# Patient Record
Sex: MALE | Race: WHITE | Employment: OTHER | URBAN - METROPOLITAN AREA
[De-identification: names, ages, dates, MRNs, and addresses within clinical notes are randomized per-mention and may not be internally consistent; named-entity substitution may affect disease eponyms.]

---

## 2017-01-11 ENCOUNTER — DOCUMENTATION ONLY (OUTPATIENT)
Dept: LAB | Facility: CLINIC | Age: 82
End: 2017-01-11

## 2017-01-11 DIAGNOSIS — R73.03 PREDIABETES: Primary | ICD-10-CM

## 2017-01-11 DIAGNOSIS — R73.09 ELEVATED GLUCOSE: ICD-10-CM

## 2017-01-11 DIAGNOSIS — I48.91 ATRIAL FIBRILLATION, UNSPECIFIED TYPE (H): ICD-10-CM

## 2017-01-11 DIAGNOSIS — I50.22 CHRONIC SYSTOLIC CONGESTIVE HEART FAILURE (H): ICD-10-CM

## 2017-01-11 DIAGNOSIS — I10 HYPERTENSION GOAL BP (BLOOD PRESSURE) < 140/90: ICD-10-CM

## 2017-01-11 NOTE — PROGRESS NOTES
This patient has a lab only appointment on 1/27/2017 but does not have future orders. It would be appreciated if this order could be made a standing order if necessary Please review, associate diagnosis and sign pending lab orders for the upcoming appointment. There are no future scheduled appointments with you at this time.      Thank you,    [facility]

## 2017-01-15 DIAGNOSIS — Z79.01 LONG-TERM (CURRENT) USE OF ANTICOAGULANTS: Primary | ICD-10-CM

## 2017-01-15 DIAGNOSIS — I48.91 ATRIAL FIBRILLATION, UNSPECIFIED TYPE (H): ICD-10-CM

## 2017-01-17 RX ORDER — WARFARIN SODIUM 2.5 MG/1
TABLET ORAL
Qty: 90 TABLET | Refills: 1 | Status: SHIPPED | OUTPATIENT
Start: 2017-01-17 | End: 2017-07-24

## 2017-01-17 NOTE — TELEPHONE ENCOUNTER
Signed Prescriptions:                        Disp   Refills    warfarin (COUMADIN) 2.5 MG tablet          90 tab*1        Sig: take 1 tablet by mouth once daily or as directed  Authorizing Provider: FAVIOLA BANERJEE  Ordering User: MOI MUKHERJEE RN refilled requested medication per [facility] according to the [facility] protocol.  Moi Mukherjee RN

## 2017-01-25 ENCOUNTER — TRANSFERRED RECORDS (OUTPATIENT)
Dept: HEALTH INFORMATION MANAGEMENT | Facility: CLINIC | Age: 82
End: 2017-01-25

## 2017-01-25 ENCOUNTER — TELEPHONE (OUTPATIENT)
Dept: FAMILY MEDICINE | Facility: CLINIC | Age: 82
End: 2017-01-25

## 2017-01-25 DIAGNOSIS — G89.4 CHRONIC PAIN SYNDROME: Primary | ICD-10-CM

## 2017-01-25 RX ORDER — OXYCODONE AND ACETAMINOPHEN 5; 325 MG/1; MG/1
1 TABLET ORAL 2 TIMES DAILY PRN
Qty: 50 TABLET | Refills: 0 | Status: SHIPPED | OUTPATIENT
Start: 2017-01-25 | End: 2017-02-14

## 2017-01-25 RX ORDER — MORPHINE SULFATE 15 MG/1
15 TABLET, FILM COATED, EXTENDED RELEASE ORAL 3 TIMES DAILY
Qty: 90 TABLET | Refills: 0 | Status: SHIPPED | OUTPATIENT
Start: 2017-01-25 | End: 2017-02-14

## 2017-01-25 NOTE — TELEPHONE ENCOUNTER
Left message for patient to call back in regards to approved refills and needing appointment.    Elyse Addison, CMA

## 2017-01-25 NOTE — TELEPHONE ENCOUNTER
The medications are refilled but he is due for a follow up appointment with me, Prescriptions sent to team pink huddle room     Please help see to it that appointment is generated within 30 days is preferred    Paul Knowles MD

## 2017-01-25 NOTE — TELEPHONE ENCOUNTER
Patient called RN hotline requesting a refill for the following medications.  Patient would like a call when these are ready to be picked up.   Controlled Substance Refill Request for Morphine     Problem List Complete:  Yes    Last Written Prescription Date:  7/19/16  Last Fill Quantity: 90,   # refills: 0    Last Office Visit with [facility] primary care provider: 7/19/16    Clinic visit frequency required: Q 3 months     Future Office visit:     Controlled substance agreement on file: Yes:  Date 4/25/16.     Processing:  Patient will  in clinic patient would like a call when this is ready    checked in past 6 months?  Yes today 1/25/17- no concerns last filled 12/29/16    Controlled Substance Refill Request for Percocet   Problem List Complete:  Yes    Last Written Prescription Date:  7/19/16  Last Fill Quantity: 50,   # refills: 0    Last Office Visit with [facility] primary care provider: 7/19/16    Clinic visit frequency required: Q 3 months     Future Office visit:     Controlled substance agreement on file: Yes:  Date 4/25/16.     Processing:  Patient will  in clinic   checked in past 6 months?  Yes today 1/25/17, no concerns      Josselyn Corona RN

## 2017-01-26 NOTE — TELEPHONE ENCOUNTER
MsContin and Percocet prescriptions at  for pick-up.  Patient called and informed.  Patient scheduled to see Dr. Knowles on Tuesday, February 14th at 11:10 a.m. Emma Leach,

## 2017-01-27 ENCOUNTER — ANTICOAGULATION THERAPY VISIT (OUTPATIENT)
Dept: NURSING | Facility: CLINIC | Age: 82
End: 2017-01-27
Payer: MEDICARE

## 2017-01-27 DIAGNOSIS — Z79.01 LONG-TERM (CURRENT) USE OF ANTICOAGULANTS: Primary | ICD-10-CM

## 2017-01-27 DIAGNOSIS — I48.91 ATRIAL FIBRILLATION, UNSPECIFIED TYPE (H): ICD-10-CM

## 2017-01-27 LAB
INR POINT OF CARE: 2.2 (ref 0.86–1.14)
INR PPP: 2.2 (ref 0.86–1.14)

## 2017-01-27 PROCEDURE — 99207 ZZC NO CHARGE NURSE ONLY: CPT | Performed by: INTERNAL MEDICINE

## 2017-01-27 PROCEDURE — 85610 PROTHROMBIN TIME: CPT | Mod: QW | Performed by: INTERNAL MEDICINE

## 2017-01-27 PROCEDURE — 36416 COLLJ CAPILLARY BLOOD SPEC: CPT | Performed by: INTERNAL MEDICINE

## 2017-01-27 NOTE — PROGRESS NOTES
ANTICOAGULATION FOLLOW-UP CLINIC VISIT    Patient Name:  Alexandru Crews  Date:  1/27/2017  Contact Type:  Telephone    SUBJECTIVE:     Patient Findings     Positives No Problem Findings           OBJECTIVE    INR   Date Value Ref Range Status   01/27/2017 2.20* 0.86 - 1.14 Final     Comment:     This test is intended for monitoring Coumadin therapy.  Results are not   accurate   in patients with prolonged INR due to factor deficiency.         ASSESSMENT / PLAN  INR assessment THER    Recheck INR In: 6 WEEKS    INR Location Clinic      Anticoagulation Summary as of 1/27/2017     INR goal 2.0-3.0   Selected INR 2.2 (1/27/2017)   Maintenance plan 2.5 mg (2.5 mg x 1) every day   Full instructions 2.5 mg every day   Weekly total 17.5 mg   No change documented Rabia Ott RN   Plan last modified Allie Awan RN (4/8/2016)   Next INR check 3/10/2017   Priority INR   Target end date     Indications   Long-term (current) use of anticoagulants [Z79.01] [Z79.01]  Atrial fibrillation (H) [I48.91]         Anticoagulation Episode Summary     INR check location     Preferred lab     Send INR reminders to [facility] CLINIC    Comments       Anticoagulation Care Providers     Provider Role Specialty Phone number    Paul Knowles MD [facility] Internal Medicine [phone]            See the Encounter Report to view Anticoagulation Flowsheet and Dosing Calendar (Go to Encounters tab in chart review, and find the Anticoagulation Therapy Visit)      Rabia Ott RN

## 2017-02-14 ENCOUNTER — OFFICE VISIT (OUTPATIENT)
Dept: FAMILY MEDICINE | Facility: CLINIC | Age: 82
End: 2017-02-14
Payer: MEDICARE

## 2017-02-14 VITALS
DIASTOLIC BLOOD PRESSURE: 76 MMHG | BODY MASS INDEX: 23.7 KG/M2 | HEART RATE: 67 BPM | TEMPERATURE: 96.8 F | SYSTOLIC BLOOD PRESSURE: 146 MMHG | WEIGHT: 129.6 LBS

## 2017-02-14 DIAGNOSIS — G89.4 CHRONIC PAIN SYNDROME: ICD-10-CM

## 2017-02-14 DIAGNOSIS — I48.91 ATRIAL FIBRILLATION, UNSPECIFIED TYPE (H): ICD-10-CM

## 2017-02-14 DIAGNOSIS — M79.672 LEFT FOOT PAIN: ICD-10-CM

## 2017-02-14 DIAGNOSIS — Z23 NEED FOR PROPHYLACTIC VACCINATION AND INOCULATION AGAINST INFLUENZA: Primary | ICD-10-CM

## 2017-02-14 DIAGNOSIS — R73.09 ELEVATED GLUCOSE: ICD-10-CM

## 2017-02-14 DIAGNOSIS — I10 HYPERTENSION GOAL BP (BLOOD PRESSURE) < 140/90: ICD-10-CM

## 2017-02-14 DIAGNOSIS — R53.83 FATIGUE, UNSPECIFIED TYPE: ICD-10-CM

## 2017-02-14 DIAGNOSIS — R73.03 PREDIABETES: ICD-10-CM

## 2017-02-14 LAB
ALT SERPL W P-5'-P-CCNC: 27 U/L (ref 0–70)
ANION GAP SERPL CALCULATED.3IONS-SCNC: 8 MMOL/L (ref 3–14)
BASOPHILS # BLD AUTO: 0 10E9/L (ref 0–0.2)
BASOPHILS NFR BLD AUTO: 0.3 %
BUN SERPL-MCNC: 21 MG/DL (ref 7–30)
CALCIUM SERPL-MCNC: 9.7 MG/DL (ref 8.5–10.1)
CHLORIDE SERPL-SCNC: 103 MMOL/L (ref 94–109)
CO2 SERPL-SCNC: 29 MMOL/L (ref 20–32)
CREAT SERPL-MCNC: 0.74 MG/DL (ref 0.66–1.25)
DIFFERENTIAL METHOD BLD: ABNORMAL
EOSINOPHIL # BLD AUTO: 0.2 10E9/L (ref 0–0.7)
EOSINOPHIL NFR BLD AUTO: 2.9 %
ERYTHROCYTE [DISTWIDTH] IN BLOOD BY AUTOMATED COUNT: 13.6 % (ref 10–15)
GFR SERPL CREATININE-BSD FRML MDRD: ABNORMAL ML/MIN/1.7M2
GLUCOSE SERPL-MCNC: 121 MG/DL (ref 70–99)
HBA1C MFR BLD: 5.5 % (ref 4.3–6)
HCT VFR BLD AUTO: 44.5 % (ref 40–53)
HGB BLD-MCNC: 15.1 G/DL (ref 13.3–17.7)
LYMPHOCYTES # BLD AUTO: 1.6 10E9/L (ref 0.8–5.3)
LYMPHOCYTES NFR BLD AUTO: 23.3 %
MCH RBC QN AUTO: 29.7 PG (ref 26.5–33)
MCHC RBC AUTO-ENTMCNC: 33.9 G/DL (ref 31.5–36.5)
MCV RBC AUTO: 88 FL (ref 78–100)
MONOCYTES # BLD AUTO: 0.5 10E9/L (ref 0–1.3)
MONOCYTES NFR BLD AUTO: 7.7 %
NEUTROPHILS # BLD AUTO: 4.5 10E9/L (ref 1.6–8.3)
NEUTROPHILS NFR BLD AUTO: 65.8 %
PLATELET # BLD AUTO: 148 10E9/L (ref 150–450)
POTASSIUM SERPL-SCNC: 4.3 MMOL/L (ref 3.4–5.3)
RBC # BLD AUTO: 5.08 10E12/L (ref 4.4–5.9)
SODIUM SERPL-SCNC: 140 MMOL/L (ref 133–144)
WBC # BLD AUTO: 6.8 10E9/L (ref 4–11)

## 2017-02-14 PROCEDURE — 36415 COLL VENOUS BLD VENIPUNCTURE: CPT | Performed by: INTERNAL MEDICINE

## 2017-02-14 PROCEDURE — 84460 ALANINE AMINO (ALT) (SGPT): CPT | Performed by: INTERNAL MEDICINE

## 2017-02-14 PROCEDURE — 85025 COMPLETE CBC W/AUTO DIFF WBC: CPT | Performed by: INTERNAL MEDICINE

## 2017-02-14 PROCEDURE — 80048 BASIC METABOLIC PNL TOTAL CA: CPT | Performed by: INTERNAL MEDICINE

## 2017-02-14 PROCEDURE — 83036 HEMOGLOBIN GLYCOSYLATED A1C: CPT | Performed by: INTERNAL MEDICINE

## 2017-02-14 PROCEDURE — 99214 OFFICE O/P EST MOD 30 MIN: CPT | Performed by: INTERNAL MEDICINE

## 2017-02-14 RX ORDER — MORPHINE SULFATE 15 MG/1
15 TABLET, FILM COATED, EXTENDED RELEASE ORAL 3 TIMES DAILY
Qty: 90 TABLET | Refills: 0 | Status: SHIPPED | OUTPATIENT
Start: 2017-02-14 | End: 2017-02-16

## 2017-02-14 RX ORDER — OXYCODONE AND ACETAMINOPHEN 5; 325 MG/1; MG/1
1 TABLET ORAL 2 TIMES DAILY PRN
Qty: 50 TABLET | Refills: 0 | Status: SHIPPED | OUTPATIENT
Start: 2017-02-14 | End: 2017-02-16

## 2017-02-14 NOTE — NURSING NOTE
"Chief Complaint   Patient presents with     Refill Request     refill and recheck medications     Fatigue       Initial /76 (BP Location: Right arm, Patient Position: Chair, Cuff Size: Adult Regular)  Pulse 67  Temp 96.8  F (36  C) (Oral)  Wt 129 lb 9.6 oz (58.8 kg)  BMI 23.7 kg/m2 Estimated body mass index is 23.7 kg/(m^2) as calculated from the following:    Height as of 8/18/16: 5' 2\" (1.575 m).    Weight as of this encounter: 129 lb 9.6 oz (58.8 kg).  Medication Reconciliation: complete   Leonila BRINK CMA ([facility])      "

## 2017-02-14 NOTE — MR AVS SNAPSHOT
After Visit Summary   2/14/2017    Alexandru Crews    MRN: [id number]           Patient Information     Date Of Birth          9/22/1932        Visit Information        Provider Department      2/14/2017 11:10 AM Paul Knowles MD [facility]        Today's Diagnoses     Need for prophylactic vaccination and inoculation against influenza    -  1    Prediabetes        Atrial fibrillation, unspecified type (H)        Chronic pain syndrome        Hypertension goal BP (blood pressure) < 140/90        Elevated glucose        Fatigue, unspecified type        Left foot pain          Care Instructions    [facility]    If you have any questions regarding to your visit please contact your care team:     Team Pink:   Clinic Hours Telephone Number   Internal Medicine:  Dr. Nini Landaverde NP       7am-7pm  Monday - Thursday   7am-5pm  Fridays  [phone]  (Appointment scheduling available 24/7)    Questions about your visit?  Team Line  [phone]   Urgent Care - [place] [facility] and [place] [facility] - 11am-9pm Monday-Friday Saturday-Sunday- 9am-5pm   [place] - 5pm-9pm Monday-Friday Saturday-Sunday- 9am-5pm  [phone] - [place]   [phone] - [place]       What options do I have for visits at the clinic other than the traditional office visit?  To expand how we care for you, many of our providers are utilizing electronic visits (e-visits) and telephone visits, when medically appropriate, for interactions with their patients rather than a visit in the clinic.   We also offer nurse visits for many medical concerns. Just like any other service, we will bill your insurance company for this type of visit based on time spent on the phone with your provider. Not all insurance companies cover these visits. Please check with your medical insurance if this type of visit is covered. You will be responsible for any charges that are not paid by  your insurance.      E-visits via [employer]:  generally incur a $35.00 fee.  Telephone visits:  Time spent on the phone: *charged based on time that is spent on the phone in increments of 10 minutes. Estimated cost:   5-10 mins $30.00   11-20 mins. $59.00   21-30 mins. $85.00   Use [employer] (secure email communication and access to your chart) to send your primary care provider a message or make an appointment. Ask someone on your Team how to sign up for [employer].    For a Price Quote for your services, please call our Consumer Price Line at [phone].    As always, Thank you for trusting us with your health care needs!    Discharged by Leonila BRINK CMA ([facility])          Follow-ups after your visit        Additional Services     PODIATRY/FOOT & ANKLE SURGERY REFERRAL       Your provider has referred you to: [facility] [place] [phone]   [email or web address]    Please be aware that coverage of these services is subject to the terms and limitations of your health insurance plan.  Call member services at your health plan with any benefit or coverage questions.      Please bring the following to your appointment:  >>   Any x-rays, CTs or MRIs which have been performed.  Contact the facility where they were done to arrange for  prior to your scheduled appointment.    >>   List of current medications   >>   This referral request   >>   Any documents/labs given to you for this referral                  Your next 10 appointments already scheduled     Mar 10, 2017  8:45 AM CST   LAB with [facility]   [facility]    [address]  [place] [address] [phone]           Patient must bring picture ID.  Patient should be prepared to give a urine specimen  Please do not eat 10-12 hours before your appointment if you are coming in fasting for labs on lipids, cholesterol, or glucose (sugar).  Pregnant women should follow their Care  "Team instructions. Water with medications is okay. Do not drink coffee or other fluids.   If you have concerns about taking  your medications, please ask at office or if scheduling via [employer], send a message by clicking on Secure Messaging, Message Your Care Team.              Future tests that were ordered for you today     Open Future Orders        Priority Expected Expires Ordered    **TSH with free T4 reflex FUTURE anytime Routine 2017    **Vitamin B12 FUTURE 2mo Routine 4/15/2017 2017 2017            Who to contact     If you have questions or need follow up information about today's clinic visit or your schedule please contact [facility] directly at [phone].  Normal or non-critical lab and imaging results will be communicated to you by [employer], letter or phone within 4 business days after the clinic has received the results. If you do not hear from us within 7 days, please contact the clinic through [employer] or phone. If you have a critical or abnormal lab result, we will notify you by phone as soon as possible.  Submit refill requests through [employer] or call your pharmacy and they will forward the refill request to us. Please allow 3 business days for your refill to be completed.          Additional Information About Your Visit        [employer] Information     [employer] lets you send messages to your doctor, view your test results, renew your prescriptions, schedule appointments and more. To sign up, go to [email or web address] . Click on \"Log in\" on the left side of the screen, which will take you to the Welcome page. Then click on \"Sign up Now\" on the right side of the page.     You will be asked to enter the access code listed below, as well as some personal information. Please follow the directions to create your username and password.     Your access code is: [email or web address]  Expires: 2017 12:57 PM     Your access code will  in 90 days. If you " need help or a new code, please call your [place] clinic or [phone].        Care EveryWhere ID     This is your Care EveryWhere ID. This could be used by other organizations to access your [place] medical records  [id number]        Your Vitals Were     Pulse Temperature BMI (Body Mass Index)             67 96.8  F (36  C) (Oral) 23.7 kg/m2          Blood Pressure from Last 3 Encounters:   02/14/17 146/76   12/01/16 132/60   07/19/16 128/68    Weight from Last 3 Encounters:   02/14/17 129 lb 9.6 oz (58.8 kg)   08/18/16 124 lb (56.2 kg)   07/19/16 124 lb (56.2 kg)              We Performed the Following     ALT     BASIC METABOLIC PANEL     CBC with platelets differential     Hemoglobin A1c     PODIATRY/FOOT & ANKLE SURGERY REFERRAL          Where to get your medicines      Some of these will need a paper prescription and others can be bought over the counter.  Ask your nurse if you have questions.     Bring a paper prescription for each of these medications     morphine 15 MG 12 hr tablet    oxyCODONE-acetaminophen 5-325 MG per tablet          Primary Care Provider Office Phone # Fax #    Paul Knowles [phone]       [address] [address]        Thank you!     Thank you for choosing [facility]  for your care. Our goal is always to provide you with excellent care. Hearing back from our patients is one way we can continue to improve our services. Please take a few minutes to complete the written survey that you may receive in the mail after your visit with us. Thank you!             Your Updated Medication List - Protect others around you: Learn how to safely use, store and throw away your medicines at www.disposemymeds.org.          This list is accurate as of: 2/14/17 11:53 AM.  Always use your most recent med list.                   Brand Name Dispense Instructions for use    calcium-vitamin D 600-400 MG-UNIT per tablet    CALTRATE      Take 1 tablet by mouth every other day       DAILY VITAMINS PO      1 tablet daily       FIBER PO      Take 1 tsp. by mouth daily       Fish Oil 1200 MG Caps      Take 1 capsule by mouth 2 times daily.       metoprolol 25 MG 24 hr tablet    TOPROL-XL     Take 25 mg by mouth 2 times daily       morphine 15 MG 12 hr tablet    MS CONTIN    90 tablet    Take 1 tablet (15 mg) by mouth 3 times daily 28 days or more between refills for controlled medications       oxyCODONE-acetaminophen 5-325 MG per tablet    PERCOCET    50 tablet    Take 1 tablet by mouth 2 times daily as needed for moderate to severe pain 28 days or more between refills for controlled medications       senna-docusate 8.6-50 MG per tablet    SENOKOT-S;PERICOLACE     Take 1 tablet by mouth 3 times daily as needed       triamcinolone 0.1 % cream    KENALOG    60 g    Apply  topically 2 times daily as needed. to affected area as directed.       VITAMIN D (CHOLECALCIFEROL) PO      Take  by mouth daily. 3000 units daily.       warfarin 2.5 MG tablet    COUMADIN    90 tablet    take 1 tablet by mouth once daily or as directed

## 2017-02-14 NOTE — LETTER
[address] [place] [address]    February 16, 2017    Alexandru Crews  [address] [place] [address]          Dear Alexandru,    Enclosed is a copy of your results.  Every single one of these tests is fine ! I will follow up with you further still after my conference with the pharmacist on your case. Please let me know if you have any questions for me. This will not take long, I should have some feedback for you within a week.    Results for orders placed or performed in visit on 02/14/17   ALT   Result Value Ref Range    ALT 27 0 - 70 U/L   BASIC METABOLIC PANEL   Result Value Ref Range    Sodium 140 133 - 144 mmol/L    Potassium 4.3 3.4 - 5.3 mmol/L    Chloride 103 94 - 109 mmol/L    Carbon Dioxide 29 20 - 32 mmol/L    Anion Gap 8 3 - 14 mmol/L    Glucose 121 (H) 70 - 99 mg/dL    Urea Nitrogen 21 7 - 30 mg/dL    Creatinine 0.74 0.66 - 1.25 mg/dL    GFR Estimate >90  Non  GFR Calc   >60 mL/min/1.7m2    GFR Estimate If Black >90   GFR Calc   >60 mL/min/1.7m2    Calcium 9.7 8.5 - 10.1 mg/dL   CBC with platelets differential   Result Value Ref Range    WBC 6.8 4.0 - 11.0 10e9/L    RBC Count 5.08 4.4 - 5.9 10e12/L    Hemoglobin 15.1 13.3 - 17.7 g/dL    Hematocrit 44.5 40.0 - 53.0 %    MCV 88 78 - 100 fl    MCH 29.7 26.5 - 33.0 pg    MCHC 33.9 31.5 - 36.5 g/dL    RDW 13.6 10.0 - 15.0 %    Platelet Count 148 (L) 150 - 450 10e9/L    Diff Method Automated Method     % Neutrophils 65.8 %    % Lymphocytes 23.3 %    % Monocytes 7.7 %    % Eosinophils 2.9 %    % Basophils 0.3 %    Absolute Neutrophil 4.5 1.6 - 8.3 10e9/L    Absolute Lymphocytes 1.6 0.8 - 5.3 10e9/L    Absolute Monocytes 0.5 0.0 - 1.3 10e9/L    Absolute Eosinophils 0.2 0.0 - 0.7 10e9/L    Absolute Basophils 0.0 0.0 - 0.2 10e9/L   Hemoglobin A1c   Result Value Ref Range    Hemoglobin A1C 5.5 4.3 - 6.0 %      Sincerely,        Paul Knowles MD/rk

## 2017-02-14 NOTE — Clinical Note
I felt we could just have a discussion regarding this patient's chronic pain medication , when you get a chance, lets chat.

## 2017-02-14 NOTE — PATIENT INSTRUCTIONS
[facility]    If you have any questions regarding to your visit please contact your care team:     Team Pink:   Clinic Hours Telephone Number   Internal Medicine:  Dr. Nini Landaverde NP       7am-7pm  Monday - Thursday   7am-5pm  Fridays  [phone]  (Appointment scheduling available 24/7)    Questions about your visit?  Team Line  [phone]   Urgent Care - [place] - 11am-9pm Monday-Friday Saturday-Sunday- 9am-5pm   [place] - 5pm-9pm Monday-Friday Saturday-Sunday- 9am-5pm  [phone] - [place]   [phone] - [place]       What options do I have for visits at the clinic other than the traditional office visit?  To expand how we care for you, many of our providers are utilizing electronic visits (e-visits) and telephone visits, when medically appropriate, for interactions with their patients rather than a visit in the clinic.   We also offer nurse visits for many medical concerns. Just like any other service, we will bill your insurance company for this type of visit based on time spent on the phone with your provider. Not all insurance companies cover these visits. Please check with your medical insurance if this type of visit is covered. You will be responsible for any charges that are not paid by your insurance.      E-visits via [employer]:  generally incur a $35.00 fee.  Telephone visits:  Time spent on the phone: *charged based on time that is spent on the phone in increments of 10 minutes. Estimated cost:   5-10 mins $30.00   11-20 mins. $59.00   21-30 mins. $85.00   Use [employer] (secure email communication and access to your chart) to send your primary care provider a message or make an appointment. Ask someone on your Team how to sign up for [employer].    For a Price Quote for your services, please call our Consumer Price Line at [phone].    As always, Thank you for trusting us with your health care  needs!    Discharged by Leonila BRINK CMA ([facility])

## 2017-02-14 NOTE — PROGRESS NOTES
"  SUBJECTIVE:                                                    Alexandru Crews is a 84 year old male who presents to clinic today for the following health issues:        Patient presents with:  Refill Request: refill and recheck medications    . Alexandru Crews is a gentleman with many problems including advancing age.    Coming for medication overview. Needs refills. Because he's on opioid pain medication we have insisted that once a year wasn't often enough and as it is, 6 months office visits are acceptable .    Here at the 6 month ruperto we recognize he has a difficult existence at this point. He feels frustrated with his Morphine (Yahaira) , as it is not as much help as it used to be he says. He asks , could he stop this ? All his pain is \" still there\". He's talking about a ill-defined chronic pain situation mostly with his legs but really fits the diagnosis of a chronic pain syndrome . He's been on chronic oral opioid therapy going all the way  Back 10 years or so, this started with Dr. Alex Mcpherson a Family Medicine Physician who used to practice here at [facility] . He is also using the oxyCODONE (ROXICODONE) - generally taking this just 2 times a day and not 3 times a day because he forgets. He's also frustrated with an unrelated matter , which is chronic fatigue.     He's using the oxyCODONE (ROXICODONE) differently then it was originally prescribed . It was planned as a medication to cover breakthrough symptoms and was to be on a more prn basis but what he does is break these oxyCODONE (ROXICODONE) pills in half and he's taking this 4-5 times a day. He is under the impression that the oxyCODONE (ROXICODONE) works more then the MS-Contin (Morphine Sulfate Controlled-Release).    This patient met previously with Kelli Gonsalez RP, the [facility] pharmacist at an effort to make things better but patient feels this was not of value for him. I simply suggested I would review his " medications with Kelli and get back to him with some suggestions.    Current regimen has been in place for a few years or so.  Quality of life right now is not so good. He needs many orthopedic surgeries but is simply not a viable candidate ; he needs a total knee replacement, a foot surgery , back surgery and has serious shoulder problems and a chronic headache  occipital neuralgia condition, seeing the [facility] for the headache pains. He's also got a pacemaker which he feels saved his life, this was placed for atrial fibrillation with slow rates / sinus pauses     Also serious foot problems , there's some chronic motor dysfunction with the left foot and this is since his back surgery, but the foot is drifting to the left consistent with arthritic degeneration    Due for some additional laboratory workup , see assessment and plan section      Problem list and histories reviewed & adjusted, as indicated.  Additional history: as documented    Patient Active Problem List   Diagnosis     Family history of colon cancer     Osteopenia     HYPERLIPIDEMIA LDL GOAL <160     Vitreous condensations, od > os     Pseudophakia, Yag Caps, ou     Chronic pain syndrome     Advanced directives, counseling/discussion     BPH (benign prostatic hyperplasia)     Atrial fibrillation (H)     History of shingles     Chronic fatigue     Lumbar spinal stenosis     DJD (degenerative joint disease), lumbar and thoracic     Posterior capsular opacification     Diverticulosis of large intestine     H/O cardiac pacemaker     Hypertension goal BP (blood pressure) < 140/90     Long-term (current) use of anticoagulants [Z79.01]     Chronic nonintractable headache, unspecified headache type     Prediabetes     Chronic pain     Elevated glucose     Past Surgical History   Procedure Laterality Date     Aditya NIETO appendectomy  12/31/2004     Carpal tunnel release rt/lt  three     2 on left, one on right     Arthroscopy knee rt/lt   1/2006     Rt & Lt, Dr Arriaga     Phacoemulsification with standard intraocular lens implant  6/2008; 8/2008     right eye; left eye     Shoulder surgery       X four [ right x 2 and left x 2 ][[[[[     Release trigger finger       right hand     Back surgery  1978 / 2003     x 3 in 1978, fusions and one surgery in 2003     Rectal surgery       fissurectomy and proctoplasty     Appendectomy  12/2004     Colonoscopy  1995,2000,2005     Inject epidural lumbar  11/9/2010     [facility]     Inject epidural lumbar  1/4/2011     [facility]     Inject epidural lumbar  4/27/2011     [facility]     Inject epidural cervical  5/24/2011     [facility]     Laser yag capsulotomy  11/2016; 12/2016     left eye; right eye     Cataract iol, rt/lt         Social History   Substance Use Topics     Smoking status: Former Smoker     Years: 15.00     Types: Cigarettes     Quit date: 6/30/1975     Smokeless tobacco: Never Used     Alcohol use Yes      Comment: few beers weekly     Family History   Problem Relation Age of Onset     CANCER Mother      ovarian     Cancer - colorectal Brother          Current Outpatient Prescriptions   Medication Sig Dispense Refill     oxyCODONE-acetaminophen (PERCOCET) 5-325 MG per tablet Take 1 tablet by mouth 2 times daily as needed for moderate to severe pain 28 days or more between refills for controlled medications 50 tablet 0     morphine (MS CONTIN) 15 MG 12 hr tablet Take 1 tablet (15 mg) by mouth 3 times daily 28 days or more between refills for controlled medications 90 tablet 0     warfarin (COUMADIN) 2.5 MG tablet take 1 tablet by mouth once daily or as directed 90 tablet 1     metoprolol (TOPROL-XL) 25 MG 24 hr tablet Take 25 mg by mouth 2 times daily       calcium-vitamin D (CALTRATE) 600-400 MG-UNIT per tablet Take 1 tablet by mouth every other day       senna-docusate (SENOKOT-S;PERICOLACE) 8.6-50 MG per tablet Take 1 tablet by mouth 3 times daily as needed         triamcinolone (KENALOG) 0.1 % cream Apply  topically 2 times daily as needed. to affected area as directed. 60 g 2     FIBER PO Take 1 tsp. by mouth daily        DAILY VITAMINS PO 1 tablet daily       VITAMIN D, CHOLECALCIFEROL, PO Take  by mouth daily. 3000 units daily.        omega-3 fatty acids (FISH OIL) 1200 MG capsule Take 1 capsule by mouth 2 times daily.       Allergies   Allergen Reactions     Lactose Nausea and Vomiting     Sulindac      Upset stomach     Tramadol      Itching     Valdecoxib Itching     Vicodin [Hydrocodone-Acetaminophen]      Itching     Vioxx Itching     Rofecoxib Itching and Rash     Sulfa Drugs Rash     Muscle aches     Recent Labs   Lab Test  02/14/17   1200  07/19/16   1033  12/23/15   0956  07/14/15   1054   07/10/14   1102 06/23/14   A1C  5.5   --    --   5.8   --   5.6   --    LDL   --   102*   --   144*   --   132*   --    HDL   --   71   --   60   --   59   --    TRIG   --   87   --   110   --   100   --    ALT  27   --   30  24   --   19   --    CR  0.74  0.66  0.72  0.66   < >  0.65*  0.75   GFRESTIMATED  >90  Non  GFR Calc    >90  Non  GFR Calc    >90  Non  GFR Calc    >90  Non  GFR Calc     < >  >90  >60   GFRESTBLACK  >90   GFR Calc    >90   GFR Calc    >90   GFR Calc    >90   GFR Calc     < >  >90  >60   POTASSIUM  4.3  4.5  4.1  5.1   < >  5.0  4.6   TSH   --    --    --    --    --   1.30  0.60    < > = values in this interval not displayed.      BP Readings from Last 3 Encounters:   02/14/17 146/76   12/01/16 132/60   07/19/16 128/68    Wt Readings from Last 3 Encounters:   02/14/17 129 lb 9.6 oz (58.8 kg)   08/18/16 124 lb (56.2 kg)   07/19/16 124 lb (56.2 kg)                  Labs reviewed in EPIC  Problem list, Medication list, Allergies, and Medical/Social/Surgical histories reviewed in EPIC and updated as  appropriate.    ROS:  Constitutional, neuro, ENT, endocrine, pulmonary, cardiac, gastrointestinal, genitourinary, musculoskeletal, integument and psychiatric systems are negative, except as otherwise noted.    OBJECTIVE:                                                    /76 (BP Location: Right arm, Patient Position: Chair, Cuff Size: Adult Regular)  Pulse 67  Temp 96.8  F (36  C) (Oral)  Wt 129 lb 9.6 oz (58.8 kg)  BMI 23.7 kg/m2  Body mass index is 23.7 kg/(m^2).  GENERAL APPEARANCE: alert and mild distress  EYES: Eyes grossly normal to inspection, PERRL and conjunctivae and sclerae normal  MS: arthritic changes of multiple joints, he showed me his left ankle which has a severe abnormality consistent with advanced osteoarthritis, his foot is literally pushed to the side in a deformity that looks really quite painful  NEURO: Normal strength and tone, mentation intact and speech normal  PSYCH: mentation appears normal, worried and a dour temperament     Diagnostic test results:  Diagnostic Test Results:  Orders Placed This Encounter   Procedures     ALT     BASIC METABOLIC PANEL     CBC with platelets differential     Hemoglobin A1c     **TSH with free T4 reflex FUTURE anytime     **Vitamin B12 FUTURE 2mo     PODIATRY/FOOT & ANKLE SURGERY REFERRAL          ASSESSMENT/PLAN:                                                    1. Need for prophylactic vaccination and inoculation against influenza      2. Prediabetes  Doubt clinical significance but warrants a1c   - ALT  - BASIC METABOLIC PANEL  - CBC with platelets differential  - Hemoglobin A1c    3. Atrial fibrillation, unspecified type (H)  Baseline, sees [facility], notes reviewed with care everywhere   - ALT  - BASIC METABOLIC PANEL  - CBC with platelets differential    4. Chronic pain syndrome  For right now I simply refilled his medication for another month but suggested I would have a conference with Kelli to consider  different ideas  - ALT  - BASIC METABOLIC PANEL  - CBC with platelets differential  - oxyCODONE-acetaminophen (PERCOCET) 5-325 MG per tablet; Take 1 tablet by mouth 2 times daily as needed for moderate to severe pain 28 days or more between refills for controlled medications  Dispense: 50 tablet; Refill: 0  - morphine (MS CONTIN) 15 MG 12 hr tablet; Take 1 tablet (15 mg) by mouth 3 times daily 28 days or more between refills for controlled medications  Dispense: 90 tablet; Refill: 0    5. Hypertension goal BP (blood pressure) < 140/90  Controlled within acceptable limits   BP Readings from Last 3 Encounters:   02/14/17 146/76   12/01/16 132/60   07/19/16 128/68       - ALT  - BASIC METABOLIC PANEL  - CBC with platelets differential    6. Elevated glucose  As above   - Hemoglobin A1c    7. Fatigue, unspecified type  He adds at the end of the appointment that we need to look into his chronic fatigue and there's really no strong sense here of where to go with this beyond a few additional laboratory studies. He's denying depression symptoms   - **TSH with free T4 reflex FUTURE anytime; Future  - **Vitamin B12 FUTURE 2mo; Future    8. Left foot pain  Suggested an opinion with our Podiatrist , there may be some palliative suggestions to lessen his pain  - PODIATRY/FOOT & ANKLE SURGERY REFERRAL      Follow up with Provider - 3-6 months      Paul Knowles MD  [facility]

## 2017-02-16 RX ORDER — MORPHINE SULFATE 15 MG/1
15 TABLET, FILM COATED, EXTENDED RELEASE ORAL EVERY 12 HOURS
Qty: 60 TABLET | Refills: 0 | Status: SHIPPED | OUTPATIENT
Start: 2017-02-16 | End: 2017-02-25

## 2017-02-16 RX ORDER — OXYCODONE AND ACETAMINOPHEN 5; 325 MG/1; MG/1
1 TABLET ORAL 3 TIMES DAILY PRN
Qty: 90 TABLET | Refills: 0 | Status: SHIPPED | OUTPATIENT
Start: 2017-02-16 | End: 2017-02-25

## 2017-02-17 NOTE — PROGRESS NOTES
Chart note addendum     I called patient and we had a detailed discussion per telephone call. I related to patient that I did spend some extra time reviewing his chart and dosing with opioid pain medication and the history, with the pharmacist . After some careful thought what I am going to do is change his prescriptions as follows     MS-Contin (Morphine Sulfate Controlled-Release) is changed to just 2 times a day with a monthly quantity of 60. We will also change his percocet 5/325 tablets to 3 times a day prn with 90 tabs per month.    We can do this good for 3 months, as of the next time for refill.    See prescriptions sent right now    Paul Knowles MD

## 2017-02-21 ENCOUNTER — TELEPHONE (OUTPATIENT)
Dept: FAMILY MEDICINE | Facility: CLINIC | Age: 82
End: 2017-02-21

## 2017-02-21 DIAGNOSIS — G89.4 CHRONIC PAIN SYNDROME: ICD-10-CM

## 2017-02-21 NOTE — TELEPHONE ENCOUNTER
Patient called RN hotline stating that he was seen on 2/14/17 and was told he could have 3 months worth of medication (pain meds) at a time.  He filled his prescription and is wondering if there is a reason it was not written for 3 months?    Patient also questioning high glucose reading.  Spoke with patient he was not fasting, advised patient that is normal if he was not fasting.      Patient would like this message held for pcp.   Josselyn Corona RN

## 2017-02-25 RX ORDER — MORPHINE SULFATE 15 MG/1
15 TABLET, FILM COATED, EXTENDED RELEASE ORAL EVERY 12 HOURS
Qty: 60 TABLET | Refills: 0 | Status: SHIPPED | OUTPATIENT
Start: 2017-02-25 | End: 2017-07-27

## 2017-02-25 RX ORDER — OXYCODONE AND ACETAMINOPHEN 5; 325 MG/1; MG/1
1 TABLET ORAL 3 TIMES DAILY PRN
Qty: 90 TABLET | Refills: 0 | Status: SHIPPED | OUTPATIENT
Start: 2017-02-25 | End: 2017-02-25

## 2017-02-25 RX ORDER — MORPHINE SULFATE 15 MG/1
15 TABLET, FILM COATED, EXTENDED RELEASE ORAL EVERY 12 HOURS
Qty: 60 TABLET | Refills: 0 | Status: SHIPPED | OUTPATIENT
Start: 2017-02-25 | End: 2017-02-25

## 2017-02-25 RX ORDER — OXYCODONE AND ACETAMINOPHEN 5; 325 MG/1; MG/1
1 TABLET ORAL 3 TIMES DAILY PRN
Qty: 90 TABLET | Refills: 0 | Status: SHIPPED | OUTPATIENT
Start: 2017-02-25 | End: 2017-07-27

## 2017-02-26 NOTE — TELEPHONE ENCOUNTER
Additional prescription are written and Prescriptions sent to team pink huddle room     Paul Knowles MD

## 2017-02-27 NOTE — TELEPHONE ENCOUNTER
2 Percocet and 2 Ms Contin prescriptions at  for pick-up.  Patient called and informed. Emma Leach,

## 2017-03-03 ENCOUNTER — DOCUMENTATION ONLY (OUTPATIENT)
Dept: LAB | Facility: CLINIC | Age: 82
End: 2017-03-03

## 2017-03-03 DIAGNOSIS — I48.91 ATRIAL FIBRILLATION, UNSPECIFIED TYPE (H): Primary | ICD-10-CM

## 2017-03-03 NOTE — PROGRESS NOTES
Dr Knowles,    Mr Crews has a Lab Only appointment for an INR.   Please review his chart, future the test to be done with the dx code and sign.    Thank you   Lab Staff

## 2017-03-03 NOTE — PROGRESS NOTES
This makes no sense to me , I am more then glad to place the order and have done so but this needs to be sorted out    Patient has had chronic anticoagulation and the INRs are usually a standing order and followed by our anticoagulation clinic or with [facility] [place] [facility].    There's something not right here and please find out what it is. Does he need inr clinic enrollment information with our anticoagulation clinic ?    See what's what, and see orders.    aPul Knowles MD

## 2017-03-03 NOTE — PROGRESS NOTES
It appears that patient was scheduled for a lab only INR visit due to no INR nurse for a previous appointment. Called to re-set up a INR clinic appointment instead of a lab only appointment. Left message with patient's wife to call [phone] to set up an appointment.    Rose Navarrete, RN - BC

## 2017-03-10 ENCOUNTER — ANTICOAGULATION THERAPY VISIT (OUTPATIENT)
Dept: NURSING | Facility: CLINIC | Age: 82
End: 2017-03-10
Payer: MEDICARE

## 2017-03-10 DIAGNOSIS — I48.91 ATRIAL FIBRILLATION, UNSPECIFIED TYPE (H): ICD-10-CM

## 2017-03-10 DIAGNOSIS — Z79.01 LONG-TERM (CURRENT) USE OF ANTICOAGULANTS: ICD-10-CM

## 2017-03-10 DIAGNOSIS — R53.83 FATIGUE, UNSPECIFIED TYPE: ICD-10-CM

## 2017-03-10 LAB
INR PPP: 1.6 (ref 0.86–1.14)
TSH SERPL DL<=0.005 MIU/L-ACNC: 1.99 MU/L (ref 0.4–4)
VIT B12 SERPL-MCNC: 755 PG/ML (ref 193–986)

## 2017-03-10 PROCEDURE — 85610 PROTHROMBIN TIME: CPT | Performed by: INTERNAL MEDICINE

## 2017-03-10 PROCEDURE — 36416 COLLJ CAPILLARY BLOOD SPEC: CPT | Performed by: INTERNAL MEDICINE

## 2017-03-10 PROCEDURE — 84443 ASSAY THYROID STIM HORMONE: CPT | Performed by: INTERNAL MEDICINE

## 2017-03-10 PROCEDURE — 82607 VITAMIN B-12: CPT | Performed by: INTERNAL MEDICINE

## 2017-03-10 PROCEDURE — 99207 ZZC NO CHARGE NURSE ONLY: CPT | Performed by: INTERNAL MEDICINE

## 2017-03-10 NOTE — PROGRESS NOTES
ANTICOAGULATION FOLLOW-UP CLINIC VISIT    Patient Name:  Alexandru Crews  Date:  3/10/2017  Contact Type:  Telephone    SUBJECTIVE:     Patient Findings     Positives Missed doses (Missed one dose during this past week, cannot remember the day. )           OBJECTIVE    INR   Date Value Ref Range Status   03/10/2017 1.60 (H) 0.86 - 1.14 Final     Comment:     This test is intended for monitoring Coumadin therapy.  Results are not   accurate   in patients with prolonged INR due to factor deficiency.  PATIENT SAID HE DID NOT TAKE HIS MEDICATION AS DIRECTED THIS PAST WEEK. MS         ASSESSMENT / PLAN  INR assessment SUB    Recheck INR In: 1 WEEK    INR Location Outside lab      Anticoagulation Summary as of 3/10/2017     INR goal 2.0-3.0   Today's INR 1.60!   Maintenance plan 2.5 mg (2.5 mg x 1) every day   Full instructions 3/10: 5 mg; Otherwise 2.5 mg every day   Weekly total 17.5 mg   Plan last modified Allie Awan RN (4/8/2016)   Next INR check 3/17/2017   Priority INR   Target end date     Indications   Long-term (current) use of anticoagulants [Z79.01] [Z79.01]  Atrial fibrillation (H) [I48.91]         Anticoagulation Episode Summary     INR check location     Preferred lab     Send INR reminders to [facility]    Comments       Anticoagulation Care Providers     Provider Role Specialty Phone number    Paul Knowles MD [facility] Internal Medicine [phone]            See the Encounter Report to view Anticoagulation Flowsheet and Dosing Calendar (Go to Encounters tab in chart review, and find the Anticoagulation Therapy Visit)    Dosage adjustment made based on physician directed care plan.    Rose Navarrete RN

## 2017-03-10 NOTE — MR AVS SNAPSHOT
Alexandru CARRERA Eleonora   3/10/2017   Anticoagulation Therapy Visit    Description:  84 year old male   Provider:  Paul Knowles MD   Department:  [facility]           INR as of 3/10/2017     Today's INR 1.60!      Anticoagulation Summary as of 3/10/2017     INR goal 2.0-3.0   Today's INR 1.60!   Full instructions 3/10: 5 mg; Otherwise 2.5 mg every day   Next INR check 3/17/2017    Indications   Long-term (current) use of anticoagulants [Z79.01] [Z79.01]  Atrial fibrillation (H) [I48.91]         Your next Anticoagulation Clinic appointment(s)     Mar 17, 2017  9:15 AM CDT   Anticoagulation Visit with JAYESH ANTI COAG   [facility] ([address] [address] [phone]              Contact Numbers     [facility]  Please call [phone] to cancel and/or reschedule your appointment   Please call [phone] with any problems or questions regarding your therapy.        March 2017 Details    Sun Mon Tue Wed Thu Fri Sat        1               2               3               4                 5               6               7               8               9               10      5 mg   See details      11      2.5 mg           12      2.5 mg         13      2.5 mg         14      2.5 mg         15      2.5 mg         16      2.5 mg         17            18                 19               20               21               22               23               24               25                 26               27               28               29               30               31                 Date Details   03/10 This INR check       Date of next INR:  3/17/2017         How to take your warfarin dose     To take:  2.5 mg Take 1 of the 2.5 mg tablets.    To take:  5 mg Take 2 of the 2.5 mg tablets.

## 2017-03-14 ENCOUNTER — ANTICOAGULATION THERAPY VISIT (OUTPATIENT)
Dept: NURSING | Facility: CLINIC | Age: 82
End: 2017-03-14

## 2017-03-14 DIAGNOSIS — Z79.01 LONG-TERM (CURRENT) USE OF ANTICOAGULANTS: ICD-10-CM

## 2017-03-14 LAB — INR PPP: 1.6

## 2017-03-17 ENCOUNTER — ANTICOAGULATION THERAPY VISIT (OUTPATIENT)
Dept: NURSING | Facility: CLINIC | Age: 82
End: 2017-03-17
Payer: MEDICARE

## 2017-03-17 DIAGNOSIS — Z79.01 LONG-TERM (CURRENT) USE OF ANTICOAGULANTS: ICD-10-CM

## 2017-03-17 LAB — INR POINT OF CARE: 2.6 (ref 0.86–1.14)

## 2017-03-17 PROCEDURE — 99207 ZZC NO CHARGE NURSE ONLY: CPT

## 2017-03-17 PROCEDURE — 85610 PROTHROMBIN TIME: CPT | Mod: QW

## 2017-03-17 PROCEDURE — 36416 COLLJ CAPILLARY BLOOD SPEC: CPT

## 2017-03-17 NOTE — PROGRESS NOTES
ANTICOAGULATION FOLLOW-UP CLINIC VISIT    Patient Name:  Alexandru Crews  Date:  3/17/2017  Contact Type:  Face to Face    SUBJECTIVE:     Patient Findings     Positives No Problem Findings           OBJECTIVE    INR Protime   Date Value Ref Range Status   03/17/2017 2.6 (A) 0.86 - 1.14 Final       ASSESSMENT / PLAN  INR assessment THER    Recheck INR In: 1 WEEK    INR Location Clinic      Anticoagulation Summary as of 3/17/2017     INR goal 2.0-3.0   Today's INR 2.6   Maintenance plan 2.5 mg (2.5 mg x 1) every day   Full instructions 3/17: 5 mg; Otherwise 2.5 mg every day   Weekly total 17.5 mg   Plan last modified Allie Awan RN (4/8/2016)   Next INR check 3/31/2017   Priority INR   Target end date     Indications   Long-term (current) use of anticoagulants [Z79.01] [Z79.01]  Atrial fibrillation (H) [I48.91]         Anticoagulation Episode Summary     INR check location     Preferred lab     Send INR reminders to [facility]    Comments       Anticoagulation Care Providers     Provider Role Specialty Phone number    Paul Knowles MD [facility] Internal Medicine [phone]            See the Encounter Report to view Anticoagulation Flowsheet and Dosing Calendar (Go to Encounters tab in chart review, and find the Anticoagulation Therapy Visit)    Dosage adjustment made based on physician directed care plan.    Bee Mesa RN

## 2017-03-17 NOTE — MR AVS SNAPSHOT
Alexandru CARRERA Eleonora   3/17/2017 9:15 AM   Anticoagulation Therapy Visit    Description:  84 year old male   Provider:  KEYANNA ANTI COAG   Department:  [facility]           INR as of 3/17/2017     Today's INR 2.6      Anticoagulation Summary as of 3/17/2017     INR goal 2.0-3.0   Today's INR 2.6   Full instructions 3/17: 5 mg; Otherwise 2.5 mg every day   Next INR check 3/31/2017    Indications   Long-term (current) use of anticoagulants [Z79.01] [Z79.01]  Atrial fibrillation (H) [I48.91]         Your next Anticoagulation Clinic appointment(s)     Mar 31, 2017  9:45 AM CDT   Anticoagulation Visit with KEYANNA ANTI COADEBI   [facility] ([address] [address] [phone]              Contact Numbers     [facility]  Please call [phone] to cancel and/or reschedule your appointment   Please call [phone] with any problems or questions regarding your therapy.        March 2017 Details    Sun Mon Tue Wed Thu Fri Sat        1               2               3               4                 5               6               7               8               9               10               11                 12               13               14               15               16               17      5 mg   See details      18      2.5 mg           19      2.5 mg         20      2.5 mg         21      2.5 mg         22      2.5 mg         23      2.5 mg         24      2.5 mg         25      2.5 mg           26      2.5 mg         27      2.5 mg         28      2.5 mg         29      2.5 mg         30      2.5 mg         31              Date Details   03/17 This INR check       Date of next INR:  3/31/2017         How to take your warfarin dose     To take:  2.5 mg Take 1 of the 2.5 mg tablets.    To take:  5 mg Take 2 of the 2.5 mg tablets.

## 2017-03-31 ENCOUNTER — ANTICOAGULATION THERAPY VISIT (OUTPATIENT)
Dept: NURSING | Facility: CLINIC | Age: 82
End: 2017-03-31
Payer: MEDICARE

## 2017-03-31 DIAGNOSIS — Z79.01 LONG-TERM (CURRENT) USE OF ANTICOAGULANTS: ICD-10-CM

## 2017-03-31 LAB — INR POINT OF CARE: 3.1 (ref 0.86–1.14)

## 2017-03-31 PROCEDURE — 36416 COLLJ CAPILLARY BLOOD SPEC: CPT

## 2017-03-31 PROCEDURE — 99207 ZZC NO CHARGE NURSE ONLY: CPT

## 2017-03-31 PROCEDURE — 85610 PROTHROMBIN TIME: CPT | Mod: QW

## 2017-03-31 NOTE — PROGRESS NOTES
ANTICOAGULATION FOLLOW-UP CLINIC VISIT    Patient Name:  Alexandru Crews  Date:  3/31/2017  Contact Type:  Face to Face    SUBJECTIVE:        OBJECTIVE    INR Protime   Date Value Ref Range Status   03/31/2017 3.1 (A) 0.86 - 1.14 Final       ASSESSMENT / PLAN  INR assessment THER    Recheck INR In: 3 WEEKS    INR Location Clinic      Anticoagulation Summary as of 3/31/2017     INR goal 2.0-3.0   Today's INR 3.1!   Maintenance plan 2.5 mg (2.5 mg x 1) every day   Full instructions 2.5 mg every day   Weekly total 17.5 mg   No change documented Bee Mesa RN   Plan last modified Allie Awan RN (4/8/2016)   Next INR check 4/21/2017   Priority INR   Target end date     Indications   Long-term (current) use of anticoagulants [Z79.01] [Z79.01]  Atrial fibrillation (H) [I48.91]         Anticoagulation Episode Summary     INR check location     Preferred lab     Send INR reminders to [facility]    Comments       Anticoagulation Care Providers     Provider Role Specialty Phone number    Paul Knowles MD Responsible Internal Medicine [phone]            See the Encounter Report to view Anticoagulation Flowsheet and Dosing Calendar (Go to Encounters tab in chart review, and find the Anticoagulation Therapy Visit)    Dosage adjustment made based on physician directed care plan. Reviewed both bleeding and clotting signs and symptoms with patient this visit. Pt. has no further questions or concerns.  Will call with any changes to diet, medications, or missed doses at INR line [phone].  Pt. Will be setting up dental appointment - will call to discuss bridging if needed.    Bee Mesa RN

## 2017-03-31 NOTE — MR AVS SNAPSHOT
Alexandru CARRERA Eleonora   3/31/2017 9:45 AM   Anticoagulation Therapy Visit    Description:  84 year old male   Provider:  KEYANNA ANTI COAG   Department:  [facility]           INR as of 3/31/2017     Today's INR 3.1!      Anticoagulation Summary as of 3/31/2017     INR goal 2.0-3.0   Today's INR 3.1!   Full instructions 2.5 mg every day   Next INR check 4/21/2017    Indications   Long-term (current) use of anticoagulants [Z79.01] [Z79.01]  Atrial fibrillation (H) [I48.91]         Description     Encouraging greens over the next day.      Your next Anticoagulation Clinic appointment(s)     Apr 21, 2017  9:45 AM CDT   Anticoagulation Visit with KEYANNA ANTI JASON   [facility] ([facility]    [address] [address] [phone]              Contact Numbers     [facility]  Please call [phone] to cancel and/or reschedule your appointment   Please call [phone] with any problems or questions regarding your therapy.        March 2017 Details    Sun Mon Tue Wed Thu Fri Sat        1               2               3               4                 5               6               7               8               9               10               11                 12               13               14               15               16               17               18                 19               20               21               22               23               24               25                 26               27               28               29               30               31      2.5 mg   See details        Date Details   03/31 This INR check               How to take your warfarin dose     To take:  2.5 mg Take 1 of the 2.5 mg tablets.           April 2017 Details    Sun Mon Tue Wed Thu Fri Sat           1      2.5 mg           2      2.5 mg         3      2.5 mg         4      2.5 mg         5      2.5 mg         6      2.5 mg         7      2.5 mg         8       2.5 mg           9      2.5 mg         10      2.5 mg         11      2.5 mg         12      2.5 mg         13      2.5 mg         14      2.5 mg         15      2.5 mg           16      2.5 mg         17      2.5 mg         18      2.5 mg         19      2.5 mg         20      2.5 mg         21            22                 23               24               25               26               27               28               29                 30                      Date Details   No additional details    Date of next INR:  4/21/2017         How to take your warfarin dose     To take:  2.5 mg Take 1 of the 2.5 mg tablets.

## 2017-04-03 ENCOUNTER — OFFICE VISIT (OUTPATIENT)
Dept: ORTHOPEDICS | Facility: CLINIC | Age: 82
End: 2017-04-03
Payer: MEDICARE

## 2017-04-03 VITALS — BODY MASS INDEX: 23.55 KG/M2 | RESPIRATION RATE: 18 BRPM | HEIGHT: 62 IN | WEIGHT: 128 LBS | TEMPERATURE: 96.6 F

## 2017-04-03 DIAGNOSIS — G56.21 CUBITAL TUNNEL SYNDROME, RIGHT: ICD-10-CM

## 2017-04-03 DIAGNOSIS — M65.4 DE QUERVAIN'S DISEASE (TENOSYNOVITIS): ICD-10-CM

## 2017-04-03 DIAGNOSIS — M18.12 PRIMARY OSTEOARTHRITIS OF FIRST CARPOMETACARPAL JOINT OF LEFT HAND: ICD-10-CM

## 2017-04-03 PROCEDURE — 99213 OFFICE O/P EST LOW 20 MIN: CPT | Performed by: ORTHOPAEDIC SURGERY

## 2017-04-03 NOTE — MR AVS SNAPSHOT
After Visit Summary   4/3/2017    Alexandru Crews    MRN: [id number]           Patient Information     Date Of Birth          9/22/1932        Visit Information        Provider Department      4/3/2017 10:45 AM Ras Malin MD [facility]        Today's Diagnoses     Cubital tunnel syndrome, right        Primary osteoarthritis of first carpometacarpal joint of left hand        De Quervain's disease (tenosynovitis)          Care Instructions    Diagnosis:  Left thumb carpometacarpal osteoarthritis.  Right DeQuervain's tenosynovitis.  -  thumb tendinitis.  Right cubital tunnel syndrome - pinching the funny bone nerve.    For the left thumb, try to avoid key pinch.  Use handles when you can.  For the right elbow, keep the elbow straight at night when you sleep.  Avoid leaning on the elbow on a desk or table.    Options for the right thumb. Use the thumb less.  Thumb spica splint.  Cannot use arthritis pills due to coumadin.  Steroid injection.  DeQuervain's tenosynovitis release - same day surgery.                            De Quervain's Tenosynovitis    What is de Quervain's tenosynovitis?   De Quervain's tenosynovitis is a painful condition that affects the tendons on the thumb side of your wrist. Tendons, are strong bands of connective tissue that attach muscle to bone. A sheath, or covering, surrounds the tendons that go to your thumb. Tenosynovitis is an irritation of this sheath.   How does it occur?   De Quervain's tenosynovitis is usually cause by overusing your thumb or wrist. This is more likely in activities when your wrist is bent and you use your thumb to  something (such as when you ski or hammer).     Surgery:  Right deQuervain's tenosynovitis release at [facility].    Preop physical with primary physician is needed within 30 days of the surgery.  Nothing to eat or drink for 8 hours before surgery.  For same day surgery you need a ride.  Someone should stay  "with you for 12 hours afterward.  Stop blood thinners 5 days before surgery (aspirin, warfarin, anti-inflammatories).  Stop Plavix at least 10-14 days before surgery.    Call [phone] to schedule your surgery.            Follow-ups after your visit        Your next 10 appointments already scheduled     2017  9:45 AM CDT   Anticoagulation Visit with FZ ANTI COAG   [facility] ([address]  [place] [address] [phone]              Who to contact     If you have questions or need follow up information about today's clinic visit or your schedule please contact [facility] directly at [phone].  Normal or non-critical lab and imaging results will be communicated to you by 4Bloxhart, letter or phone within 4 business days after the clinic has received the results. If you do not hear from us within 7 days, please contact the clinic through 4Bloxhart or phone. If you have a critical or abnormal lab result, we will notify you by phone as soon as possible.  Submit refill requests through GetYou or call your pharmacy and they will forward the refill request to us. Please allow 3 business days for your refill to be completed.          Additional Information About Your Visit        GetYou Information     GetYou lets you send messages to your doctor, view your test results, renew your prescriptions, schedule appointments and more. To sign up, go to [email or web address] . Click on \"Log in\" on the left side of the screen, which will take you to the Welcome page. Then click on \"Sign up Now\" on the right side of the page.     You will be asked to enter the access code listed below, as well as some personal information. Please follow the directions to create your username and password.     Your access code is: [email or web address]  Expires: 2017 11:48 AM     Your access code will  in 90 days. If you need help or a new code, please call your " "[facility] or [phone].        Care EveryWhere ID     This is your Care EveryWhere ID. This could be used by other organizations to access your [facility] medical records  [id number]        Your Vitals Were     Temperature Respirations Height BMI (Body Mass Index)          96.6  F (35.9  C) 18 1.575 m (5' 2\") 23.41 kg/m2         Blood Pressure from Last 3 Encounters:   02/14/17 146/76   12/01/16 132/60   07/19/16 128/68    Weight from Last 3 Encounters:   04/03/17 58.1 kg (128 lb)   02/14/17 58.8 kg (129 lb 9.6 oz)   08/18/16 56.2 kg (124 lb)              Today, you had the following     No orders found for display       Primary Care Provider Office Phone # Fax #    Paul Knowles [phone]       [address] [address]        Thank you!     Thank you for choosing [facility]  for your care. Our goal is always to provide you with excellent care. Hearing back from our patients is one way we can continue to improve our services. Please take a few minutes to complete the written survey that you may receive in the mail after your visit with us. Thank you!             Your Updated Medication List - Protect others around you: Learn how to safely use, store and throw away your medicines at www.disposemymeds.org.          This list is accurate as of: 4/3/17 11:48 AM.  Always use your most recent med list.                   Brand Name Dispense Instructions for use    calcium-vitamin D 600-400 MG-UNIT per tablet    CALTRATE     Take 1 tablet by mouth every other day       DAILY VITAMINS PO      1 tablet daily       FIBER PO      Take 1 tsp. by mouth daily       Fish Oil 1200 MG Caps      Take 1 capsule by mouth 2 times daily.       metoprolol 25 MG 24 hr tablet    TOPROL-XL     Take 25 mg by mouth 2 times daily       morphine 15 MG 12 hr tablet    MS CONTIN    60 tablet    Take 1 tablet (15 mg) by mouth every 12 hours 28 days or more between " refills for controlled medications       oxyCODONE-acetaminophen 5-325 MG per tablet    PERCOCET    90 tablet    Take 1 tablet by mouth 3 times daily as needed for moderate to severe pain 28 days or more between refills for controlled medications       senna-docusate 8.6-50 MG per tablet    SENOKOT-S;PERICOLACE     Take 1 tablet by mouth 3 times daily as needed       triamcinolone 0.1 % cream    KENALOG    60 g    Apply  topically 2 times daily as needed. to affected area as directed.       VITAMIN D (CHOLECALCIFEROL) PO      Take  by mouth daily. 3000 units daily.       warfarin 2.5 MG tablet    COUMADIN    90 tablet    take 1 tablet by mouth once daily or as directed

## 2017-04-03 NOTE — NURSING NOTE
"Chief Complaint   Patient presents with     RECHECK     Right de Queralisha last injection 8/18//17.       Initial Temp 96.6  F (35.9  C)  Resp 18  Ht 1.575 m (5' 2\")  Wt 58.1 kg (128 lb)  BMI 23.41 kg/m2 Estimated body mass index is 23.41 kg/(m^2) as calculated from the following:    Height as of this encounter: 1.575 m (5' 2\").    Weight as of this encounter: 58.1 kg (128 lb).  Medication Reconciliation: complete   Sulmacharlene Bennett MA      "

## 2017-04-03 NOTE — PATIENT INSTRUCTIONS
Diagnosis:  Left thumb carpometacarpal osteoarthritis.  Right DeQuervain's tenosynovitis.  -  thumb tendinitis.  Right cubital tunnel syndrome - pinching the funny bone nerve.    For the left thumb, try to avoid key pinch.  Use handles when you can.  For the right elbow, keep the elbow straight at night when you sleep.  Avoid leaning on the elbow on a desk or table.    Options for the right thumb. Use the thumb less.  Thumb spica splint.  Cannot use arthritis pills due to coumadin.  Steroid injection.  DeQuervain's tenosynovitis release - same day surgery.                            De Quervain's Tenosynovitis    What is de Quervain's tenosynovitis?   De Quervain's tenosynovitis is a painful condition that affects the tendons on the thumb side of your wrist. Tendons, are strong bands of connective tissue that attach muscle to bone. A sheath, or covering, surrounds the tendons that go to your thumb. Tenosynovitis is an irritation of this sheath.   How does it occur?   De Quervain's tenosynovitis is usually cause by overusing your thumb or wrist. This is more likely in activities when your wrist is bent and you use your thumb to  something (such as when you ski or hammer).     Surgery:  Right deQuervain's tenosynovitis release at [facility].    Preop physical with primary physician is needed within 30 days of the surgery.  Nothing to eat or drink for 8 hours before surgery.  For same day surgery you need a ride.  Someone should stay with you for 12 hours afterward.  Stop blood thinners 5 days before surgery (aspirin, warfarin, anti-inflammatories).  Stop Plavix at least 10-14 days before surgery.    Call [phone] to schedule your surgery.

## 2017-04-03 NOTE — LETTER
4/3/2017       RE: Alexandru Crews  [address] [place] [address]           Dear Colleague,    Thank you for referring your patient, Alexandru Crews, to the [facility]. Please see a copy of my visit note below.         HISTORY OF PRESENT ILLNESS:  Mr. Alexandru Crews is an 83-year-old male referred from Dr. Knowles for evaluation of right wrist and thumb pain.  This started on 05/22/2016 when a window fell on his hand.  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  They were fixing a window and someone else was holding the window open and then went to move and the window fell on his hand.  It cut the back of his hand open badly.  He has pictures of this.  It is healed up quite nicely, but he has developed some thumb pain since then along the radial aspect of the thumb.  He has sharp, dull, constant pain rated between 2-6/10, increased with use and moving the thumb, better with rest.  He has a wrist brace but does not include the thumb.  He is on Coumadin so cannot take anti-inflammatories. Steroid injection 8/18/16 did help, but now pain has returned.  He also has pain in left thumb at the base of the thumb.  This is not as severe as the right, but he is worried it is turning into the same thing.  He also has numbness of right small and ring fingers.  He had similar problem on left previously with ulnar nerve transposition.  He does sleep with his elbow bent.    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 risk (brother)     Hydrocele     Rt     Hypertension goal BP (blood pressure) < 140/90 8/10/2015     Osteopenia 2005      Osteoporosis     Dr Regla DHALIWAL (peptic ulcer disease) 11/1998    h/o, +H. Pylori Rx     Varicocele     Lt side-large       Past Surgical History:   Procedure Laterality Date     APPENDECTOMY  12/2004     ARTHROSCOPY KNEE RT/LT  1/2006    Rt & Lt, Dr Arriaga     BACK SURGERY  1978 / 2003    x 3 in 1978, fusions and one surgery in 2003     C APPENDECTOMY  12/31/2004     CARPAL TUNNEL RELEASE RT/LT  three    2 on left, one on right     CATARACT IOL, RT/LT       COLONOSCOPY  1995,2000,2005     INJECT EPIDURAL CERVICAL  5/24/2011    [facility]     INJECT EPIDURAL LUMBAR  11/9/2010    [facility]     INJECT EPIDURAL LUMBAR  1/4/2011    [facility]     INJECT EPIDURAL LUMBAR  4/27/2011    [facility]     LASER YAG CAPSULOTOMY  11/2016; 12/2016    left eye; right eye     PHACOEMULSIFICATION WITH STANDARD INTRAOCULAR LENS IMPLANT  6/2008; 8/2008    right eye; left eye     RECTAL SURGERY      fissurectomy and proctoplasty     RELEASE TRIGGER FINGER      right hand     SHOULDER SURGERY      X four [ right x 2 and left x 2 ][[[[[     TUNA         Family History   Problem Relation Age of Onset     CANCER Mother      ovarian     Cancer - colorectal Brother        Social History     Social History     Marital status:      Spouse name: N/A     Number of children: N/A     Years of education: N/A     Occupational History     Not on file.     Social History Main Topics     Smoking status: Former Smoker     Years: 15.00     Types: Cigarettes     Quit date: 6/30/1975     Smokeless tobacco: Never Used     Alcohol use Yes      Comment: few beers weekly     Drug use: No     Sexual activity: Not Currently     Partners: Female     Other Topics Concern     Not on file     Social History Narrative       Current Outpatient Prescriptions   Medication Sig Dispense Refill     oxyCODONE-acetaminophen (PERCOCET) 5-325 MG per tablet Take 1 tablet by mouth 3 times daily as needed for moderate to severe pain 28 days or  more between refills for controlled medications 90 tablet 0     morphine (MS CONTIN) 15 MG 12 hr tablet Take 1 tablet (15 mg) by mouth every 12 hours 28 days or more between refills for controlled medications 60 tablet 0     warfarin (COUMADIN) 2.5 MG tablet take 1 tablet by mouth once daily or as directed 90 tablet 1     metoprolol (TOPROL-XL) 25 MG 24 hr tablet Take 25 mg by mouth 2 times daily       calcium-vitamin D (CALTRATE) 600-400 MG-UNIT per tablet Take 1 tablet by mouth every other day       senna-docusate (SENOKOT-S;PERICOLACE) 8.6-50 MG per tablet Take 1 tablet by mouth 3 times daily as needed        triamcinolone (KENALOG) 0.1 % cream Apply  topically 2 times daily as needed. to affected area as directed. 60 g 2     FIBER PO Take 1 tsp. by mouth daily        DAILY VITAMINS PO 1 tablet daily       VITAMIN D, CHOLECALCIFEROL, PO Take  by mouth daily. 3000 units daily.        omega-3 fatty acids (FISH OIL) 1200 MG capsule Take 1 capsule by mouth 2 times daily.         Allergies   Allergen Reactions     Lactose Nausea and Vomiting     Sulindac      Upset stomach     Tramadol      Itching     Valdecoxib Itching     Vicodin [Hydrocodone-Acetaminophen]      Itching     Vioxx Itching     Rofecoxib Itching and Rash     Sulfa Drugs Rash     Muscle aches       REVIEW OF SYSTEMS:  CONSTITUTIONAL:  NEGATIVE for fever, chills, change in weight, not feeling tired  SKIN:  NEGATIVE for worrisome rashes, no skin lumps, no skin ulcers and no non-healing wounds  EYES:  NEGATIVE for vision changes or irritation.  ENT/MOUTH:  NEGATIVE.  No hearing loss, no hoarseness, no difficulty swallowing.  RESP:  NEGATIVE. No cough or shortness of breath.  BREAST:  NEGATIVE for masses, tenderness or discharge  CV:  NEGATIVE for chest pain, palpitations or peripheral edema  GI:  NEGATIVE for nausea, abdominal pain, heartburn, or change in bowel habits  :  Negative. No dysuria, no hematuria  MUSCULOSKELETAL:  See HPI above  NEURO:   "NEGATIVE . No headaches, no dizziness,  no numbness  ENDOCRINE:  NEGATIVE for temperature intolerance, skin/hair changes  HEME/ALLERGY/IMMUNE:  NEGATIVE for bleeding problems  PSYCHIATRIC:  NEGATIVE. no anxiety, no depression.      Exam:  Vitals: Temp 96.6  F (35.9  C)  Resp 18  Ht 1.575 m (5' 2\")  Wt 58.1 kg (128 lb)  BMI 23.41 kg/m2  BMI= Body mass index is 23.41 kg/(m^2).  Constitutional:  healthy, alert and no distress  Neuro: Alert and Oriented x 3, Gait normal. Sensation grossly WNL.  HEENT:  Atraumatic, EOMI  Neck:  Neck supple with no tenderness.  Psych: Affect normal   Respiratory: Breathing not labored.  Cardiovascular: normal peripheral pulses.  Lymph: no adenopathy  Skin: No rashes,worrisome lesions or skin problems  Spine: straight, no straight leg raising pain  He has positive Tinel at right elbow over ulnar nerve.    He has full range of motion of the elbow.  No lateral epicondyle tenderness.    Good mobility of both wrists.  He has tenderness over the first dorsal compartment tendons on the right.  He has positive Finkelstein test here.  He has well-healed scars on the back of the right hand.  He has mild tenderness there with a little bit of numbness on the back of the hand.  He does have full range of motion of the fingers.  Has no triggering of fingers or thumb.  He had negative grind test of the CMC joint on right thumb.  He has positive grind of left thumb carpometacarpal.  He has negative Finkelstein of left thumb.  No ligamentous instability.      IMPRESSION:  Right de Quervain tenosynovitis.    2. Left thumb carpometacarpal osteoarthritis.  3. Right cubital tunnel syndrome     Plan:  We discussed options for all of these.  He would like to have deQuervain's tenosynovitis release on right.  Risks, benefits, potential complications and alternatives were discussed.  He will need to stop coumadin 5 days before surgery.    He will keep elbow straight at night to treat cubital tunnel " syndrome.        RAS FLOR MD              Again, thank you for allowing me to participate in the care of your patient.        Sincerely,              Ras Flor MD

## 2017-04-04 NOTE — PROGRESS NOTES
HISTORY OF PRESENT ILLNESS:  Mr. Alexandru Crews is an 83-year-old male referred from Dr. Knowles for evaluation of right wrist and thumb pain.  This started on 05/22/2016 when a window fell on his hand.  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  They were fixing a window and someone else was holding the window open and then went to move and the window fell on his hand.  It cut the back of his hand open badly.  He has pictures of this.  It is healed up quite nicely, but he has developed some thumb pain since then along the radial aspect of the thumb.  He has sharp, dull, constant pain rated between 2-6/10, increased with use and moving the thumb, better with rest.  He has a wrist brace but does not include the thumb.  He is on Coumadin so cannot take anti-inflammatories. Steroid injection 8/18/16 did help, but now pain has returned.  He also has pain in left thumb at the base of the thumb.  This is not as severe as the right, but he is worried it is turning into the same thing.  He also has numbness of right small and ring fingers.  He had similar problem on left previously with ulnar nerve transposition.  He does sleep with his elbow bent.    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 risk (brother)     Hydrocele     Rt     Hypertension goal BP (blood pressure) < 140/90 8/10/2015     Osteopenia 2005     Osteoporosis     Dr Regla DHALIWAL (peptic ulcer disease) 11/1998    h/o, +H. Pylori Rx     Varicocele     Lt side-large       Past Surgical History:   Procedure Laterality Date     APPENDECTOMY  12/2004     ARTHROSCOPY KNEE RT/LT  1/2006    Rt & Lt,   [place]     BACK SURGERY  1978 / 2003    x 3 in 1978, fusions and one surgery in 2003     C APPENDECTOMY  12/31/2004     CARPAL TUNNEL RELEASE RT/LT  three    2 on left, one on right     CATARACT IOL, RT/LT       COLONOSCOPY  1995,2000,2005     INJECT EPIDURAL CERVICAL  5/24/2011    [facility]     INJECT EPIDURAL LUMBAR  11/9/2010    [facility]     INJECT EPIDURAL LUMBAR  1/4/2011    [facility]     INJECT EPIDURAL LUMBAR  4/27/2011    [facility]     LASER YAG CAPSULOTOMY  11/2016; 12/2016    left eye; right eye     PHACOEMULSIFICATION WITH STANDARD INTRAOCULAR LENS IMPLANT  6/2008; 8/2008    right eye; left eye     RECTAL SURGERY      fissurectomy and proctoplasty     RELEASE TRIGGER FINGER      right hand     SHOULDER SURGERY      X four [ right x 2 and left x 2 ][[[[[     TUNA         Family History   Problem Relation Age of Onset     CANCER Mother      ovarian     Cancer - colorectal Brother        Social History     Social History     Marital status:      Spouse name: N/A     Number of children: N/A     Years of education: N/A     Occupational History     Not on file.     Social History Main Topics     Smoking status: Former Smoker     Years: 15.00     Types: Cigarettes     Quit date: 6/30/1975     Smokeless tobacco: Never Used     Alcohol use Yes      Comment: few beers weekly     Drug use: No     Sexual activity: Not Currently     Partners: Female     Other Topics Concern     Not on file     Social History Narrative       Current Outpatient Prescriptions   Medication Sig Dispense Refill     oxyCODONE-acetaminophen (PERCOCET) 5-325 MG per tablet Take 1 tablet by mouth 3 times daily as needed for moderate to severe pain 28 days or more between refills for controlled medications 90 tablet 0     morphine (MS CONTIN) 15 MG 12 hr tablet Take 1 tablet (15 mg) by mouth every 12 hours 28 days or more between refills for controlled medications 60 tablet 0     warfarin (COUMADIN) 2.5 MG  tablet take 1 tablet by mouth once daily or as directed 90 tablet 1     metoprolol (TOPROL-XL) 25 MG 24 hr tablet Take 25 mg by mouth 2 times daily       calcium-vitamin D (CALTRATE) 600-400 MG-UNIT per tablet Take 1 tablet by mouth every other day       senna-docusate (SENOKOT-S;PERICOLACE) 8.6-50 MG per tablet Take 1 tablet by mouth 3 times daily as needed        triamcinolone (KENALOG) 0.1 % cream Apply  topically 2 times daily as needed. to affected area as directed. 60 g 2     FIBER PO Take 1 tsp. by mouth daily        DAILY VITAMINS PO 1 tablet daily       VITAMIN D, CHOLECALCIFEROL, PO Take  by mouth daily. 3000 units daily.        omega-3 fatty acids (FISH OIL) 1200 MG capsule Take 1 capsule by mouth 2 times daily.         Allergies   Allergen Reactions     Lactose Nausea and Vomiting     Sulindac      Upset stomach     Tramadol      Itching     Valdecoxib Itching     Vicodin [Hydrocodone-Acetaminophen]      Itching     Vioxx Itching     Rofecoxib Itching and Rash     Sulfa Drugs Rash     Muscle aches       REVIEW OF SYSTEMS:  CONSTITUTIONAL:  NEGATIVE for fever, chills, change in weight, not feeling tired  SKIN:  NEGATIVE for worrisome rashes, no skin lumps, no skin ulcers and no non-healing wounds  EYES:  NEGATIVE for vision changes or irritation.  ENT/MOUTH:  NEGATIVE.  No hearing loss, no hoarseness, no difficulty swallowing.  RESP:  NEGATIVE. No cough or shortness of breath.  BREAST:  NEGATIVE for masses, tenderness or discharge  CV:  NEGATIVE for chest pain, palpitations or peripheral edema  GI:  NEGATIVE for nausea, abdominal pain, heartburn, or change in bowel habits  :  Negative. No dysuria, no hematuria  MUSCULOSKELETAL:  See HPI above  NEURO:  NEGATIVE . No headaches, no dizziness,  no numbness  ENDOCRINE:  NEGATIVE for temperature intolerance, skin/hair changes  HEME/ALLERGY/IMMUNE:  NEGATIVE for bleeding problems  PSYCHIATRIC:  NEGATIVE. no anxiety, no depression.      Exam:  Vitals: Temp 96.6  " F (35.9  C)  Resp 18  Ht 1.575 m (5' 2\")  Wt 58.1 kg (128 lb)  BMI 23.41 kg/m2  BMI= Body mass index is 23.41 kg/(m^2).  Constitutional:  healthy, alert and no distress  Neuro: Alert and Oriented x 3, Gait normal. Sensation grossly WNL.  HEENT:  Atraumatic, EOMI  Neck:  Neck supple with no tenderness.  Psych: Affect normal   Respiratory: Breathing not labored.  Cardiovascular: normal peripheral pulses.  Lymph: no adenopathy  Skin: No rashes,worrisome lesions or skin problems  Spine: straight, no straight leg raising pain  He has positive Tinel at right elbow over ulnar nerve.    He has full range of motion of the elbow.  No lateral epicondyle tenderness.    Good mobility of both wrists.  He has tenderness over the first dorsal compartment tendons on the right.  He has positive Finkelstein test here.  He has well-healed scars on the back of the right hand.  He has mild tenderness there with a little bit of numbness on the back of the hand.  He does have full range of motion of the fingers.  Has no triggering of fingers or thumb.  He had negative grind test of the CMC joint on right thumb.  He has positive grind of left thumb carpometacarpal.  He has negative Finkelstein of left thumb.  No ligamentous instability.      IMPRESSION:  Right de Quervain tenosynovitis.    2. Left thumb carpometacarpal osteoarthritis.  3. Right cubital tunnel syndrome     Plan:  We discussed options for all of these.  He would like to have deQuervain's tenosynovitis release on right.  Risks, benefits, potential complications and alternatives were discussed.  He will need to stop coumadin 5 days before surgery.    He will keep elbow straight at night to treat cubital tunnel syndrome.        ROSCOE FLOR MD          "

## 2017-04-12 ENCOUNTER — TRANSFERRED RECORDS (OUTPATIENT)
Dept: HEALTH INFORMATION MANAGEMENT | Facility: CLINIC | Age: 82
End: 2017-04-12

## 2017-04-20 NOTE — PATIENT INSTRUCTIONS
"Hold Coumadin for 5 evenings prior to procedure. Restart regular dose of Coumadin post surgery.     EKG (electrocardiogram) was done: we found a new change, a \"Right Bundle Branch Block\". 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      Before Your Surgery      Call your surgeon if there is any change in your health. This includes signs of a cold or flu (such as a sore throat, runny nose, cough, rash or fever).    Do not smoke, drink alcohol or take over the counter medicine (unless your surgeon or primary care doctor tells you to) for the 24 hours before and after surgery.    If you take prescribed drugs: Follow your doctor s orders about which medicines to take and which to stop until after surgery.    Eating and drinking prior to surgery: follow the instructions from your surgeon    Take a shower or bath the night before surgery. Use the soap your surgeon gave you to gently clean your skin. If you do not have soap from your surgeon, use your regular soap. Do not shave or scrub the surgery site.  Wear clean pajamas and have clean sheets on your bed.     [facility]    If you have any questions regarding to your visit please contact your care team:     Team Pink:   Clinic Hours Telephone Number   Internal Medicine:  Dr. Nini Landaverde, NP       7am-7pm  Monday - Thursday   7am-5pm  Fridays  [phone]  (Appointment scheduling available 24/7)    Questions about your visit?  Team Line  [phone]   Urgent Care - [place] - 11am-9pm Monday-Friday Saturday-Sunday- 9am-5pm   [place] - 5pm-9pm Monday-Friday Saturday-Sunday- 9am-5pm  [phone] - [place]   [phone] - [place]       What options do I have for visits at the clinic other than the traditional office visit?  To expand how we care for you, many of our providers are utilizing electronic " visits (e-visits) and telephone visits, when medically appropriate, for interactions with their patients rather than a visit in the clinic.   We also offer nurse visits for many medical concerns. Just like any other service, we will bill your insurance company for this type of visit based on time spent on the phone with your provider. Not all insurance companies cover these visits. Please check with your medical insurance if this type of visit is covered. You will be responsible for any charges that are not paid by your insurance.      E-visits via [employer]:  generally incur a $35.00 fee.  Telephone visits:  Time spent on the phone: *charged based on time that is spent on the phone in increments of 10 minutes. Estimated cost:   5-10 mins $30.00   11-20 mins. $59.00   21-30 mins. $85.00   Use [employer] (secure email communication and access to your chart) to send your primary care provider a message or make an appointment. Ask someone on your Team how to sign up for [facility].    For a Price Quote for your services, please call our Consumer Price Line at [phone].    As always, Thank you for trusting us with your health care needs!    Discharged by Leonila BRINK CMA ([facility])

## 2017-04-20 NOTE — PROGRESS NOTES
[address] [address]  [phone]  Dept: [phone]    PRE-OP EVALUATION:  Today's date: 2017    Alexandru Crews (: 1932) presents for pre-operative evaluation assessment as requested by Dr. Malin.  He requires evaluation and anesthesia risk assessment prior to undergoing surgery/procedure for treatment of De Quervains Contracture .  Proposed procedure: Right De Quervains Contracture Release.    Date of Surgery/ Procedure: 2017  Time of Surgery/ Procedure:   Hospital/Surgical Facility: [facility]   Fax number for surgical facility:   Primary Physician: Paul Knowles  Type of Anesthesia Anticipated: General    Patient has a Health Care Directive or Living Will:  NO    1. NO - Do you have a history of heart attack, stroke, stent, bypass or surgery on an artery in the head, neck, heart or legs?  2. NO - Do you ever have any pain or discomfort in your chest?  3. YES - DO YOU HAVE A HISTORY OF HEART FAILURE   Afib on Warfarin   4. YES - ARE YOUR TROUBLED BY SHORTNESS OF BREATH WHEN WALKING ON THE LEVEL, UP A SLIGHT HILL OR AT NIGHT?    Age related   5. NO - Do you currently have a cold, bronchitis or other respiratory infection?  6. NO - Do you have a cough, shortness of breath or wheezing?  7. NO - Do you sometimes get pains in the calves of your legs when you walk?  8. NO - Do you or anyone in your family have previous history of blood clots?  9. NO - Do you or does anyone in your family have a serious bleeding problem such as prolonged bleeding following surgeries or cuts?  10. NO - Have you ever had problems with anemia or been told to take iron pills?  11. NO - Have you had any abnormal blood loss such as black, tarry or bloody stools, or abnormal vaginal bleeding?  12. YES - HAVE YOU EVER HAD A BLOOD TRANSFUSION?   Previous back surgery many years ago. Hemoglobin is normal presently.     Hemoglobin   Date Value Ref Range Status    02/14/2017 15.1 13.3 - 17.7 g/dL Final       13. YES - HAVE YOU OR ANY OF YOUR RELATIVES EVER HAD PROBLEMS WITH ANESTHESIA?   Previous back surgery. Uncertain reaction.  14. NO - Do you have sleep apnea, excessive snoring or daytime drowsiness?  15. NO - Do you have any prosthetic heart valves?  16. YES - DO YOU HAVE PROSTHETIC JOINTS?   Left knee replacement  17. NO - Is there any chance that you may be pregnant?      HPI:                                                      Brief HPI related to upcoming procedure: diagnosis of DeQuervain's disease (tenosynovitis)   Alexandru Crews is a 84-year-old male who presents to the clinic for a pre-op evaluation for a Right De Quervains Contracture Release procedure. This procedure will be preformed with a local anesthetic.     Cardiac: Coumadin  Follows with Dr. Ferrara. Last follow up 4/12/2017.     Denies fever, cough, sore throat.     See problem list for active medical problems.  Problems all longstanding and stable, except as noted/documented.  See ROS for pertinent symptoms related to these conditions.                                                                                                  .    MEDICAL HISTORY:                                                      Patient Active Problem List    Diagnosis Date Noted     De Quervain's disease (tenosynovitis) 04/03/2017     Priority: Medium     Primary osteoarthritis of first carpometacarpal joint of left hand 04/03/2017     Priority: Medium     Cubital tunnel syndrome, right 04/03/2017     Priority: Medium     Elevated glucose 02/14/2017     Priority: Medium     Prediabetes 05/26/2016     Priority: Medium     Chronic nonintractable headache, unspecified headache type 04/25/2016     Priority: Medium     Long-term (current) use of anticoagulants [Z79.01] 04/08/2016     Priority: Medium     Hypertension goal BP (blood pressure) < 140/90 08/10/2015     Priority: Medium     DJD (degenerative joint disease), lumbar  and thoracic 09/09/2013     Priority: Medium     Chronic pain 11/30/2016     Patient is followed by Paul Knowles MD for ongoing prescription of pain medication.  All refills should be approved by this provider, or covering partner.    Medication(s): MS-Contin (Morphine Sulfate Controlled-Release) 15 milligrams 3 times a day , percocet 5/325 tablets 2 a day  Maximum quantity per month: 90/60  Clinic visit frequency required: Q 3 months     Controlled substance agreement:  Encounter-Level CSA - 4/25/16:               Controlled Substance Agreement - Scan on 5/6/2016  1:14 PM : CONTROLLED SUBSTANCE AGREEMENT (below)            Pain Clinic evaluation in the past: Yes       Date/Location:      DIRE Total Score(s):  No flowsheet data found.    Last [facility] website verification:  done on July, 2016, 01/25/17   [email or web address]    Controlled substance agreement: 04/25/16       H/O cardiac pacemaker 01/02/2015     Diverticulosis of large intestine 07/10/2014     Problem list name updated by automated process. Provider to review       Posterior capsular opacification 02/03/2014     Lumbar spinal stenosis 09/09/2013     See lumbar mri evaluation with [facility] 10/11/2010 and 7/26/2011       History of shingles 07/09/2013     Chronic fatigue 07/09/2013     Atrial fibrillation (H)      BPH (benign prostatic hyperplasia)      Has been followed by Dr. Worthington with [facility]        Advanced directives, counseling/discussion 06/24/2011     Advance Directive Problem List Overview:   Name Relationship Phone    Primary Health Care Agent            Alternative Health Care Agent          Patient states has Advance Directive and will bring in a copy to clinic. 6/24/2011          Chronic pain syndrome      Complicated. Multiple tests. Mainly this is nocturnal leg pain that is unbearable. He has tried the following different medications     Gabapentin [Neurontin] , pramipexole (MIRAPEX) , LYRICA (pregabalin) ,  nortriptyline (PAMELOR) , Clinoril (Sulindac)  , Bextra (Valdecoxib), ultracet, percocet 5/325 tablets [ what works best ], HYDROcodone (VICODIN) , tramadol (Ultram) , rofecoxib (Vioxx) , lactose , darvocet n-100 tablets , ropinirole [ Requip ]  , pramipexole (MIRAPEX)     All the others are either ineffective or had side effects mostly of generalized pruritis        Vitreous condensations, od > os 01/19/2011     Pseudophakia, Yag Caps, ou 01/19/2011     HYPERLIPIDEMIA LDL GOAL <160 10/31/2010     Family history of colon cancer      Incr. risk (brother)       Osteopenia      Dr Arauz, patient sees him there at [facility] and gets Denosumab injection (Prolia) biannual        Past Medical History:   Diagnosis Date     Atrial fibrillation (H)      BPH (benign prostatic hyperplasia)      Cataracts      Chest pain 12/2010    Normal Myocardial perfusion test with [facility]     Chronic pain syndrome      CTS (carpal tunnel syndrome)     Rt, Rx surgery     Diverticulosis      DJD (degenerative joint disease), lumbar and thoracic 9/9/2013     Family history of colon cancer     Incr. risk (brother)     Hydrocele     Rt     Hypertension goal BP (blood pressure) < 140/90 8/10/2015     Osteopenia 2005     Osteoporosis     Dr Arauz     PUD (peptic ulcer disease) 11/1998    h/o, +H. Pylori Rx     Varicocele     Lt side-large     Past Surgical History:   Procedure Laterality Date     APPENDECTOMY  12/2004     ARTHROSCOPY KNEE RT/LT  1/2006    Rt & Lt, Dr Arriaga     BACK SURGERY  1978 / 2003    x 3 in 1978, fusions and one surgery in 2003     C APPENDECTOMY  12/31/2004     CARPAL TUNNEL RELEASE RT/LT  three    2 on left, one on right     CATARACT IOL, RT/LT       COLONOSCOPY  1995,2000,2005     INJECT EPIDURAL CERVICAL  5/24/2011    [facility]     INJECT EPIDURAL LUMBAR  11/9/2010    [facility]     INJECT EPIDURAL LUMBAR  1/4/2011    [facility]     INJECT EPIDURAL LUMBAR   4/27/2011    [facility]     LASER YAG CAPSULOTOMY  11/2016; 12/2016    left eye; right eye     PHACOEMULSIFICATION WITH STANDARD INTRAOCULAR LENS IMPLANT  6/2008; 8/2008    right eye; left eye     RECTAL SURGERY      fissurectomy and proctoplasty     RELEASE TRIGGER FINGER      right hand     SHOULDER SURGERY      X four [ right x 2 and left x 2 ][[[[[     TUNA       Current Outpatient Prescriptions   Medication Sig Dispense Refill     oxyCODONE-acetaminophen (PERCOCET) 5-325 MG per tablet Take 1 tablet by mouth 3 times daily as needed for moderate to severe pain 28 days or more between refills for controlled medications 90 tablet 0     morphine (MS CONTIN) 15 MG 12 hr tablet Take 1 tablet (15 mg) by mouth every 12 hours 28 days or more between refills for controlled medications 60 tablet 0     warfarin (COUMADIN) 2.5 MG tablet take 1 tablet by mouth once daily or as directed 90 tablet 1     metoprolol (TOPROL-XL) 25 MG 24 hr tablet Take 25 mg by mouth 2 times daily       calcium-vitamin D (CALTRATE) 600-400 MG-UNIT per tablet Take 1 tablet by mouth every other day       senna-docusate (SENOKOT-S;PERICOLACE) 8.6-50 MG per tablet Take 1 tablet by mouth 3 times daily as needed        triamcinolone (KENALOG) 0.1 % cream Apply  topically 2 times daily as needed. to affected area as directed. 60 g 2     FIBER PO Take 1 tsp. by mouth daily        DAILY VITAMINS PO 1 tablet daily       VITAMIN D, CHOLECALCIFEROL, PO Take  by mouth daily. 3000 units daily.        omega-3 fatty acids (FISH OIL) 1200 MG capsule Take 1 capsule by mouth 2 times daily.       OTC products: None, except as noted above    Allergies   Allergen Reactions     Lactose Nausea and Vomiting     Sulindac      Upset stomach     Tramadol      Itching     Valdecoxib Itching     Vicodin [Hydrocodone-Acetaminophen]      Itching     Vioxx Itching     Rofecoxib Itching and Rash     Sulfa Drugs Rash     Muscle aches      Latex Allergy: NO    Social History  "  Substance Use Topics     Smoking status: Former Smoker     Years: 15.00     Types: Cigarettes     Quit date: 6/30/1975     Smokeless tobacco: Never Used     Alcohol use Yes      Comment: few beers weekly     History   Drug Use No       REVIEW OF SYSTEMS:                                                    Constitutional, HEENT, cardiovascular, pulmonary, GI, , musculoskeletal, neuro, skin, endocrine and psych systems are negative, except as in HPI or otherwise noted     This document serves as a record of the services and decisions personally performed and made by Paul Knowles MD. It was created on their behalf by Jose Manuel Muñiz, a trained medical scribe. The creation of this document is based the provider's statements to the medical scribe.  Jose Manuel Muñiz April 21, 2017 10:15 AM      EXAM:                                                    /58 (BP Location: Left arm, Patient Position: Chair, Cuff Size: Adult Regular)  Pulse 78  Temp 96.7  F (35.9  C) (Oral)  Ht 1.575 m (5' 2\")  Wt 58.5 kg (129 lb)  SpO2 97%  BMI 23.59 kg/m2    GENERAL APPEARANCE: healthy, alert and no distress, walks with cane, appears his stated age . Frail; elderly gentleman      EYES: EOMI,  PERRL     RESP: lungs clear to auscultation - no rales, rhonchi or wheezes     CV:  Irregularly irregular rhythm consistent with atrial fibrillation and otherwise no murmur, click or rub     ABDOMEN:  soft, nontender, no HSM or masses and bowel sounds normal, sensitive to palpation     MS: Right wrist painful to palpation     SKIN: no suspicious lesions or rashes to visible skin     NEURO: mentation intact and speech normal     PSYCH: mentation appears normal. and affect normal/bright    DIAGNOSTICS:                                                    EKG: appears normal, NSR, normal axis, normal intervals, no acute ST/T changes c/w ischemia, no LVH by voltage criteria, there is an apparently new Right Bundle Branch Block but the previous " electrocardiogram we have is from 7 years ago    Recent Labs   Lab Test 03/31/17 03/17/17 02/14/17   1200   07/19/16   1033   12/23/15   0956   07/14/15   1054   HGB   --    --    --   15.1   --    --    --   14.1   --    --    PLT   --    --    --   148*   --    --    --   193   --    --    INR  3.1*  2.6*   < >   --    < >   --    < >   --    < >   --    NA   --    --    --   140   --   137   --   139   --   138   POTASSIUM   --    --    --   4.3   --   4.5   --   4.1   --   5.1   CR   --    --    --   0.74   --   0.66   --   0.72   --   0.66   A1C   --    --    --   5.5   --    --    --    --    --   5.8    < > = values in this interval not displayed.     IMPRESSION:                                                    Reason for surgery/procedure: De Quervain's syndrome     The proposed surgical procedure is considered INTERMEDIATE risk.    REVISED CARDIAC RISK INDEX  The patient has the following serious cardiovascular risks for perioperative complications such as (MI, PE, VFib and 3  AV Block):  No serious cardiac risks  INTERPRETATION: 0 risks: Class I (very low risk - 0.4% complication rate)    The patient has the following additional risks for perioperative complications:        ICD-10-CM    1. Preop general physical exam Z01.818 EKG 12-lead complete w/read - Clinics       RECOMMENDATIONS:                                                        --Patient is to take all scheduled medications on the day of surgery EXCEPT for modifications listed below.    Hold Coumadin for 5 days prior to procedure, and then restart regular dose post surgery.    APPROVAL GIVEN to proceed with proposed procedure, without further diagnostic evaluation     The information in this document, created by the medical scribe for me, accurately reflects the services I personally performed and the decisions made by me. I have reviewed and approved this document for accuracy.   Paul Knowles MD     Signed Electronically by: Paul Knowles,  MD    Copy of this evaluation report is provided to requesting physician.    [place] Preop Guidelines

## 2017-04-21 ENCOUNTER — ANTICOAGULATION THERAPY VISIT (OUTPATIENT)
Dept: NURSING | Facility: CLINIC | Age: 82
End: 2017-04-21
Payer: MEDICARE

## 2017-04-21 ENCOUNTER — OFFICE VISIT (OUTPATIENT)
Dept: FAMILY MEDICINE | Facility: CLINIC | Age: 82
End: 2017-04-21
Payer: MEDICARE

## 2017-04-21 VITALS
WEIGHT: 129 LBS | BODY MASS INDEX: 23.74 KG/M2 | HEART RATE: 78 BPM | HEIGHT: 62 IN | SYSTOLIC BLOOD PRESSURE: 100 MMHG | DIASTOLIC BLOOD PRESSURE: 58 MMHG | TEMPERATURE: 96.7 F | OXYGEN SATURATION: 97 %

## 2017-04-21 DIAGNOSIS — Z01.818 PREOP GENERAL PHYSICAL EXAM: Primary | ICD-10-CM

## 2017-04-21 DIAGNOSIS — Z79.01 LONG-TERM (CURRENT) USE OF ANTICOAGULANTS: ICD-10-CM

## 2017-04-21 LAB — INR POINT OF CARE: 2.1 (ref 0.86–1.14)

## 2017-04-21 PROCEDURE — 85610 PROTHROMBIN TIME: CPT | Mod: QW

## 2017-04-21 PROCEDURE — 36416 COLLJ CAPILLARY BLOOD SPEC: CPT

## 2017-04-21 PROCEDURE — 99207 ZZC NO CHARGE NURSE ONLY: CPT

## 2017-04-21 PROCEDURE — 99214 OFFICE O/P EST MOD 30 MIN: CPT | Performed by: INTERNAL MEDICINE

## 2017-04-21 PROCEDURE — 93000 ELECTROCARDIOGRAM COMPLETE: CPT | Performed by: INTERNAL MEDICINE

## 2017-04-21 NOTE — MR AVS SNAPSHOT
Alexandru CARRERA Eleonora   4/21/2017 9:45 AM   Anticoagulation Therapy Visit    Description:  84 year old male   Provider:  [facility]   Department:  [facility]           INR as of 4/21/2017     Today's INR 2.1      Anticoagulation Summary as of 4/21/2017     INR goal 2.0-3.0   Today's INR 2.1   Full instructions 2.5 mg every day   Next INR check 5/5/2017    Indications   Long-term (current) use of anticoagulants [Z79.01] [Z79.01]  Atrial fibrillation (H) [I48.91]         Your next Anticoagulation Clinic appointment(s)     May 05, 2017 10:15 AM CDT   Anticoagulation Visit with [facility]   [facility] ([facility]    [address] [address] [phone]              Contact Numbers     [facility]  Please call [phone] to cancel and/or reschedule your appointment   Please call [phone] with any problems or questions regarding your therapy.        April 2017 Details    Sun Mon Tue Wed Thu Fri Sat           1                 2               3               4               5               6               7               8                 9               10               11               12               13               14               15                 16               17               18               19               20               21      2.5 mg   See details      22      2.5 mg           23      2.5 mg         24      2.5 mg         25      2.5 mg         26      2.5 mg         27      2.5 mg         28      2.5 mg         29      2.5 mg           30      2.5 mg                Date Details   04/21 This INR check               How to take your warfarin dose     To take:  2.5 mg Take 1 of the 2.5 mg tablets.           May 2017 Details    Sun Mon Tue Wed u Fri Sat      1      2.5 mg         2      2.5 mg         3      2.5 mg         4      2.5 mg         5            6                 7               8               9               10               11                12               13                 14               15               16               17               18               19               20                 21               22               23               24               25               26               27                 28               29               30               31                   Date Details   No additional details    Date of next INR:  5/5/2017         How to take your warfarin dose     To take:  2.5 mg Take 1 of the 2.5 mg tablets.

## 2017-04-21 NOTE — PROGRESS NOTES
ANTICOAGULATION FOLLOW-UP CLINIC VISIT    Patient Name:  Alexandru Crews  Date:  4/21/2017  Contact Type:  Face to Face    SUBJECTIVE:     Patient Findings     Positives No Problem Findings           OBJECTIVE    INR Protime   Date Value Ref Range Status   04/21/2017 2.1 (A) 0.86 - 1.14 Final       ASSESSMENT / PLAN  INR assessment THER    Recheck INR In: 3 WEEKS    INR Location Clinic      Anticoagulation Summary as of 4/21/2017     INR goal 2.0-3.0   Today's INR 2.1   Maintenance plan 2.5 mg (2.5 mg x 1) every day   Full instructions 2.5 mg every day   Weekly total 17.5 mg   No change documented Bee Mesa RN   Plan last modified Allie Awan RN (4/8/2016)   Next INR check 5/5/2017   Priority INR   Target end date     Indications   Long-term (current) use of anticoagulants [Z79.01] [Z79.01]  Atrial fibrillation (H) [I48.91]         Anticoagulation Episode Summary     INR check location     Preferred lab     Send INR reminders to [facility]    Comments       Anticoagulation Care Providers     Provider Role Specialty Phone number    Paul Knowles MD Responsible Internal Medicine [phone]            See the Encounter Report to view Anticoagulation Flowsheet and Dosing Calendar (Go to Encounters tab in chart review, and find the Anticoagulation Therapy Visit)    Dosage adjustment made based on physician directed care plan. Reviewed both bleeding and clotting signs and symptoms with patient this visit. Pt. has no further questions or concerns.  Will call with any changes to diet, medications, or missed doses at INR line [phone].      Bee Mesa, SHELLEY

## 2017-04-21 NOTE — NURSING NOTE
"Chief Complaint   Patient presents with     Pre-Op Exam       Initial /58 (BP Location: Left arm, Patient Position: Chair, Cuff Size: Adult Regular)  Pulse 78  Temp 96.7  F (35.9  C) (Oral)  Ht 5' 2\" (1.575 m)  Wt 129 lb (58.5 kg)  SpO2 97%  BMI 23.59 kg/m2 Estimated body mass index is 23.59 kg/(m^2) as calculated from the following:    Height as of this encounter: 5' 2\" (1.575 m).    Weight as of this encounter: 129 lb (58.5 kg).  Medication Reconciliation: complete   Leonila BIRNK CMA ([facility])      "

## 2017-04-21 NOTE — MR AVS SNAPSHOT
"              After Visit Summary   4/21/2017    Alexandru Crews    MRN: [id number]           Patient Information     Date Of Birth          9/22/1932        Visit Information        Provider Department      4/21/2017 10:10 AM Paul Knowles MD [facility]        Today's Diagnoses     Preop general physical exam    -  1      Care Instructions    Hold Coumadin for 5 evenings prior to procedure. Restart regular dose of Coumadin post surgery.     EKG (electrocardiogram) was done: we found a new change, a \"Right Bundle Branch Block\". 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      Before Your Surgery      Call your surgeon if there is any change in your health. This includes signs of a cold or flu (such as a sore throat, runny nose, cough, rash or fever).    Do not smoke, drink alcohol or take over the counter medicine (unless your surgeon or primary care doctor tells you to) for the 24 hours before and after surgery.    If you take prescribed drugs: Follow your doctor s orders about which medicines to take and which to stop until after surgery.    Eating and drinking prior to surgery: follow the instructions from your surgeon    Take a shower or bath the night before surgery. Use the soap your surgeon gave you to gently clean your skin. If you do not have soap from your surgeon, use your regular soap. Do not shave or scrub the surgery site.  Wear clean pajamas and have clean sheets on your bed.     [facility]    If you have any questions regarding to your visit please contact your care team:     Team Pink:   Clinic Hours Telephone Number   Internal Medicine:  Dr. Nini Landaverde NP       7am-7pm  Monday - Thursday   7am-5pm  Fridays  [phone]  (Appointment scheduling available 24/7)    Questions about your visit?  Team Line  [phone]   Urgent Care - [place] " and [place] - 11am-9pm Monday-Friday Saturday-Sunday- 9am-5pm   [place] - 5pm-9pm Monday-Friday Saturday-Sunday- 9am-5pm  [phone] - [place] [facility]  [phone] - [place]       What options do I have for visits at the clinic other than the traditional office visit?  To expand how we care for you, many of our providers are utilizing electronic visits (e-visits) and telephone visits, when medically appropriate, for interactions with their patients rather than a visit in the clinic.   We also offer nurse visits for many medical concerns. Just like any other service, we will bill your insurance company for this type of visit based on time spent on the phone with your provider. Not all insurance companies cover these visits. Please check with your medical insurance if this type of visit is covered. You will be responsible for any charges that are not paid by your insurance.      E-visits via Ulule:  generally incur a $35.00 fee.  Telephone visits:  Time spent on the phone: *charged based on time that is spent on the phone in increments of 10 minutes. Estimated cost:   5-10 mins $30.00   11-20 mins. $59.00   21-30 mins. $85.00   Use CAD Crowdt (secure email communication and access to your chart) to send your primary care provider a message or make an appointment. Ask someone on your Team how to sign up for Ulule.    For a Price Quote for your services, please call our Consumer Price Line at [phone].    As always, Thank you for trusting us with your health care needs!    Discharged by Leonila BRINK CMA ([facility])          Follow-ups after your visit        Your next 10 appointments already scheduled     May 05, 2017 10:15 AM CDT   Anticoagulation Visit with JAYESH ANTI COAG   [facility] [place] ([facility] [place])    [address]  [place] [address]   [phone]            May 15, 2017 10:30 AM CDT   Return Visit with Ras Malin MD   [facility] [place] ([place]  "[facility] [place]) [address]  [place] [address] [phone]              Who to contact     If you have questions or need follow up information about today's clinic visit or your schedule please contact [facility] directly at [phone].  Normal or non-critical lab and imaging results will be communicated to you by MyChart, letter or phone within 4 business days after the clinic has received the results. If you do not hear from us within 7 days, please contact the clinic through MyChart or phone. If you have a critical or abnormal lab result, we will notify you by phone as soon as possible.  Submit refill requests through Codota or call your pharmacy and they will forward the refill request to us. Please allow 3 business days for your refill to be completed.          Additional Information About Your Visit        [email or web address] Information     Codota lets you send messages to your doctor, view your test results, renew your prescriptions, schedule appointments and more. To sign up, go to [email or web address] . Click on \"Log in\" on the left side of the screen, which will take you to the Welcome page. Then click on \"Sign up Now\" on the right side of the page.     You will be asked to enter the access code listed below, as well as some personal information. Please follow the directions to create your username and password.     Your access code is: [email or web address]  Expires: 2017 11:48 AM     Your access code will  in 90 days. If you need help or a new code, please call your [facility] or [phone].        Care EveryWhere ID     This is your Care EveryWhere ID. This could be used by other organizations to access your [place] medical records  [id number]        Your Vitals Were     Pulse Temperature Height Pulse Oximetry BMI (Body Mass Index)       78 96.7  F (35.9  C) (Oral) 5' 2\" (1.575 m) 97% 23.59 kg/m2        Blood Pressure from Last 3 Encounters:   17 100/58 "   02/14/17 146/76   12/01/16 132/60    Weight from Last 3 Encounters:   04/21/17 129 lb (58.5 kg)   04/03/17 128 lb (58.1 kg)   02/14/17 129 lb 9.6 oz (58.8 kg)              We Performed the Following     EKG 12-lead complete w/read - Clinics        Primary Care Provider Office Phone # Fax #    Paul Knowles [phone]       [address] [address]        Thank you!     Thank you for choosing [facility]  for your care. Our goal is always to provide you with excellent care. Hearing back from our patients is one way we can continue to improve our services. Please take a few minutes to complete the written survey that you may receive in the mail after your visit with us. Thank you!             Your Updated Medication List - Protect others around you: Learn how to safely use, store and throw away your medicines at www.disposemymeds.org.          This list is accurate as of: 4/21/17 10:38 AM.  Always use your most recent med list.                   Brand Name Dispense Instructions for use    calcium-vitamin D 600-400 MG-UNIT per tablet    CALTRATE     Take 1 tablet by mouth every other day       DAILY VITAMINS PO      1 tablet daily       FIBER PO      Take 1 tsp. by mouth daily       Fish Oil 1200 MG Caps      Take 1 capsule by mouth 2 times daily.       metoprolol 25 MG 24 hr tablet    TOPROL-XL     Take 25 mg by mouth 2 times daily       morphine 15 MG 12 hr tablet    MS CONTIN    60 tablet    Take 1 tablet (15 mg) by mouth every 12 hours 28 days or more between refills for controlled medications       oxyCODONE-acetaminophen 5-325 MG per tablet    PERCOCET    90 tablet    Take 1 tablet by mouth 3 times daily as needed for moderate to severe pain 28 days or more between refills for controlled medications       senna-docusate 8.6-50 MG per tablet    SENOKOT-S;PERICOLACE     Take 1 tablet by mouth 3 times daily as needed       triamcinolone 0.1 %  cream    KENALOG    60 g    Apply  topically 2 times daily as needed. to affected area as directed.       VITAMIN D (CHOLECALCIFEROL) PO      Take  by mouth daily. 3000 units daily.       warfarin 2.5 MG tablet    COUMADIN    90 tablet    take 1 tablet by mouth once daily or as directed

## 2017-04-24 ENCOUNTER — TELEPHONE (OUTPATIENT)
Dept: FAMILY MEDICINE | Facility: CLINIC | Age: 82
End: 2017-04-24

## 2017-04-28 ENCOUNTER — TRANSFERRED RECORDS (OUTPATIENT)
Dept: HEALTH INFORMATION MANAGEMENT | Facility: CLINIC | Age: 82
End: 2017-04-28

## 2017-05-05 ENCOUNTER — ANTICOAGULATION THERAPY VISIT (OUTPATIENT)
Dept: NURSING | Facility: CLINIC | Age: 82
End: 2017-05-05
Payer: MEDICARE

## 2017-05-05 DIAGNOSIS — Z79.01 LONG-TERM (CURRENT) USE OF ANTICOAGULANTS: ICD-10-CM

## 2017-05-05 LAB — INR POINT OF CARE: 1.6 (ref 0.86–1.14)

## 2017-05-05 PROCEDURE — 85610 PROTHROMBIN TIME: CPT | Mod: QW

## 2017-05-05 PROCEDURE — 99207 ZZC NO CHARGE NURSE ONLY: CPT

## 2017-05-05 PROCEDURE — 36416 COLLJ CAPILLARY BLOOD SPEC: CPT

## 2017-05-05 NOTE — MR AVS SNAPSHOT
Alexandru CARRERA Eleonora   5/5/2017 1:15 PM   Anticoagulation Therapy Visit    Description:  84 year old male   Provider:  KEYANNA ANTI COAG   Department:  [facility]           INR as of 5/5/2017     Today's INR 1.6!      Anticoagulation Summary as of 5/5/2017     INR goal 2.0-3.0   Today's INR 1.6!   Full instructions 5/5: 5 mg; Otherwise 2.5 mg every day   Next INR check 5/19/2017    Indications   Long-term (current) use of anticoagulants [Z79.01] [Z79.01]  Atrial fibrillation (H) [I48.91]         Your next Anticoagulation Clinic appointment(s)     May 19, 2017 10:00 AM CDT   Anticoagulation Visit with KEYANNA ANTI JASON   [facility] ([address] [address] [phone]              Contact Numbers     [facility]  Please call [phone] to cancel and/or reschedule your appointment   Please call [phone] with any problems or questions regarding your therapy.        May 2017 Details    Sun Mon Tue Wed Thu Fri Sat      1               2               3               4               5      5 mg   See details      6      2.5 mg           7      2.5 mg         8      2.5 mg         9      2.5 mg         10      2.5 mg         11      2.5 mg         12      2.5 mg         13      2.5 mg           14      2.5 mg         15      2.5 mg         16      2.5 mg         17      2.5 mg         18      2.5 mg         19            20                 21               22               23               24               25               26               27                 28               29               30               31                   Date Details   05/05 This INR check       Date of next INR:  5/19/2017         How to take your warfarin dose     To take:  2.5 mg Take 1 of the 2.5 mg tablets.    To take:  5 mg Take 2 of the 2.5 mg tablets.

## 2017-05-05 NOTE — PROGRESS NOTES
ANTICOAGULATION FOLLOW-UP CLINIC VISIT    Patient Name:  Alexandru Crews  Date:  5/5/2017  Contact Type:  Face to Face    SUBJECTIVE:     Patient Findings     Positives No Problem Findings           OBJECTIVE    INR Protime   Date Value Ref Range Status   05/05/2017 1.6 (A) 0.86 - 1.14 Final       ASSESSMENT / PLAN  INR assessment SUB    Recheck INR In: 2 WEEKS    INR Location Clinic      Anticoagulation Summary as of 5/5/2017     INR goal 2.0-3.0   Today's INR 1.6!   Maintenance plan 2.5 mg (2.5 mg x 1) every day   Full instructions 5/5: 5 mg; Otherwise 2.5 mg every day   Weekly total 17.5 mg   Plan last modified Allie Awan RN (4/8/2016)   Next INR check 5/19/2017   Priority INR   Target end date     Indications   Long-term (current) use of anticoagulants [Z79.01] [Z79.01]  Atrial fibrillation (H) [I48.91]         Anticoagulation Episode Summary     INR check location     Preferred lab     Send INR reminders to [facility] CLINIC    Comments       Anticoagulation Care Providers     Provider Role Specialty Phone number    Paul Knowles MD [facility] Internal Medicine [phone]            See the Encounter Report to view Anticoagulation Flowsheet and Dosing Calendar (Go to Encounters tab in chart review, and find the Anticoagulation Therapy Visit)    Dosage adjustment made based on physician directed care plan. Reviewed both bleeding and clotting signs and symptoms with patient this visit. Pt. has no further questions or concerns.  Will call with any changes to diet, medications, or missed doses at INR line [phone].      Bee Mesa RN

## 2017-05-08 ENCOUNTER — OFFICE VISIT (OUTPATIENT)
Dept: ORTHOPEDICS | Facility: CLINIC | Age: 82
End: 2017-05-08
Payer: MEDICARE

## 2017-05-08 VITALS — WEIGHT: 128 LBS | BODY MASS INDEX: 23.55 KG/M2 | RESPIRATION RATE: 14 BRPM | HEIGHT: 62 IN

## 2017-05-08 DIAGNOSIS — M65.4 DE QUERVAIN'S DISEASE (TENOSYNOVITIS): Primary | ICD-10-CM

## 2017-05-08 PROCEDURE — 99024 POSTOP FOLLOW-UP VISIT: CPT | Performed by: ORTHOPAEDIC SURGERY

## 2017-05-08 NOTE — NURSING NOTE
"Chief Complaint   Patient presents with     RECHECK     Right de Quarviens release on 4/28/17 at [facility].       Initial Resp 14  Ht 1.575 m (5' 2\")  Wt 58.1 kg (128 lb)  BMI 23.41 kg/m2 Estimated body mass index is 23.41 kg/(m^2) as calculated from the following:    Height as of this encounter: 1.575 m (5' 2\").    Weight as of this encounter: 58.1 kg (128 lb).  Medication Reconciliation: complete     GILBERTO BuenoC  Supervising physician: Ras Malin MD  Dept. of Orthopedics  [facility]          "

## 2017-05-08 NOTE — LETTER
5/8/2017       RE: Alexandru Crews  [address] [place] [address]           Dear Colleague,    Thank you for referring your patient, Alexandru Crews, to the [facility]. Please see a copy of my visit note below.    Follow up right deQuervain's tenosynovitis release on 4/28/17.  Splint is removed.  Wound is healing well.   Sutures are removed.    Start scar massage with vitamin-E cream.  Resume activity as tolerated.  Return to clinic as needed.    He would like to be seen in future for his shoulders.  We will get x-ray of shoulders then and consider injections.      Again, thank you for allowing me to participate in the care of your patient.        Sincerely,              Ras Malin MD

## 2017-05-08 NOTE — PATIENT INSTRUCTIONS
Start scar massage with vitamin-E cream.  Resume activity as tolerated.  Return to clinic as needed.

## 2017-05-08 NOTE — PROGRESS NOTES
Follow up right deQuervain's tenosynovitis release on 4/28/17.  Splint is removed.  Wound is healing well.   Sutures are removed.    Start scar massage with vitamin-E cream.  Resume activity as tolerated.  Return to clinic as needed.    He would like to be seen in future for his shoulders.  We will get x-ray of shoulders then and consider injections.

## 2017-05-08 NOTE — MR AVS SNAPSHOT
"              After Visit Summary   5/8/2017    Alexandru Crews    MRN: [id number]           Patient Information     Date Of Birth          9/22/1932        Visit Information        Provider Department      5/8/2017 11:00 AM Ras Malin MD [facility]        Today's Diagnoses     De Quervain's disease (tenosynovitis)    -  1      Care Instructions    Start scar massage with vitamin-E cream.  Resume activity as tolerated.  Return to clinic as needed.        Follow-ups after your visit        Your next 10 appointments already scheduled     May 19, 2017 10:00 AM CDT   Anticoagulation Visit with FZ ANTI COAG   [facility] ([facility])    [address] [address] [phone]              Who to contact     If you have questions or need follow up information about today's clinic visit or your schedule please contact [facility] directly at [phone].  Normal or non-critical lab and imaging results will be communicated to you by Collaborate Cloudhart, letter or phone within 4 business days after the clinic has received the results. If you do not hear from us within 7 days, please contact the clinic through Collaborate Cloudhart or phone. If you have a critical or abnormal lab result, we will notify you by phone as soon as possible.  Submit refill requests through [employer] or call your pharmacy and they will forward the refill request to us. Please allow 3 business days for your refill to be completed.          Additional Information About Your Visit        MyChart Information     [employer] lets you send messages to your doctor, view your test results, renew your prescriptions, schedule appointments and more. To sign up, go to [email or web address] . Click on \"Log in\" on the left side of the screen, which will take you to the Welcome page. Then click on \"Sign up Now\" on the right side of the page.     You will be asked to enter the access code listed below, as " "well as some personal information. Please follow the directions to create your username and password.     Your access code is: [email or web address]  Expires: 2017 11:48 AM     Your access code will  in 90 days. If you need help or a new code, please call your [place] clinic or [phone].        Care EveryWhere ID     This is your Care EveryWhere ID. This could be used by other organizations to access your [place] medical records  [id number]        Your Vitals Were     Respirations Height BMI (Body Mass Index)             14 1.575 m (5' 2\") 23.41 kg/m2          Blood Pressure from Last 3 Encounters:   17 100/58   17 146/76   16 132/60    Weight from Last 3 Encounters:   17 58.1 kg (128 lb)   17 58.5 kg (129 lb)   17 58.1 kg (128 lb)              Today, you had the following     No orders found for display       Primary Care Provider Office Phone # Fax #    Paul Knowles [phone]       [address] [address]        Thank you!     Thank you for choosing [facility]  for your care. Our goal is always to provide you with excellent care. Hearing back from our patients is one way we can continue to improve our services. Please take a few minutes to complete the written survey that you may receive in the mail after your visit with us. Thank you!             Your Updated Medication List - Protect others around you: Learn how to safely use, store and throw away your medicines at www.disposemymeds.org.          This list is accurate as of: 17 12:00 PM.  Always use your most recent med list.                   Brand Name Dispense Instructions for use    calcium-vitamin D 600-400 MG-UNIT per tablet    CALTRATE     Take 1 tablet by mouth every other day       DAILY VITAMINS PO      1 tablet daily       FIBER PO      Take 1 tsp. by mouth daily       Fish Oil 1200 MG Caps      Take 1 capsule by mouth 2 times " daily.       metoprolol 25 MG 24 hr tablet    TOPROL-XL     Take 25 mg by mouth 2 times daily       morphine 15 MG 12 hr tablet    MS CONTIN    60 tablet    Take 1 tablet (15 mg) by mouth every 12 hours 28 days or more between refills for controlled medications       oxyCODONE-acetaminophen 5-325 MG per tablet    PERCOCET    90 tablet    Take 1 tablet by mouth 3 times daily as needed for moderate to severe pain 28 days or more between refills for controlled medications       senna-docusate 8.6-50 MG per tablet    SENOKOT-S;PERICOLACE     Take 1 tablet by mouth 3 times daily as needed       triamcinolone 0.1 % cream    KENALOG    60 g    Apply  topically 2 times daily as needed. to affected area as directed.       VITAMIN D (CHOLECALCIFEROL) PO      Take  by mouth daily. 3000 units daily.       warfarin 2.5 MG tablet    COUMADIN    90 tablet    take 1 tablet by mouth once daily or as directed

## 2017-05-19 ENCOUNTER — ANTICOAGULATION THERAPY VISIT (OUTPATIENT)
Dept: NURSING | Facility: CLINIC | Age: 82
End: 2017-05-19
Payer: MEDICARE

## 2017-05-19 DIAGNOSIS — Z79.01 LONG-TERM (CURRENT) USE OF ANTICOAGULANTS: ICD-10-CM

## 2017-05-19 LAB — INR POINT OF CARE: 2.7 (ref 0.86–1.14)

## 2017-05-19 PROCEDURE — 36416 COLLJ CAPILLARY BLOOD SPEC: CPT

## 2017-05-19 PROCEDURE — 85610 PROTHROMBIN TIME: CPT | Mod: QW

## 2017-05-19 PROCEDURE — 99207 ZZC NO CHARGE NURSE ONLY: CPT

## 2017-05-19 NOTE — PROGRESS NOTES
ANTICOAGULATION FOLLOW-UP CLINIC VISIT    Patient Name:  Alexandru Crews  Date:  5/19/2017  Contact Type:  Face to Face    SUBJECTIVE:     Patient Findings     Positives No Problem Findings    Comments Will be having dental procedure on 6/9/17.           OBJECTIVE    INR Protime   Date Value Ref Range Status   05/19/2017 2.7 (A) 0.86 - 1.14 Final       ASSESSMENT / PLAN  INR assessment THER    Recheck INR In: 3 WEEKS    INR Location Clinic      Anticoagulation Summary as of 5/19/2017     INR goal 2.0-3.0   Today's INR 2.7   Maintenance plan 2.5 mg (2.5 mg x 1) every day   Full instructions 2.5 mg every day   Weekly total 17.5 mg   No change documented Bee Mesa RN   Plan last modified Allie Awan RN (4/8/2016)   Next INR check 6/5/2017   Priority INR   Target end date     Indications   Long-term (current) use of anticoagulants [Z79.01] [Z79.01]  Atrial fibrillation (H) [I48.91]         Anticoagulation Episode Summary     INR check location     Preferred lab     Send INR reminders to [facility] CLINIC    Comments       Anticoagulation Care Providers     Provider Role Specialty Phone number    Paul Knowles MD Responsible Internal Medicine [phone]            See the Encounter Report to view Anticoagulation Flowsheet and Dosing Calendar (Go to Encounters tab in chart review, and find the Anticoagulation Therapy Visit)    Dosage adjustment made based on physician directed care plan. Reviewed both bleeding and clotting signs and symptoms with patient this visit. Pt. has no further questions or concerns.  Will call with any changes to diet, medications, or missed doses at INR line [phone].      Bee Mesa, SHELLEY

## 2017-05-19 NOTE — MR AVS SNAPSHOT
Alexandru CARRERA Eleonora   5/19/2017 10:00 AM   Anticoagulation Therapy Visit    Description:  84 year old male   Provider:  KEYANNA ANTI COAG   Department:  [facility]           INR as of 5/19/2017     Today's INR 2.7      Anticoagulation Summary as of 5/19/2017     INR goal 2.0-3.0   Today's INR 2.7   Full instructions 2.5 mg every day   Next INR check 6/5/2017    Indications   Long-term (current) use of anticoagulants [Z79.01] [Z79.01]  Atrial fibrillation (H) [I48.91]         Your next Anticoagulation Clinic appointment(s)     Jun 05, 2017 10:15 AM CDT   Anticoagulation Visit with KEYANNA ANTI COAG   [facility] ([facility]    [address] [address] [phone]              Contact Numbers     [facility]  Please call [phone] to cancel and/or reschedule your appointment   Please call [phone] with any problems or questions regarding your therapy.        May 2017 Details    Sun Mon Tue Wed Thu Fri Sat      1               2               3               4               5               6                 7               8               9               10               11               12               13                 14               15               16               17               18               19      2.5 mg   See details      20      2.5 mg           21      2.5 mg         22      2.5 mg         23      2.5 mg         24      2.5 mg         25      2.5 mg         26      2.5 mg         27      2.5 mg           28      2.5 mg         29      2.5 mg         30      2.5 mg         31      2.5 mg             Date Details   05/19 This INR check               How to take your warfarin dose     To take:  2.5 mg Take 1 of the 2.5 mg tablets.           June 2017 Details    Sun Mon Tue Wed Thu Fri Sat         1      2.5 mg         2      2.5 mg         3      2.5 mg           4      2.5 mg         5            6               7               8               9                10                 11               12               13               14               15               16               17                 18               19               20               21               22               23               24                 25               26               27               28               29               30                 Date Details   No additional details    Date of next INR:  6/5/2017         How to take your warfarin dose     To take:  2.5 mg Take 1 of the 2.5 mg tablets.

## 2017-06-05 ENCOUNTER — RADIANT APPOINTMENT (OUTPATIENT)
Dept: GENERAL RADIOLOGY | Facility: CLINIC | Age: 82
End: 2017-06-05
Attending: ORTHOPAEDIC SURGERY
Payer: MEDICARE

## 2017-06-05 ENCOUNTER — ANTICOAGULATION THERAPY VISIT (OUTPATIENT)
Dept: NURSING | Facility: CLINIC | Age: 82
End: 2017-06-05
Payer: MEDICARE

## 2017-06-05 ENCOUNTER — OFFICE VISIT (OUTPATIENT)
Dept: ORTHOPEDICS | Facility: CLINIC | Age: 82
End: 2017-06-05
Payer: MEDICARE

## 2017-06-05 DIAGNOSIS — M25.562 PAIN IN BOTH KNEES, UNSPECIFIED CHRONICITY: Primary | ICD-10-CM

## 2017-06-05 DIAGNOSIS — Z79.01 LONG-TERM (CURRENT) USE OF ANTICOAGULANTS: ICD-10-CM

## 2017-06-05 DIAGNOSIS — M25.561 PAIN IN BOTH KNEES, UNSPECIFIED CHRONICITY: Primary | ICD-10-CM

## 2017-06-05 DIAGNOSIS — M25.561 PAIN IN BOTH KNEES, UNSPECIFIED CHRONICITY: ICD-10-CM

## 2017-06-05 DIAGNOSIS — M17.11 PRIMARY OSTEOARTHRITIS OF RIGHT KNEE: ICD-10-CM

## 2017-06-05 DIAGNOSIS — M25.562 PAIN IN BOTH KNEES, UNSPECIFIED CHRONICITY: ICD-10-CM

## 2017-06-05 DIAGNOSIS — Z96.652 HISTORY OF TOTAL KNEE ARTHROPLASTY, LEFT: ICD-10-CM

## 2017-06-05 LAB — INR POINT OF CARE: 2.3 (ref 0.86–1.14)

## 2017-06-05 PROCEDURE — 99213 OFFICE O/P EST LOW 20 MIN: CPT | Mod: 24 | Performed by: ORTHOPAEDIC SURGERY

## 2017-06-05 PROCEDURE — 36416 COLLJ CAPILLARY BLOOD SPEC: CPT

## 2017-06-05 PROCEDURE — 99207 ZZC NO CHARGE NURSE ONLY: CPT

## 2017-06-05 PROCEDURE — 85610 PROTHROMBIN TIME: CPT | Mod: QW

## 2017-06-05 PROCEDURE — 73562 X-RAY EXAM OF KNEE 3: CPT | Mod: RT

## 2017-06-05 PROCEDURE — 20610 DRAIN/INJ JOINT/BURSA W/O US: CPT | Mod: 79 | Performed by: ORTHOPAEDIC SURGERY

## 2017-06-05 RX ORDER — METHYLPREDNISOLONE ACETATE 80 MG/ML
80 INJECTION, SUSPENSION INTRA-ARTICULAR; INTRALESIONAL; INTRAMUSCULAR; SOFT TISSUE ONCE
Qty: 1 ML | Refills: 0 | OUTPATIENT
Start: 2017-06-05 | End: 2017-06-05

## 2017-06-05 NOTE — PROGRESS NOTES
The patient's right knee was prepped with betadine solution after verification of allergies. Area approximately 10 cm x 10 cm prepped in a sterile fashion. After injection, betadine removed with soap and water and band-aids applied.    1ml depo-medrol with 1% lidocaine plain injected into patient's right knee by Dr. Ras Malin  LOT# [id number]  Exp. 08/2019    Jayden Schwartz PA-C  Supervising physician: Ras Malin MD  Dept. of Orthopedics  [facility]

## 2017-06-05 NOTE — LETTER
6/5/2017       RE: Alexandru Crews  [address] [place] [address]           Dear Colleague,    Thank you for referring your patient, Alexandru Crews, to the [facility]. Please see a copy of my visit note below.    The patient's right knee was prepped with betadine solution after verification of allergies. Area approximately 10 cm x 10 cm prepped in a sterile fashion. After injection, betadine removed with soap and water and band-aids applied.    1ml depo-medrol with 1% lidocaine plain injected into patient's right knee by Dr. Ras Malin  LOT# [id number]  Exp. 08/2019    GILBERTO BuenoC  Supervising physician: Ras Malin MD  Dept. of Orthopedics  [facility]          Alexandru Crews is seen for follow up left total knee arthroplasty from 2006 by Dr Gasper Arriaga.  He complains of right knee pain.  Has occasional left knee pain that he thinks is due to favoring right knee.  He thinks he is not a surgical candidate due to bilateral shoulder cuff tear arthropathy, lumbar spinal stenosis, left foot plano-valgus deformity.  Exercise:  walking.  He rates his right knee pain as 7-8/10.  This pain present for 2 years.    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 risk (brother)     Hydrocele     Rt     Hypertension goal BP (blood pressure) < 140/90 8/10/2015     Osteopenia 2005     Osteoporosis     Dr Regla DHALIWAL (peptic ulcer disease) 11/1998    h/o, +H. Pylori Rx     Varicocele     Lt side-large       Past Surgical History:   Procedure Laterality Date     APPENDECTOMY  12/2004     ARTHROSCOPY KNEE  RT/LT  1/2006    Rt & Lt, Dr Arriaga     BACK SURGERY  1978 / 2003    x 3 in 1978, fusions and one surgery in 2003     C APPENDECTOMY  12/31/2004     CARPAL TUNNEL RELEASE RT/LT  three    2 on left, one on right     CATARACT IOL, RT/LT       COLONOSCOPY  1995,2000,2005     INJECT EPIDURAL CERVICAL  5/24/2011    [facility]     INJECT EPIDURAL LUMBAR  11/9/2010    [facility]     INJECT EPIDURAL LUMBAR  1/4/2011    [facility]     INJECT EPIDURAL LUMBAR  4/27/2011    [facility]     LASER YAG CAPSULOTOMY  11/2016; 12/2016    left eye; right eye     PHACOEMULSIFICATION WITH STANDARD INTRAOCULAR LENS IMPLANT  6/2008; 8/2008    right eye; left eye     RECTAL SURGERY      fissurectomy and proctoplasty     RELEASE DEQUERVAINS WRIST Right 04/28/2017    DML     RELEASE TRIGGER FINGER      right hand     SHOULDER SURGERY      X four [ right x 2 and left x 2 ][[[[[     TUNA         Family History   Problem Relation Age of Onset     CANCER Mother      ovarian     Cancer - colorectal Brother        Social History     Social History     Marital status:      Spouse name: N/A     Number of children: N/A     Years of education: N/A     Occupational History     Not on file.     Social History Main Topics     Smoking status: Former Smoker     Years: 15.00     Types: Cigarettes     Quit date: 6/30/1975     Smokeless tobacco: Never Used     Alcohol use Yes      Comment: few beers weekly     Drug use: No     Sexual activity: Not Currently     Partners: Female     Other Topics Concern     Not on file     Social History Narrative       Current Outpatient Prescriptions   Medication Sig Dispense Refill     methylPREDNISolone acetate (DEPO-MEDROL) 80 MG/ML injection 1 mL (80 mg) by INTRA-ARTICULAR route once for 1 dose 1 mL 0     oxyCODONE-acetaminophen (PERCOCET) 5-325 MG per tablet Take 1 tablet by mouth 3 times daily as needed for moderate to severe pain 28 days or more between refills for controlled medications  90 tablet 0     morphine (MS CONTIN) 15 MG 12 hr tablet Take 1 tablet (15 mg) by mouth every 12 hours 28 days or more between refills for controlled medications 60 tablet 0     warfarin (COUMADIN) 2.5 MG tablet take 1 tablet by mouth once daily or as directed 90 tablet 1     metoprolol (TOPROL-XL) 25 MG 24 hr tablet Take 25 mg by mouth 2 times daily       calcium-vitamin D (CALTRATE) 600-400 MG-UNIT per tablet Take 1 tablet by mouth every other day       senna-docusate (SENOKOT-S;PERICOLACE) 8.6-50 MG per tablet Take 1 tablet by mouth 3 times daily as needed        triamcinolone (KENALOG) 0.1 % cream Apply  topically 2 times daily as needed. to affected area as directed. 60 g 2     FIBER PO Take 1 tsp. by mouth daily        DAILY VITAMINS PO 1 tablet daily       VITAMIN D, CHOLECALCIFEROL, PO Take  by mouth daily. 3000 units daily.        omega-3 fatty acids (FISH OIL) 1200 MG capsule Take 1 capsule by mouth 2 times daily.         Allergies   Allergen Reactions     Lactose Nausea and Vomiting     Sulindac      Upset stomach     Tramadol      Itching     Valdecoxib Itching     Vicodin [Hydrocodone-Acetaminophen]      Itching     Vioxx Itching     Rofecoxib Itching and Rash     Sulfa Drugs Rash     Muscle aches       REVIEW OF SYSTEMS:  CONSTITUTIONAL:  NEGATIVE for fever, chills, change in weight, not feeling tired  SKIN:  NEGATIVE for worrisome rashes, no skin lumps, no skin ulcers and no non-healing wounds  EYES:  NEGATIVE for vision changes or irritation.  ENT/MOUTH:  NEGATIVE.  No hearing loss, no hoarseness, no difficulty swallowing.  RESP:  NEGATIVE. No cough or shortness of breath.  BREAST:  NEGATIVE for masses, tenderness or discharge  CV:  NEGATIVE for chest pain, palpitations or peripheral edema  GI:  NEGATIVE for nausea, abdominal pain, heartburn, or change in bowel habits  :  Negative. No dysuria, no hematuria  MUSCULOSKELETAL:  See HPI above  NEURO:  NEGATIVE . No headaches, no dizziness,  no  numbness  ENDOCRINE:  NEGATIVE for temperature intolerance, skin/hair changes  HEME/ALLERGY/IMMUNE:  NEGATIVE for bleeding problems  PSYCHIATRIC:  NEGATIVE. no anxiety, no depression.      Xray:  Xray images were independently visualized with the patient.  This shows good position of left total knee arthroplasty components.  No sign of loosening or wear.   Right knee has severe medial osteoarthritis with bone-on-bone.    Exam:  Vitals: There were no vitals taken for this visit.  BMI= There is no height or weight on file to calculate BMI.  Range of motion right 3-115 degrees, left 2-115 degrees.  Alignment  Normal on left, moderate varus on right.  Stability:   Right       Lateral collateral ligament no laxity       Medial collateral ligament no laxity  Left:       Lateral collateral ligament no laxity       Medial collateral ligament no laxity  Wound:  Well healed.  Edema: Minor - both legs.  Effusion: Minor - right knee   Tenderness: Right Significant - medial joint line        Left:  None  Sensation, motor, and circulation intact.    Assessment:  left total knee arthroplasty doing well.  Right knee severe osteoarthritis.  Plan:  if symptoms are severe enough, he would be candidate for right total knee arthroplasty.  He  desires injection today of right knee(s).  Risks, benefits, potential complications and alternatives were discussed.   With the patient's consent, sterile prep was performed of right knee(s).  Right knee was injected with Depo Medrol 80 mg and lidocaine at anteromedial site.  Return to clinic as needed.    Continue antibiotics for dental work.      Again, thank you for allowing me to participate in the care of your patient.        Sincerely,              Ras Malin MD

## 2017-06-05 NOTE — MR AVS SNAPSHOT
After Visit Summary   6/5/2017    Alexandru Crews    MRN: [id number]           Patient Information     Date Of Birth          9/22/1932        Visit Information        Provider Department      6/5/2017 11:15 AM Ras Malin MD [facility]        Today's Diagnoses     Pain in both knees, unspecified chronicity    -  1      Care Instructions    You have had a steroid injection today.  For the first 2 hours there will likely be some numbing in the joint from the lidocaine.  This is a good sign, indicating that the injection is in the right place.  In 2 hours the lidocaine will wear off, and the joint will hurt like you had a shot.  Each day the cortisone makes it feel better.  It reaches peak effect in 2 weeks.  We expect it to last for 3 months.  You may resume regular activity when you feel ready.  If you are diabetic, your glucoses will be quite high for several days.    Consider  brace for knee              Follow-ups after your visit        Your next 10 appointments already scheduled     Jul 07, 2017 10:00 AM CDT   Anticoagulation Visit with [facility] CO[facility]   [facility] [place] ([facility])    [address] [place] [address]  [place] [id number] [phone]              Who to contact     If you have questions or need follow up information about today's clinic visit or your schedule please contact [facility] directly at [phone].  Normal or non-critical lab and imaging results will be communicated to you by MyChart, letter or phone within 4 business days after the clinic has received the results. If you do not hear from us within 7 days, please contact the clinic through MyChart or phone. If you have a critical or abnormal lab result, we will notify you by phone as soon as possible.  Submit refill requests through [employer] or call your pharmacy and they will forward the refill request to us. Please allow 3 business days for your refill to be  "completed.          Additional Information About Your Visit        [employer] Information     [employer] lets you send messages to your doctor, view your test results, renew your prescriptions, schedule appointments and more. To sign up, go to [email or web address] . Click on \"Log in\" on the left side of the screen, which will take you to the Welcome page. Then click on \"Sign up Now\" on the right side of the page.     You will be asked to enter the access code listed below, as well as some personal information. Please follow the directions to create your username and password.     Your access code is: [email or web address]  Expires: 2017 11:48 AM     Your access code will  in 90 days. If you need help or a new code, please call your [place] clinic or [phone].        Care EveryWhere ID     This is your Care EveryWhere ID. This could be used by other organizations to access your [place] medical records  [id number]         Blood Pressure from Last 3 Encounters:   17 100/58   17 146/76   16 132/60    Weight from Last 3 Encounters:   17 58.1 kg (128 lb)   17 58.5 kg (129 lb)   17 58.1 kg (128 lb)               Primary Care Provider Office Phone # Fax #    Paul Knowles [phone]       [address] [address]        Thank you!     Thank you for choosing [facility]  for your care. Our goal is always to provide you with excellent care. Hearing back from our patients is one way we can continue to improve our services. Please take a few minutes to complete the written survey that you may receive in the mail after your visit with us. Thank you!             Your Updated Medication List - Protect others around you: Learn how to safely use, store and throw away your medicines at www.disposemymeds.org.          This list is accurate as of: 17 12:43 PM.  Always use your most recent med list.                   " Brand Name Dispense Instructions for use    calcium-vitamin D 600-400 MG-UNIT per tablet    CALTRATE     Take 1 tablet by mouth every other day       DAILY VITAMINS PO      1 tablet daily       FIBER PO      Take 1 tsp. by mouth daily       Fish Oil 1200 MG Caps      Take 1 capsule by mouth 2 times daily.       metoprolol 25 MG 24 hr tablet    TOPROL-XL     Take 25 mg by mouth 2 times daily       morphine 15 MG 12 hr tablet    MS CONTIN    60 tablet    Take 1 tablet (15 mg) by mouth every 12 hours 28 days or more between refills for controlled medications       oxyCODONE-acetaminophen 5-325 MG per tablet    PERCOCET    90 tablet    Take 1 tablet by mouth 3 times daily as needed for moderate to severe pain 28 days or more between refills for controlled medications       senna-docusate 8.6-50 MG per tablet    SENOKOT-S;PERICOLACE     Take 1 tablet by mouth 3 times daily as needed       triamcinolone 0.1 % cream    KENALOG    60 g    Apply  topically 2 times daily as needed. to affected area as directed.       VITAMIN D (CHOLECALCIFEROL) PO      Take  by mouth daily. 3000 units daily.       warfarin 2.5 MG tablet    COUMADIN    90 tablet    take 1 tablet by mouth once daily or as directed

## 2017-06-05 NOTE — PATIENT INSTRUCTIONS
You have had a steroid injection today.  For the first 2 hours there will likely be some numbing in the joint from the lidocaine.  This is a good sign, indicating that the injection is in the right place.  In 2 hours the lidocaine will wear off, and the joint will hurt like you had a shot.  Each day the cortisone makes it feel better.  It reaches peak effect in 2 weeks.  We expect it to last for 3 months.  You may resume regular activity when you feel ready.  If you are diabetic, your glucoses will be quite high for several days.    Consider  brace for knee

## 2017-06-05 NOTE — NURSING NOTE
"Chief Complaint   Patient presents with     RECHECK     New issue. Bilateral knee pain for the last 2 yrs. no injury. Pain level 7-8/10 sharp, dull shooting, achy, snapping and episodic. Rest makes the pain better and walking makes the pain worse.       Initial There were no vitals taken for this visit. Estimated body mass index is 23.41 kg/(m^2) as calculated from the following:    Height as of 5/8/17: 1.575 m (5' 2\").    Weight as of 5/8/17: 58.1 kg (128 lb).  Medication Reconciliation: complete   Sulma Bennett MA      "

## 2017-06-05 NOTE — MR AVS SNAPSHOT
Alexandru CARRERA Eleonora   6/5/2017 10:15 AM   Anticoagulation Therapy Visit    Description:  84 year old male   Provider:  [facility] ANTI [facility]AG   Department:  [facility]           INR as of 6/5/2017     Today's INR 2.3      Anticoagulation Summary as of 6/5/2017     INR goal 2.0-3.0   Today's INR 2.3   Full instructions 2.5 mg every day   Next INR check 7/7/2017    Indications   Long-term (current) use of anticoagulants [Z79.01] [Z79.01]  Atrial fibrillation (H) [I48.91]         Your next Anticoagulation Clinic appointment(s)     Jul 07, 2017 10:00 AM CDT   Anticoagulation Visit with KEYANNA ANTI COAG   [facility] ([facility]    [address] [address] [phone]              Contact Numbers     [facility]  Please call [phone] to cancel and/or reschedule your appointment   Please call [phone] with any problems or questions regarding your therapy.        June 2017 Details    Sun Mon Tue Wed Thu Fri Sat         1               2               3                 4               5      2.5 mg   See details      6      2.5 mg         7      2.5 mg         8      2.5 mg         9      2.5 mg         10      2.5 mg           11      2.5 mg         12      2.5 mg         13      2.5 mg         14      2.5 mg         15      2.5 mg         16      2.5 mg         17      2.5 mg           18      2.5 mg         19      2.5 mg         20      2.5 mg         21      2.5 mg         22      2.5 mg         23      2.5 mg         24      2.5 mg           25      2.5 mg         26      2.5 mg         27      2.5 mg         28      2.5 mg         29      2.5 mg         30      2.5 mg           Date Details   06/05 This INR check               How to take your warfarin dose     To take:  2.5 mg Take 1 of the 2.5 mg tablets.           July 2017 Details    Sun Mon Tue Wed Thu Fri Sat           1      2.5 mg           2      2.5 mg         3      2.5 mg         4      2.5 mg         5       2.5 mg         6      2.5 mg         7            8                 9               10               11               12               13               14               15                 16               17               18               19               20               21               22                 23               24               25               26               27               28               29                 30               31                     Date Details   No additional details    Date of next INR:  7/7/2017         How to take your warfarin dose     To take:  2.5 mg Take 1 of the 2.5 mg tablets.

## 2017-06-06 NOTE — PROGRESS NOTES
Alexandru Crews is seen for follow up left total knee arthroplasty from 2006 by Dr Gasper Arriaga.  He complains of right knee pain.  Has occasional left knee pain that he thinks is due to favoring right knee.  He thinks he is not a surgical candidate due to bilateral shoulder cuff tear arthropathy, lumbar spinal stenosis, left foot plano-valgus deformity.  Exercise:  walking.  He rates his right knee pain as 7-8/10.  This pain present for 2 years.    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 risk (brother)     Hydrocele     Rt     Hypertension goal BP (blood pressure) < 140/90 8/10/2015     Osteopenia 2005     Osteoporosis     Dr Regla DHALIWAL (peptic ulcer disease) 11/1998    h/o, +H. Pylori Rx     Varicocele     Lt side-large       Past Surgical History:   Procedure Laterality Date     APPENDECTOMY  12/2004     ARTHROSCOPY KNEE RT/LT  1/2006    Rt & Lt, Dr Arriaga     BACK SURGERY  1978 / 2003    x 3 in 1978, fusions and one surgery in 2003     C APPENDECTOMY  12/31/2004     CARPAL TUNNEL RELEASE RT/LT  three    2 on left, one on right     CATARACT IOL, RT/LT       COLONOSCOPY  1995,2000,2005     INJECT EPIDURAL CERVICAL  5/24/2011    [facility]     INJECT EPIDURAL LUMBAR  11/9/2010    [facility]     INJECT EPIDURAL LUMBAR  1/4/2011    [facility]     INJECT EPIDURAL LUMBAR  4/27/2011    [facility]     LASER YAG CAPSULOTOMY  11/2016; 12/2016    left eye; right eye     PHACOEMULSIFICATION WITH STANDARD INTRAOCULAR LENS IMPLANT  6/2008; 8/2008    right eye; left eye     RECTAL SURGERY      fissurectomy and proctoplasty     RELEASE  DEQUERVAINS WRIST Right 04/28/2017    DML     RELEASE TRIGGER FINGER      right hand     SHOULDER SURGERY      X four [ right x 2 and left x 2 ][[[[[     TUNA         Family History   Problem Relation Age of Onset     CANCER Mother      ovarian     Cancer - colorectal Brother        Social History     Social History     Marital status:      Spouse name: N/A     Number of children: N/A     Years of education: N/A     Occupational History     Not on file.     Social History Main Topics     Smoking status: Former Smoker     Years: 15.00     Types: Cigarettes     Quit date: 6/30/1975     Smokeless tobacco: Never Used     Alcohol use Yes      Comment: few beers weekly     Drug use: No     Sexual activity: Not Currently     Partners: Female     Other Topics Concern     Not on file     Social History Narrative       Current Outpatient Prescriptions   Medication Sig Dispense Refill     methylPREDNISolone acetate (DEPO-MEDROL) 80 MG/ML injection 1 mL (80 mg) by INTRA-ARTICULAR route once for 1 dose 1 mL 0     oxyCODONE-acetaminophen (PERCOCET) 5-325 MG per tablet Take 1 tablet by mouth 3 times daily as needed for moderate to severe pain 28 days or more between refills for controlled medications 90 tablet 0     morphine (MS CONTIN) 15 MG 12 hr tablet Take 1 tablet (15 mg) by mouth every 12 hours 28 days or more between refills for controlled medications 60 tablet 0     warfarin (COUMADIN) 2.5 MG tablet take 1 tablet by mouth once daily or as directed 90 tablet 1     metoprolol (TOPROL-XL) 25 MG 24 hr tablet Take 25 mg by mouth 2 times daily       calcium-vitamin D (CALTRATE) 600-400 MG-UNIT per tablet Take 1 tablet by mouth every other day       senna-docusate (SENOKOT-S;PERICOLACE) 8.6-50 MG per tablet Take 1 tablet by mouth 3 times daily as needed        triamcinolone (KENALOG) 0.1 % cream Apply  topically 2 times daily as needed. to affected area as directed. 60 g 2     FIBER PO Take 1 tsp. by mouth daily         DAILY VITAMINS PO 1 tablet daily       VITAMIN D, CHOLECALCIFEROL, PO Take  by mouth daily. 3000 units daily.        omega-3 fatty acids (FISH OIL) 1200 MG capsule Take 1 capsule by mouth 2 times daily.         Allergies   Allergen Reactions     Lactose Nausea and Vomiting     Sulindac      Upset stomach     Tramadol      Itching     Valdecoxib Itching     Vicodin [Hydrocodone-Acetaminophen]      Itching     Vioxx Itching     Rofecoxib Itching and Rash     Sulfa Drugs Rash     Muscle aches       REVIEW OF SYSTEMS:  CONSTITUTIONAL:  NEGATIVE for fever, chills, change in weight, not feeling tired  SKIN:  NEGATIVE for worrisome rashes, no skin lumps, no skin ulcers and no non-healing wounds  EYES:  NEGATIVE for vision changes or irritation.  ENT/MOUTH:  NEGATIVE.  No hearing loss, no hoarseness, no difficulty swallowing.  RESP:  NEGATIVE. No cough or shortness of breath.  BREAST:  NEGATIVE for masses, tenderness or discharge  CV:  NEGATIVE for chest pain, palpitations or peripheral edema  GI:  NEGATIVE for nausea, abdominal pain, heartburn, or change in bowel habits  :  Negative. No dysuria, no hematuria  MUSCULOSKELETAL:  See HPI above  NEURO:  NEGATIVE . No headaches, no dizziness,  no numbness  ENDOCRINE:  NEGATIVE for temperature intolerance, skin/hair changes  HEME/ALLERGY/IMMUNE:  NEGATIVE for bleeding problems  PSYCHIATRIC:  NEGATIVE. no anxiety, no depression.      Xray:  Xray images were independently visualized with the patient.  This shows good position of left total knee arthroplasty components.  No sign of loosening or wear.   Right knee has severe medial osteoarthritis with bone-on-bone.    Exam:  Vitals: There were no vitals taken for this visit.  BMI= There is no height or weight on file to calculate BMI.  Range of motion right 3-115 degrees, left 2-115 degrees.  Alignment  Normal on left, moderate varus on right.  Stability:   Right       Lateral collateral ligament no laxity       Medial collateral  ligament no laxity  Left:       Lateral collateral ligament no laxity       Medial collateral ligament no laxity  Wound:  Well healed.  Edema: Minor - both legs.  Effusion: Minor - right knee   Tenderness: Right Significant - medial joint line        Left:  None  Sensation, motor, and circulation intact.    Assessment:  left total knee arthroplasty doing well.  Right knee severe osteoarthritis.  Plan:  if symptoms are severe enough, he would be candidate for right total knee arthroplasty.  He  desires injection today of right knee(s).  Risks, benefits, potential complications and alternatives were discussed.   With the patient's consent, sterile prep was performed of right knee(s).  Right knee was injected with Depo Medrol 80 mg and lidocaine at anteromedial site.  Return to clinic as needed.    Continue antibiotics for dental work.

## 2017-07-07 ENCOUNTER — ANTICOAGULATION THERAPY VISIT (OUTPATIENT)
Dept: NURSING | Facility: CLINIC | Age: 82
End: 2017-07-07
Payer: MEDICARE

## 2017-07-07 DIAGNOSIS — Z79.01 LONG-TERM (CURRENT) USE OF ANTICOAGULANTS: ICD-10-CM

## 2017-07-07 LAB — INR POINT OF CARE: 2.4 (ref 0.86–1.14)

## 2017-07-07 PROCEDURE — 36416 COLLJ CAPILLARY BLOOD SPEC: CPT

## 2017-07-07 PROCEDURE — 99207 ZZC NO CHARGE NURSE ONLY: CPT

## 2017-07-07 PROCEDURE — 85610 PROTHROMBIN TIME: CPT | Mod: QW

## 2017-07-07 NOTE — MR AVS SNAPSHOT
Alexandru CARRERA Eleonora   7/7/2017 10:00 AM   Anticoagulation Therapy Visit    Description:  84 year old male   Provider:  KEYANNA ANTI COAG   Department:  [facility]           INR as of 7/7/2017     Today's INR 2.4      Anticoagulation Summary as of 7/7/2017     INR goal 2.0-3.0   Today's INR 2.4   Full instructions 2.5 mg every day   Next INR check 8/18/2017    Indications   Long-term (current) use of anticoagulants [Z79.01] [Z79.01]  Atrial fibrillation (H) [I48.91]         Your next Anticoagulation Clinic appointment(s)     Aug 18, 2017 10:00 AM CDT   Anticoagulation Visit with KEYANNA ANTI COAG   [facility] ([facility]    [address] [address] [phone]              Contact Numbers     [facility]  Please call [phone] to cancel and/or reschedule your appointment   Please call [phone] with any problems or questions regarding your therapy.        July 2017 Details    Sun Mon Tue Wed Thu Fri Sat           1                 2               3               4               5               6               7      2.5 mg   See details      8      2.5 mg           9      2.5 mg         10      2.5 mg         11      2.5 mg         12      2.5 mg         13      2.5 mg         14      2.5 mg         15      2.5 mg           16      2.5 mg         17      2.5 mg         18      2.5 mg         19      2.5 mg         20      2.5 mg         21      2.5 mg         22      2.5 mg           23      2.5 mg         24      2.5 mg         25      2.5 mg         26      2.5 mg         27      2.5 mg         28      2.5 mg         29      2.5 mg           30      2.5 mg         31      2.5 mg               Date Details   07/07 This INR check               How to take your warfarin dose     To take:  2.5 mg Take 1 of the 2.5 mg tablets.           August 2017 Details    Sun Mon Tue Wed Thu Fri Sat       1      2.5 mg         2      2.5 mg         3      2.5 mg         4      2.5  mg         5      2.5 mg           6      2.5 mg         7      2.5 mg         8      2.5 mg         9      2.5 mg         10      2.5 mg         11      2.5 mg         12      2.5 mg           13      2.5 mg         14      2.5 mg         15      2.5 mg         16      2.5 mg         17      2.5 mg         18            19                 20               21               22               23               24               25               26                 27               28               29               30               31                  Date Details   No additional details    Date of next INR:  8/18/2017         How to take your warfarin dose     To take:  2.5 mg Take 1 of the 2.5 mg tablets.

## 2017-07-07 NOTE — PROGRESS NOTES
ANTICOAGULATION FOLLOW-UP CLINIC VISIT    Patient Name:  Alexandru Crews  Date:  7/7/2017  Contact Type:  Face to Face    SUBJECTIVE:     Patient Findings     Positives No Problem Findings           OBJECTIVE    INR Protime   Date Value Ref Range Status   07/07/2017 2.4 (A) 0.86 - 1.14 Final       ASSESSMENT / PLAN  INR assessment THER    Recheck INR In: 5 WEEKS    INR Location Clinic      Anticoagulation Summary as of 7/7/2017     INR goal 2.0-3.0   Today's INR 2.4   Maintenance plan 2.5 mg (2.5 mg x 1) every day   Full instructions 2.5 mg every day   Weekly total 17.5 mg   No change documented Bee Mesa RN   Plan last modified Allie Awan RN (4/8/2016)   Next INR check 8/18/2017   Priority INR   Target end date     Indications   Long-term (current) use of anticoagulants [Z79.01] [Z79.01]  Atrial fibrillation (H) [I48.91]         Anticoagulation Episode Summary     INR check location     Preferred lab     Send INR reminders to [facility]    Comments       Anticoagulation Care Providers     Provider Role Specialty Phone number    Paul Knowles MD Responsible Internal Medicine [phone]            See the Encounter Report to view Anticoagulation Flowsheet and Dosing Calendar (Go to Encounters tab in chart review, and find the Anticoagulation Therapy Visit)    Dosage adjustment made based on physician directed care plan. Reviewed both bleeding and clotting signs and symptoms with patient this visit. Pt. has no further questions or concerns.  Will call with any changes to diet, medications, or missed doses at INR line [phone].      Bee Meas, SHELLEY

## 2017-07-24 DIAGNOSIS — I48.91 ATRIAL FIBRILLATION, UNSPECIFIED TYPE (H): ICD-10-CM

## 2017-07-24 DIAGNOSIS — Z79.01 LONG-TERM (CURRENT) USE OF ANTICOAGULANTS: ICD-10-CM

## 2017-07-25 RX ORDER — WARFARIN SODIUM 2.5 MG/1
TABLET ORAL
Qty: 90 TABLET | Refills: 0 | Status: SHIPPED | OUTPATIENT
Start: 2017-07-25 | End: 2017-10-15

## 2017-07-25 NOTE — TELEPHONE ENCOUNTER
warfarin (COUMADIN) 2.5 MG tablet    Last Written Prescription Date: 1/17/2017  Last Fill Qty: 90, # refills: 1  Last Office Visit with [facility], [facility] or [facility] prescribing provider: 4/21/2017  Next 5 appointments (look out 90 days)     Jul 27, 2017 10:50 AM CDT   PHYSICAL with Paul Knowles MD   [facility] ([facility]    [address] [address] [phone]                   Date and Result of Last PT/INR:   Lab Results   Component Value Date    INR 2.4 07/07/2017    INR 2.3 06/05/2017    INR 1.6 03/14/2017    INR 1.60 03/10/2017    PT 17.2 11/13/2014

## 2017-07-25 NOTE — TELEPHONE ENCOUNTER
Prescription approved per [facility]ill [facility].  Rose Navarrete, RN - BC

## 2017-07-27 ENCOUNTER — OFFICE VISIT (OUTPATIENT)
Dept: FAMILY MEDICINE | Facility: CLINIC | Age: 82
End: 2017-07-27
Payer: MEDICARE

## 2017-07-27 VITALS
OXYGEN SATURATION: 94 % | BODY MASS INDEX: 23.22 KG/M2 | HEIGHT: 62 IN | SYSTOLIC BLOOD PRESSURE: 130 MMHG | HEART RATE: 88 BPM | DIASTOLIC BLOOD PRESSURE: 76 MMHG | WEIGHT: 126.2 LBS | TEMPERATURE: 97.2 F

## 2017-07-27 DIAGNOSIS — Z00.00 ROUTINE GENERAL MEDICAL EXAMINATION AT A HEALTH CARE FACILITY: Primary | ICD-10-CM

## 2017-07-27 DIAGNOSIS — G89.4 CHRONIC PAIN SYNDROME: ICD-10-CM

## 2017-07-27 DIAGNOSIS — I48.20 CHRONIC ATRIAL FIBRILLATION (H): ICD-10-CM

## 2017-07-27 DIAGNOSIS — M65.4 DE QUERVAIN'S DISEASE (TENOSYNOVITIS): ICD-10-CM

## 2017-07-27 DIAGNOSIS — E78.5 HYPERLIPIDEMIA LDL GOAL <160: ICD-10-CM

## 2017-07-27 DIAGNOSIS — R73.09 ELEVATED GLUCOSE: ICD-10-CM

## 2017-07-27 LAB
ANION GAP SERPL CALCULATED.3IONS-SCNC: 6 MMOL/L (ref 3–14)
BUN SERPL-MCNC: 22 MG/DL (ref 7–30)
CALCIUM SERPL-MCNC: 9.8 MG/DL (ref 8.5–10.1)
CHLORIDE SERPL-SCNC: 102 MMOL/L (ref 94–109)
CHOLEST SERPL-MCNC: 194 MG/DL
CO2 SERPL-SCNC: 27 MMOL/L (ref 20–32)
CREAT SERPL-MCNC: 0.8 MG/DL (ref 0.66–1.25)
GFR SERPL CREATININE-BSD FRML MDRD: NORMAL ML/MIN/1.7M2
GLUCOSE SERPL-MCNC: 95 MG/DL (ref 70–99)
HDLC SERPL-MCNC: 66 MG/DL
LDLC SERPL CALC-MCNC: 116 MG/DL
NONHDLC SERPL-MCNC: 128 MG/DL
POTASSIUM SERPL-SCNC: 4.7 MMOL/L (ref 3.4–5.3)
SODIUM SERPL-SCNC: 135 MMOL/L (ref 133–144)
TRIGL SERPL-MCNC: 62 MG/DL

## 2017-07-27 PROCEDURE — 36415 COLL VENOUS BLD VENIPUNCTURE: CPT | Performed by: INTERNAL MEDICINE

## 2017-07-27 PROCEDURE — 80061 LIPID PANEL: CPT | Performed by: INTERNAL MEDICINE

## 2017-07-27 PROCEDURE — 80048 BASIC METABOLIC PNL TOTAL CA: CPT | Performed by: INTERNAL MEDICINE

## 2017-07-27 PROCEDURE — G0439 PPPS, SUBSEQ VISIT: HCPCS | Performed by: INTERNAL MEDICINE

## 2017-07-27 RX ORDER — MORPHINE SULFATE 15 MG/1
15 TABLET, FILM COATED, EXTENDED RELEASE ORAL EVERY 12 HOURS
Qty: 60 TABLET | Refills: 0 | Status: SHIPPED | OUTPATIENT
Start: 2017-07-27 | End: 2017-07-27

## 2017-07-27 RX ORDER — OXYCODONE AND ACETAMINOPHEN 5; 325 MG/1; MG/1
1 TABLET ORAL 3 TIMES DAILY PRN
Qty: 90 TABLET | Refills: 0 | Status: SHIPPED | OUTPATIENT
Start: 2017-07-27 | End: 2017-07-27

## 2017-07-27 RX ORDER — OXYCODONE AND ACETAMINOPHEN 5; 325 MG/1; MG/1
1 TABLET ORAL 3 TIMES DAILY PRN
Qty: 90 TABLET | Refills: 0 | Status: SHIPPED | OUTPATIENT
Start: 2017-07-27 | End: 2017-11-24

## 2017-07-27 RX ORDER — MORPHINE SULFATE 15 MG/1
15 TABLET, FILM COATED, EXTENDED RELEASE ORAL EVERY 12 HOURS
Qty: 60 TABLET | Refills: 0 | Status: SHIPPED | OUTPATIENT
Start: 2017-07-27 | End: 2017-11-24

## 2017-07-27 ASSESSMENT — ANXIETY QUESTIONNAIRES
2. NOT BEING ABLE TO STOP OR CONTROL WORRYING: NOT AT ALL
6. BECOMING EASILY ANNOYED OR IRRITABLE: SEVERAL DAYS
GAD7 TOTAL SCORE: 1
7. FEELING AFRAID AS IF SOMETHING AWFUL MIGHT HAPPEN: NOT AT ALL
5. BEING SO RESTLESS THAT IT IS HARD TO SIT STILL: NOT AT ALL
1. FEELING NERVOUS, ANXIOUS, OR ON EDGE: NOT AT ALL
3. WORRYING TOO MUCH ABOUT DIFFERENT THINGS: NOT AT ALL

## 2017-07-27 ASSESSMENT — PATIENT HEALTH QUESTIONNAIRE - PHQ9: 5. POOR APPETITE OR OVEREATING: NOT AT ALL

## 2017-07-27 NOTE — MR AVS SNAPSHOT
After Visit Summary   7/27/2017    Alexandru Crews    MRN: [id number]           Patient Information     Date Of Birth          9/22/1932        Visit Information        Provider Department      7/27/2017 10:50 AM Paul Knowles MD [facility]        Today's Diagnoses     Routine general medical examination at a health care facility    -  1    Chronic pain syndrome        Hyperlipidemia LDL goal <160        Chronic atrial fibrillation (H)        De Quervain's disease (tenosynovitis)        Elevated glucose          Care Instructions    - I will see if we can get a ID badge for you.    - You can get a Fingertip Pulse Oximeter to check your oxygen levels when you feel shortness of breath.    Preventive Health Recommendations:       Male Ages 65 and over    Yearly exam:             See your health care provider every year in order to  o   Review health changes.   o   Discuss preventive care.    o   Review your medicines if your doctor has prescribed any.    Talk with your health care provider about whether you should have a test to screen for prostate cancer (PSA).    Every 3 years, have a diabetes test (fasting glucose). If you are at risk for diabetes, you should have this test more often.    Every 5 years, have a cholesterol test. Have this test more often if you are at risk for high cholesterol or heart disease.     Every 10 years, have a colonoscopy. Or, have a yearly FIT test (stool test). These exams will check for colon cancer.    Talk to with your health care provider about screening for Abdominal Aortic Aneurysm if you have a family history of AAA or have a history of smoking.  Shots:     Get a flu shot each year.     Get a tetanus shot every 10 years.     Talk to your doctor about your pneumonia vaccines. There are now two you should receive - Pneumovax (PPSV 23) and Prevnar (PCV 13).    Talk to your doctor about a shingles vaccine.     Talk to your doctor about the hepatitis B  vaccine.  Nutrition:     Eat at least 5 servings of fruits and vegetables each day.     Eat whole-grain bread, whole-wheat pasta and brown rice instead of white grains and rice.     Talk to your doctor about Calcium and Vitamin D.   Lifestyle    Exercise for at least 150 minutes a week (30 minutes a day, 5 days a week). This will help you control your weight and prevent disease.     Limit alcohol to one drink per day.     No smoking.     Wear sunscreen to prevent skin cancer.     See your dentist every six months for an exam and cleaning.     See your eye doctor every 1 to 2 years to screen for conditions such as glaucoma, macular degeneration and cataracts.  [facility]    If you have any questions regarding to your visit please contact your care team:     Team Pink:   Clinic Hours Telephone Number   Internal Medicine:  Dr. Nini Landaverde, NP       7am-7pm  Monday - Thursday   7am-5pm  Fridays  [phone]  (Appointment scheduling available 24/7)    Questions about your visit?  Team Line  [phone]   Urgent Care - [place] and [place] [facility] - 11am-9pm Monday-Friday Saturday-Sunday- 9am-5pm   [place] - 5pm-9pm Monday-Friday Saturday-Sunday- 9am-5pm  [phone] - [place]   [phone] - [place]       What options do I have for visits at the clinic other than the traditional office visit?  To expand how we care for you, many of our providers are utilizing electronic visits (e-visits) and telephone visits, when medically appropriate, for interactions with their patients rather than a visit in the clinic.   We also offer nurse visits for many medical concerns. Just like any other service, we will bill your insurance company for this type of visit based on time spent on the phone with your provider. Not all insurance companies cover these visits. Please check with your medical insurance if this type of visit is covered. You will be responsible for  any charges that are not paid by your insurance.      E-visits via Flixsterhart:  generally incur a $35.00 fee.  Telephone visits:  Time spent on the phone: *charged based on time that is spent on the phone in increments of 10 minutes. Estimated cost:   5-10 mins $30.00   11-20 mins. $59.00   21-30 mins. $85.00   Use Flixsterhart (secure email communication and access to your chart) to send your primary care provider a message or make an appointment. Ask someone on your Team how to sign up for Appwapp.    For a Price Quote for your services, please call our Inmagic Line at [phone].    As always, Thank you for trusting us with your health care needs!        JUAN M/MA              Follow-ups after your visit        Your next 10 appointments already scheduled     Aug 18, 2017 10:00 AM CDT   Anticoagulation Visit with FZ ANTI COAG   [facility] ([address]  [place] [address] [phone]              Who to contact     If you have questions or need follow up information about today's clinic visit or your schedule please contact [facility] directly at [phone].  Normal or non-critical lab and imaging results will be communicated to you by Flixsterhart, letter or phone within 4 business days after the clinic has received the results. If you do not hear from us within 7 days, please contact the clinic through Flixsterhart or phone. If you have a critical or abnormal lab result, we will notify you by phone as soon as possible.  Submit refill requests through Appwapp or call your pharmacy and they will forward the refill request to us. Please allow 3 business days for your refill to be completed.          Additional Information About Your Visit        FlixsterharEnergyHub Information     Appwapp lets you send messages to your doctor, view your test results, renew your prescriptions, schedule appointments and more. To sign up, go to [email or web address] . Click  "on \"Log in\" on the left side of the screen, which will take you to the Welcome page. Then click on \"Sign up Now\" on the right side of the page.     You will be asked to enter the access code listed below, as well as some personal information. Please follow the directions to create your username and password.     Your access code is: [email or web address]  Expires: 10/25/2017 11:35 AM     Your access code will  in 90 days. If you need help or a new code, please call your [facility] clinic or [phone].        Care EveryWhere ID     This is your Care EveryWhere ID. This could be used by other organizations to access your [facility] medical records  [id number]        Your Vitals Were     Pulse Temperature Height Pulse Oximetry BMI (Body Mass Index)       88 97.2  F (36.2  C) (Oral) 5' 1.93\" (1.573 m) 94% 23.14 kg/m2        Blood Pressure from Last 3 Encounters:   17 130/76   17 100/58   17 146/76    Weight from Last 3 Encounters:   17 126 lb 3.2 oz (57.2 kg)   17 128 lb (58.1 kg)   17 129 lb (58.5 kg)              We Performed the Following     BASIC METABOLIC PANEL     Lipid panel reflex to direct LDL          Today's Medication Changes          These changes are accurate as of: 17 11:35 AM.  If you have any questions, ask your nurse or doctor.               Start taking these medicines.        Dose/Directions    morphine 15 MG 12 hr tablet   Commonly known as:  MS CONTIN   Used for:  Chronic pain syndrome   Started by:  Paul Knowles MD        Dose:  15 mg   Take 1 tablet (15 mg) by mouth every 12 hours 28 days or more between refills for controlled medications   Quantity:  60 tablet   Refills:  0       oxyCODONE-acetaminophen 5-325 MG per tablet   Commonly known as:  PERCOCET   Used for:  Chronic pain syndrome   Started by:  Paul Knowles MD        Dose:  1 tablet   Take 1 tablet by mouth 3 times daily as needed for moderate to severe pain 28 days or more between refills for " controlled medications   Quantity:  90 tablet   Refills:  0            Where to get your medicines      Some of these will need a paper prescription and others can be bought over the counter.  Ask your nurse if you have questions.     Bring a paper prescription for each of these medications     morphine 15 MG 12 hr tablet    oxyCODONE-acetaminophen 5-325 MG per tablet                Primary Care Provider Office Phone # Fax #    Paul Knowles [phone]       [address] [address]        Equal Access to Services     [facility] : Hadii aad ku hadasho Soomaali, waaxda luqadaha, qaybta kaalmada adeegyada, waxdaly londono hayjoselyn baez . So [facility] [phone].    ATENCIÓN: Si habla español, tiene a jackson disposición servicios gratuitos de asistencia lingüística. Dorinda al [phone].    We comply with applicable federal civil rights laws and [place] laws. We do not discriminate on the basis of race, color, national origin, age, disability sex, sexual orientation or gender identity.            Thank you!     Thank you for choosing [facility]  for your care. Our goal is always to provide you with excellent care. Hearing back from our patients is one way we can continue to improve our services. Please take a few minutes to complete the written survey that you may receive in the mail after your visit with us. Thank you!             Your Updated Medication List - Protect others around you: Learn how to safely use, store and throw away your medicines at www.disposemymeds.org.          This list is accurate as of: 7/27/17 11:35 AM.  Always use your most recent med list.                   Brand Name Dispense Instructions for use Diagnosis    calcium-vitamin D 600-400 MG-UNIT per tablet    CALTRATE     Take 1 tablet by mouth every other day        DAILY VITAMINS PO      1 tablet daily        FIBER PO      Take 1 tsp. by mouth daily        Fish  Oil 1200 MG Caps      Take 1 capsule by mouth 2 times daily.        metoprolol 25 MG 24 hr tablet    TOPROL-XL     Take 25 mg by mouth 2 times daily        morphine 15 MG 12 hr tablet    MS CONTIN    60 tablet    Take 1 tablet (15 mg) by mouth every 12 hours 28 days or more between refills for controlled medications    Chronic pain syndrome       oxyCODONE-acetaminophen 5-325 MG per tablet    PERCOCET    90 tablet    Take 1 tablet by mouth 3 times daily as needed for moderate to severe pain 28 days or more between refills for controlled medications    Chronic pain syndrome       senna-docusate 8.6-50 MG per tablet    SENOKOT-S;PERICOLACE     Take 1 tablet by mouth 3 times daily as needed        triamcinolone 0.1 % cream    KENALOG    60 g    Apply  topically 2 times daily as needed. to affected area as directed.    Dermatitis       VITAMIN D (CHOLECALCIFEROL) PO      Take  by mouth daily. 3000 units daily.        warfarin 2.5 MG tablet    COUMADIN    90 tablet    TAKE ONE TABLET BY MOUTH ONCE DAILY OR AS DIRECTED    Long-term (current) use of anticoagulants, Atrial fibrillation, unspecified type (H)

## 2017-07-27 NOTE — PROGRESS NOTES
SUBJECTIVE:   Alexandru Crews is a 84 year old male who presents for Preventive Visit.    Chronic Pain -- Patient reports he does not like to take his narcotic pain medications because of the side effects. He has been compliant with MS Contin bid, but is not sure if it is actually helping him. Nevertheless his generalized chronic pain syndrome overwhelms him and he continues with narcotic pain medication     Fatigue -- He states he works in his yard and plows snow sometimes. His daughter's can help him if he needs. Patient notes intermittently when he is working in the yard he feels SOB. Denies chest pain.    De Quervain's Tenosynovitis -- He states he had a De Quervain's release with Dr. Malin recently.    Hypertension -- Patient notes he has a pacemaker and sees Dr. Ferrara once a year. For complete details please see care everywhere for [facility] office visit notes   BP Readings from Last 3 Encounters:   07/27/17 130/76   04/21/17 100/58   02/14/17 146/76     Are you in the first 12 months of your Medicare Part B coverage?  No    Healthy Habits:    Do you get at least three servings of calcium containing foods daily (dairy, green leafy vegetables, etc.)? yes    Amount of exercise or daily activities, outside of work: 7 day(s) per week doing yard work    Problems taking medications regularly No    Medication side effects: Yes pain medication making me tired    Have you had an eye exam in the past two years? yes    Do you see a dentist twice per year? no    Do you have sleep apnea, excessive snoring or daytime drowsiness? Due to pain medications daytime drowsiness    COGNITIVE SCREEN  1) Repeat 3 items (Banana, Sunrise, Chair)    2) Clock draw: NORMAL  3) 3 item recall: Recalls 3 objects  Results: 3 items recalled: COGNITIVE IMPAIRMENT LESS LIKELY    Mini-CogTM Copyright S Callum. Licensed by the author for use in [employer]; reprinted with permission  (cory@[facility]). All rights reserved.      Reviewed and updated as needed this visit by clinical staffTobacco  Allergies  Meds  Soc Hx        Reviewed and updated as needed this visit by Provider        Social History   Substance Use Topics     Smoking status: Former Smoker     Years: 15.00     Types: Cigarettes     Quit date: 6/30/1975     Smokeless tobacco: Never Used     Alcohol use Yes      Comment: 1 per week       The patient does not drink >3 drinks per day nor >7 drinks per week.    Today's PHQ-2 Score:   PHQ-2 ( 1999 Pfizer) 7/27/2017 2/14/2017   Q1: Little interest or pleasure in doing things 0 0   Q2: Feeling down, depressed or hopeless 0 0   PHQ-2 Score 0 0     Do you feel safe in your environment - Yes    Do you have a Health Care Directive?: Yes: Advance Directive has been received and scanned.    Current providers sharing in care for this patient include: Patient Care Team:  Paul Knowles MD as PCP - General (Internal Medicine)      Hearing impairment: No    Ability to successfully perform activities of daily living: No, needs assistance with: Anything with reaching wife helps with     Fall risk:  Fallen 2 or more times in the past year?: No  Any fall with injury in the past year?: No      Home safety:  none identified      The following health maintenance items are reviewed in Epic and correct as of today:  Health Maintenance   Topic Date Due     URINE DRUG SCREEN Q1 YR  09/22/1947     BARBARA QUESTIONNAIRE 1 YEAR  09/22/1950     PHQ-9 Q1YR  09/22/1950     ADVANCE DIRECTIVE PLANNING Q5 YRS  06/24/2016     MEDICARE ANNUAL WELLNESS VISIT  04/25/2017     OP ANNUAL INR REFERRAL  04/25/2017     LIPID MONITORING Q1 YEAR  07/19/2017     FALL RISK ASSESSMENT  07/19/2017     BMP Q6 MOS  08/14/2017     INFLUENZA VACCINE (SYSTEM ASSIGNED)  09/01/2017     EYE EXAM Q1 YEAR  12/13/2017     ALT Q1 YR  02/14/2018     CBC Q1 YR  02/14/2018     HF ACTION PLAN Q3 YR  01/25/2019     TETANUS IMMUNIZATION (SYSTEM ASSIGNED)   "07/09/2023     PNEUMOCOCCAL  Completed     Labs reviewed in EPIC    ROS:  Constitutional, HEENT, cardiovascular, pulmonary, GI, , musculoskeletal, neuro, skin, endocrine and psych systems are negative, except as otherwise noted.    This document serves as a record of the services and decisions personally performed and made by Paul Knowles MD. It was created on their behalf by Wyatt Ramsey, a trained medical scribe. The creation of this document is based the provider's statements to the medical scribe.  Wyatt Ramsey July 27, 2017 11:25 AM    OBJECTIVE:   /76  Pulse 88  Temp 97.2  F (36.2  C) (Oral)  Ht 1.573 m (5' 1.93\")  Wt 57.2 kg (126 lb 3.2 oz)  SpO2 94%  BMI 23.14 kg/m2 Estimated body mass index is 23.14 kg/(m^2) as calculated from the following:    Height as of this encounter: 1.573 m (5' 1.93\").    Weight as of this encounter: 57.2 kg (126 lb 3.2 oz).  EXAM:   GENERAL: healthy, alert and no distress, answers all questions appropriately, appropriate grooming and affect, appears stated age   EYES: Eyes grossly normal to inspection, PERRL and conjunctivae and sclerae normal  HENT: ear canals and TM's normal, nose and mouth without ulcers or lesions, impacted cerumen  NECK: no adenopathy, no asymmetry, masses, or scars and thyroid normal to palpation  RESP: lungs clear to auscultation - no rales, rhonchi or wheezes  CV: regular rate and rhythm, normal S1 S2, no S3 or S4, no murmur, click or rub, no peripheral edema and peripheral pulses strong  ABDOMEN: soft, nontender, no hepatosplenomegaly, no masses and bowel sounds normal  MS: generalized osteoarthritis with significant joint deformities, at multiple sites  SKIN: large skin tag on left anterior thigh, about 4 mm  NEURO: mentation intact and speech normal  PSYCH: mentation appears normal, affect normal/bright    ASSESSMENT / PLAN:   (Z00.00) Routine general medical examination at a health care facility  (primary encounter " "diagnosis)  Comment: routine screening issues   Plan: BASIC METABOLIC PANEL            (G89.4) Chronic pain syndrome  Comment: refills provided. A 6 month office visit is required  Plan: morphine (MS CONTIN) 15 MG 12 hr tablet,         oxyCODONE-acetaminophen (PERCOCET) 5-325 MG per        tablet, DISCONTINUED: oxyCODONE-acetaminophen         (PERCOCET) 5-325 MG per tablet, DISCONTINUED:         morphine (MS CONTIN) 15 MG 12 hr tablet,         DISCONTINUED: morphine (MS CONTIN) 15 MG 12 hr         tablet, DISCONTINUED: oxyCODONE-acetaminophen         (PERCOCET) 5-325 MG per tablet            (E78.5) Hyperlipidemia LDL goal <160  Comment: recheck   Plan: Lipid panel reflex to direct LDL            (I48.2) Chronic atrial fibrillation (H)  Comment: pacemaker and continues with coumadin with [facility]   Plan: see care everywhere     (M65.4) De Quervain's disease (tenosynovitis)  Comment: noted as a point of historical importance   Plan: status post release surgery    (R73.09) Elevated glucose  Comment: doubt clinical significance ,   Lab Results   Component Value Date    A1C 5.5 02/14/2017    A1C 5.8 07/14/2015    A1C 5.6 07/10/2014       Plan: further testing not necessary for today     End of Life Planning:  Patient currently has an advanced directive: Yes.  Practitioner is supportive of decision.    COUNSELING:  Reviewed preventive health counseling, as reflected in patient instructions       Regular exercise       Healthy diet/nutrition       Vision screening       Hearing screening       Dental care    Estimated body mass index is 23.14 kg/(m^2) as calculated from the following:    Height as of this encounter: 1.573 m (5' 1.93\").    Weight as of this encounter: 57.2 kg (126 lb 3.2 oz).     reports that he quit smoking about 42 years ago. His smoking use included Cigarettes. He quit after 15.00 years of use. He has never used smokeless tobacco.    Appropriate preventive services were " discussed with this patient, including applicable screening as appropriate for cardiovascular disease, diabetes, osteopenia/osteoporosis, and glaucoma.  As appropriate for age/gender, discussed screening for colorectal cancer, prostate cancer, breast cancer, and cervical cancer. Checklist reviewing preventive services available has been given to the patient.    Reviewed patients plan of care and provided an AVS. The Intermediate Care Plan ( asthma action plan, low back pain action plan, and migraine action plan) for Alexandru meets the Care Plan requirement. This Care Plan has been established and reviewed with the Patient.    Counseling Resources:  ATP IV Guidelines  Pooled Cohorts Equation Calculator  Breast Cancer Risk Calculator  FRAX Risk Assessment  ICSI Preventive Guidelines  Dietary Guidelines for Americans, 2010  USDA's MyPlate  ASA Prophylaxis  Lung CA Screening    The information in this document, created by the medical scribe for me, accurately reflects the services I personally performed and the decisions made by me. I have reviewed and approved this document for accuracy.   MD Paul Vigil MD  [facility]

## 2017-07-27 NOTE — PATIENT INSTRUCTIONS
- I will see if we can get a ID badge for you.    - You can get a Fingertip Pulse Oximeter to check your oxygen levels when you feel shortness of breath.    Preventive Health Recommendations:       Male Ages 65 and over    Yearly exam:             See your health care provider every year in order to  o   Review health changes.   o   Discuss preventive care.    o   Review your medicines if your doctor has prescribed any.    Talk with your health care provider about whether you should have a test to screen for prostate cancer (PSA).    Every 3 years, have a diabetes test (fasting glucose). If you are at risk for diabetes, you should have this test more often.    Every 5 years, have a cholesterol test. Have this test more often if you are at risk for high cholesterol or heart disease.     Every 10 years, have a colonoscopy. Or, have a yearly FIT test (stool test). These exams will check for colon cancer.    Talk to with your health care provider about screening for Abdominal Aortic Aneurysm if you have a family history of AAA or have a history of smoking.  Shots:     Get a flu shot each year.     Get a tetanus shot every 10 years.     Talk to your doctor about your pneumonia vaccines. There are now two you should receive - Pneumovax (PPSV 23) and Prevnar (PCV 13).    Talk to your doctor about a shingles vaccine.     Talk to your doctor about the hepatitis B vaccine.  Nutrition:     Eat at least 5 servings of fruits and vegetables each day.     Eat whole-grain bread, whole-wheat pasta and brown rice instead of white grains and rice.     Talk to your doctor about Calcium and Vitamin D.   Lifestyle    Exercise for at least 150 minutes a week (30 minutes a day, 5 days a week). This will help you control your weight and prevent disease.     Limit alcohol to one drink per day.     No smoking.     Wear sunscreen to prevent skin cancer.     See your dentist every six months for an exam and cleaning.     See your eye doctor  every 1 to 2 years to screen for conditions such as glaucoma, macular degeneration and cataracts.  [facility]    If you have any questions regarding to your visit please contact your care team:     Team Pink:   Clinic Hours Telephone Number   Internal Medicine:  Dr. Nini Landvaerde NP       7am-7pm  Monday - Thursday   7am-5pm  Fridays  [phone]  (Appointment scheduling available 24/7)    Questions about your visit?  Team Line  [phone]   [facility] [place] [facility] [place] 11[facility]-9pm Monday-Friday Saturday-Sunday- 9am-5pm   [place] - 5pm-9pm Monday-Friday Saturday-Sunday- 9am-5pm  [phone] - [place]   [phone] - [place]       What options do I have for visits at the clinic other than the traditional office visit?  To expand how we care for you, many of our providers are utilizing electronic visits (e-visits) and telephone visits, when medically appropriate, for interactions with their patients rather than a visit in the clinic.   We also offer nurse visits for many medical concerns. Just like any other service, we will bill your insurance company for this type of visit based on time spent on the phone with your provider. Not all insurance companies cover these visits. Please check with your medical insurance if this type of visit is covered. You will be responsible for any charges that are not paid by your insurance.      E-visits via [employer]:  generally incur a $35.00 fee.  Telephone visits:  Time spent on the phone: *charged based on time that is spent on the phone in increments of 10 minutes. Estimated cost:   5-10 mins $30.00   11-20 mins. $59.00   21-30 mins. $85.00   Use Not iTt (secure email communication and access to your chart) to send your primary care provider a message or make an appointment. Ask someone on your Team how to sign up for [employer].    For a Price Quote for your services, please call our Consumer Price  Line at [phone].    As always, Thank you for trusting us with your health care needs!        JUAN M/MA

## 2017-07-27 NOTE — NURSING NOTE
"Chief Complaint   Patient presents with     Physical       Initial /76  Pulse 88  Temp 97.2  F (36.2  C) (Oral)  Ht 5' 1.93\" (1.573 m)  Wt 126 lb 3.2 oz (57.2 kg)  SpO2 94%  BMI 23.14 kg/m2 Estimated body mass index is 23.14 kg/(m^2) as calculated from the following:    Height as of this encounter: 5' 1.93\" (1.573 m).    Weight as of this encounter: 126 lb 3.2 oz (57.2 kg).  Medication Reconciliation: complete     Azeb Galdamez MA    "

## 2017-07-27 NOTE — LETTER
[facility]  [address] [place] [address]    July 28, 2017    Alexandru Crews  [address] [place] [address]          Dear Alexandru,  All of these tests are within acceptable limits . Take care Roger,     Results for orders placed or performed in visit on 07/27/17   BASIC METABOLIC PANEL   Result Value Ref Range    Sodium 135 133 - 144 mmol/L    Potassium 4.7 3.4 - 5.3 mmol/L    Chloride 102 94 - 109 mmol/L    Carbon Dioxide 27 20 - 32 mmol/L    Anion Gap 6 3 - 14 mmol/L    Glucose 95 70 - 99 mg/dL    Urea Nitrogen 22 7 - 30 mg/dL    Creatinine 0.80 0.66 - 1.25 mg/dL    GFR Estimate >90  Non  GFR Calc   >60 mL/min/1.7m2    GFR Estimate If Black >90   GFR Calc   >60 mL/min/1.7m2    Calcium 9.8 8.5 - 10.1 mg/dL   Lipid panel reflex to direct LDL   Result Value Ref Range    Cholesterol 194 <200 mg/dL    Triglycerides 62 <150 mg/dL    HDL Cholesterol 66 >39 mg/dL    LDL Cholesterol Calculated 116 (H) <100 mg/dL    Non HDL Cholesterol 128 <130 mg/dL   If you have any questions or concerns, please me or my clinic team at [phone].      Sincerely,        Paul Knowles MD/bt

## 2017-07-28 ASSESSMENT — PATIENT HEALTH QUESTIONNAIRE - PHQ9: SUM OF ALL RESPONSES TO PHQ QUESTIONS 1-9: 3

## 2017-07-28 ASSESSMENT — ANXIETY QUESTIONNAIRES: GAD7 TOTAL SCORE: 1

## 2017-08-18 ENCOUNTER — ANTICOAGULATION THERAPY VISIT (OUTPATIENT)
Dept: NURSING | Facility: CLINIC | Age: 82
End: 2017-08-18
Payer: MEDICARE

## 2017-08-18 DIAGNOSIS — Z79.01 LONG-TERM (CURRENT) USE OF ANTICOAGULANTS: ICD-10-CM

## 2017-08-18 DIAGNOSIS — I48.20 CHRONIC ATRIAL FIBRILLATION (H): ICD-10-CM

## 2017-08-18 LAB — INR POINT OF CARE: 2.3 (ref 0.86–1.14)

## 2017-08-18 PROCEDURE — 85610 PROTHROMBIN TIME: CPT | Mod: QW

## 2017-08-18 PROCEDURE — 36416 COLLJ CAPILLARY BLOOD SPEC: CPT

## 2017-08-18 PROCEDURE — 99207 ZZC NO CHARGE NURSE ONLY: CPT

## 2017-08-18 NOTE — MR AVS SNAPSHOT
Alexandru CARRERA Eleonora   8/18/2017 10:00 AM   Anticoagulation Therapy Visit    Description:  84 year old male   Provider:  KEYANNA ANTI COAG   Department:  [facility]           INR as of 8/18/2017     Today's INR 2.3      Anticoagulation Summary as of 8/18/2017     INR goal 2.0-3.0   Today's INR 2.3   Full instructions 2.5 mg every day   Next INR check 9/29/2017    Indications   Long-term (current) use of anticoagulants [Z79.01] [Z79.01]  Atrial fibrillation (H) [I48.91]         Your next Anticoagulation Clinic appointment(s)     Aug 18, 2017 10:00 AM CDT   Anticoagulation Visit with KEYANNA ANTI COAG   [facility] ([facility]    [address] [id number] [phone]            Sep 29, 2017 10:00 AM CDT   Anticoagulation Visit with KEYANNA ANTI COAG   [facility] ([place] [id number] [address] [phone]              Contact Numbers     [facility]  Please call [phone] to cancel and/or reschedule your appointment   Please call [phone] with any problems or questions regarding your therapy.        August 2017 Details    Sun Mon Tue Wed Thu Fri Sat       1               2               3               4               5                 6               7               8               9               10               11               12                 13               14               15               16               17               18      2.5 mg   See details      19      2.5 mg           20      2.5 mg         21      2.5 mg         22      2.5 mg         23      2.5 mg         24      2.5 mg         25      2.5 mg         26      2.5 mg           27      2.5 mg         28      2.5 mg         29      2.5 mg         30      2.5 mg         31      2.5 mg            Date Details   08/18 This INR check               How to take your warfarin dose     To take:  2.5 mg Take 1 of the 2.5 mg tablets.           September  2017 Details    Sun Mon Tue Wed Thu Fri Sat          1      2.5 mg         2      2.5 mg           3      2.5 mg         4      2.5 mg         5      2.5 mg         6      2.5 mg         7      2.5 mg         8      2.5 mg         9      2.5 mg           10      2.5 mg         11      2.5 mg         12      2.5 mg         13      2.5 mg         14      2.5 mg         15      2.5 mg         16      2.5 mg           17      2.5 mg         18      2.5 mg         19      2.5 mg         20      2.5 mg         21      2.5 mg         22      2.5 mg         23      2.5 mg           24      2.5 mg         25      2.5 mg         26      2.5 mg         27      2.5 mg         28      2.5 mg         29            30                Date Details   No additional details    Date of next INR:  9/29/2017         How to take your warfarin dose     To take:  2.5 mg Take 1 of the 2.5 mg tablets.

## 2017-08-18 NOTE — PROGRESS NOTES
ANTICOAGULATION FOLLOW-UP CLINIC VISIT    Patient Name:  Alexandru Crews  Date:  8/18/2017  Contact Type:  Face to Face    SUBJECTIVE:     Patient Findings     Positives No Problem Findings           OBJECTIVE    INR Protime   Date Value Ref Range Status   08/18/2017 2.3 (A) 0.86 - 1.14 Final       ASSESSMENT / PLAN  No question data found.  Anticoagulation Summary as of 8/18/2017     INR goal 2.0-3.0   Today's INR 2.3   Maintenance plan 2.5 mg (2.5 mg x 1) every day   Full instructions 2.5 mg every day   Weekly total 17.5 mg   No change documented Rose Navarrete, RN   Plan last modified Allie Awan RN (4/8/2016)   Next INR check 9/29/2017   Priority INR   Target end date     Indications   Long-term (current) use of anticoagulants [Z79.01] [Z79.01]  Atrial fibrillation (H) [I48.91]         Anticoagulation Episode Summary     INR check location     Preferred lab     Send INR reminders to [facility] CL[facility]IC    Comments       Anticoagulation Care Providers     Provider Role Specialty Phone number    Paul Knowles MD [facility] Internal Medicine [phone]            See the Encounter Report to view Anticoagulation Flowsheet and Dosing Calendar (Go to Encounters tab in chart review, and find the Anticoagulation Therapy Visit)    Dosage adjustment made based on physician directed care plan.    Rose Navarrete RN

## 2017-10-06 ENCOUNTER — TELEPHONE (OUTPATIENT)
Dept: INTERNAL MEDICINE | Facility: CLINIC | Age: 82
End: 2017-10-06

## 2017-10-06 ENCOUNTER — ANTICOAGULATION THERAPY VISIT (OUTPATIENT)
Dept: NURSING | Facility: CLINIC | Age: 82
End: 2017-10-06
Payer: MEDICARE

## 2017-10-06 DIAGNOSIS — I48.20 CHRONIC ATRIAL FIBRILLATION (H): ICD-10-CM

## 2017-10-06 DIAGNOSIS — I48.20 CHRONIC ATRIAL FIBRILLATION (H): Primary | ICD-10-CM

## 2017-10-06 DIAGNOSIS — Z79.01 LONG-TERM (CURRENT) USE OF ANTICOAGULANTS: ICD-10-CM

## 2017-10-06 LAB — INR POINT OF CARE: 2.5 (ref 0.86–1.14)

## 2017-10-06 PROCEDURE — 85610 PROTHROMBIN TIME: CPT | Mod: QW

## 2017-10-06 PROCEDURE — 99207 ZZC NO CHARGE NURSE ONLY: CPT

## 2017-10-06 PROCEDURE — 36416 COLLJ CAPILLARY BLOOD SPEC: CPT

## 2017-10-06 NOTE — PROGRESS NOTES
ANTICOAGULATION FOLLOW-UP CLINIC VISIT    Patient Name:  Alexandru Crews  Date:  10/6/2017  Contact Type:  Face to Face    SUBJECTIVE:     Patient Findings     Positives No Problem Findings           OBJECTIVE    INR Protime   Date Value Ref Range Status   10/06/2017 2.5 (A) 0.86 - 1.14 Final       ASSESSMENT / PLAN  No question data found.  Anticoagulation Summary as of 10/6/2017     INR goal 2.0-3.0   Today's INR 2.5   Maintenance plan 2.5 mg (2.5 mg x 1) every day   Full instructions 2.5 mg every day   Weekly total 17.5 mg   No change documented Rose Navarrete, RN   Plan last modified Allie Awan RN (4/8/2016)   Next INR check 11/17/2017   Priority INR   Target end date     Indications   Long-term (current) use of anticoagulants [Z79.01] [Z79.01]  Atrial fibrillation (H) [I48.91]         Anticoagulation Episode Summary     INR check location     Preferred lab     Send INR reminders to [facility]    Comments       Anticoagulation Care Providers     Provider Role Specialty Phone number    Paul Knowles MD [facility] Internal Medicine [phone]            See the Encounter Report to view Anticoagulation Flowsheet and Dosing Calendar (Go to Encounters tab in chart review, and find the Anticoagulation Therapy Visit)    Dosage adjustment made based on physician directed care plan.    Rose Navarrete RN

## 2017-10-06 NOTE — MR AVS SNAPSHOT
Alexandru CARRERA Eleonora   10/6/2017 10:00 AM   Anticoagulation Therapy Visit    Description:  85 year old male   Provider:  KEYANNA ANTI COAG   Department:  [facility]           INR as of 10/6/2017     Today's INR 2.5      Anticoagulation Summary as of 10/6/2017     INR goal 2.0-3.0   Today's INR 2.5   Full instructions 2.5 mg every day   Next INR check 11/17/2017    Indications   Long-term (current) use of anticoagulants [Z79.01] [Z79.01]  Atrial fibrillation (H) [I48.91]         Your next Anticoagulation Clinic appointment(s)     Nov 17, 2017 10:00 AM CST   Anticoagulation Visit with KEYANNA ANTI COAG   [facility] ([facility]    [address] [address] [phone]              Contact Numbers     [facility]  Please call [phone] to cancel and/or reschedule your appointment   Please call [phone] with any problems or questions regarding your therapy.        October 2017 Details    Sun Mon Tue Wed Thu Fri Sat     1               2               3               4               5               6      2.5 mg   See details      7      2.5 mg           8      2.5 mg         9      2.5 mg         10      2.5 mg         11      2.5 mg         12      2.5 mg         13      2.5 mg         14      2.5 mg           15      2.5 mg         16      2.5 mg         17      2.5 mg         18      2.5 mg         19      2.5 mg         20      2.5 mg         21      2.5 mg           22      2.5 mg         23      2.5 mg         24      2.5 mg         25      2.5 mg         26      2.5 mg         27      2.5 mg         28      2.5 mg           29      2.5 mg         30      2.5 mg         31      2.5 mg              Date Details   10/06 This INR check               How to take your warfarin dose     To take:  2.5 mg Take 1 of the 2.5 mg tablets.           November 2017 Details    Sun Mon Tue Wed Thu Fri Sat        1      2.5 mg         2      2.5 mg         3      2.5 mg         4       2.5 mg           5      2.5 mg         6      2.5 mg         7      2.5 mg         8      2.5 mg         9      2.5 mg         10      2.5 mg         11      2.5 mg           12      2.5 mg         13      2.5 mg         14      2.5 mg         15      2.5 mg         16      2.5 mg         17            18                 19               20               21               22               23               24               25                 26               27               28               29               30                  Date Details   No additional details    Date of next INR:  11/17/2017         How to take your warfarin dose     To take:  2.5 mg Take 1 of the 2.5 mg tablets.

## 2017-10-15 DIAGNOSIS — I48.91 ATRIAL FIBRILLATION, UNSPECIFIED TYPE (H): ICD-10-CM

## 2017-10-15 DIAGNOSIS — Z79.01 LONG-TERM (CURRENT) USE OF ANTICOAGULANTS: ICD-10-CM

## 2017-10-16 NOTE — TELEPHONE ENCOUNTER
warfarin (COUMADIN) 2.5 MG tablet    Last Written Prescription Date: 7/25/2017  Last Fill Qty: 90, # refills: 0  Last Office Visit with G, [facility] or [facility] prescribing provider: 7/27/2017       Date and Result of Last PT/INR:   Lab Results   Component Value Date    INR 2.5 10/06/2017    INR 2.3 08/18/2017    INR 1.6 03/14/2017    INR 1.60 03/10/2017    PT 17.2 11/13/2014

## 2017-10-17 RX ORDER — WARFARIN SODIUM 2.5 MG/1
TABLET ORAL
Qty: 90 TABLET | Refills: 0 | Status: SHIPPED | OUTPATIENT
Start: 2017-10-17 | End: 2018-01-21

## 2017-10-17 NOTE — TELEPHONE ENCOUNTER
Prescription approved per [facility] Refill Protocol.  Rose Navarrete, RN - BC

## 2017-11-22 ENCOUNTER — TELEPHONE (OUTPATIENT)
Dept: INTERNAL MEDICINE | Facility: CLINIC | Age: 82
End: 2017-11-22

## 2017-11-22 DIAGNOSIS — G89.4 CHRONIC PAIN SYNDROME: ICD-10-CM

## 2017-11-22 RX ORDER — OXYCODONE AND ACETAMINOPHEN 5; 325 MG/1; MG/1
1 TABLET ORAL 3 TIMES DAILY PRN
Qty: 90 TABLET | Refills: 0 | Status: CANCELLED | OUTPATIENT
Start: 2017-11-22

## 2017-11-22 RX ORDER — MORPHINE SULFATE 15 MG/1
15 TABLET, FILM COATED, EXTENDED RELEASE ORAL EVERY 12 HOURS
Qty: 60 TABLET | Refills: 0 | Status: CANCELLED | OUTPATIENT
Start: 2017-11-22

## 2017-11-22 NOTE — TELEPHONE ENCOUNTER
Pt will be in on Friday for INR. Pt asking to  written Rx's above.  Pt informed PCP is off today. Since Thursday is a holiday, that we would get message to  as soon as possible.   Rabia Ott RN

## 2017-11-24 ENCOUNTER — ANTICOAGULATION THERAPY VISIT (OUTPATIENT)
Dept: NURSING | Facility: CLINIC | Age: 82
End: 2017-11-24
Payer: MEDICARE

## 2017-11-24 DIAGNOSIS — I48.20 CHRONIC ATRIAL FIBRILLATION (H): ICD-10-CM

## 2017-11-24 DIAGNOSIS — Z79.01 LONG-TERM (CURRENT) USE OF ANTICOAGULANTS: ICD-10-CM

## 2017-11-24 LAB — INR POINT OF CARE: 2.6 (ref 0.86–1.14)

## 2017-11-24 PROCEDURE — 85610 PROTHROMBIN TIME: CPT | Mod: QW

## 2017-11-24 PROCEDURE — 99207 ZZC NO CHARGE NURSE ONLY: CPT

## 2017-11-24 PROCEDURE — 36416 COLLJ CAPILLARY BLOOD SPEC: CPT

## 2017-11-24 NOTE — PROGRESS NOTES
ANTICOAGULATION FOLLOW-UP CLINIC VISIT    Patient Name:  Alexandru Crews  Date:  11/24/2017  Contact Type:  Face to Face    SUBJECTIVE:     Patient Findings     Positives No Problem Findings           OBJECTIVE    INR Protime   Date Value Ref Range Status   11/24/2017 2.6 (A) 0.86 - 1.14 Final       ASSESSMENT / PLAN  INR assessment THER    Recheck INR In: 6 WEEKS    INR Location Clinic      Anticoagulation Summary as of 11/24/2017     INR goal 2.0-3.0   Today's INR 2.6   Maintenance plan 2.5 mg (2.5 mg x 1) every day   Full instructions 2.5 mg every day   Weekly total 17.5 mg   No change documented Evelyne Bethea RN   Plan last modified Allie Awan RN (4/8/2016)   Next INR check 1/5/2018   Priority INR   Target end date Indefinite    Indications   Long-term (current) use of anticoagulants [Z79.01] [Z79.01]  Atrial fibrillation (H) [I48.91]         Anticoagulation Episode Summary     INR check location     Preferred lab     Send INR reminders to [facility]    Comments       Anticoagulation Care Providers     Provider Role Specialty Phone number    Paul Knowles MD Responsible Internal Medicine [phone]            See the Encounter Report to view Anticoagulation Flowsheet and Dosing Calendar (Go to Encounters tab in chart review, and find the Anticoagulation Therapy Visit)    Dosage adjustment made based on physician directed care plan.    Evelyne Bethea RN

## 2017-11-24 NOTE — MR AVS SNAPSHOT
Alexandru CARRERA Eleonora   11/24/2017 8:00 AM   Anticoagulation Therapy Visit    Description:  85 year old male   Provider:  KEYANNA ANTI COAG   Department:  [facility]           INR as of 11/24/2017     Today's INR 2.6      Anticoagulation Summary as of 11/24/2017     INR goal 2.0-3.0   Today's INR 2.6   Full instructions 2.5 mg every day   Next INR check 1/5/2018    Indications   Long-term (current) use of anticoagulants [Z79.01] [Z79.01]  Atrial fibrillation (H) [I48.91]         Your next Anticoagulation Clinic appointment(s)     Jan 05, 2018  9:30 AM CST   Anticoagulation Visit with KEYANNA ANTI COAG   [facility]    [address] [address] [phone]              Contact Numbers     [facility]  Please call [phone] to cancel and/or reschedule your appointment   Please call [phone] with any problems or questions regarding your therapy.        November 2017 Details    Sun Mon Tue Wed Thu Fri Sat        1               2               3               4                 5               6               7               8               9               10               11                 12               13               14               15               16               17               18                 19               20               21               22               23               24      2.5 mg   See details      25      2.5 mg           26      2.5 mg         27      2.5 mg         28      2.5 mg         29      2.5 mg         30      2.5 mg            Date Details   11/24 This INR check               How to take your warfarin dose     To take:  2.5 mg Take 1 of the 2.5 mg tablets.           December 2017 Details    Sun Mon Tue Wed u Fri Sat          1      2.5 mg         2      2.5 mg           3      2.5 mg         4      2.5 mg         5      2.5 mg         6      2.5 mg         7      2.5 mg         8      2.5 mg         9      2.5 mg            10      2.5 mg         11      2.5 mg         12      2.5 mg         13      2.5 mg         14      2.5 mg         15      2.5 mg         16      2.5 mg           17      2.5 mg         18      2.5 mg         19      2.5 mg         20      2.5 mg         21      2.5 mg         22      2.5 mg         23      2.5 mg           24      2.5 mg         25      2.5 mg         26      2.5 mg         27      2.5 mg         28      2.5 mg         29      2.5 mg         30      2.5 mg           31      2.5 mg                Date Details   No additional details            How to take your warfarin dose     To take:  2.5 mg Take 1 of the 2.5 mg tablets.           January 2018 Details    Sun Mon Tue Wed Thu Fri Sat      1      2.5 mg         2      2.5 mg         3      2.5 mg         4      2.5 mg         5            6                 7               8               9               10               11               12               13                 14               15               16               17               18               19               20                 21               22               23               24               25               26               27                 28               29               30               31                   Date Details   No additional details    Date of next INR:  1/5/2018         How to take your warfarin dose     To take:  2.5 mg Take 1 of the 2.5 mg tablets.

## 2017-11-24 NOTE — TELEPHONE ENCOUNTER
Received 3 month supply  Rx for Oxycodone and MSContin. Writer placed scripts at .      Inocencia Diaz CMA

## 2017-11-28 NOTE — TELEPHONE ENCOUNTER
Spoke with patient he was unaware that prescriptions were ready for .  advised patient of message below, patient will come in to  prescription.   Josselyn Corona RN

## 2017-12-14 ENCOUNTER — OFFICE VISIT (OUTPATIENT)
Dept: OPHTHALMOLOGY | Facility: CLINIC | Age: 82
End: 2017-12-14
Payer: MEDICARE

## 2017-12-14 DIAGNOSIS — H52.4 PRESBYOPIA: ICD-10-CM

## 2017-12-14 DIAGNOSIS — H43.399 VITREOUS OPACITIES: ICD-10-CM

## 2017-12-14 DIAGNOSIS — Z96.1 PSEUDOPHAKIA: Primary | ICD-10-CM

## 2017-12-14 PROCEDURE — 92014 COMPRE OPH EXAM EST PT 1/>: CPT | Performed by: OPHTHALMOLOGY

## 2017-12-14 PROCEDURE — 92015 DETERMINE REFRACTIVE STATE: CPT | Mod: GY | Performed by: OPHTHALMOLOGY

## 2017-12-14 ASSESSMENT — CONF VISUAL FIELD
OS_NORMAL: 1
OD_NORMAL: 1

## 2017-12-14 ASSESSMENT — REFRACTION_WEARINGRX
OS_CYLINDER: +0.75
OS_AXIS: 155
OD_CYLINDER: +0.25
OD_SPHERE: -0.25
OD_ADD: +3.00
OD_AXIS: 136
OS_ADD: +3.00
SPECS_TYPE: TRIFOCAL
OS_SPHERE: -0.50

## 2017-12-14 ASSESSMENT — VISUAL ACUITY
METHOD: SNELLEN - LINEAR
OD_CC: 20/20
OD_CC+: -2
OS_CC+: -2
CORRECTION_TYPE: GLASSES
OS_CC: 20/20

## 2017-12-14 ASSESSMENT — SLIT LAMP EXAM - LIDS
COMMENTS: 1+ DERMATOCHALASIS - UPPER LID
COMMENTS: 1+ DERMATOCHALASIS - UPPER LID

## 2017-12-14 ASSESSMENT — EXTERNAL EXAM - RIGHT EYE: OD_EXAM: 2+ BROW PTOSIS

## 2017-12-14 ASSESSMENT — TONOMETRY
IOP_METHOD: APPLANATION
OS_IOP_MMHG: 14
OD_IOP_MMHG: 14

## 2017-12-14 ASSESSMENT — REFRACTION_MANIFEST
OD_ADD: +3.00
OS_AXIS: 166
OD_CYLINDER: +0.50
OS_CYLINDER: +1.00
OS_SPHERE: -0.75
OD_SPHERE: -0.50
OD_AXIS: 175
OS_ADD: +3.00

## 2017-12-14 ASSESSMENT — CUP TO DISC RATIO
OS_RATIO: 0.2
OD_RATIO: 0.2

## 2017-12-14 ASSESSMENT — EXTERNAL EXAM - LEFT EYE: OS_EXAM: 2+ BROW PTOSIS

## 2017-12-14 NOTE — PATIENT INSTRUCTIONS
Glasses Rx given - optional   Call in August 2018 for an appointment in December 2018 for Complete Exam    Dr. Raines [phone]

## 2017-12-14 NOTE — PROGRESS NOTES
" Current Eye Medications:  None     Subjective: here for complete today. Needs glasses adjusted, keep going down the nose.  Seems to have a little \"swimming\" with them once in awhile, make him dizzy then it comes back to normal.    Still left occipital neuralgia.  Sees neuro; injections stopped working.  Talk of cutting nerve.      Objective:  See Ophthalmology Exam.       Assessment:  Stable eye exam.      ICD-10-CM    1. Pseudophakia, Yag Caps, ou Z96.1 EYE EXAM (SIMPLE-NONBILLABLE)   2. 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 Presbyopia H52.4 REFRACTIVE STATUS        Plan:  Glasses Rx given - optional   Call in August 2018 for an appointment in December 2018 for Complete Exam    Dr. Raines [phone]       "

## 2017-12-14 NOTE — MR AVS SNAPSHOT
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 Raines [phone]          Follow-ups after your visit        Your next 10 appointments already scheduled     Jan 05, 2018  9:30 AM CST   Anticoagulation Visit with FZ ANTI COAG   [facility] [address] [address] [phone]              Who to contact     If you have questions or need follow up information about today's clinic visit or your schedule please contact [facility] directly at [phone].  Normal or non-critical lab and imaging results will be communicated to you by MyChart, letter or phone within 4 business days after the clinic has received the results. If you do not hear from us within 7 days, please contact the clinic through [employer] or phone. If you have a critical or abnormal lab result, we will notify you by phone as soon as possible.  Submit refill requests through [facility] [facility] or call your pharmacy and they will forward the refill request to us. Please allow 3 business days for your refill to be completed.          Additional Information About Your Visit        [employer] Information     [facility] lets you send messages to your doctor, view your test results, renew your prescriptions, schedule appointments and more. To sign up, go to [email or web address] . Click on \"Log in\" on the left side of the screen, which will take you to the Welcome page. Then click on \"Sign up Now\" on the right " side of the page.     You will be asked to enter the access code listed below, as well as some personal information. Please follow the directions to create your username and password.     Your access code is: [email or web address]  Expires: 2018  5:27 PM     Your access code will  in 90 days. If you need help or a new code, please call your [place] clinic or [phone].        Care EveryWhere ID     This is your Care EveryWhere ID. This could be used by other organizations to access your [place] medical records  [id number]         Blood Pressure from Last 3 Encounters:   17 130/76   17 100/58   17 146/76    Weight from Last 3 Encounters:   17 57.2 kg (126 lb 3.2 oz)   17 58.1 kg (128 lb)   17 58.5 kg (129 lb)              We Performed the Following     EYE EXAM (SIMPLE-NONBILLABLE)     REFRACTIVE STATUS        Primary Care Provider Office Phone # Fax #    Paul Knowles [phone] [address] [address]        Equal Access to Services     [facility]: Hadii aad ku hadasho Soomaali, waaxda luqadaha, qaybta kaalmada adeegyada, vlad baez . So [facility] [phone].    ATENCIÓN: Si habla español, tiene a jackson disposición servicios gratuitos de asistencia lingüística. [facility] [phone].    We comply with applicable federal civil rights laws and [place] laws. We do not discriminate on the basis of race, color, national origin, age, disability, sex, sexual orientation, or gender identity.            Thank you!     Thank you for choosing [facility]  for your care. Our goal is always to provide you with excellent care. Hearing back from our patients is one way we can continue to improve our services. Please take a few minutes to complete the written survey that you may receive in the mail after your visit with us. Thank you!             Your Updated Medication List - Protect others around you:  Learn how to safely use, store and throw away your medicines at www.disposemymeds.org.          This list is accurate as of: 12/14/17  1:55 PM.  Always use your most recent med list.                   Brand Name Dispense Instructions for use Diagnosis    calcium-vitamin D 600-400 MG-UNIT per tablet    CALTRATE     Take 1 tablet by mouth every other day        DAILY VITAMINS PO      1 tablet daily        FIBER PO      Take 1 tsp. by mouth daily        fish Oil 1200 MG capsule      Take 1 capsule by mouth 2 times daily.        metoprolol 25 MG 24 hr tablet    TOPROL-XL     Take 25 mg by mouth 2 times daily        morphine 15 MG 12 hr tablet    MS CONTIN    60 tablet    Take 1 tablet (15 mg) by mouth every 12 hours 28 days or more between refills for controlled medications    Chronic pain syndrome       oxyCODONE-acetaminophen 5-325 MG per tablet    PERCOCET    90 tablet    Take 1 tablet by mouth 3 times daily as needed for moderate to severe pain 28 days or more between refills for controlled medications    Chronic pain syndrome       senna-docusate 8.6-50 MG per tablet    SENOKOT-S;PERICOLACE     Take 1 tablet by mouth 3 times daily as needed        triamcinolone 0.1 % cream    KENALOG    60 g    Apply  topically 2 times daily as needed. to affected area as directed.    Dermatitis       VITAMIN D (CHOLECALCIFEROL) PO      Take  by mouth daily. 3000 units daily.        warfarin 2.5 MG tablet    COUMADIN    90 tablet    TAKE 1 TABLET BY MOUTH ONCE DAILY OR AS DIRECTED.    Long-term (current) use of anticoagulants, Atrial fibrillation, unspecified type (H)

## 2018-01-05 ENCOUNTER — ANTICOAGULATION THERAPY VISIT (OUTPATIENT)
Dept: NURSING | Facility: CLINIC | Age: 83
End: 2018-01-05
Payer: MEDICARE

## 2018-01-05 DIAGNOSIS — I48.20 CHRONIC ATRIAL FIBRILLATION (H): ICD-10-CM

## 2018-01-05 DIAGNOSIS — Z79.01 LONG-TERM (CURRENT) USE OF ANTICOAGULANTS: ICD-10-CM

## 2018-01-05 LAB — INR POINT OF CARE: 2.7 (ref 0.86–1.14)

## 2018-01-05 PROCEDURE — 36416 COLLJ CAPILLARY BLOOD SPEC: CPT

## 2018-01-05 PROCEDURE — 99207 ZZC NO CHARGE NURSE ONLY: CPT

## 2018-01-05 PROCEDURE — 85610 PROTHROMBIN TIME: CPT | Mod: QW

## 2018-01-05 NOTE — PROGRESS NOTES
ANTICOAGULATION FOLLOW-UP CLINIC VISIT    Patient Name:  Alexandru Crews  Date:  1/5/2018  Contact Type:  Face to Face    SUBJECTIVE:     Patient Findings     Positives No Problem Findings           OBJECTIVE    INR Protime   Date Value Ref Range Status   01/05/2018 2.7 (A) 0.86 - 1.14 Final       ASSESSMENT / PLAN  INR assessment THER    Recheck INR In: 6 WEEKS    INR Location Clinic      Anticoagulation Summary as of 1/5/2018     INR goal 2.0-3.0   Today's INR 2.7   Maintenance plan 2.5 mg (2.5 mg x 1) every day   Full instructions 2.5 mg every day   Weekly total 17.5 mg   No change documented Rose Navarrete RN   Plan last modified Allie Awan RN (4/8/2016)   Next INR check 2/16/2018   Priority INR   Target end date Indefinite    Indications   Long-term (current) use of anticoagulants [Z79.01] [Z79.01]  Atrial fibrillation (H) [I48.91]         Anticoagulation Episode Summary     INR check location     Preferred lab     Send INR reminders to [facility]    Comments       Anticoagulation Care Providers     Provider Role Specialty Phone number    Paul Knowles MD [facility] Internal Medicine [phone]            See the Encounter Report to view Anticoagulation Flowsheet and Dosing Calendar (Go to Encounters tab in chart review, and find the Anticoagulation Therapy Visit)    Dosage adjustment made based on physician directed care plan.    Rose Navarrete, SHELLEY

## 2018-01-05 NOTE — MR AVS SNAPSHOT
Alexandru CARRERA Eleonora   1/5/2018 9:30 AM   Anticoagulation Therapy Visit    Description:  85 year old male   Provider:  KEYANNA ANTI COAG   Department:  [facility]           INR as of 1/5/2018     Today's INR 2.7      Anticoagulation Summary as of 1/5/2018     INR goal 2.0-3.0   Today's INR 2.7   Full instructions 2.5 mg every day   Next INR check 2/16/2018    Indications   Long-term (current) use of anticoagulants [Z79.01] [Z79.01]  Atrial fibrillation (H) [I48.91]         Your next Anticoagulation Clinic appointment(s)     Feb 16, 2018  9:45 AM CST   Anticoagulation Visit with KEYANNA ANTI COAG   [facility]    [address] [address] [phone]              Contact Numbers     [facility]  Please call [phone] to cancel and/or reschedule your appointment   Please call [phone] with any problems or questions regarding your therapy.        January 2018 Details    Sun Mon Tue Wed Thu Fri Sat      1               2               3               4               5      2.5 mg   See details      6      2.5 mg           7      2.5 mg         8      2.5 mg         9      2.5 mg         10      2.5 mg         11      2.5 mg         12      2.5 mg         13      2.5 mg           14      2.5 mg         15      2.5 mg         16      2.5 mg         17      2.5 mg         18      2.5 mg         19      2.5 mg         20      2.5 mg           21      2.5 mg         22      2.5 mg         23      2.5 mg         24      2.5 mg         25      2.5 mg         26      2.5 mg         27      2.5 mg           28      2.5 mg         29      2.5 mg         30      2.5 mg         31      2.5 mg             Date Details   01/05 This INR check               How to take your warfarin dose     To take:  2.5 mg Take 1 of the 2.5 mg tablets.           February 2018 Details    Sun Mon Tue Wed Thu Fri Sat         1      2.5 mg         2      2.5 mg         3      2.5 mg           4       2.5 mg         5      2.5 mg         6      2.5 mg         7      2.5 mg         8      2.5 mg         9      2.5 mg         10      2.5 mg           11      2.5 mg         12      2.5 mg         13      2.5 mg         14      2.5 mg         15      2.5 mg         16            17                 18               19               20               21               22               23               24                 25               26               27               28                   Date Details   No additional details    Date of next INR:  2/16/2018         How to take your warfarin dose     To take:  2.5 mg Take 1 of the 2.5 mg tablets.

## 2018-02-01 DIAGNOSIS — L30.9 DERMATITIS: ICD-10-CM

## 2018-02-01 RX ORDER — TRIAMCINOLONE ACETONIDE 1 MG/G
CREAM TOPICAL 2 TIMES DAILY PRN
Qty: 60 G | Refills: 2 | Status: SHIPPED | OUTPATIENT
Start: 2018-02-01

## 2018-02-01 NOTE — TELEPHONE ENCOUNTER
Pt called RN hotline. He is requesting a script for Triamcinolone 1% cream as needed for ear itch. Was initially prescribed this medication years ago from a dermatologist. Also had this refilled in the hospital. Dr. Knowles has never prescribed this. He uses it every night. Ok to leave a detailed message. Please advise.    Evelyne Bethea RN  [facility]

## 2018-02-14 DIAGNOSIS — G89.4 CHRONIC PAIN SYNDROME: ICD-10-CM

## 2018-02-14 NOTE — TELEPHONE ENCOUNTER
Patient left VM on hotline asking for refill on below 2 medications. Patient would like to know if OV is needed before refill.   OK to leave detailed message on VM if OV is needed.     oxyCODONE-acetaminophen (PERCOCET) 5-325 MG per tablet 90 tablet 0 11/24/2017  No      Sig: Take 1 tablet by mouth 3 times daily as needed for moderate to severe pain 28 days or more between refills for controlled medications      morphine (MS CONTIN) 15 MG 12 hr tablet 60 tablet 0 11/24/2017  No     Sig: Take 1 tablet (15 mg) by mouth every 12 hours 28 days or more between refills for controlled medications     Class: Local Print       Prescriptions pending. Routing to PCP to advise if OV is needed before refill.    Sandy Dubois RN

## 2018-02-16 ENCOUNTER — ANTICOAGULATION THERAPY VISIT (OUTPATIENT)
Dept: NURSING | Facility: CLINIC | Age: 83
End: 2018-02-16
Payer: MEDICARE

## 2018-02-16 DIAGNOSIS — I48.20 CHRONIC ATRIAL FIBRILLATION (H): ICD-10-CM

## 2018-02-16 DIAGNOSIS — Z79.01 LONG-TERM (CURRENT) USE OF ANTICOAGULANTS: ICD-10-CM

## 2018-02-16 LAB — INR POINT OF CARE: 2.9 (ref 0.86–1.14)

## 2018-02-16 PROCEDURE — 85610 PROTHROMBIN TIME: CPT | Mod: QW

## 2018-02-16 PROCEDURE — 99207 ZZC NO CHARGE NURSE ONLY: CPT

## 2018-02-16 PROCEDURE — 36416 COLLJ CAPILLARY BLOOD SPEC: CPT

## 2018-02-16 RX ORDER — OXYCODONE AND ACETAMINOPHEN 5; 325 MG/1; MG/1
1 TABLET ORAL 3 TIMES DAILY PRN
Qty: 90 TABLET | Refills: 0
Start: 2018-02-16

## 2018-02-16 RX ORDER — MORPHINE SULFATE 15 MG/1
15 TABLET, FILM COATED, EXTENDED RELEASE ORAL EVERY 12 HOURS
Qty: 60 TABLET | Refills: 0
Start: 2018-02-16

## 2018-02-16 NOTE — TELEPHONE ENCOUNTER
Yes. Needs face to face encounter . If he is out of medication I can refill one month     Paul Knowles MD

## 2018-02-16 NOTE — PROGRESS NOTES
ANTICOAGULATION FOLLOW-UP CLINIC VISIT    Patient Name:  Alexandru Crews  Date:  2/16/2018  Contact Type:  Face to Face    SUBJECTIVE:     Patient Findings     Positives No Problem Findings           OBJECTIVE    INR Protime   Date Value Ref Range Status   02/16/2018 2.9 (A) 0.86 - 1.14 Final       ASSESSMENT / PLAN  INR assessment THER    Recheck INR In: 6 WEEKS    INR Location Clinic      Anticoagulation Summary as of 2/16/2018     INR goal 2.0-3.0   Today's INR 2.9   Maintenance plan 2.5 mg (2.5 mg x 1) every day   Full instructions 2.5 mg every day   Weekly total 17.5 mg   No change documented Rose Navarrete RN   Plan last modified Allie Awan RN (4/8/2016)   Next INR check 3/30/2018   Priority INR   Target end date Indefinite    Indications   Long-term (current) use of anticoagulants [Z79.01] [Z79.01]  Atrial fibrillation (H) [I48.91]         Anticoagulation Episode Summary     INR check location     Preferred lab     Send INR reminders to [facility]    Comments       Anticoagulation Care Providers     Provider Role Specialty Phone number    Paul Knowles MD [facility] Internal Medicine [phone]            See the Encounter Report to view Anticoagulation Flowsheet and Dosing Calendar (Go to Encounters tab in chart review, and find the Anticoagulation Therapy Visit)    Dosage adjustment made based on physician directed care plan.    Rose Navarrete, SHELLEY

## 2018-02-16 NOTE — TELEPHONE ENCOUNTER
Patient has appointment with provider 2/20/2018 and said he should have enough medication until his appointment.  Evelyne MURRAY CMA ([facility])

## 2018-02-16 NOTE — MR AVS SNAPSHOT
Alexandru CARRERA Eleonora   2/16/2018 9:45 AM   Anticoagulation Therapy Visit    Description:  85 year old male   Provider:  KEYANNA ANTI COAG   Department:  [facility]           INR as of 2/16/2018     Today's INR 2.9      Anticoagulation Summary as of 2/16/2018     INR goal 2.0-3.0   Today's INR 2.9   Full instructions 2.5 mg every day   Next INR check 3/30/2018    Indications   Long-term (current) use of anticoagulants [Z79.01] [Z79.01]  Atrial fibrillation (H) [I48.91]         Your next Anticoagulation Clinic appointment(s)     Mar 30, 2018  9:45 AM CDT   Anticoagulation Visit with KEYANNA ANTI COAG   [facility]    [address] [address] [phone]              Contact Numbers     [facility]  Please call [phone] to cancel and/or reschedule your appointment   Please call [phone] with any problems or questions regarding your therapy.        February 2018 Details    Sun Mon Tue Wed Thu Fri Sat         1               2               3                 4               5               6               7               8               9               10                 11               12               13               14               15               16      2.5 mg   See details      17      2.5 mg           18      2.5 mg         19      2.5 mg         20      2.5 mg         21      2.5 mg         22      2.5 mg         23      2.5 mg         24      2.5 mg           25      2.5 mg         26      2.5 mg         27      2.5 mg         28      2.5 mg             Date Details   02/16 This INR check               How to take your warfarin dose     To take:  2.5 mg Take 1 of the 2.5 mg tablets.           March 2018 Details    Sun Mon Tue Wed Thu Fri Sat         1      2.5 mg         2      2.5 mg         3      2.5 mg           4      2.5 mg         5      2.5 mg         6      2.5 mg         7      2.5 mg         8      2.5 mg         9      2.5 mg          10      2.5 mg           11      2.5 mg         12      2.5 mg         13      2.5 mg         14      2.5 mg         15      2.5 mg         16      2.5 mg         17      2.5 mg           18      2.5 mg         19      2.5 mg         20      2.5 mg         21      2.5 mg         22      2.5 mg         23      2.5 mg         24      2.5 mg           25      2.5 mg         26      2.5 mg         27      2.5 mg         28      2.5 mg         29      2.5 mg         30            31                Date Details   No additional details    Date of next INR:  3/30/2018         How to take your warfarin dose     To take:  2.5 mg Take 1 of the 2.5 mg tablets.

## 2018-02-20 ENCOUNTER — OFFICE VISIT (OUTPATIENT)
Dept: FAMILY MEDICINE | Facility: CLINIC | Age: 83
End: 2018-02-20
Payer: MEDICARE

## 2018-02-20 ENCOUNTER — TELEPHONE (OUTPATIENT)
Dept: INTERNAL MEDICINE | Facility: CLINIC | Age: 83
End: 2018-02-20

## 2018-02-20 VITALS
RESPIRATION RATE: 16 BRPM | WEIGHT: 128 LBS | HEIGHT: 62 IN | OXYGEN SATURATION: 96 % | BODY MASS INDEX: 23.55 KG/M2 | HEART RATE: 87 BPM | DIASTOLIC BLOOD PRESSURE: 66 MMHG | SYSTOLIC BLOOD PRESSURE: 132 MMHG

## 2018-02-20 DIAGNOSIS — G89.4 CHRONIC PAIN SYNDROME: Primary | ICD-10-CM

## 2018-02-20 DIAGNOSIS — R73.03 PREDIABETES: ICD-10-CM

## 2018-02-20 DIAGNOSIS — I48.20 CHRONIC ATRIAL FIBRILLATION (H): ICD-10-CM

## 2018-02-20 DIAGNOSIS — Z79.01 LONG-TERM (CURRENT) USE OF ANTICOAGULANTS: ICD-10-CM

## 2018-02-20 DIAGNOSIS — Z95.0 PACEMAKER: ICD-10-CM

## 2018-02-20 DIAGNOSIS — I10 HYPERTENSION GOAL BP (BLOOD PRESSURE) < 140/90: ICD-10-CM

## 2018-02-20 LAB
ALBUMIN SERPL-MCNC: 3.8 G/DL (ref 3.4–5)
ALP SERPL-CCNC: 77 U/L (ref 40–150)
ALT SERPL W P-5'-P-CCNC: 61 U/L (ref 0–70)
ANION GAP SERPL CALCULATED.3IONS-SCNC: 4 MMOL/L (ref 3–14)
AST SERPL W P-5'-P-CCNC: 37 U/L (ref 0–45)
BASOPHILS # BLD AUTO: 0 10E9/L (ref 0–0.2)
BASOPHILS NFR BLD AUTO: 0.3 %
BILIRUB SERPL-MCNC: 0.6 MG/DL (ref 0.2–1.3)
BUN SERPL-MCNC: 26 MG/DL (ref 7–30)
CALCIUM SERPL-MCNC: 9.5 MG/DL (ref 8.5–10.1)
CHLORIDE SERPL-SCNC: 104 MMOL/L (ref 94–109)
CO2 SERPL-SCNC: 30 MMOL/L (ref 20–32)
CREAT SERPL-MCNC: 0.82 MG/DL (ref 0.66–1.25)
DIFFERENTIAL METHOD BLD: ABNORMAL
EOSINOPHIL # BLD AUTO: 0.3 10E9/L (ref 0–0.7)
EOSINOPHIL NFR BLD AUTO: 4 %
ERYTHROCYTE [DISTWIDTH] IN BLOOD BY AUTOMATED COUNT: 14.9 % (ref 10–15)
GFR SERPL CREATININE-BSD FRML MDRD: 89 ML/MIN/1.7M2
GLUCOSE SERPL-MCNC: 95 MG/DL (ref 70–99)
HBA1C MFR BLD: 5.3 % (ref 4.3–6)
HCT VFR BLD AUTO: 41.1 % (ref 40–53)
HGB BLD-MCNC: 13.3 G/DL (ref 13.3–17.7)
LYMPHOCYTES # BLD AUTO: 1.3 10E9/L (ref 0.8–5.3)
LYMPHOCYTES NFR BLD AUTO: 20.3 %
MCH RBC QN AUTO: 28.7 PG (ref 26.5–33)
MCHC RBC AUTO-ENTMCNC: 32.4 G/DL (ref 31.5–36.5)
MCV RBC AUTO: 89 FL (ref 78–100)
MONOCYTES # BLD AUTO: 0.5 10E9/L (ref 0–1.3)
MONOCYTES NFR BLD AUTO: 7.4 %
NEUTROPHILS # BLD AUTO: 4.2 10E9/L (ref 1.6–8.3)
NEUTROPHILS NFR BLD AUTO: 68 %
PLATELET # BLD AUTO: 122 10E9/L (ref 150–450)
POTASSIUM SERPL-SCNC: 4.9 MMOL/L (ref 3.4–5.3)
PROT SERPL-MCNC: 6.9 G/DL (ref 6.8–8.8)
RBC # BLD AUTO: 4.63 10E12/L (ref 4.4–5.9)
SODIUM SERPL-SCNC: 138 MMOL/L (ref 133–144)
WBC # BLD AUTO: 6.2 10E9/L (ref 4–11)

## 2018-02-20 PROCEDURE — 99213 OFFICE O/P EST LOW 20 MIN: CPT | Performed by: INTERNAL MEDICINE

## 2018-02-20 PROCEDURE — 85025 COMPLETE CBC W/AUTO DIFF WBC: CPT | Performed by: INTERNAL MEDICINE

## 2018-02-20 PROCEDURE — 80053 COMPREHEN METABOLIC PANEL: CPT | Performed by: INTERNAL MEDICINE

## 2018-02-20 PROCEDURE — 36415 COLL VENOUS BLD VENIPUNCTURE: CPT | Performed by: INTERNAL MEDICINE

## 2018-02-20 PROCEDURE — 83036 HEMOGLOBIN GLYCOSYLATED A1C: CPT | Performed by: INTERNAL MEDICINE

## 2018-02-20 RX ORDER — SOTALOL HYDROCHLORIDE 120 MG/1
120 TABLET ORAL 2 TIMES DAILY
COMMUNITY
Start: 2018-02-06 | End: 2019-05-23

## 2018-02-20 RX ORDER — MORPHINE SULFATE 15 MG/1
15 TABLET, FILM COATED, EXTENDED RELEASE ORAL EVERY 12 HOURS
Qty: 60 TABLET | Refills: 0 | Status: SHIPPED | OUTPATIENT
Start: 2018-02-20 | End: 2018-02-20

## 2018-02-20 RX ORDER — CALCIUM CARBONATE 500(1250)
TABLET,CHEWABLE ORAL
COMMUNITY
Start: 2017-04-12 | End: 2020-01-01

## 2018-02-20 RX ORDER — OXYCODONE AND ACETAMINOPHEN 5; 325 MG/1; MG/1
1 TABLET ORAL 3 TIMES DAILY PRN
Qty: 90 TABLET | Refills: 0 | Status: SHIPPED | OUTPATIENT
Start: 2018-02-20 | End: 2018-05-22

## 2018-02-20 RX ORDER — OXYCODONE AND ACETAMINOPHEN 5; 325 MG/1; MG/1
1 TABLET ORAL 3 TIMES DAILY PRN
Qty: 90 TABLET | Refills: 0 | Status: SHIPPED | OUTPATIENT
Start: 2018-02-20 | End: 2018-02-20

## 2018-02-20 RX ORDER — MORPHINE SULFATE 15 MG/1
15 TABLET, FILM COATED, EXTENDED RELEASE ORAL EVERY 12 HOURS
Qty: 60 TABLET | Refills: 0 | Status: SHIPPED | OUTPATIENT
Start: 2018-02-20 | End: 2018-05-22

## 2018-02-20 NOTE — PROGRESS NOTES
SUBJECTIVE:   Alexandru Crews is a 85 year old male who presents to clinic today for the following health issues:      Medication Re-check  He needs to have his medications refilled. He is using percocet for chronic pain in his legs and back from arthritis and degenerative joint disease.     He is being seen at [facility] by Dr. Ferrara for his heart problems. He has a pacemaker implanted. He reports that his pacemaker fires often, and he has some kind of arrhythmia disorder, but isn't totally sure. He is taking coumadin as well.     Reviewed health maintenance topics. Can draw labs today that he is due for: ALT, CBC, BMP. He doesn't need any homecare referral today, but wishes when he built his house he had made it handicap accessible. He has an appointment with the pharmacist nurse to go over his meds, as he has questions for when he should be taking them.       Social History   Substance Use Topics     Smoking status: Former Smoker     Years: 15.00     Types: Cigarettes     Quit date: 6/30/1975     Smokeless tobacco: Never Used     Alcohol use Yes      Comment: 1 per week      Problem list and histories reviewed & adjusted, as indicated.  Additional history: as documented    Patient Active Problem List   Diagnosis     Family history of colon cancer     Osteopenia     HYPERLIPIDEMIA LDL GOAL <160     Vitreous condensations, od > os     Pseudophakia, Yag Caps, ou     Chronic pain syndrome     Advanced directives, counseling/discussion     BPH (benign prostatic hyperplasia)     Atrial fibrillation (H)     History of shingles     Chronic fatigue     Lumbar spinal stenosis     DJD (degenerative joint disease), lumbar and thoracic     Diverticulosis of large intestine     H/O cardiac pacemaker     Hypertension goal BP (blood pressure) < 140/90     Long-term (current) use of anticoagulants [Z79.01]     Chronic nonintractable headache, unspecified headache type     Prediabetes     Chronic  pain     Elevated glucose     De Quervain's disease (tenosynovitis)     Primary osteoarthritis of first carpometacarpal joint of left hand     Cubital tunnel syndrome, right     Primary osteoarthritis of right knee     History of total knee arthroplasty, left     Chronic atrial fibrillation (H)     Past Surgical History:   Procedure Laterality Date     APPENDECTOMY  12/2004     ARTHROSCOPY KNEE RT/LT  1/2006    Rt & Lt, Dr Arriaga     BACK SURGERY  1978 / 2003    x 3 in 1978, fusions and one surgery in 2003     C APPENDECTOMY  12/31/2004     CARPAL TUNNEL RELEASE RT/LT  three    2 on left, one on right     CATARACT IOL, RT/LT       COLONOSCOPY  1995,2000,2005     INJECT EPIDURAL CERVICAL  5/24/2011    [facility]     INJECT EPIDURAL LUMBAR  11/9/2010    [facility]     INJECT EPIDURAL LUMBAR  1/4/2011    [facility]     INJECT EPIDURAL LUMBAR  4/27/2011    [facility]     LASER YAG CAPSULOTOMY  11/2016; 12/2016    left eye; right eye     PHACOEMULSIFICATION WITH STANDARD INTRAOCULAR LENS IMPLANT  6/2008; 8/2008    right eye; left eye     RECTAL SURGERY      fissurectomy and proctoplasty     RELEASE DEQUERVAINS WRIST Right 04/28/2017    DML     RELEASE TRIGGER FINGER      right hand     SHOULDER SURGERY      X four [ right x 2 and left x 2 ][[[[[     TUNA         Social History   Substance Use Topics     Smoking status: Former Smoker     Years: 15.00     Types: Cigarettes     Quit date: 6/30/1975     Smokeless tobacco: Never Used     Alcohol use Yes      Comment: 1 per week     Family History   Problem Relation Age of Onset     CANCER Mother      ovarian     Cancer - colorectal Brother          Current Outpatient Prescriptions   Medication Sig Dispense Refill     sotalol (BETAPACE) 120 MG tablet Take 120 mg by mouth       calcium carbonate 1250 (500 CA) MG CHEW Takes one every other day-unsure of dose       triamcinolone (KENALOG) 0.1 % cream Apply topically 2 times daily as needed to affected area  "as directed. 60 g 2     warfarin (COUMADIN) 2.5 MG tablet TAKE 1 TABLET BY MOUTH ONCE DAILY OR AS DIRECTED. 90 tablet 0     oxyCODONE-acetaminophen (PERCOCET) 5-325 MG per tablet Take 1 tablet by mouth 3 times daily as needed for moderate to severe pain 28 days or more between refills for controlled medications 90 tablet 0     morphine (MS CONTIN) 15 MG 12 hr tablet Take 1 tablet (15 mg) by mouth every 12 hours 28 days or more between refills for controlled medications 60 tablet 0     metoprolol (TOPROL-XL) 25 MG 24 hr tablet Take 25 mg by mouth 2 times daily       senna-docusate (SENOKOT-S;PERICOLACE) 8.6-50 MG per tablet Take 1 tablet by mouth 3 times daily as needed        FIBER PO Take 1 tsp. by mouth daily        DAILY VITAMINS PO 1 tablet daily       VITAMIN D, CHOLECALCIFEROL, PO Take  by mouth daily. 3000 units daily.        omega-3 fatty acids (FISH OIL) 1200 MG capsule Take 1 capsule by mouth 2 times daily.         Reviewed and updated as needed this visit by clinical staff  Tobacco  Allergies  Meds  Med Hx  Surg Hx  Fam Hx  Soc Hx      Reviewed and updated as needed this visit by Provider         ROS:  Constitutional, HEENT, cardiovascular, pulmonary, GI, , musculoskeletal, neuro, skin, endocrine and psych systems are negative, except as otherwise noted.    This document serves as a record of the services and decisions personally performed and made by Paul Knowles MD. It was created on his behalf by Yamilex Ahuja, a trained medical scribe. The creation of this document is based on the provider's statements to the medical scribe.  Yamilex Ahuja February 20, 2018 11:22 AM      OBJECTIVE:     /66  Pulse 87  Resp 16  Ht 1.573 m (5' 1.92\")  Wt 58.1 kg (128 lb)  SpO2 96%  BMI 23.47 kg/m2  Body mass index is 23.47 kg/(m^2).  GENERAL: healthy, alert and no distress, well groomed  RESP: lungs clear to auscultation - no rales, rhonchi or wheezes  CV: regular rate and rhythm, normal S1 S2, no S3 or " S4, no murmur, click or rub, no peripheral edema and peripheral pulses strong  MS: arthritis in ankles and knees  SKIN: no suspicious lesions or rashes to visible skin  NEURO: Normal strength and tone, mentation intact and speech normal  PSYCH: mentation appears normal, affect normal/bright    Diagnostic Test Results:  No results found for this or any previous visit (from the past 24 hour(s)).    ASSESSMENT/PLAN:         (E78.5) Hyperlipidemia LDL goal <160  (primary encounter diagnosis)  Comment: laboratory studies postponed until June   Plan: complete physical exam in June     (I10) Hypertension goal BP (blood pressure) < 140/90  Comment: as above   Plan:     (Z79.01) Long-term (current) use of anticoagulants [Z79.01]  Comment: as above   Plan:     (R73.03) Prediabetes  Comment: as above   Plan:     (I48.2) Chronic atrial fibrillation (H)  Comment: as above  Plan:     (Z95.0) Pacemaker  Comment: as above  Plan:     (G89.4) Chronic pain syndrome  Comment: refills provided   Plan: oxyCODONE-acetaminophen (PERCOCET) 5-325 MG per        tablet, morphine (MS CONTIN) 15 MG 12 hr         tablet, DISCONTINUED: oxyCODONE-acetaminophen         (PERCOCET) 5-325 MG per tablet, DISCONTINUED:         morphine (MS CONTIN) 15 MG 12 hr tablet,         DISCONTINUED: oxyCODONE-acetaminophen         (PERCOCET) 5-325 MG per tablet, DISCONTINUED:         morphine (MS CONTIN) 15 MG 12 hr tablet            The information in this document, created by the medical scribe for me, accurately reflects the services I personally performed and the decisions made by me. I have reviewed and approved this document for accuracy.   MD Paul Vigil MD  [facility]

## 2018-02-20 NOTE — PATIENT INSTRUCTIONS
Follow up for Wellness physical in 3 months      [facility]    If you have any questions regarding to your visit please contact your care team:     Team Pink:   Clinic Hours Telephone Number   Internal Medicine:  Dr. Nini Landaverde, NP       7am-7pm  Monday - Thursday   7am-5pm  Fridays  [phone]  (Appointment scheduling available 24/7)    Questions about your visit?  Team Line  [phone]   Urgent Care - [place] and [facility] - 11am-9pm Monday-Friday Saturday-Sunday- 9am-5pm   [place] - 5pm-9pm Monday-Friday Saturday-Sunday- 9am-5pm  [phone] - [place]   [phone] - [place]       What options do I have for visits at the clinic other than the traditional office visit?  To expand how we care for you, many of our providers are utilizing electronic visits (e-visits) and telephone visits, when medically appropriate, for interactions with their patients rather than a visit in the clinic.   We also offer nurse visits for many medical concerns. Just like any other service, we will bill your insurance company for this type of visit based on time spent on the phone with your provider. Not all insurance companies cover these visits. Please check with your medical insurance if this type of visit is covered. You will be responsible for any charges that are not paid by your insurance.      E-visits via [employer]:  generally incur a $35.00 fee.  Telephone visits:  Time spent on the phone: *charged based on time that is spent on the phone in increments of 10 minutes. Estimated cost:   5-10 mins $30.00   11-20 mins. $59.00   21-30 mins. $85.00   Use [employer] (secure email communication and access to your chart) to send your primary care provider a message or make an appointment. Ask someone on your Team how to sign up for [employer].    For a Price Quote for your services, please call our Consumer Price Line at [phone].    As always, Thank you for  trusting us with your health care needs!    Elyse Addison, CMA

## 2018-02-20 NOTE — TELEPHONE ENCOUNTER
BP Readings from Last 3 Encounters:   02/20/18 132/66   07/27/17 130/76   04/21/17 100/58     Patient left message on Rn hotline asking why his BP was recorded as 140/90. Patient would like a call back, okay to leave a detailed message.   Called patient and explained that the 140/90 is the BP goal, so we want his BP under this. Patient verbalized understanding, no further questions or concerns.    Josselyn Corona RN

## 2018-02-20 NOTE — LETTER
[address] [place] [address]    February 21, 2018    Alexandru Crews  [address] [place] [address]          Dear Alexandru,    Enclosed is a copy of your results. All of these tests are within acceptable limits. Things look good !     Results for orders placed or performed in visit on 02/20/18   CBC with platelets differential   Result Value Ref Range    WBC 6.2 4.0 - 11.0 10e9/L    RBC Count 4.63 4.4 - 5.9 10e12/L    Hemoglobin 13.3 13.3 - 17.7 g/dL    Hematocrit 41.1 40.0 - 53.0 %    MCV 89 78 - 100 fl    MCH 28.7 26.5 - 33.0 pg    MCHC 32.4 31.5 - 36.5 g/dL    RDW 14.9 10.0 - 15.0 %    Platelet Count 122 (L) 150 - 450 10e9/L    Diff Method Automated Method     % Neutrophils 68.0 %    % Lymphocytes 20.3 %    % Monocytes 7.4 %    % Eosinophils 4.0 %    % Basophils 0.3 %    Absolute Neutrophil 4.2 1.6 - 8.3 10e9/L    Absolute Lymphocytes 1.3 0.8 - 5.3 10e9/L    Absolute Monocytes 0.5 0.0 - 1.3 10e9/L    Absolute Eosinophils 0.3 0.0 - 0.7 10e9/L    Absolute Basophils 0.0 0.0 - 0.2 10e9/L   Comprehensive metabolic panel   Result Value Ref Range    Sodium 138 133 - 144 mmol/L    Potassium 4.9 3.4 - 5.3 mmol/L    Chloride 104 94 - 109 mmol/L    Carbon Dioxide 30 20 - 32 mmol/L    Anion Gap 4 3 - 14 mmol/L    Glucose 95 70 - 99 mg/dL    Urea Nitrogen 26 7 - 30 mg/dL    Creatinine 0.82 0.66 - 1.25 mg/dL    GFR Estimate 89 >60 mL/min/1.7m2    GFR Estimate If Black >90 >60 mL/min/1.7m2    Calcium 9.5 8.5 - 10.1 mg/dL    Bilirubin Total 0.6 0.2 - 1.3 mg/dL    Albumin 3.8 3.4 - 5.0 g/dL    Protein Total 6.9 6.8 - 8.8 g/dL    Alkaline Phosphatase 77 40 - 150 U/L    ALT 61 0 - 70 U/L    AST 37 0 - 45 U/L   Hemoglobin A1c   Result Value Ref Range    Hemoglobin A1C 5.3 4.3 - 6.0 %     If you have any questions or concerns, please call myself or my nurse at [phone].    Sincerely,        Paul Knowles MD/contreras

## 2018-02-26 ENCOUNTER — TELEPHONE (OUTPATIENT)
Dept: FAMILY MEDICINE | Facility: CLINIC | Age: 83
End: 2018-02-26

## 2018-02-26 NOTE — TELEPHONE ENCOUNTER
Patient left message on RN hotline stating he has pain below the belly button near his hip and is wondering if he can have an appointment with PCP for this? Okay to leave a detailed message.   Called patient he is gone right now and his wife asked that clinic call back in a few hours.  Josselyn Corona RN

## 2018-02-28 NOTE — TELEPHONE ENCOUNTER
Spoke to patient.  The pain is now gone so he does not need to be seen.  Advised patient that if pain returns and he wants to be seen for an appointment to call the main number to schedule.  Emma Leach,

## 2018-03-13 ENCOUNTER — OFFICE VISIT (OUTPATIENT)
Dept: PHARMACY | Facility: CLINIC | Age: 83
End: 2018-03-13

## 2018-03-13 ENCOUNTER — OFFICE VISIT (OUTPATIENT)
Dept: PODIATRY | Facility: CLINIC | Age: 83
End: 2018-03-13
Payer: MEDICARE

## 2018-03-13 VITALS — HEART RATE: 74 BPM | WEIGHT: 128 LBS | OXYGEN SATURATION: 97 % | BODY MASS INDEX: 23.47 KG/M2

## 2018-03-13 VITALS — DIASTOLIC BLOOD PRESSURE: 70 MMHG | SYSTOLIC BLOOD PRESSURE: 112 MMHG

## 2018-03-13 DIAGNOSIS — R21 RASH: ICD-10-CM

## 2018-03-13 DIAGNOSIS — M19.072 PRIMARY LOCALIZED OSTEOARTHROSIS OF LEFT ANKLE AND FOOT: Primary | ICD-10-CM

## 2018-03-13 DIAGNOSIS — I10 HYPERTENSION GOAL BP (BLOOD PRESSURE) < 140/90: ICD-10-CM

## 2018-03-13 DIAGNOSIS — G89.4 CHRONIC PAIN SYNDROME: ICD-10-CM

## 2018-03-13 DIAGNOSIS — I50.30 (HFPEF) HEART FAILURE WITH PRESERVED EJECTION FRACTION (H): ICD-10-CM

## 2018-03-13 DIAGNOSIS — I48.91 ATRIAL FIBRILLATION, UNSPECIFIED TYPE (H): ICD-10-CM

## 2018-03-13 DIAGNOSIS — E63.9 NUTRITIONAL DISORDER: ICD-10-CM

## 2018-03-13 DIAGNOSIS — K59.00 CONSTIPATION, UNSPECIFIED CONSTIPATION TYPE: ICD-10-CM

## 2018-03-13 DIAGNOSIS — M85.80 OSTEOPENIA, UNSPECIFIED LOCATION: Primary | ICD-10-CM

## 2018-03-13 PROCEDURE — 99203 OFFICE O/P NEW LOW 30 MIN: CPT | Performed by: PODIATRIST

## 2018-03-13 PROCEDURE — 99607 MTMS BY PHARM ADDL 15 MIN: CPT | Performed by: PHARMACIST

## 2018-03-13 PROCEDURE — 99605 MTMS BY PHARM NP 15 MIN: CPT | Performed by: PHARMACIST

## 2018-03-13 NOTE — PROGRESS NOTES
"SUBJECTIVE/OBJECTIVE:                Alexandru Crews is a 85 year old male coming in for a follow-up visit for Medication Therapy Management.  He was referred to me from Dr. Knowles.     Chief Complaint: Follow up from our visit on 12/1/16.  This is his first MT visit in >1 year.  He is concerned about the timing of sotalol, he was under the impression he'd need to take this 30 mins prior to breakfast and dinner.      Tobacco: History of tobacco dependence - quit 1975  Alcohol: 1-3 beverages / week    Medication Adherence/Access:  no issues reported    Osteopenia:  Current therapy includes: Vitamin D 2000 IU daily and calcium/vitamin D every other day, dietary intake (gets at least 800mg of calcium/day) and his MVI (contains 210mg of calcium). Pt is not experiencing side effects.  Hasn't received treatment since January 2015.   We had planned to do another DEXA scan summer of 2018.  Last vitamin D level: 9/17/15 = 46 ug/L  DEXA History: DEXA done July 2016 indicated osteopenia, FRAX was not calculated due to recent treatment  High risk: No     Chronic Pain: Current medication regimen includes MS Contin 15mg BID and Percocet 5/325mg TID PRN (he generally takes 1/2 tablet 4 times daily PRN).  He again notes that he \"should\" have several different surgeries to treat the pain, but he doesn't feel he'd do well with these (and for some has been told he's not a candidate).  He denies side effects of therapy.  He feels he's doing ok with this regimen.    Hypertension/A. Fib/HFpEF: Current medications include metoprolol ER 25mg BID, sotalol 120mg BID and warfarin as directed.   Patient reports no current medication side effects including s/s bruising/bleeding.  He's been trying to take sotalol 30 minutes prior to breakfast and dinner, but is often missing doses.     Constipation: He takes fiber daily as well as senna/docuate 2-3x/day.  Feels bowels are overall stable with this regimen.    Rash:  He uses triamcinolone cream " as needed, which he finds effective.  He denies side effects of therapy.    Supplements:  He's taking fish oil 1200mg BID, at previous visits he wasn't interested in discontinuing this but today questions why he's taking it.  He denies side effects of therapy.    Current labs include:  Today's Vitals: /70     BP Readings from Last 3 Encounters:   02/20/18 132/66   07/27/17 130/76   04/21/17 100/58     Lab Results   Component Value Date    A1C 5.3 02/20/2018   .  Lab Results   Component Value Date    CHOL 194 07/27/2017     Lab Results   Component Value Date    TRIG 62 07/27/2017     Lab Results   Component Value Date    HDL 66 07/27/2017     Lab Results   Component Value Date     07/27/2017       Liver Function Studies -   Recent Labs   Lab Test  02/20/18   1146   PROTTOTAL  6.9   ALBUMIN  3.8   BILITOTAL  0.6   ALKPHOS  77   AST  37   ALT  61       Lab Results   Component Value Date    UCRR 48 07/14/2015    MICROL 8 07/14/2015    UMALCR 17.39 (H) 07/14/2015       Last Basic Metabolic Panel:  Lab Results   Component Value Date     02/20/2018      Lab Results   Component Value Date    POTASSIUM 4.9 02/20/2018     Lab Results   Component Value Date    CHLORIDE 104 02/20/2018     Lab Results   Component Value Date    BUN 26 02/20/2018     Lab Results   Component Value Date    CR 0.82 02/20/2018     GFR Estimate   Date Value Ref Range Status   02/20/2018 89 >60 mL/min/1.7m2 Final     Comment:     Non  GFR Calc   07/27/2017 >90  Non  GFR Calc   >60 mL/min/1.7m2 Final   02/14/2017 >90  Non  GFR Calc   >60 mL/min/1.7m2 Final       TSH   Date Value Ref Range Status   03/10/2017 1.99 0.40 - 4.00 mU/L Final   ]    Most Recent Immunizations   Administered Date(s) Administered     Influenza (High Dose) 3 valent vaccine 10/01/2017     Influenza (IIV3) PF 09/13/2013     Pneumo Conj 13-V (2010&after) 07/14/2015     Pneumococcal 23 valent 10/01/2005     TD (ADULT,  7+) 11/20/2007     TDAP Vaccine (Adacel) 07/09/2013       ASSESSMENT:              Current medications were reviewed today as discussed above.      Medication Adherence: good, sotalol is the main issue at this point.    Osteopenia: Will be due for DEXA scan this summer.      Chronic Pain: Stable.    Hypertension/A. Fib/HFpEF: Sotalol does not need to be taken 30 mins prior to food, can be taken with his other medications.    Constipation: Stable.    Rash: Stable.    Supplements:  Could d/c fish oil.     PLAN:                  1.  Advised Alexandru he can take sotalol in the AM and HS, along with his other medications.  2.  Recommended d/c fish oil.    I spent 45 minutes with this patient today. A copy of the visit note was provided to the patient's primary care provider.     Will follow up this summer for repeat DEXA scan.    The patient was given a summary of these recommendations as an after visit summary.    Kelli Gonsalez, PharmD, [facility]  Medication Therapy Management Provider  Pager: [phone]

## 2018-03-13 NOTE — MR AVS SNAPSHOT
After Visit Summary   3/13/2018    Alexandru Crews    MRN: [id number]           Patient Information     Date Of Birth          9/22/1932        Visit Information        Provider Department      3/13/2018 10:30 AM Kelli Gonsalez, [facility] MTM        Care Instructions    Recommendations from today's MTM visit:                                                    MTM (medication therapy management) is a service provided by a clinical pharmacist designed to help you get the most of out of your medicines.   Today we reviewed what your medicines are for, how to know if they are working, that your medicines are safe and how to make your medicine regimen as easy as possible.     1.  You can take sotalol in the AM and at bedtime, along with your other medications.    2.  You can stop taking the fish oil.  There's a small chance this might help with pain; so if you notice your pain worsens you can go back on it.    Next MTM visit:  1 year, sooner if needed    To schedule another MTM appointment, please call the clinic directly or you may call the MTM scheduling line at [phone] or toll-free at [phone].     My Clinical Pharmacist's contact information:                                                      It was a pleasure seeing you today!  Please feel free to contact me with any questions or concerns you have.      Kelli Gonsalez, PharmD, [facility]  Medication Therapy Management Provider  Pager: [phone]     You may receive a survey about the MTM services you received.  I would appreciate your feedback to help me serve you better in the future. Please fill it out and return it when you can. Your comments will be anonymous.                   Follow-ups after your visit        Your next 10 appointments already scheduled     Mar 13, 2018  1:30 PM CDT   [facility] Dago Fortune DPM   [facility]    [address] [address]  "  [phone]            Mar 30, 2018  9:45 AM CDT   Anticoagulation Visit with FZ ANTI COAG   [facility] ([address]  [place] [address] [phone]            May 22, 2018 10:10 AM CDT   PHYSICAL with Paul Knowles MD   [facility] ([place] [id number] [facility]  [place] [address] [phone]              Who to contact     If you have questions or need follow up information about today's clinic visit or your schedule please contact [facility] MTM directly at [phone].  Normal or non-critical lab and imaging results will be communicated to you by MyChart, letter or phone within 4 business days after the clinic has received the results. If you do not hear from us within 7 days, please contact the clinic through MyChart or phone. If you have a critical or abnormal lab result, we will notify you by phone as soon as possible.  Submit refill requests through eucl3D or call your pharmacy and they will forward the refill request to us. Please allow 3 business days for your refill to be completed.          Additional Information About Your Visit        InbiomotionharAuramist Information     eucl3D lets you send messages to your doctor, view your test results, renew your prescriptions, schedule appointments and more. To sign up, go to [email or web address] . Click on \"Log in\" on the left side of the screen, which will take you to the Welcome page. Then click on \"Sign up Now\" on the right side of the page.     You will be asked to enter the access code listed below, as well as some personal information. Please follow the directions to create your username and password.     Your access code is: [email or web address]  Expires: 2018 11:06 AM     Your access code will  in 90 days. If you need help or a new code, please call your [place] clinic or [phone].        Care EveryWhere ID     This is your Care EveryWhere " ID. This could be used by other organizations to access your [facility] medical records  [id number]         Blood Pressure from Last 3 Encounters:   03/13/18 112/70   02/20/18 132/66   07/27/17 130/76    Weight from Last 3 Encounters:   02/20/18 128 lb (58.1 kg)   07/27/17 126 lb 3.2 oz (57.2 kg)   05/08/17 128 lb (58.1 kg)              Today, you had the following     No orders found for display       Primary Care Provider Office Phone # Fax #    Paul Knowles [phone]       [address] [address]        Equal Access to Services     KIRTI NIEVES : Herbert Sher, wahailey parada, qaybta kaalmada daniel, vlad gale. So [facility] [phone].    ATENCIÓN: Si habla español, tiene a jackson disposición servicios gratuitos de asistencia lingüística. Llame al [phone].    We comply with applicable federal civil rights laws and [place] laws. We do not discriminate on the basis of race, color, national origin, age, disability, sex, sexual orientation, or gender identity.            Thank you!     Thank you for choosing [facility] [place] [facility]  for your care. Our goal is always to provide you with excellent care. Hearing back from our patients is one way we can continue to improve our services. Please take a few minutes to complete the written survey that you may receive in the mail after your visit with us. Thank you!             Your Updated Medication List - Protect others around you: Learn how to safely use, store and throw away your medicines at www.disposemymeds.org.          This list is accurate as of 3/13/18 11:06 AM.  Always use your most recent med list.                   Brand Name Dispense Instructions for use Diagnosis    calcium carbonate 1250 (500 CA) MG Chew      Takes one every other day-unsure of dose        DAILY VITAMINS PO      1 tablet daily        FIBER PO      Take 1 tsp. by mouth daily        metoprolol  succinate 25 MG 24 hr tablet    TOPROL-XL     Take 25 mg by mouth 2 times daily        morphine 15 MG 12 hr tablet    MS CONTIN    60 tablet    Take 1 tablet (15 mg) by mouth every 12 hours 28 days or more between refills for controlled medications    Chronic pain syndrome       oxyCODONE-acetaminophen 5-325 MG per tablet    PERCOCET    90 tablet    Take 1 tablet by mouth 3 times daily as needed for moderate to severe pain 28 days or more between refills for controlled medications    Chronic pain syndrome       senna-docusate 8.6-50 MG per tablet    SENOKOT-S;PERICOLACE     Take 1 tablet by mouth 3 times daily as needed        sotalol 120 MG tablet    BETAPACE     Take 120 mg by mouth 2 times daily        triamcinolone 0.1 % cream    KENALOG    60 g    Apply topically 2 times daily as needed to affected area as directed.    Dermatitis       VITAMIN D (CHOLECALCIFEROL) PO      Take 1,000 Units by mouth 2 times daily        warfarin 2.5 MG tablet    COUMADIN    90 tablet    TAKE 1 TABLET BY MOUTH ONCE DAILY OR AS DIRECTED.    Long-term (current) use of anticoagulants, Atrial fibrillation, unspecified type (H)

## 2018-03-13 NOTE — LETTER
3/13/2018         RE: Alexandru Tapiasera  [address] [place] [address]        Dear Colleague,    Thank you for referring your patient, Alexandru Crews, to the [facility]. Please see a copy of my visit note below.    S:  Complains of foot pain.  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  Has had this for 9 years.  Describes it as a burning pain.  Aggrevated by activity and relieved by rest.  Slowly getting worse.    Positive history of post static dyskinesia.  Retired.  Has tried orthotics with have helped and Federico brace which did not help.  States he had x-rays and MRI at another facility and he has brought in results and films.   He is retired.  On blood thinner.      ROS:  A 10-point review of systems was performed and is positive for that noted in the HPI and as seen above.  All other areas are negative.          Allergies   Allergen Reactions     Lactose Nausea and Vomiting     Sulindac      Upset stomach     Tramadol      Itching     Valdecoxib Itching     Vicodin [Hydrocodone-Acetaminophen]      Itching     Vioxx Itching     Rofecoxib Itching and Rash     Sulfa Drugs Rash     Muscle aches       Current Outpatient Prescriptions   Medication Sig Dispense Refill     sotalol (BETAPACE) 120 MG tablet Take 120 mg by mouth 2 times daily        calcium carbonate 1250 (500 CA) MG CHEW Takes one every other day-unsure of dose       oxyCODONE-acetaminophen (PERCOCET) 5-325 MG per tablet Take 1 tablet by mouth 3 times daily as needed for moderate to severe pain 28 days or more between refills for controlled medications 90 tablet 0     morphine (MS CONTIN) 15 MG 12 hr tablet Take 1 tablet (15 mg) by mouth every 12 hours 28 days or more between refills for controlled medications 60 tablet 0     triamcinolone (KENALOG) 0.1 % cream Apply topically 2 times daily as needed to affected area as directed. 60 g 2     warfarin (COUMADIN) 2.5 MG tablet TAKE 1 TABLET BY MOUTH ONCE DAILY OR AS DIRECTED. 90  tablet 0     metoprolol (TOPROL-XL) 25 MG 24 hr tablet Take 25 mg by mouth 2 times daily       senna-docusate (SENOKOT-S;PERICOLACE) 8.6-50 MG per tablet Take 1 tablet by mouth 3 times daily as needed        FIBER PO Take 1 tsp. by mouth daily        DAILY VITAMINS PO 1 tablet daily       VITAMIN D, CHOLECALCIFEROL, PO Take 1,000 Units by mouth 2 times daily          Patient Active Problem List   Diagnosis     Family history of colon cancer     Osteopenia     HYPERLIPIDEMIA LDL GOAL <160     Vitreous condensations, od > os     Pseudophakia, Yag Caps, ou     Chronic pain syndrome     Advanced directives, counseling/discussion     BPH (benign prostatic hyperplasia)     Paroxysmal atrial fibrillation (H)     History of shingles     Chronic fatigue     Lumbar spinal stenosis     DJD (degenerative joint disease), lumbar and thoracic     Diverticulosis of large intestine     H/O cardiac pacemaker     Hypertension goal BP (blood pressure) < 140/90     Long-term (current) use of anticoagulants [Z79.01]     Chronic nonintractable headache, unspecified headache type     Prediabetes     Chronic pain     Elevated glucose     De Quervain's disease (tenosynovitis)     Primary osteoarthritis of first carpometacarpal joint of left hand     Cubital tunnel syndrome, right     Primary osteoarthritis of right knee     History of total knee arthroplasty, left     Chronic atrial fibrillation (H)     Pacemaker       Past Medical History:   Diagnosis Date     Atrial fibrillation (H)      BPH (benign prostatic hyperplasia)      Cataracts      Chest pain 12/2010    Normal Myocardial perfusion test with [facility]     Chronic pain syndrome      CTS (carpal tunnel syndrome)     Rt, Rx surgery     Diverticulosis      DJD (degenerative joint disease), lumbar and thoracic 9/9/2013     Family history of colon cancer     Incr. risk (brother)     Hydrocele     Rt     Hypertension goal BP (blood pressure) < 140/90 8/10/2015     Osteopenia 2005      Osteoporosis     Dr Regla DHALIWAL (peptic ulcer disease) 11/1998    h/o, +H. Pylori Rx     Varicocele     Lt side-large       Past Surgical History:   Procedure Laterality Date     APPENDECTOMY  12/2004     ARTHROSCOPY KNEE RT/LT  1/2006    Rt & Lt, Dr Arriaga     BACK SURGERY  1978 / 2003    x 3 in 1978, fusions and one surgery in 2003     C APPENDECTOMY  12/31/2004     CARPAL TUNNEL RELEASE RT/LT  three    2 on left, one on right     CATARACT IOL, RT/LT       COLONOSCOPY  1995,2000,2005     INJECT EPIDURAL CERVICAL  5/24/2011    [facility]     INJECT EPIDURAL LUMBAR  11/9/2010    [facility]     INJECT EPIDURAL LUMBAR  1/4/2011    [facility]     INJECT EPIDURAL LUMBAR  4/27/2011    [facility]     LASER YAG CAPSULOTOMY  11/2016; 12/2016    left eye; right eye     PHACOEMULSIFICATION WITH STANDARD INTRAOCULAR LENS IMPLANT  6/2008; 8/2008    right eye; left eye     RECTAL SURGERY      fissurectomy and proctoplasty     RELEASE DEQUERVAINS WRIST Right 04/28/2017    DML     RELEASE TRIGGER FINGER      right hand     SHOULDER SURGERY      X four [ right x 2 and left x 2 ][[[[[     TUNA         Family History   Problem Relation Age of Onset     CANCER Mother      ovarian     Cancer - colorectal Brother        Social History   Substance Use Topics     Smoking status: Former Smoker     Years: 15.00     Types: Cigarettes     Quit date: 6/30/1975     Smokeless tobacco: Never Used     Alcohol use Yes      Comment: 1 per week         Exam:    Vitals: Pulse 74  Wt 128 lb (58.1 kg)  SpO2 97%  BMI 23.47 kg/m2  BMI: Body mass index is 23.47 kg/(m^2).  Height: Data Unavailable    Constitutional/ general:  Pt is in no apparent distress, appears well-nourished.  Cooperative with history and physical exam.  Seen with wife.       Psych:  The patient answered questions appropriately.  Normal affect.  Seems to have reasonable expectations, in terms of treatment.     Eyes:  Visual scanning/ tracking without  deficit.     Ears:  Response to auditory stimuli is normal.  positive hearing aid devices.  Auricles in proper alignment.     Lymphatic:  Popliteal lymph nodes not enlarged.     Lungs:  Non labored breathing, non labored speech. No cough.  No audible wheezing. Even, quiet breathing.       Vascular:  positive pedal pulses bilaterally for both the DP and PT arteries.  CFT < 3 sec.  positive ankle edema.  negative pedal hair growth.    Neuro:  Alert and oriented x 3. Coordinated gait.  Light touch sensation is intact to the L4, L5, S1 distributions. No obvious deficits.  No evidence of neurological-based weakness, spasticity, or contracture in the lower extremities.      Derm: Normal texture and turgor.  No erythema, ecchymosis, or cyanosis.      Musculoskeletal:    Lower extremity muscle strength is normal.  Patient is ambulatory without an assistive device or brace.   Pronated arch with weightbearing left>R.  No forefoot or rear foot deformities noted.  Decrease ROM all fore foot and rearfoot joints.  No equinus.    No pain with stressing any muscle compartments.  No erythema edema or ecchymosis or masses noted.  Pain over left sinus tarsi.  Some pain with ROM of STJ and ankle joint      Radiographic Exam:  MRI- arthritis of ankle and subtalor joint left foot.      A:  Left rearfoot arthritis.      P:  X-rays and mri personally revieved.  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    Rx for [place] AFO.  Discussed importance of wearing this in a good shoe at all times, even in the house to prevent rearfoot pain.  Discussed other treatment options if not resolving.  RETURN TO CLINIC PRN.    Dago Fortune DPM, FACFAS      Again, thank you for allowing me to participate in the care of your patient.        Sincerely,        Dago Fortune DPM

## 2018-03-13 NOTE — PATIENT INSTRUCTIONS
Recommendations from today's MTM visit:                                                    MTM (medication therapy management) is a service provided by a clinical pharmacist designed to help you get the most of out of your medicines.   Today we reviewed what your medicines are for, how to know if they are working, that your medicines are safe and how to make your medicine regimen as easy as possible.     1.  You can take sotalol in the AM and at bedtime, along with your other medications.    2.  You can stop taking the fish oil.  There's a small chance this might help with pain; so if you notice your pain worsens you can go back on it.    Next MTM visit:  1 year, sooner if needed    To schedule another MTM appointment, please call the clinic directly or you may call the MTM scheduling line at [phone] or toll-free at [phone].     My Clinical Pharmacist's contact information:                                                      It was a pleasure seeing you today!  Please feel free to contact me with any questions or concerns you have.      Kelli Gonsalez, [place], [facility]  Medication Therapy Management Provider  Pager: [phone]     You may receive a survey about the MTM services you received.  I would appreciate your feedback to help me serve you better in the future. Please fill it out and return it when you can. Your comments will be anonymous.

## 2018-03-13 NOTE — MR AVS SNAPSHOT
After Visit Summary   3/13/2018    Alexandru Crews    MRN: [id number]           Patient Information     Date Of Birth          9/22/1932        Visit Information        Provider Department      3/13/2018 1:30 PM Dago Fortune, DPM [facility]        Care Instructions    We wish you continued good healing. If you have any questions or concerns, please do not hesitate to contact us at [phone]    Please remember to call and schedule a follow up appointment if one was recommended at your earliest convenience.   PODIATRY CLINIC HOURS  TELEPHONE NUMBER    Dr. Dago Fortune D.P.M FAC FAS    Clinics:  [facility]    [facility]   Tuesday 1PM-6PM  [place]  Wednesday 7AM-2PM  [place]  Thursday 10AM-6PM  [place]  Friday 7AM-3PM  [place]  Specialty schedulers:   [phone] to make an appointment with any Specialty Provider.        Urgent Care locations:    [place] Monday-Friday 5 pm - 9 pm. Saturday-Sunday 9 am -5pm    Monday-Friday 11 am - 9 pm Saturday 9 am - 5 pm     Monday-Sunday 12 noon-8PM [phone]     If you need a medication refill, please contact us you may need lab work and/or a follow up visit prior to your refill (i.e. Antifungal medications).    AlphaSmarthart (secure e-mail communication and access to your chart) to send a message or to make an appointment.    If MRI needed please call [facility] at [phone]        Weight management plan: Patient was referred to their PCP to discuss a diet and exercise plan.            Follow-ups after your visit        Your next 10 appointments already scheduled     Mar 30, 2018  9:45 AM CDT   Anticoagulation Visit with JAYESH ANTI COAG   [facility] [place] ([facility])    [address]  [place] [address] [phone]            May 22, 2018  "10:10 AM CDT   PHYSICAL with Paul Knowles MD   [facility] [place] ([facility])    [address]  [place] [address] [phone]              Who to contact     If you have questions or need follow up information about today's clinic visit or your schedule please contact [facility] directly at [phone].  Normal or non-critical lab and imaging results will be communicated to you by FUJIAN HAIYUANhart, letter or phone within 4 business days after the clinic has received the results. If you do not hear from us within 7 days, please contact the clinic through FUJIAN HAIYUANhart or phone. If you have a critical or abnormal lab result, we will notify you by phone as soon as possible.  Submit refill requests through Axcient or call your pharmacy and they will forward the refill request to us. Please allow 3 business days for your refill to be completed.          Additional Information About Your Visit        Axcient Information     Axcient lets you send messages to your doctor, view your test results, renew your prescriptions, schedule appointments and more. To sign up, go to [email or web address] . Click on \"Log in\" on the left side of the screen, which will take you to the Welcome page. Then click on \"Sign up Now\" on the right side of the page.     You will be asked to enter the access code listed below, as well as some personal information. Please follow the directions to create your username and password.     Your access code is: [email or web address]  Expires: 2018 11:06 AM     Your access code will  in 90 days. If you need help or a new code, please call your [place] clinic or [phone].        Care EveryWhere ID     This is your Care EveryWhere ID. This could be used by other organizations to access your [place] medical records  [id number]        Your Vitals Were     Pulse Pulse Oximetry BMI (Body Mass Index)             74 97% 23.47 kg/m2          Blood Pressure from Last 3 " Encounters:   03/13/18 112/70   02/20/18 132/66   07/27/17 130/76    Weight from Last 3 Encounters:   03/13/18 128 lb (58.1 kg)   02/20/18 128 lb (58.1 kg)   07/27/17 126 lb 3.2 oz (57.2 kg)              Today, you had the following     No orders found for display       Primary Care Provider Office Phone # Fax #    Paul Knowles [phone] [address] [address]        Equal Access to Services     [facility]: Hadii aad ku hadasho Soomaali, waaxda luqadaha, qaybta kaalmada adeabdullahi, vlad baez . So [facility] [phone].    ATENCIÓN: Si habla español, tiene a jackson disposición servicios gratuitos de asistencia lingüística. [facility] [phone].    We comply with applicable federal civil rights laws and [place] laws. We do not discriminate on the basis of race, color, national origin, age, disability, sex, sexual orientation, or gender identity.            Thank you!     Thank you for choosing [facility]  for your care. Our goal is always to provide you with excellent care. Hearing back from our patients is one way we can continue to improve our services. Please take a few minutes to complete the written survey that you may receive in the mail after your visit with us. Thank you!             Your Updated Medication List - Protect others around you: Learn how to safely use, store and throw away your medicines at www.disposemymeds.org.          This list is accurate as of 3/13/18  1:39 PM.  Always use your most recent med list.                   Brand Name Dispense Instructions for use Diagnosis    calcium carbonate 1250 (500 CA) MG Chew      Takes one every other day-unsure of dose        DAILY VITAMINS PO      1 tablet daily        FIBER PO      Take 1 tsp. by mouth daily        metoprolol succinate 25 MG 24 hr tablet    TOPROL-XL     Take 25 mg by mouth 2 times daily        morphine 15 MG 12 hr tablet    MS CONTIN    60 tablet     Take 1 tablet (15 mg) by mouth every 12 hours 28 days or more between refills for controlled medications    Chronic pain syndrome       oxyCODONE-acetaminophen 5-325 MG per tablet    PERCOCET    90 tablet    Take 1 tablet by mouth 3 times daily as needed for moderate to severe pain 28 days or more between refills for controlled medications    Chronic pain syndrome       senna-docusate 8.6-50 MG per tablet    SENOKOT-S;PERICOLACE     Take 1 tablet by mouth 3 times daily as needed        sotalol 120 MG tablet    BETAPACE     Take 120 mg by mouth 2 times daily        triamcinolone 0.1 % cream    KENALOG    60 g    Apply topically 2 times daily as needed to affected area as directed.    Dermatitis       VITAMIN D (CHOLECALCIFEROL) PO      Take 1,000 Units by mouth 2 times daily        warfarin 2.5 MG tablet    COUMADIN    90 tablet    TAKE 1 TABLET BY MOUTH ONCE DAILY OR AS DIRECTED.    Long-term (current) use of anticoagulants, Atrial fibrillation, unspecified type (H)

## 2018-03-13 NOTE — PATIENT INSTRUCTIONS
We wish you continued good healing. If you have any questions or concerns, please do not hesitate to contact us at [phone]    Please remember to call and schedule a follow up appointment if one was recommended at your earliest convenience.   PODIATRY CLINIC HOURS  TELEPHONE NUMBER    Dr. Dago Fortune D.P.M [facility]    Clinics:  [facility]    [facility]   Tuesday 1PM-6PM  [facility] [place]  Wednesday 7AM-2PM  [facility]  Thursday 10AM-6PM  [place]  Friday 7AM-3PM  [place]  Specialty schedulers:   [phone] to make an appointment with any Specialty Provider.        Urgent Care locations:    [facility] Monday-Friday 5 pm - 9 pm. Saturday-Sunday 9 am -5pm    Monday-Friday 11 am - 9 pm Saturday 9 am - 5 pm     Monday-Sunday 12 noon-8PM [phone]     If you need a medication refill, please contact us you may need lab work and/or a follow up visit prior to your refill (i.e. Antifungal medications).    [employer] (secure e-mail communication and access to your chart) to send a message or to make an appointment.    If MRI needed please call Teodoro Armijo at [phone]        Weight management plan: Patient was referred to their PCP to discuss a diet and exercise plan.

## 2018-03-16 NOTE — PROGRESS NOTES
S:  Complains of foot pain.  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  Has had this for 9 years.  Describes it as a burning pain.  Aggrevated by activity and relieved by rest.  Slowly getting worse.    Positive history of post static dyskinesia.  Retired.  Has tried orthotics with have helped and Federico brace which did not help.  States he had x-rays and MRI at another facility and he has brought in results and films.   He is retired.  On blood thinner.      ROS:  A 10-point review of systems was performed and is positive for that noted in the HPI and as seen above.  All other areas are negative.          Allergies   Allergen Reactions     Lactose Nausea and Vomiting     Sulindac      Upset stomach     Tramadol      Itching     Valdecoxib Itching     Vicodin [Hydrocodone-Acetaminophen]      Itching     Vioxx Itching     Rofecoxib Itching and Rash     Sulfa Drugs Rash     Muscle aches       Current Outpatient Prescriptions   Medication Sig Dispense Refill     sotalol (BETAPACE) 120 MG tablet Take 120 mg by mouth 2 times daily        calcium carbonate 1250 (500 CA) MG CHEW Takes one every other day-unsure of dose       oxyCODONE-acetaminophen (PERCOCET) 5-325 MG per tablet Take 1 tablet by mouth 3 times daily as needed for moderate to severe pain 28 days or more between refills for controlled medications 90 tablet 0     morphine (MS CONTIN) 15 MG 12 hr tablet Take 1 tablet (15 mg) by mouth every 12 hours 28 days or more between refills for controlled medications 60 tablet 0     triamcinolone (KENALOG) 0.1 % cream Apply topically 2 times daily as needed to affected area as directed. 60 g 2     warfarin (COUMADIN) 2.5 MG tablet TAKE 1 TABLET BY MOUTH ONCE DAILY OR AS DIRECTED. 90 tablet 0     metoprolol (TOPROL-XL) 25 MG 24 hr tablet Take 25 mg by mouth 2 times daily       senna-docusate (SENOKOT-S;PERICOLACE) 8.6-50 MG per tablet Take 1 tablet by mouth 3 times daily as needed        FIBER PO Take 1 tsp. by mouth daily         DAILY VITAMINS PO 1 tablet daily       VITAMIN D, CHOLECALCIFEROL, PO Take 1,000 Units by mouth 2 times daily          Patient Active Problem List   Diagnosis     Family history of colon cancer     Osteopenia     HYPERLIPIDEMIA LDL GOAL <160     Vitreous condensations, od > os     Pseudophakia, Yag Caps, ou     Chronic pain syndrome     Advanced directives, counseling/discussion     BPH (benign prostatic hyperplasia)     Paroxysmal atrial fibrillation (H)     History of shingles     Chronic fatigue     Lumbar spinal stenosis     DJD (degenerative joint disease), lumbar and thoracic     Diverticulosis of large intestine     H/O cardiac pacemaker     Hypertension goal BP (blood pressure) < 140/90     Long-term (current) use of anticoagulants [Z79.01]     Chronic nonintractable headache, unspecified headache type     Prediabetes     Chronic pain     Elevated glucose     De Quervain's disease (tenosynovitis)     Primary osteoarthritis of first carpometacarpal joint of left hand     Cubital tunnel syndrome, right     Primary osteoarthritis of right knee     History of total knee arthroplasty, left     Chronic atrial fibrillation (H)     Pacemaker       Past Medical History:   Diagnosis Date     Atrial fibrillation (H)      BPH (benign prostatic hyperplasia)      Cataracts      Chest pain 12/2010    Normal Myocardial perfusion test with [facility]     Chronic pain syndrome      CTS (carpal tunnel syndrome)     Rt, Rx surgery     Diverticulosis      DJD (degenerative joint disease), lumbar and thoracic 9/9/2013     Family history of colon cancer     Incr. risk (brother)     Hydrocele     Rt     Hypertension goal BP (blood pressure) < 140/90 8/10/2015     Osteopenia 2005     Osteoporosis     Dr Regla DHALIWAL (peptic ulcer disease) 11/1998    h/o, +H. Pylori Rx     Varicocele     Lt side-large       Past Surgical History:   Procedure Laterality Date     APPENDECTOMY  12/2004     ARTHROSCOPY KNEE RT/LT  1/2006    Rt &  Lt, Dr Arriaga     BACK SURGERY  1978 / 2003    x 3 in 1978, fusions and one surgery in 2003     C APPENDECTOMY  12/31/2004     CARPAL TUNNEL RELEASE RT/LT  three    2 on left, one on right     CATARACT IOL, RT/LT       COLONOSCOPY  1995,2000,2005     INJECT EPIDURAL CERVICAL  5/24/2011    [facility]     INJECT EPIDURAL LUMBAR  11/9/2010    [facility]     INJECT EPIDURAL LUMBAR  1/4/2011    [facility]     INJECT EPIDURAL LUMBAR  4/27/2011    [facility]     LASER YAG CAPSULOTOMY  11/2016; 12/2016    left eye; right eye     PHACOEMULSIFICATION WITH STANDARD INTRAOCULAR LENS IMPLANT  6/2008; 8/2008    right eye; left eye     RECTAL SURGERY      fissurectomy and proctoplasty     RELEASE DEQUERVAINS WRIST Right 04/28/2017    DML     RELEASE TRIGGER FINGER      right hand     SHOULDER SURGERY      X four [ right x 2 and left x 2 ][[[[[     TUNA         Family History   Problem Relation Age of Onset     CANCER Mother      ovarian     Cancer - colorectal Brother        Social History   Substance Use Topics     Smoking status: Former Smoker     Years: 15.00     Types: Cigarettes     Quit date: 6/30/1975     Smokeless tobacco: Never Used     Alcohol use Yes      Comment: 1 per week         Exam:    Vitals: Pulse 74  Wt 128 lb (58.1 kg)  SpO2 97%  BMI 23.47 kg/m2  BMI: Body mass index is 23.47 kg/(m^2).  Height: Data Unavailable    Constitutional/ general:  Pt is in no apparent distress, appears well-nourished.  Cooperative with history and physical exam.  Seen with wife.       Psych:  The patient answered questions appropriately.  Normal affect.  Seems to have reasonable expectations, in terms of treatment.     Eyes:  Visual scanning/ tracking without deficit.     Ears:  Response to auditory stimuli is normal.  positive hearing aid devices.  Auricles in proper alignment.     Lymphatic:  Popliteal lymph nodes not enlarged.     Lungs:  Non labored breathing, non labored speech. No cough.  No audible  wheezing. Even, quiet breathing.       Vascular:  positive pedal pulses bilaterally for both the DP and PT arteries.  CFT < 3 sec.  positive ankle edema.  negative pedal hair growth.    Neuro:  Alert and oriented x 3. Coordinated gait.  Light touch sensation is intact to the L4, L5, S1 distributions. No obvious deficits.  No evidence of neurological-based weakness, spasticity, or contracture in the lower extremities.      Derm: Normal texture and turgor.  No erythema, ecchymosis, or cyanosis.      Musculoskeletal:    Lower extremity muscle strength is normal.  Patient is ambulatory without an assistive device or brace.   Pronated arch with weightbearing left>R.  No forefoot or rear foot deformities noted.  Decrease ROM all fore foot and rearfoot joints.  No equinus.    No pain with stressing any muscle compartments.  No erythema edema or ecchymosis or masses noted.  Pain over left sinus tarsi.  Some pain with ROM of STJ and ankle joint      Radiographic Exam:  MRI- arthritis of ankle and subtalor joint left foot.      A:  Left rearfoot arthritis.      P:  X-rays and mri personally revieved.  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    Rx for [place] AFO.  Discussed importance of wearing this in a good shoe at all times, even in the house to prevent rearfoot pain.  Discussed other treatment options if not resolving.  RETURN TO CLINIC PRN.    Dago Fortune DPM, [place]

## 2018-03-30 ENCOUNTER — ANTICOAGULATION THERAPY VISIT (OUTPATIENT)
Dept: NURSING | Facility: CLINIC | Age: 83
End: 2018-03-30
Payer: MEDICARE

## 2018-03-30 DIAGNOSIS — Z79.01 LONG-TERM (CURRENT) USE OF ANTICOAGULANTS: ICD-10-CM

## 2018-03-30 DIAGNOSIS — I48.0 PAROXYSMAL ATRIAL FIBRILLATION (H): ICD-10-CM

## 2018-03-30 LAB — INR POINT OF CARE: 2.7 (ref 0.86–1.14)

## 2018-03-30 PROCEDURE — 99207 ZZC NO CHARGE NURSE ONLY: CPT

## 2018-03-30 PROCEDURE — 36416 COLLJ CAPILLARY BLOOD SPEC: CPT

## 2018-03-30 PROCEDURE — 85610 PROTHROMBIN TIME: CPT | Mod: QW

## 2018-03-30 NOTE — PROGRESS NOTES
ANTICOAGULATION FOLLOW-UP CLINIC VISIT    Patient Name:  Alexandru Crews  Date:  3/30/2018  Contact Type:  Face to Face    SUBJECTIVE:     Patient Findings     Positives No Problem Findings           OBJECTIVE    INR Protime   Date Value Ref Range Status   03/30/2018 2.7 (A) 0.86 - 1.14 Final       ASSESSMENT / PLAN  INR assessment THER    Recheck INR In: 6 WEEKS    INR Location Clinic      Anticoagulation Summary as of 3/30/2018     INR goal 2.0-3.0   Today's INR 2.7   Maintenance plan 2.5 mg (2.5 mg x 1) every day   Full instructions 2.5 mg every day   Weekly total 17.5 mg   No change documented Rose Navarrete RN   Plan last modified Allie Awan RN (4/8/2016)   Next INR check 5/11/2018   Priority INR   Target end date Indefinite    Indications   Long-term (current) use of anticoagulants [Z79.01] [Z79.01]  Paroxysmal atrial fibrillation (H) [I48.0]         Anticoagulation Episode Summary     INR check location     Preferred lab     Send INR reminders to [facility]    Comments       Anticoagulation Care Providers     Provider Role Specialty Phone number    Paul Knowles MD Responsible Internal Medicine [phone]            See the Encounter Report to view Anticoagulation Flowsheet and Dosing Calendar (Go to Encounters tab in chart review, and find the Anticoagulation Therapy Visit)    Dosage adjustment made based on physician directed care plan.    Rose Navarrete, SHELLEY

## 2018-03-30 NOTE — MR AVS SNAPSHOT
Alexandru CARRERA Eleonora   3/30/2018 9:45 AM   Anticoagulation Therapy Visit    Description:  85 year old male   Provider:  KEYANNA ANTI COAG   Department:  [facility]           INR as of 3/30/2018     Today's INR 2.7      Anticoagulation Summary as of 3/30/2018     INR goal 2.0-3.0   Today's INR 2.7   Full instructions 2.5 mg every day   Next INR check 5/11/2018    Indications   Long-term (current) use of anticoagulants [Z79.01] [Z79.01]  Paroxysmal atrial fibrillation (H) [I48.0]         Your next Anticoagulation Clinic appointment(s)     May 11, 2018 10:30 AM CDT   Anticoagulation Visit with KEYANNA ANTI COAG   [facility] ([facility]    [address] [address] [phone]              Contact Numbers     [facility]  Please call [phone] to cancel and/or reschedule your appointment   Please call [phone] with any problems or questions regarding your therapy.        March 2018 Details    Sun Mon Tue Wed Thu Fri Sat         1               2               3                 4               5               6               7               8               9               10                 11               12               13               14               15               16               17                 18               19               20               21               22               23               24                 25               26               27               28               29               30      2.5 mg   See details      31      2.5 mg          Date Details   03/30 This INR check               How to take your warfarin dose     To take:  2.5 mg Take 1 of the 2.5 mg tablets.           April 2018 Details    Sun Mon Tue Wed Thu Fri Sat     1      2.5 mg         2      2.5 mg         3      2.5 mg         4      2.5 mg         5      2.5 mg         6      2.5 mg         7      2.5 mg           8      2.5 mg         9      2.5 mg         10      2.5 mg          11      2.5 mg         12      2.5 mg         13      2.5 mg         14      2.5 mg           15      2.5 mg         16      2.5 mg         17      2.5 mg         18      2.5 mg         19      2.5 mg         20      2.5 mg         21      2.5 mg           22      2.5 mg         23      2.5 mg         24      2.5 mg         25      2.5 mg         26      2.5 mg         27      2.5 mg         28      2.5 mg           29      2.5 mg         30      2.5 mg               Date Details   No additional details            How to take your warfarin dose     To take:  2.5 mg Take 1 of the 2.5 mg tablets.           May 2018 Details    Sun Mon Tue Wed Thu Fri Sat       1      2.5 mg         2      2.5 mg         3      2.5 mg         4      2.5 mg         5      2.5 mg           6      2.5 mg         7      2.5 mg         8      2.5 mg         9      2.5 mg         10      2.5 mg         11            12                 13               14               15               16               17               18               19                 20               21               22               23               24               25               26                 27               28               29               30               31                  Date Details   No additional details    Date of next INR:  5/11/2018         How to take your warfarin dose     To take:  2.5 mg Take 1 of the 2.5 mg tablets.

## 2018-05-02 ENCOUNTER — TRANSFERRED RECORDS (OUTPATIENT)
Dept: HEALTH INFORMATION MANAGEMENT | Facility: CLINIC | Age: 83
End: 2018-05-02

## 2018-05-17 ENCOUNTER — ANTICOAGULATION THERAPY VISIT (OUTPATIENT)
Dept: NURSING | Facility: CLINIC | Age: 83
End: 2018-05-17
Payer: MEDICARE

## 2018-05-17 DIAGNOSIS — Z79.01 LONG-TERM (CURRENT) USE OF ANTICOAGULANTS: ICD-10-CM

## 2018-05-17 DIAGNOSIS — I48.0 PAROXYSMAL ATRIAL FIBRILLATION (H): ICD-10-CM

## 2018-05-17 LAB — INR POINT OF CARE: 2.7 (ref 0.86–1.14)

## 2018-05-17 PROCEDURE — 99207 ZZC NO CHARGE NURSE ONLY: CPT

## 2018-05-17 PROCEDURE — 36416 COLLJ CAPILLARY BLOOD SPEC: CPT

## 2018-05-17 PROCEDURE — 85610 PROTHROMBIN TIME: CPT | Mod: QW

## 2018-05-17 NOTE — PROGRESS NOTES
ANTICOAGULATION FOLLOW-UP CLINIC VISIT    Patient Name:  Alexandru Crews  Date:  5/17/2018  Contact Type:  Face to Face    SUBJECTIVE:     Patient Findings     Positives No Problem Findings           OBJECTIVE    INR Protime   Date Value Ref Range Status   05/17/2018 2.7 (A) 0.86 - 1.14 Final       ASSESSMENT / PLAN  INR assessment THER    Recheck INR In: 6 WEEKS    INR Location Clinic      Anticoagulation Summary as of 5/17/2018     INR goal 2.0-3.0   Today's INR 2.7   Maintenance plan 2.5 mg (2.5 mg x 1) every day   Full instructions 2.5 mg every day   Weekly total 17.5 mg   No change documented Rose Navarrete RN   Plan last modified Allie Awan RN (4/8/2016)   Next INR check 6/29/2018   Priority INR   Target end date Indefinite    Indications   Long-term (current) use of anticoagulants [Z79.01] [Z79.01]  Paroxysmal atrial fibrillation (H) [I48.0]         Anticoagulation Episode Summary     INR check location     Preferred lab     Send INR reminders to [facility]    Comments       Anticoagulation Care Providers     Provider Role Specialty Phone number    Paul Knowles MD Responsible Internal Medicine [phone]            See the Encounter Report to view Anticoagulation Flowsheet and Dosing Calendar (Go to Encounters tab in chart review, and find the Anticoagulation Therapy Visit)    Dosage adjustment made based on physician directed care plan.    Rose Navarrete, SHELLEY

## 2018-05-17 NOTE — MR AVS SNAPSHOT
Alexandru Crews   5/17/2018 10:45 AM   Anticoagulation Therapy Visit    Description:  85 year old male   Provider:  KEYANNA ANTI COAG   Department:  [facility]           INR as of 5/17/2018     Today's INR 2.7      Anticoagulation Summary as of 5/17/2018     INR goal 2.0-3.0   Today's INR 2.7   Full instructions 2.5 mg every day   Next INR check 6/29/2018    Indications   Long-term (current) use of anticoagulants [Z79.01] [Z79.01]  Paroxysmal atrial fibrillation (H) [I48.0]         Your next Anticoagulation Clinic appointment(s)     May 17, 2018 10:45 AM CDT   Anticoagulation Visit with  ANTI COAG   [facility] ([place] [id number] [facility] [address] [phone]            Jun 29, 2018 10:15 AM CDT   Anticoagulation Visit with  ANTI COAG   [facility] ([place] [id number] [facility] [address] [phone]              Contact Numbers     [facility]  Please call [phone] to cancel and/or reschedule your appointment   Please call [phone] with any problems or questions regarding your therapy.        May 2018 Details    Sun Mon Tue Wed Thu Fri Sat       1               2               3               4               5                 6               7               8               9               10               11               12                 13               14               15               16               17      2.5 mg   See details      18      2.5 mg         19      2.5 mg           20      2.5 mg         21      2.5 mg         22      2.5 mg         23      2.5 mg         24      2.5 mg         25      2.5 mg         26      2.5 mg           27      2.5 mg         28      2.5 mg         29      2.5 mg         30      2.5 mg         31      2.5 mg            Date Details   05/17 This INR check               How to take your warfarin dose     To take:  2.5 mg Take 1 of the 2.5 mg tablets.            June 2018 Details    Sun Mon Tue Wed Thu Fri Sat          1      2.5 mg         2      2.5 mg           3      2.5 mg         4      2.5 mg         5      2.5 mg         6      2.5 mg         7      2.5 mg         8      2.5 mg         9      2.5 mg           10      2.5 mg         11      2.5 mg         12      2.5 mg         13      2.5 mg         14      2.5 mg         15      2.5 mg         16      2.5 mg           17      2.5 mg         18      2.5 mg         19      2.5 mg         20      2.5 mg         21      2.5 mg         22      2.5 mg         23      2.5 mg           24      2.5 mg         25      2.5 mg         26      2.5 mg         27      2.5 mg         28      2.5 mg         29            30                Date Details   No additional details    Date of next INR:  6/29/2018         How to take your warfarin dose     To take:  2.5 mg Take 1 of the 2.5 mg tablets.

## 2018-05-22 ENCOUNTER — OFFICE VISIT (OUTPATIENT)
Dept: FAMILY MEDICINE | Facility: CLINIC | Age: 83
End: 2018-05-22
Payer: MEDICARE

## 2018-05-22 VITALS
TEMPERATURE: 97.1 F | DIASTOLIC BLOOD PRESSURE: 62 MMHG | SYSTOLIC BLOOD PRESSURE: 126 MMHG | RESPIRATION RATE: 16 BRPM | BODY MASS INDEX: 22.01 KG/M2 | HEART RATE: 86 BPM | WEIGHT: 120 LBS | OXYGEN SATURATION: 99 %

## 2018-05-22 DIAGNOSIS — G89.4 CHRONIC PAIN SYNDROME: ICD-10-CM

## 2018-05-22 DIAGNOSIS — M85.80 OSTEOPENIA, UNSPECIFIED LOCATION: ICD-10-CM

## 2018-05-22 DIAGNOSIS — Z00.00 ROUTINE GENERAL MEDICAL EXAMINATION AT A HEALTH CARE FACILITY: Primary | ICD-10-CM

## 2018-05-22 DIAGNOSIS — R68.89 SENSATION OF FEELING COLD: ICD-10-CM

## 2018-05-22 DIAGNOSIS — R53.83 FATIGUE, UNSPECIFIED TYPE: ICD-10-CM

## 2018-05-22 DIAGNOSIS — I48.91 ATRIAL FIBRILLATION, UNSPECIFIED TYPE (H): ICD-10-CM

## 2018-05-22 DIAGNOSIS — Z79.01 LONG-TERM (CURRENT) USE OF ANTICOAGULANTS: ICD-10-CM

## 2018-05-22 LAB
BASOPHILS # BLD AUTO: 0 10E9/L (ref 0–0.2)
BASOPHILS NFR BLD AUTO: 0.4 %
DIFFERENTIAL METHOD BLD: ABNORMAL
EOSINOPHIL # BLD AUTO: 0.4 10E9/L (ref 0–0.7)
EOSINOPHIL NFR BLD AUTO: 6.8 %
ERYTHROCYTE [DISTWIDTH] IN BLOOD BY AUTOMATED COUNT: 15.4 % (ref 10–15)
HCT VFR BLD AUTO: 37.7 % (ref 40–53)
HGB BLD-MCNC: 12.3 G/DL (ref 13.3–17.7)
LYMPHOCYTES # BLD AUTO: 1.8 10E9/L (ref 0.8–5.3)
LYMPHOCYTES NFR BLD AUTO: 33.3 %
MCH RBC QN AUTO: 29.2 PG (ref 26.5–33)
MCHC RBC AUTO-ENTMCNC: 32.6 G/DL (ref 31.5–36.5)
MCV RBC AUTO: 90 FL (ref 78–100)
MONOCYTES # BLD AUTO: 0.5 10E9/L (ref 0–1.3)
MONOCYTES NFR BLD AUTO: 9 %
NEUTROPHILS # BLD AUTO: 2.7 10E9/L (ref 1.6–8.3)
NEUTROPHILS NFR BLD AUTO: 50.5 %
PLATELET # BLD AUTO: 104 10E9/L (ref 150–450)
RBC # BLD AUTO: 4.21 10E12/L (ref 4.4–5.9)
TSH SERPL DL<=0.005 MIU/L-ACNC: 1.37 MU/L (ref 0.4–4)
WBC # BLD AUTO: 5.3 10E9/L (ref 4–11)

## 2018-05-22 PROCEDURE — 85025 COMPLETE CBC W/AUTO DIFF WBC: CPT | Performed by: INTERNAL MEDICINE

## 2018-05-22 PROCEDURE — 99213 OFFICE O/P EST LOW 20 MIN: CPT | Mod: 25 | Performed by: INTERNAL MEDICINE

## 2018-05-22 PROCEDURE — G0439 PPPS, SUBSEQ VISIT: HCPCS | Performed by: INTERNAL MEDICINE

## 2018-05-22 PROCEDURE — 36415 COLL VENOUS BLD VENIPUNCTURE: CPT | Performed by: INTERNAL MEDICINE

## 2018-05-22 PROCEDURE — 84443 ASSAY THYROID STIM HORMONE: CPT | Performed by: INTERNAL MEDICINE

## 2018-05-22 RX ORDER — WARFARIN SODIUM 2.5 MG/1
TABLET ORAL
Qty: 90 TABLET | Refills: 1 | Status: SHIPPED | OUTPATIENT
Start: 2018-05-22 | End: 2019-01-20

## 2018-05-22 RX ORDER — OXYCODONE AND ACETAMINOPHEN 5; 325 MG/1; MG/1
1 TABLET ORAL 3 TIMES DAILY PRN
Qty: 90 TABLET | Refills: 0 | Status: SHIPPED | OUTPATIENT
Start: 2018-05-22 | End: 2018-05-22

## 2018-05-22 RX ORDER — MORPHINE SULFATE 15 MG/1
15 TABLET, FILM COATED, EXTENDED RELEASE ORAL EVERY 12 HOURS
Qty: 60 TABLET | Refills: 0 | Status: SHIPPED | OUTPATIENT
Start: 2018-05-22 | End: 2018-08-24

## 2018-05-22 RX ORDER — MORPHINE SULFATE 15 MG/1
15 TABLET, FILM COATED, EXTENDED RELEASE ORAL EVERY 12 HOURS
Qty: 60 TABLET | Refills: 0 | Status: SHIPPED | OUTPATIENT
Start: 2018-05-22 | End: 2018-05-22

## 2018-05-22 RX ORDER — OXYCODONE AND ACETAMINOPHEN 5; 325 MG/1; MG/1
1 TABLET ORAL 3 TIMES DAILY PRN
Qty: 90 TABLET | Refills: 0 | Status: SHIPPED | OUTPATIENT
Start: 2018-05-22 | End: 2018-08-24

## 2018-05-22 NOTE — MR AVS SNAPSHOT
After Visit Summary   5/22/2018    Alexandru Crews    MRN: [id number]           Patient Information     Date Of Birth          9/22/1932        Visit Information        Provider Department      5/22/2018 10:10 AM Paul Knowles MD [facility]        Today's Diagnoses     Routine general medical examination at a health care facility    -  1    Sensation of feeling cold        Fatigue, unspecified type        Chronic pain syndrome        Osteopenia, unspecified location        Long-term (current) use of anticoagulants [Z79.01]        Atrial fibrillation, unspecified type (H)          Care Instructions    Get 1200-1500mg of calcium daily.    Send us a copy of your advanced directive.      Preventive Health Recommendations:   Male Ages 65 and over    Yearly exam:             See your health care provider every year in order to  o   Review health changes.   o   Discuss preventive care.    o   Review your medicines if your doctor has prescribed any.    Talk with your health care provider about whether you should have a test to screen for prostate cancer (PSA).    Every 3 years, have a diabetes test (fasting glucose). If you are at risk for diabetes, you should have this test more often.    Every 5 years, have a cholesterol test. Have this test more often if you are at risk for high cholesterol or heart disease.     Every 10 years, have a colonoscopy. Or, have a yearly FIT test (stool test). These exams will check for colon cancer.    Talk to with your health care provider about screening for Abdominal Aortic Aneurysm if you have a family history of AAA or have a history of smoking.    Shots:     Get a flu shot each year.     Get a tetanus shot every 10 years.     Talk to your doctor about your pneumonia vaccines. There are now two you should receive - Pneumovax (PPSV 23) and Prevnar (PCV 13).     Talk to your doctor about a shingles vaccine.     Talk to your doctor about the hepatitis B  vaccine.  Nutrition:     Eat at least 5 servings of fruits and vegetables each day.     Eat whole-grain bread, whole-wheat pasta and brown rice instead of white grains and rice.     Talk to your provider about Calcium and Vitamin D.   Lifestyle    Exercise for at least 150 minutes a week (30 minutes a day, 5 days a week). This will help you control your weight and prevent disease.     Limit alcohol to one drink per day.     No smoking.     Wear sunscreen to prevent skin cancer.     See your dentist every six months for an exam and cleaning.     See your eye doctor every 1 to 2 years to screen for conditions such as glaucoma, macular degeneration, cataracts, etc         [facility]    If you have any questions regarding to your visit please contact your care team:     Team Pink:   Clinic Hours Telephone Number   Internal Medicine:  Dr. Nini Landaverde, NP       7am-7pm  Monday - Thursday   7am-5pm  Fridays  [phone]  (Appointment scheduling available 24/7)    Questions about your recent visit?  Team Line  [phone]   Urgent Care - [place] and [facility] - 11am-9pm Monday-Friday Saturday-Sunday- 9am-5pm   [place] - 5pm-9pm Monday-Friday Saturday-Sunday- 9am-5pm  [phone] - [place]  [phone] - [place]       What options do I have for a visit other than an office visit? We offer electronic visits (e-visits) and telephone visits, when medically appropriate.  Please check with your medical insurance to see if these types of visits are covered, as you will be responsible for any charges that are not paid by your insurance.      You can use BuildFax (secure electronic communication) to access to your chart, send your primary care provider a message, or make an appointment. Ask a team member how to get started.     For a price quote for your services, please call our Consumer Price Line at [phone] or our Imaging Cost estimation line  "at [phone] (for imaging tests).  Leonila BRINK CMA ([facility])            Follow-ups after your visit        Your next 10 appointments already scheduled     2018 10:15 AM CDT   Anticoagulation Visit with FZ ANTI COAG   [facility] [place] ([facility] [place])    [address] [place] [address]  [place] [address] [phone]              Who to contact     If you have questions or need follow up information about today's clinic visit or your schedule please contact [facility] directly at [phone].  Normal or non-critical lab and imaging results will be communicated to you by Streamixhart, letter or phone within 4 business days after the clinic has received the results. If you do not hear from us within 7 days, please contact the clinic through Streamixhart or phone. If you have a critical or abnormal lab result, we will notify you by phone as soon as possible.  Submit refill requests through Pharmly or call your pharmacy and they will forward the refill request to us. Please allow 3 business days for your refill to be completed.          Additional Information About Your Visit        MyChart Information     Pharmly lets you send messages to your doctor, view your test results, renew your prescriptions, schedule appointments and more. To sign up, go to [email or web address] . Click on \"Log in\" on the left side of the screen, which will take you to the Welcome page. Then click on \"Sign up Now\" on the right side of the page.     You will be asked to enter the access code listed below, as well as some personal information. Please follow the directions to create your username and password.     Your access code is: [email or web address]  Expires: 2018 11:06 AM     Your access code will  in 90 days. If you need help or a new code, please call your [facility] or [phone].        Care EveryWhere ID     This is your Care EveryWhere ID. This could be used by other organizations to access your " [facility] medical records  [id number]        Your Vitals Were     Pulse Temperature Respirations Pulse Oximetry BMI (Body Mass Index)       86 97.1  F (36.2  C) (Oral) 16 99% 22.01 kg/m2        Blood Pressure from Last 3 Encounters:   05/22/18 126/62   03/13/18 112/70   02/20/18 132/66    Weight from Last 3 Encounters:   05/22/18 120 lb (54.4 kg)   03/13/18 128 lb (58.1 kg)   02/20/18 128 lb (58.1 kg)              We Performed the Following     CBC with platelets differential     TSH with free T4 reflex          Today's Medication Changes          These changes are accurate as of 5/22/18 10:46 AM.  If you have any questions, ask your nurse or doctor.               Start taking these medicines.        Dose/Directions    morphine 15 MG 12 hr tablet   Commonly known as:  MS CONTIN   Used for:  Chronic pain syndrome   Started by:  Paul Knowles MD        Dose:  15 mg   Take 1 tablet (15 mg) by mouth every 12 hours 28 days or more between refills for controlled medications   Quantity:  60 tablet   Refills:  0       oxyCODONE-acetaminophen 5-325 MG per tablet   Commonly known as:  PERCOCET   Used for:  Chronic pain syndrome   Started by:  Paul Knowles MD        Dose:  1 tablet   Take 1 tablet by mouth 3 times daily as needed for moderate to severe pain 28 days or more between refills for controlled medications   Quantity:  90 tablet   Refills:  0         These medicines have changed or have updated prescriptions.        Dose/Directions    warfarin 2.5 MG tablet   Commonly known as:  COUMADIN   This may have changed:  See the new instructions.   Used for:  Long-term (current) use of anticoagulants, Atrial fibrillation, unspecified type (H)   Changed by:  Paul Knowles MD        TAKE ONE TABLET BY MOUTH ONE TIME DAILY OR AS DIRECTED   Quantity:  90 tablet   Refills:  1            Where to get your medicines      These medications were sent to [id number] - [place], [place] - [address]  [address], [place] [place] [address]     Phone:  [phone]     warfarin 2.5 MG tablet         Some of these will need a paper prescription and others can be bought over the counter.  Ask your nurse if you have questions.     Bring a paper prescription for each of these medications     morphine 15 MG 12 hr tablet    oxyCODONE-acetaminophen 5-325 MG per tablet               Information about OPIOIDS     PRESCRIPTION OPIOIDS: WHAT YOU NEED TO KNOW   You have a prescription for an opioid (narcotic) pain medicine. Opioids can cause addiction. If you have a history of chemical dependency of any type, you are at a higher risk of becoming addicted to opioids. Only take this medicine after all other options have been tried. Take it for as short a time and as few doses as possible.     Do not:    Drive. If you drive while taking these medicines, you could be arrested for driving under the influence (DUI).    Operate heavy machinery    Do any other dangerous activities while taking these medicines.     Drink any alcohol while taking these medicines.      Take with any other medicines that contain acetaminophen. Read all labels carefully. Look for the word  acetaminophen  or  Tylenol.  Ask your pharmacist if you have questions or are unsure.    Store your pills in a secure place, locked if possible. We will not replace any lost or stolen medicine. If you don t finish your medicine, please throw away (dispose) as directed by your pharmacist. The [place] Pollution Control Agency has more information about safe disposal: https://www.pca.[facility].[place].us/living-green/managing-unwanted-medications    All opioids tend to cause constipation. Drink plenty of water and eat foods that have a lot of fiber, such as fruits, vegetables, prune juice, apple juice and high-fiber cereal. Take a laxative (Miralax, milk of magnesia, Colace, Senna) if you don t move your bowels at least every other day.          Primary Care Provider Office Phone # Fax #     Paul Knowles [phone]       [address] [facility] [address]  [place] [address]        Equal Access to Services     KIRTI NIEVES : Hadii aad ku hadyesicahenrik Sher, candy isabellajacobyha, treva kamichael sanchez, vlad vernonin hayaan imanidivya prakash nestor gale. So [facility] [phone].    ATENCIÓN: Si habla español, tiene a jackson disposición servicios gratuitos de asistencia lingüística. Llame al [phone].    We comply with applicable federal civil rights laws and [place] laws. We do not discriminate on the basis of race, color, national origin, age, disability, sex, sexual orientation, or gender identity.            Thank you!     Thank you for choosing [facility]  for your care. Our goal is always to provide you with excellent care. Hearing back from our patients is one way we can continue to improve our services. Please take a few minutes to complete the written survey that you may receive in the mail after your visit with us. Thank you!             Your Updated Medication List - Protect others around you: Learn how to safely use, store and throw away your medicines at www.disposemymeds.org.          This list is accurate as of 5/22/18 10:46 AM.  Always use your most recent med list.                   Brand Name Dispense Instructions for use Diagnosis    calcium carbonate 1250 (500 Ca) MG Chew      Takes one every other day-unsure of dose        DAILY VITAMINS PO      1 tablet daily        FIBER PO      Take 1 tsp. by mouth daily        metoprolol succinate 25 MG 24 hr tablet    TOPROL-XL     Take 25 mg by mouth 2 times daily        morphine 15 MG 12 hr tablet    MS CONTIN    60 tablet    Take 1 tablet (15 mg) by mouth every 12 hours 28 days or more between refills for controlled medications    Chronic pain syndrome       oxyCODONE-acetaminophen 5-325 MG per tablet    PERCOCET    90 tablet    Take 1 tablet by mouth 3 times daily as needed for moderate to severe pain 28 days or more between refills for  controlled medications    Chronic pain syndrome       senna-docusate 8.6-50 MG per tablet    SENOKOT-S;PERICOLACE     Take 1 tablet by mouth 3 times daily as needed        sotalol 120 MG tablet    BETAPACE     Take 120 mg by mouth 2 times daily        triamcinolone 0.1 % cream    KENALOG    60 g    Apply topically 2 times daily as needed to affected area as directed.    Dermatitis       VITAMIN D (CHOLECALCIFEROL) PO      Take 1,000 Units by mouth 2 times daily        warfarin 2.5 MG tablet    COUMADIN    90 tablet    TAKE ONE TABLET BY MOUTH ONE TIME DAILY OR AS DIRECTED    Long-term (current) use of anticoagulants, Atrial fibrillation, unspecified type (H)

## 2018-05-22 NOTE — PATIENT INSTRUCTIONS
Get 1200-1500mg of calcium daily.    Send us a copy of your advanced directive.      Preventive Health Recommendations:   Male Ages 65 and over    Yearly exam:             See your health care provider every year in order to  o   Review health changes.   o   Discuss preventive care.    o   Review your medicines if your doctor has prescribed any.    Talk with your health care provider about whether you should have a test to screen for prostate cancer (PSA).    Every 3 years, have a diabetes test (fasting glucose). If you are at risk for diabetes, you should have this test more often.    Every 5 years, have a cholesterol test. Have this test more often if you are at risk for high cholesterol or heart disease.     Every 10 years, have a colonoscopy. Or, have a yearly FIT test (stool test). These exams will check for colon cancer.    Talk to with your health care provider about screening for Abdominal Aortic Aneurysm if you have a family history of AAA or have a history of smoking.    Shots:     Get a flu shot each year.     Get a tetanus shot every 10 years.     Talk to your doctor about your pneumonia vaccines. There are now two you should receive - Pneumovax (PPSV 23) and Prevnar (PCV 13).     Talk to your doctor about a shingles vaccine.     Talk to your doctor about the hepatitis B vaccine.  Nutrition:     Eat at least 5 servings of fruits and vegetables each day.     Eat whole-grain bread, whole-wheat pasta and brown rice instead of white grains and rice.     Talk to your provider about Calcium and Vitamin D.   Lifestyle    Exercise for at least 150 minutes a week (30 minutes a day, 5 days a week). This will help you control your weight and prevent disease.     Limit alcohol to one drink per day.     No smoking.     Wear sunscreen to prevent skin cancer.     See your dentist every six months for an exam and cleaning.     See your eye doctor every 1 to 2 years to screen for conditions such as glaucoma, macular  degeneration, cataracts, etc         [facility]    If you have any questions regarding to your visit please contact your care team:     Team Pink:   Clinic Hours Telephone Number   Internal Medicine:  Dr. Nini Landaverde, NP       7am-7pm  Monday - Thursday   7am-5pm  Fridays  [phone]  (Appointment scheduling available 24/7)    Questions about your recent visit?  Team Line  [phone]   Urgent Care - [place] and [facility] - 11am-9pm Monday-Friday Saturday-Sunday- 9am-5pm   [place] - 5pm-9pm Monday-Friday Saturday-Sunday- 9am-5pm  [phone] - [place]  [phone] - [place]       What options do I have for a visit other than an office visit? We offer electronic visits (e-visits) and telephone visits, when medically appropriate.  Please check with your medical insurance to see if these types of visits are covered, as you will be responsible for any charges that are not paid by your insurance.      You can use Nubee (secure electronic communication) to access to your chart, send your primary care provider a message, or make an appointment. Ask a team member how to get started.     For a price quote for your services, please call our Consumer Price Line at [phone] or our Imaging Cost estimation line at [phone] (for imaging tests).  Leonila BRINK CMA ([facility])

## 2018-05-22 NOTE — PROGRESS NOTES
SUBJECTIVE:   Alexandru Crwes is a 85 year old male who presents for Preventive Visit.      Are you in the first 12 months of your Medicare Part B coverage?  No    Healthy Habits:    Do you get at least three servings of calcium containing foods daily (dairy, green leafy vegetables, etc.)? no    Amount of exercise or daily activities, outside of work: 6-7 day(s) per week    Problems taking medications regularly No    Medication side effects: No    Have you had an eye exam in the past two years? yes    Do you see a dentist twice per year? no    Do you have sleep apnea, excessive snoring or daytime drowsiness?no      Ability to successfully perform activities of daily living: Yes, no assistance needed    Home safety:  none identified     Hearing impairment: No    Fall risk:  Fallen 2 or more times in the past year?: No  Any fall with injury in the past year?: No    COGNITIVE SCREEN  1) Repeat 3 items (Banana, Sunrise, Chair)    2) Clock draw: NORMAL  3) 3 item recall: Recalls 3 objects  Results: 3 items recalled: COGNITIVE IMPAIRMENT LESS LIKELY    Mini-CogTM Copyright S Callum. Licensed by the author for use in [facility] [employer]; reprinted with permission ([email or web address]@[email or web address]). All rights reserved.      Osteopenia - He used to take calcium supplements for osteopenia and wonders if he is getting enough daily calcium. Recommended 7440-5332 milligrams of daily calcium     Chronic pain - He has chronic body pains including his legs, feet, and back. He used to see Dr. Arriaga with [facility] and Dr. Malin. This is a chronic oral opioid therapy , needs his refills today     Foot pain - On his left foot, he says he has 4 toes with muscle atrophy and needs reconstruction. Denies neuropathy.     Appetite - He does not eat as much and sometimes does not care if he eats. However, he enjoys eating sweets.    Wt Readings from Last 5 Encounters:   05/22/18 54.4 kg (120 lb)   03/13/18 58.1 kg (128 lb)   02/20/18 58.1 kg (128 lb)    07/27/17 57.2 kg (126 lb 3.2 oz)   05/08/17 58.1 kg (128 lb)     Additional notes:  Still feels persistently cold and wonders if his thyroid is the cause  Reports having fatigue and headaches. See assessment and plan section  / orders section of this encounter . Laboratory workup ordered       Reviewed and updated as needed this visit by clinical staff  Tobacco  Meds  Med Hx  Surg Hx  Fam Hx  Soc Hx      Reviewed and updated as needed this visit by Provider        Social History   Substance Use Topics     Smoking status: Former Smoker     Years: 15.00     Types: Cigarettes     Quit date: 6/30/1975     Smokeless tobacco: Never Used     Alcohol use Yes      Comment: 1 per week     If you drink alcohol do you typically have >3 drinks per day or >7 drinks per week? No                        Today's PHQ-2 Score:   PHQ-2 ( 1999 [employer]) 7/27/2017 2/14/2017   Q1: Little interest or pleasure in doing things 0 0   Q2: Feeling down, depressed or hopeless 0 0   PHQ-2 Score 0 0     Do you feel safe in your environment - Yes  Do you have a Health Care Directive?: Yes: Patient states has Advance Directive and will bring in a copy to clinic.    Current providers sharing in care for this patient include:   Patient Care Team:  Paul Knowles MD as PCP - General (Internal Medicine)    The following health maintenance items are reviewed in Epic and correct as of today:  Health Maintenance   Topic Date Due     URINE DRUG SCREEN Q1 YR  09/22/1947     ADVANCE DIRECTIVE PLANNING Q5 YRS  06/24/2016     MEDICARE ANNUAL WELLNESS VISIT  04/25/2017     OP ANNUAL INR REFERRAL  04/25/2017     FALL RISK ASSESSMENT  07/27/2018     LIPID MONITORING Q1 YEAR  07/27/2018     BARBARA QUESTIONNAIRE 1 YEAR  07/27/2018     PHQ-9 Q1YR  07/27/2018     BMP Q6 MOS  08/20/2018     EYE EXAM Q1 YEAR  12/14/2018     HF ACTION PLAN Q3 YR  01/25/2019     ALT Q1 YR  02/20/2019     CBC Q1 YR  02/20/2019     TETANUS IMMUNIZATION (SYSTEM ASSIGNED)  07/09/2023      PNEUMOCOCCAL  Completed     INFLUENZA VACCINE  Completed     Labs reviewed in EPIC  Patient Active Problem List   Diagnosis     Family history of colon cancer     Osteopenia     HYPERLIPIDEMIA LDL GOAL <160     Vitreous condensations, od > os     Pseudophakia, Yag Caps, ou     Chronic pain syndrome     Advanced directives, counseling/discussion     BPH (benign prostatic hyperplasia)     Paroxysmal atrial fibrillation (H)     History of shingles     Chronic fatigue     Lumbar spinal stenosis     DJD (degenerative joint disease), lumbar and thoracic     Diverticulosis of large intestine     H/O cardiac pacemaker     Hypertension goal BP (blood pressure) < 140/90     Long-term (current) use of anticoagulants [Z79.01]     Chronic nonintractable headache, unspecified headache type     Prediabetes     Chronic pain     Elevated glucose     De Quervain's disease (tenosynovitis)     Primary osteoarthritis of first carpometacarpal joint of left hand     Cubital tunnel syndrome, right     Primary osteoarthritis of right knee     History of total knee arthroplasty, left     Chronic atrial fibrillation (H)     Pacemaker     Past Surgical History:   Procedure Laterality Date     APPENDECTOMY  12/2004     ARTHROSCOPY KNEE RT/LT  1/2006    Rt & Lt, Dr Arriaga     BACK SURGERY  1978 / 2003    x 3 in 1978, fusions and one surgery in 2003     C APPENDECTOMY  12/31/2004     CARPAL TUNNEL RELEASE RT/LT  three    2 on left, one on right     CATARACT IOL, RT/LT       COLONOSCOPY  1995,2000,2005     INJECT EPIDURAL CERVICAL  5/24/2011    [facility]     INJECT EPIDURAL LUMBAR  11/9/2010    [facility]     INJECT EPIDURAL LUMBAR  1/4/2011    [facility]     INJECT EPIDURAL LUMBAR  4/27/2011    [facility]     LASER YAG CAPSULOTOMY  11/2016; 12/2016    left eye; right eye     PHACOEMULSIFICATION WITH STANDARD INTRAOCULAR LENS IMPLANT  6/2008; 8/2008    right eye; left eye     RECTAL SURGERY      fissurectomy and  proctoplasty     RELEASE DEQUERVAINS WRIST Right 04/28/2017    DML     RELEASE TRIGGER FINGER      right hand     SHOULDER SURGERY      X four [ right x 2 and left x 2 ][[[[[     TUNA         Social History   Substance Use Topics     Smoking status: Former Smoker     Years: 15.00     Types: Cigarettes     Quit date: 6/30/1975     Smokeless tobacco: Never Used     Alcohol use Yes      Comment: 1 per week     Family History   Problem Relation Age of Onset     CANCER Mother      ovarian     Cancer - colorectal Brother          Current Outpatient Prescriptions   Medication Sig Dispense Refill     calcium carbonate 1250 (500 CA) MG CHEW Takes one every other day-unsure of dose       DAILY VITAMINS PO 1 tablet daily       FIBER PO Take 1 tsp. by mouth daily        metoprolol (TOPROL-XL) 25 MG 24 hr tablet Take 25 mg by mouth 2 times daily       morphine (MS CONTIN) 15 MG 12 hr tablet Take 1 tablet (15 mg) by mouth every 12 hours 28 days or more between refills for controlled medications 60 tablet 0     oxyCODONE-acetaminophen (PERCOCET) 5-325 MG per tablet Take 1 tablet by mouth 3 times daily as needed for moderate to severe pain 28 days or more between refills for controlled medications 90 tablet 0     senna-docusate (SENOKOT-S;PERICOLACE) 8.6-50 MG per tablet Take 1 tablet by mouth 3 times daily as needed        sotalol (BETAPACE) 120 MG tablet Take 120 mg by mouth 2 times daily        triamcinolone (KENALOG) 0.1 % cream Apply topically 2 times daily as needed to affected area as directed. 60 g 2     VITAMIN D, CHOLECALCIFEROL, PO Take 1,000 Units by mouth 2 times daily        warfarin (COUMADIN) 2.5 MG tablet TAKE ONE TABLET BY MOUTH ONE TIME DAILY OR AS DIRECTED 90 tablet 1       Pneumonia Vaccine: COMPLETED    ROS:  Constitutional, HEENT, cardiovascular, pulmonary, GI, , musculoskeletal, neuro, skin, endocrine and psych systems are negative, except as otherwise noted.    This document serves as a record of the  "services and decisions personally performed and made by Paul Knowles MD. It was created on his/her behalf by Mary Barclay, trained medical scribe. The creation of this document is based the provider's statements to the medical scribes.    Roland Barclay 10:28 AM, May 22, 2018  OBJECTIVE:   /62  Pulse 86  Temp 97.1  F (36.2  C) (Oral)  Resp 16  Wt 54.4 kg (120 lb)  SpO2 99%  BMI 22.01 kg/m2 Estimated body mass index is 22.01 kg/(m^2) as calculated from the following:    Height as of 2/20/18: 1.573 m (5' 1.92\").    Weight as of this encounter: 54.4 kg (120 lb).  EXAM:   GENERAL: healthy, alert and no distress, appears his stated age   EYES: Eyes grossly normal to inspection, PERRL and conjunctivae and sclerae normal  HENT: ear canals and TM's normal, nose and mouth without ulcers or lesions  NECK: no adenopathy, no asymmetry, masses, or scars and thyroid normal to palpation  RESP: lungs clear to auscultation - no rales, rhonchi or wheezes  CV: regular rate and rhythm, normal S1 S2, no S3 or S4, no murmur, click or rub, no peripheral edema and peripheral pulses strong  ABDOMEN: soft, nontender, no hepatosplenomegaly, no masses and bowel sounds normal  MS: no gross musculoskeletal defects noted, no edema  SKIN: no suspicious lesions or rashes  NEURO: Normal strength and tone, mentation intact and speech normal  PSYCH: mentation appears normal, affect normal/bright    ASSESSMENT / PLAN:   (Z00.00) Routine general medical examination at a health care facility  (primary encounter diagnosis)  Comment: routine screening issues   Plan: TSH with free T4 reflex, CBC with platelets         differential        Doubt new organic pathological process     (R68.89) Sensation of feeling cold  Comment: as above   Plan: TSH with free T4 reflex, CBC with platelets         differential            (R53.83) Fatigue, unspecified type  Comment: as above   Plan: TSH with free T4 reflex, CBC with platelets         differential       " "     (G89.4) Chronic pain syndrome  Comment: refills provided   Plan: morphine (MS CONTIN) 15 MG 12 hr tablet,         oxyCODONE-acetaminophen (PERCOCET) 5-325 MG per        tablet, DISCONTINUED: morphine (MS CONTIN) 15         MG 12 hr tablet, DISCONTINUED:         oxyCODONE-acetaminophen (PERCOCET) 5-325 MG per        tablet, DISCONTINUED: morphine (MS CONTIN) 15         MG 12 hr tablet, DISCONTINUED:         oxyCODONE-acetaminophen (PERCOCET) 5-325 MG per        tablet            (M85.80) Osteopenia, unspecified location  Comment: continue current plan of care    Plan: see most recent previous DEXA scan     (Z79.01) Long-term (current) use of anticoagulants [Z79.01]  Comment: ongoing treatment   Plan: warfarin (COUMADIN) 2.5 MG tablet        Sees [facility]     (I48.91) Atrial fibrillation, unspecified type (H)  Comment: ongoing treatment plan   Plan: warfarin (COUMADIN) 2.5 MG tablet              End of Life Planning:  Patient currently has an advanced directive: Yes.  Practitioner is supportive of decision.    COUNSELING:  Reviewed preventive health counseling, as reflected in patient instructions  Special attention given to:       Regular exercise       Healthy diet/nutrition      Estimated body mass index is 22.01 kg/(m^2) as calculated from the following:    Height as of 2/20/18: 1.573 m (5' 1.92\").    Weight as of this encounter: 54.4 kg (120 lb).  Weight management plan noted, stable and monitoring   reports that he quit smoking about 42 years ago. His smoking use included Cigarettes. He quit after 15.00 years of use. He has never used smokeless tobacco.    Appropriate preventive services were discussed with this patient, including applicable screening as appropriate for cardiovascular disease, diabetes, osteopenia/osteoporosis, and glaucoma.  As appropriate for age/gender, discussed screening for colorectal cancer, prostate cancer, breast cancer, and cervical cancer. " Checklist reviewing preventive services available has been given to the patient.    Reviewed patients plan of care and provided an AVS. The Complex Care Plan (for patients with higher acuity and needing more deliberate coordination of services) for Alexandru meets the Care Plan requirement. This Care Plan has been established and reviewed with the Patient.    Counseling Resources:  ATP IV Guidelines  Pooled Cohorts Equation Calculator  Breast Cancer Risk Calculator  FRAX Risk Assessment  ICSI Preventive Guidelines  Dietary Guidelines for Americans, 2010  [employer]'s MyPlate  ASA Prophylaxis  Lung CA Screening    Patient Instructions  Get 1200-1500mg of calcium daily.    Send us a copy of your advanced directive.      Preventive Health Recommendations:   Male Ages 65 and over    Yearly exam:             See your health care provider every year in order to  o   Review health changes.   o   Discuss preventive care.    o   Review your medicines if your doctor has prescribed any.    Talk with your health care provider about whether you should have a test to screen for prostate cancer (PSA).    Every 3 years, have a diabetes test (fasting glucose). If you are at risk for diabetes, you should have this test more often.    Every 5 years, have a cholesterol test. Have this test more often if you are at risk for high cholesterol or heart disease.     Every 10 years, have a colonoscopy. Or, have a yearly FIT test (stool test). These exams will check for colon cancer.    Talk to with your health care provider about screening for Abdominal Aortic Aneurysm if you have a family history of AAA or have a history of smoking.    Shots:     Get a flu shot each year.     Get a tetanus shot every 10 years.     Talk to your doctor about your pneumonia vaccines. There are now two you should receive - Pneumovax (PPSV 23) and Prevnar (PCV 13).     Talk to your doctor about a shingles vaccine.     Talk to your doctor about the hepatitis B  vaccine.  Nutrition:     Eat at least 5 servings of fruits and vegetables each day.     Eat whole-grain bread, whole-wheat pasta and brown rice instead of white grains and rice.     Talk to your provider about Calcium and Vitamin D.   Lifestyle    Exercise for at least 150 minutes a week (30 minutes a day, 5 days a week). This will help you control your weight and prevent disease.     Limit alcohol to one drink per day.     No smoking.     Wear sunscreen to prevent skin cancer.     See your dentist every six months for an exam and cleaning.     See your eye doctor every 1 to 2 years to screen for conditions such as glaucoma, macular degeneration, cataracts, etc       The information in this document, created by the medical scribe, Mary Barclay, for me, accurately reflects the services I personally performed and the decisions made by me. I have reviewed and approved this document for accuracy prior to leaving the patient care area.    Paul Knowles MD  [facility]

## 2018-05-23 ENCOUNTER — TELEPHONE (OUTPATIENT)
Dept: INTERNAL MEDICINE | Facility: CLINIC | Age: 83
End: 2018-05-23

## 2018-05-23 DIAGNOSIS — D64.9 LOW HEMOGLOBIN: Primary | ICD-10-CM

## 2018-05-23 DIAGNOSIS — E78.5 HYPERLIPIDEMIA LDL GOAL <160: ICD-10-CM

## 2018-05-23 DIAGNOSIS — D69.6 TEMPORARY LOW PLATELET COUNT (H): ICD-10-CM

## 2018-05-23 NOTE — TELEPHONE ENCOUNTER
TSH with free T4 reflex   Status:  Final result   Visible to patient:  No (Not Released)   Dx:  Routine general medical examination a... Order: [id number]       Notes Recorded by Sandy Dubois RN on 5/23/2018 at 12:08 PM  Message left on  to return call to RN hotline at [phone].   Sandy Dubois RN    TE started  ------    Notes Recorded by Paul Knowles MD on 5/22/2018 at 4:04 PM  The TSH ( thyroid test ) is within normal limits     The complete blood count shows some mild and nonspecific variations from normal range.    1. Platelet count a touch lower at 104  2. Hemoglobin a touch lower at 12.3    I don't know if this is significant or not. I think at this point we should just    A] recheck laboratory studies in one month , TSH ( thyroid test ), and complete blood count with differential along with ferritin, total iron binding capacity and serum iron   B] further follow up will depending on the test results at that time   C] Please let me know if patient has questions for me     Paul Knowles MD

## 2018-05-24 NOTE — TELEPHONE ENCOUNTER
1.   Lab Results   Component Value Date    A1C 5.3 02/20/2018    A1C 5.5 02/14/2017    A1C 5.8 07/14/2015    A1C 5.6 07/10/2014     Patient has ZERO evidence of diabetes mellitus . This is a total non-issue and nondiagnosis. He needs reasonably an hemoglobin a1c  [ diabetes test ] once a year because he had minute tiny and likely insignificant bump with his blood glucose . His next hemoglobin a1c  [ diabetes test ] would be February 2019    2.   Lab Results   Component Value Date    CHOL 194 07/27/2017     Lab Results   Component Value Date    HDL 66 07/27/2017     Lab Results   Component Value Date     07/27/2017     Lab Results   Component Value Date    TRIG 62 07/27/2017     Lab Results   Component Value Date    CHOLHDLRATIO 3.8 07/14/2015     His next fasting lipid panel can be in July. If he wants it sooner this is ok but unnecessary in my opinion. He can do it without fasting . We have to order it as non-fasting which is an option. If he can fast without any difficulty for this test then that's probably to be preferred so we can set it up either way.    Paul Knowles MD

## 2018-05-24 NOTE — TELEPHONE ENCOUNTER
Patient notified of Provider's message as written.  Patient verbalized understanding.    Patient asked if his cholesterol or DM labs were checked.  Advised him that he was not due for these tests and so they were not checked.  He asked it FLP can be added to the labs listed below to be checked in 1 month, order balta'd up    Please call him back with updates so that he knows whether or not he should come fasting    Arelis Ferrara RN

## 2018-05-24 NOTE — TELEPHONE ENCOUNTER
Patient notified of Provider's message as written.  Patient verbalized understanding.  He is unsure if insurance would pay for another FLP yet so he will hold off on this until it is due  He understands that the A1c is not due until Feb 2019    Arelis Ferrara RN

## 2018-06-26 DIAGNOSIS — D64.9 LOW HEMOGLOBIN: ICD-10-CM

## 2018-06-26 DIAGNOSIS — D69.6 TEMPORARY LOW PLATELET COUNT (H): ICD-10-CM

## 2018-06-26 LAB
BASOPHILS # BLD AUTO: 0 10E9/L (ref 0–0.2)
BASOPHILS NFR BLD AUTO: 0.3 %
DIFFERENTIAL METHOD BLD: ABNORMAL
EOSINOPHIL # BLD AUTO: 0.4 10E9/L (ref 0–0.7)
EOSINOPHIL NFR BLD AUTO: 6.4 %
ERYTHROCYTE [DISTWIDTH] IN BLOOD BY AUTOMATED COUNT: 15 % (ref 10–15)
FERRITIN SERPL-MCNC: 264 NG/ML (ref 26–388)
HCT VFR BLD AUTO: 38.6 % (ref 40–53)
HGB BLD-MCNC: 12.8 G/DL (ref 13.3–17.7)
IRON SATN MFR SERPL: 21 % (ref 15–46)
IRON SERPL-MCNC: 58 UG/DL (ref 35–180)
LYMPHOCYTES # BLD AUTO: 1.9 10E9/L (ref 0.8–5.3)
LYMPHOCYTES NFR BLD AUTO: 33.7 %
MCH RBC QN AUTO: 30.3 PG (ref 26.5–33)
MCHC RBC AUTO-ENTMCNC: 33.2 G/DL (ref 31.5–36.5)
MCV RBC AUTO: 92 FL (ref 78–100)
MONOCYTES # BLD AUTO: 0.5 10E9/L (ref 0–1.3)
MONOCYTES NFR BLD AUTO: 8 %
NEUTROPHILS # BLD AUTO: 3 10E9/L (ref 1.6–8.3)
NEUTROPHILS NFR BLD AUTO: 51.6 %
PLATELET # BLD AUTO: 128 10E9/L (ref 150–450)
RBC # BLD AUTO: 4.22 10E12/L (ref 4.4–5.9)
TIBC SERPL-MCNC: 274 UG/DL (ref 240–430)
TSH SERPL DL<=0.005 MIU/L-ACNC: 1.56 MU/L (ref 0.4–4)
WBC # BLD AUTO: 5.8 10E9/L (ref 4–11)

## 2018-06-26 PROCEDURE — 82728 ASSAY OF FERRITIN: CPT | Performed by: INTERNAL MEDICINE

## 2018-06-26 PROCEDURE — 36415 COLL VENOUS BLD VENIPUNCTURE: CPT | Performed by: INTERNAL MEDICINE

## 2018-06-26 PROCEDURE — 83550 IRON BINDING TEST: CPT | Performed by: INTERNAL MEDICINE

## 2018-06-26 PROCEDURE — 84443 ASSAY THYROID STIM HORMONE: CPT | Performed by: INTERNAL MEDICINE

## 2018-06-26 PROCEDURE — 83540 ASSAY OF IRON: CPT | Performed by: INTERNAL MEDICINE

## 2018-06-26 PROCEDURE — 85025 COMPLETE CBC W/AUTO DIFF WBC: CPT | Performed by: INTERNAL MEDICINE

## 2018-06-29 ENCOUNTER — ANTICOAGULATION THERAPY VISIT (OUTPATIENT)
Dept: NURSING | Facility: CLINIC | Age: 83
End: 2018-06-29
Payer: MEDICARE

## 2018-06-29 DIAGNOSIS — I48.0 PAROXYSMAL ATRIAL FIBRILLATION (H): ICD-10-CM

## 2018-06-29 DIAGNOSIS — Z79.01 LONG-TERM (CURRENT) USE OF ANTICOAGULANTS: ICD-10-CM

## 2018-06-29 LAB — INR POINT OF CARE: 2.8 (ref 0.86–1.14)

## 2018-06-29 PROCEDURE — 36416 COLLJ CAPILLARY BLOOD SPEC: CPT

## 2018-06-29 PROCEDURE — 99207 ZZC NO CHARGE NURSE ONLY: CPT

## 2018-06-29 PROCEDURE — 85610 PROTHROMBIN TIME: CPT | Mod: QW

## 2018-06-29 NOTE — MR AVS SNAPSHOT
Alexandru CARRERA Eleonora   6/29/2018 10:15 AM   Anticoagulation Therapy Visit    Description:  85 year old male   Provider:  [facility]AG   Department:  [facility]           INR as of 6/29/2018     Today's INR 2.8      Anticoagulation Summary as of 6/29/2018     INR goal 2.0-3.0   Today's INR 2.8   Full warfarin instructions 2.5 mg every day   Next INR check 8/10/2018    Indications   Long-term (current) use of anticoagulants [Z79.01] [Z79.01]  Paroxysmal atrial fibrillation (H) [I48.0]         Your next Anticoagulation Clinic appointment(s)     Aug 10, 2018 10:15 AM CDT   Anticoagulation Visit with KEYANNA ANTI JASON   [facility] ([facility]    [address] [facility] [address] [phone]              Contact Numbers     [facility]  Please call [phone] to cancel and/or reschedule your appointment   Please call [phone] with any problems or questions regarding your therapy.        June 2018 Details    Sun Mon Tue Wed Thu Fri Sat          1               2                 3               4               5               6               7               8               9                 10               11               12               13               14               15               16                 17               18               19               20               21               22               23                 24               25               26               27               28               29      2.5 mg   See details      30      2.5 mg          Date Details   06/29 This INR check               How to take your warfarin dose     To take:  2.5 mg Take 1 of the 2.5 mg tablets.           July 2018 Details    Sun Mon Tue Wed Thu Fri Sat     1      2.5 mg         2      2.5 mg         3      2.5 mg         4      2.5 mg         5      2.5 mg         6      2.5 mg         7      2.5 mg           8      2.5 mg         9      2.5 mg         10      2.5 mg          11      2.5 mg         12      2.5 mg         13      2.5 mg         14      2.5 mg           15      2.5 mg         16      2.5 mg         17      2.5 mg         18      2.5 mg         19      2.5 mg         20      2.5 mg         21      2.5 mg           22      2.5 mg         23      2.5 mg         24      2.5 mg         25      2.5 mg         26      2.5 mg         27      2.5 mg         28      2.5 mg           29      2.5 mg         30      2.5 mg         31      2.5 mg              Date Details   No additional details            How to take your warfarin dose     To take:  2.5 mg Take 1 of the 2.5 mg tablets.           August 2018 Details    Sun Mon Tue Wed Thu Fri Sat        1      2.5 mg         2      2.5 mg         3      2.5 mg         4      2.5 mg           5      2.5 mg         6      2.5 mg         7      2.5 mg         8      2.5 mg         9      2.5 mg         10            11                 12               13               14               15               16               17               18                 19               20               21               22               23               24               25                 26               27               28               29               30               31                 Date Details   No additional details    Date of next INR:  8/10/2018         How to take your warfarin dose     To take:  2.5 mg Take 1 of the 2.5 mg tablets.

## 2018-06-29 NOTE — PROGRESS NOTES
ANTICOAGULATION FOLLOW-UP CLINIC VISIT    Patient Name:  Alexandru Crews  Date:  6/29/2018  Contact Type:  Face to Face    SUBJECTIVE:     Patient Findings     Positives No Problem Findings           OBJECTIVE    INR Protime   Date Value Ref Range Status   06/29/2018 2.8 (A) 0.86 - 1.14 Final       ASSESSMENT / PLAN  INR assessment THER    Recheck INR In: 6 WEEKS    INR Location Clinic      Anticoagulation Summary as of 6/29/2018     INR goal 2.0-3.0   Today's INR 2.8   Warfarin maintenance plan 2.5 mg (2.5 mg x 1) every day   Full warfarin instructions 2.5 mg every day   Weekly warfarin total 17.5 mg   No change documented Rose Navarrete RN   Plan last modified Allie Awan RN (4/8/2016)   Next INR check 8/10/2018   Priority INR   Target end date Indefinite    Indications   Long-term (current) use of anticoagulants [Z79.01] [Z79.01]  Paroxysmal atrial fibrillation (H) [I48.0]         Anticoagulation Episode Summary     INR check location     Preferred lab     Send INR reminders to [facility]    Comments       Anticoagulation Care Providers     Provider Role Specialty Phone number    Paul Knowles MD Responsible Internal Medicine [phone]            See the Encounter Report to view Anticoagulation Flowsheet and Dosing Calendar (Go to Encounters tab in chart review, and find the Anticoagulation Therapy Visit)    Dosage adjustment made based on physician directed care plan.    Rose Navarrete RN

## 2018-08-10 ENCOUNTER — ANTICOAGULATION THERAPY VISIT (OUTPATIENT)
Dept: NURSING | Facility: CLINIC | Age: 83
End: 2018-08-10
Payer: MEDICARE

## 2018-08-10 DIAGNOSIS — I48.0 PAROXYSMAL ATRIAL FIBRILLATION (H): ICD-10-CM

## 2018-08-10 DIAGNOSIS — Z79.01 LONG-TERM (CURRENT) USE OF ANTICOAGULANTS: ICD-10-CM

## 2018-08-10 LAB — INR POINT OF CARE: 2.3 (ref 0.86–1.14)

## 2018-08-10 PROCEDURE — 36416 COLLJ CAPILLARY BLOOD SPEC: CPT

## 2018-08-10 PROCEDURE — 99207 ZZC NO CHARGE NURSE ONLY: CPT

## 2018-08-10 PROCEDURE — 85610 PROTHROMBIN TIME: CPT | Mod: QW

## 2018-08-10 NOTE — MR AVS SNAPSHOT
Alexandru G Eleonora   8/10/2018 10:15 AM   Anticoagulation Therapy Visit    Description:  85 year old male   Provider:  KEYANNA ANTI COAG   Department:  [facility]           INR as of 8/10/2018     Today's INR 2.3      Anticoagulation Summary as of 8/10/2018     INR goal 2.0-3.0   Today's INR 2.3   Full warfarin instructions 2.5 mg every day   Next INR check 8/21/2018    Indications   Long-term (current) use of anticoagulants [Z79.01] [Z79.01]  Paroxysmal atrial fibrillation (H) [I48.0]         Your next Anticoagulation Clinic appointment(s)     Aug 10, 2018 10:15 AM CDT   Anticoagulation Visit with  ANTI COAG   [facility] ([address] [id number] [phone]            Sep 21, 2018 10:15 AM CDT   Anticoagulation Visit with KEYANNA ANTI COAG   [facility] ([place] [id number] [facility] [id number] [phone]              Contact Numbers     [facility]  Please call [phone] to cancel and/or reschedule your appointment   Please call [phone] with any problems or questions regarding your therapy.        August 2018 Details    Sun Mon Tue Wed Thu Fri Sat        1               2               3               4                 5               6               7               8               9               10      2.5 mg   See details      11      2.5 mg           12      2.5 mg         13      2.5 mg         14      2.5 mg         15      2.5 mg         16      2.5 mg         17      2.5 mg         18      2.5 mg           19      2.5 mg         20      2.5 mg         21            22               23               24               25                 26               27               28               29               30               31                 Date Details   08/10 This INR check       Date of next INR:  8/21/2018         How to take your warfarin dose     To take:  2.5 mg Take 1 of the 2.5 mg  tablets.

## 2018-08-24 ENCOUNTER — TELEPHONE (OUTPATIENT)
Dept: INTERNAL MEDICINE | Facility: CLINIC | Age: 83
End: 2018-08-24

## 2018-08-24 DIAGNOSIS — G89.4 CHRONIC PAIN SYNDROME: ICD-10-CM

## 2018-08-24 RX ORDER — MORPHINE SULFATE 15 MG/1
15 TABLET, FILM COATED, EXTENDED RELEASE ORAL EVERY 12 HOURS
Qty: 60 TABLET | Refills: 0 | Status: SHIPPED | OUTPATIENT
Start: 2018-08-24 | End: 2018-08-24

## 2018-08-24 RX ORDER — MORPHINE SULFATE 15 MG/1
15 TABLET, FILM COATED, EXTENDED RELEASE ORAL EVERY 12 HOURS
Qty: 60 TABLET | Refills: 0 | Status: SHIPPED | OUTPATIENT
Start: 2018-08-24 | End: 2018-11-09

## 2018-08-24 RX ORDER — OXYCODONE AND ACETAMINOPHEN 5; 325 MG/1; MG/1
1 TABLET ORAL 3 TIMES DAILY PRN
Qty: 90 TABLET | Refills: 0 | Status: SHIPPED | OUTPATIENT
Start: 2018-08-24 | End: 2018-11-09

## 2018-08-24 RX ORDER — OXYCODONE AND ACETAMINOPHEN 5; 325 MG/1; MG/1
1 TABLET ORAL 3 TIMES DAILY PRN
Qty: 90 TABLET | Refills: 0 | Status: SHIPPED | OUTPATIENT
Start: 2018-08-24 | End: 2018-08-24

## 2018-08-24 NOTE — TELEPHONE ENCOUNTER
Left message for Alexandru to call the clinic back.     Paper prescriptions have been written.    Need to know if patient wants to  at the [facility]  (please get full name of person picking up and relationship to the patient if not the patient to ) or if he would like this take to the [facility] [phone] to get filled.    Awaiting return call. Radha Crandall,

## 2018-08-24 NOTE — TELEPHONE ENCOUNTER
Please call patient back when prescriptions are ready      Controlled Substance Refill Request for Morphine, Percocet   Problem List Complete:  Yes  Overview  Edited:  Arelis Ferrara RN  Today  Patient is followed by Paul Knowles MD for ongoing prescription of pain medication. All refills should be approved by this provider, or covering partner.     Medication(s): MS-Contin (Morphine Sulfate Controlled-Release) 15 milligrams 3 times a day , percocet 5/325 tablets 2 a day  Maximum quantity per month: 90/60  Clinic visit frequency required: Q 3 months      Controlled substance agreement:      Encounter-Level CSA - 4/25/16:                   Controlled Substance Agreement - Scan on 5/6/2016 1:14 PM : CONTROLLED SUBSTANCE AGREEMENT (below)          Pain Clinic evaluation in the past: Yes  Date/Location:      DIRE Total Score(s):  No flowsheet data found.     Last [facility] website verification: done on July, 2016, 01/25/17  [email or web address]     Controlled substance agreement: 8/24/18              checked in past 3 months?  Yes today     RX [place] reviewed:  reviewed- no concerns, Morphine last dispensed on 8/1/18, percocet last dispensed on 6/3/18    [place]:  [email or web address]      Arelis Ferrara RN

## 2018-08-27 NOTE — TELEPHONE ENCOUNTER
3 Percocet and 3 MS Contin prescriptions at  for . Emma Leach,         Alexandru picked up envelope 8-27-18

## 2018-08-27 NOTE — TELEPHONE ENCOUNTER
Patient is returning call patient will be coming in this morning to  his medication at the . Thank you

## 2018-09-13 DIAGNOSIS — M85.80 OSTEOPENIA, UNSPECIFIED LOCATION: Primary | ICD-10-CM

## 2018-09-13 DIAGNOSIS — M81.0 AGE-RELATED OSTEOPOROSIS WITHOUT CURRENT PATHOLOGICAL FRACTURE: ICD-10-CM

## 2018-09-13 NOTE — PROGRESS NOTES
Radiology cannot accept the order for Dexa signed by [facility] pharmacist.   Routing to Dr. Knowles to please sign Dexa scan order balta'd up  Arelis Ferrara RN

## 2018-09-17 ENCOUNTER — RADIANT APPOINTMENT (OUTPATIENT)
Dept: BONE DENSITY | Facility: CLINIC | Age: 83
End: 2018-09-17
Attending: INTERNAL MEDICINE
Payer: MEDICARE

## 2018-09-17 ENCOUNTER — TELEPHONE (OUTPATIENT)
Dept: INTERNAL MEDICINE | Facility: CLINIC | Age: 83
End: 2018-09-17

## 2018-09-17 DIAGNOSIS — M85.80 OSTEOPENIA, UNSPECIFIED LOCATION: ICD-10-CM

## 2018-09-17 DIAGNOSIS — M81.0 AGE-RELATED OSTEOPOROSIS WITHOUT CURRENT PATHOLOGICAL FRACTURE: ICD-10-CM

## 2018-09-17 DIAGNOSIS — M85.80 OSTEOPENIA: Primary | ICD-10-CM

## 2018-09-17 PROCEDURE — 77081 DXA BONE DENSITY APPENDICULR: CPT | Performed by: INTERNAL MEDICINE

## 2018-09-17 PROCEDURE — 77080 DXA BONE DENSITY AXIAL: CPT | Mod: 59 | Performed by: INTERNAL MEDICINE

## 2018-09-17 RX ORDER — ALENDRONATE SODIUM 70 MG/1
TABLET ORAL
Qty: 12 TABLET | Refills: 3 | Status: CANCELLED | OUTPATIENT
Start: 2018-09-17

## 2018-09-17 NOTE — LETTER
[address] [place] [address]    September 21, 2018    Alexandru Crews  [address] [place] [address] [address] [place] [address]          Dear Alexandru,  Results of DEXA scan suggest treatment with more then just calcium and vitamin D . I recommend Fosamax (alendronate) .  Alendronate is used to prevent and treat certain types of bone loss (osteoporosis) in adults. Osteoporosis causes bones to become thinner and break more easily. Your chance of developing osteoporosis increases as you age, after menopause, or if you are taking corticosteroid medications (such as prednisone) for a long time. We have assessed your bone health with the DEXA scan and it confirm the presence of thinning of the bones beyond what is considered a safe minor amount. Thus the indication for a bone building drug like fosomax .  This medication works by slowing bone loss. This effect helps maintain strong bones and reduce the risk of broken bones (fractures). Alendronate belongs to a class of drugs called bisphosphonates.  How to use Fosamax Oral    Read the Medication Guide provided by your pharmacist before you start taking alendronate and each time you get a refill. Follow the instructions very closely to make sure your body absorbs as much drug as possible and to reduce the risk of injury to your esophagus. If you have any questions, ask your doctor or pharmacist.    Take this medication by mouth once a week, after getting up for the day and before taking your first food, beverage, or other medication. Take it with a full glass (6-8 ounces or 180-240 milliliters) of plain water. Swallow the tablet whole. Do not chew or suck on it. Then stay fully upright (sitting, standing, or walking) for at least 30 minutes and do not lie down until after your first food of the day. Alendronate works only if taken on an empty stomach. Wait at least 30 minutes (preferably 1 to  2 hours) after taking the medication before you eat or drink anything other than plain water.    Do not take this medication at bedtime or before rising for the day. It may not be absorbed and you may have side effects.    Calcium or iron supplements, vitamins, antacids, coffee, tea, soda, mineral water, calcium-enriched juices, and food can decrease the absorption of alendronate. Do not take these for at least 30 minutes (preferably 1 to 2 hours) after taking alendronate.    Take this medication regularly to get the most benefit from it. Remember to use it at the same time each morning. Talk to me about the risks and benefits of long-term use of this medication if you have any additional questions or concerns.  Results for orders placed or performed in visit on 18   DX Wrist Heel Radius    Narrative    BONE DENSITOMETRY  [facility]  [address] [place] [id number]  2018       PATIENT: Alexandru Crews  CHART: [id number]   :  1932  AGE:  85 year old  SEX:  male   REFERRING PROVIDER:  Paul Knowles MD      PROCEDURE:  Bone density scanning was performed using DXA technology of   the lumbar spine and hip.  Scanning was performed on a Lunar Prodigy   scanner.  Reporting is completed in the form of a T-score.  The T-score   represents the standard deviation from peak bone mass based on a young   healthy adult.      REFERENCE T-SCORES:       Normal                -1.0 and greater                                 Osteopenia         Between -1.0 and -2.5                                             Osteoporosis     -2.5 and less                                       RISK FACTORS:  Male > 70 years old, Height loss of 5 inches, Parent   history of osteoporosis, follow up osteopenia     CURRENT TREATMENT:  Calcium, Vitamin D, previous Boniva, Prolia, stopped   ?      FINDINGS:               Left Femoral Neck T-score:  -1.8               Right Femoral Neck T-score:  " -2.1               Forearm (radius 33%) T-score:  -2.4  The spine is not acceptable for evaluation due to previous surgical   changes.                                Total Hip Mean BMD: 0.898              Previous:   0.898                         Forearm (radius 33%) BMD: 0.613    Previous: 0.666      Comparison is made to another DXA performed on the same Lunar Prodigy    machine on 7/6/2016.       IMPRESSION  Osteopenia.     There has been significant decrease in bone density of the forearm. There   has been no significant change in bone density of the hip(s).     Recommendations include ensuring adequate Calcium and Vitamin D.     The current NOF Guidelines recommend treatment for patients with prior hip   or vertebral fracture, T-score -2.5 or below, or 10 year risk of any major   osteoporotic fracture >20% or 10 year risk of hip fracture >3%, as   calculated using the FRAX calculator (www.shef.ac.uk/FRAX or you can   google \"FRAX\").       This patient's risks based on available information, with the use of FRAX,   are 7.8 % for major osteoporotic fracture and 3.3 % for hip fracture.   Based on these guidelines, treatment (in addition to calcium and vitamin   D) is recommended for this patient, after ruling out other causes of   osteoporosis.  This is meant as an aid to clinical decision making; one must still use   clinical judgement.        Follow up can be considered in 2 years.   ___________________  Trinidad Agustin M.D.  Electronically signed           If you have any questions or concerns, please me or my clinic team at [phone].      Sincerely,        Paul Knowles MD/bt            "

## 2018-09-17 NOTE — TELEPHONE ENCOUNTER
Left message on voicemail for patient to return call to  hotline at # [phone].  Prescription balta'd up as clarified by Dr. Knowles- Alendronate (fosamax) 70mg once a week      Notes Recorded by Paul Knowles MD on 9/17/2018 at 2:25 PM  Results of DEXA scan suggest treatment with more then just calcium and vitamin D . I recommend Fosamax (alendronate) .    Alendronate is used to prevent and treat certain types of bone loss (osteoporosis) in adults. Osteoporosis causes bones to become thinner and break more easily. Your chance of developing osteoporosis increases as you age, after menopause, or if you are taking corticosteroid medications (such as prednisone) for a long time. We have assessed your bone health with the DEXA scan and it confirm the presence of thinning of the bones beyond what is considered a safe minor amount. Thus the indication for a bone building drug like fosomax .    This medication works by slowing bone loss. This effect helps maintain strong bones and reduce the risk of broken bones (fractures). Alendronate belongs to a class of drugs called bisphosphonates.    How to use Fosamax Oral    Read the Medication Guide provided by your pharmacist before you start taking alendronate and each time you get a refill. Follow the instructions very closely to make sure your body absorbs as much drug as possible and to reduce the risk of injury to your esophagus. If you have any questions, ask your doctor or pharmacist.    Take this medication by mouth once a week, after getting up for the day and before taking your first food, beverage, or other medication. Take it with a full glass (6-8 ounces or 180-240 milliliters) of plain water. Swallow the tablet whole. Do not chew or suck on it. Then stay fully upright (sitting, standing, or walking) for at least 30 minutes and do not lie down until after your first food of the day. Alendronate works only if taken on an empty stomach. Wait at least 30 minutes  (preferably 1 to 2 hours) after taking the medication before you eat or drink anything other than plain water.    Do not take this medication at bedtime or before rising for the day. It may not be absorbed and you may have side effects.    Calcium or iron supplements, vitamins, antacids, coffee, tea, soda, mineral water, calcium-enriched juices, and food can decrease the absorption of alendronate. Do not take these for at least 30 minutes (preferably 1 to 2 hours) after taking alendronate.    Take this medication regularly to get the most benefit from it. Remember to use it at the same time each morning. Talk to me about the risks and benefits of long-term use of this medication if you have any additional questions or concerns.    Paul Ferrara, RN

## 2018-09-19 NOTE — TELEPHONE ENCOUNTER
Detailed message left on patient's VM with results and recommendations and instructions for Fosamax  Requested that he call back to discuss further and to verify pharmacy      Arelis Ferrara RN

## 2018-09-19 NOTE — TELEPHONE ENCOUNTER
Patient left message on RN hotline number requesting a call back with results. States it is ok to leave a detailed VM.    Rose Navarrete RN - BC

## 2018-09-20 NOTE — TELEPHONE ENCOUNTER
Denosumab injection (Prolia) is an entirely appropriate choice. Lets see if patient wants to get this set up. Can you help me with the protocol ?    Paul Knowles MD

## 2018-09-20 NOTE — TELEPHONE ENCOUNTER
-Patient would like note from provider below mailed to home address.     PCP- patient stated that he has a sensitive stomach and is nervous about taking Fosamax. He previously took Prolia but this was stopped after a previously normal Dexa scan result.  Patient wondering if he should take Prolia instead?  He is willing to try Fosamax but is wondering about the Prolia.     Josselyn Corona RN

## 2018-09-21 ENCOUNTER — TELEPHONE (OUTPATIENT)
Dept: PHARMACY | Facility: CLINIC | Age: 83
End: 2018-09-21

## 2018-09-21 ENCOUNTER — ANTICOAGULATION THERAPY VISIT (OUTPATIENT)
Dept: NURSING | Facility: CLINIC | Age: 83
End: 2018-09-21
Payer: MEDICARE

## 2018-09-21 DIAGNOSIS — M85.80 OSTEOPENIA, UNSPECIFIED LOCATION: Primary | ICD-10-CM

## 2018-09-21 DIAGNOSIS — I48.0 PAROXYSMAL ATRIAL FIBRILLATION (H): ICD-10-CM

## 2018-09-21 DIAGNOSIS — R73.09 ELEVATED GLUCOSE: ICD-10-CM

## 2018-09-21 DIAGNOSIS — Z79.01 LONG-TERM (CURRENT) USE OF ANTICOAGULANTS: ICD-10-CM

## 2018-09-21 DIAGNOSIS — E78.5 HYPERLIPIDEMIA LDL GOAL <160: ICD-10-CM

## 2018-09-21 LAB — INR POINT OF CARE: 2.8 (ref 0.86–1.14)

## 2018-09-21 PROCEDURE — 36416 COLLJ CAPILLARY BLOOD SPEC: CPT

## 2018-09-21 PROCEDURE — 85610 PROTHROMBIN TIME: CPT | Mod: QW

## 2018-09-21 PROCEDURE — 99207 ZZC NO CHARGE NURSE ONLY: CPT

## 2018-09-21 NOTE — PROGRESS NOTES
ANTICOAGULATION FOLLOW-UP CLINIC VISIT    Patient Name:  Alexandru Crews  Date:  9/21/2018  Contact Type:  Face to Face    SUBJECTIVE:     Patient Findings     Positives Other complaints (General pain)           OBJECTIVE    INR Protime   Date Value Ref Range Status   09/21/2018 2.8 (A) 0.86 - 1.14 Final       ASSESSMENT / PLAN  INR assessment THER    Recheck INR In: 6 WEEKS    INR Location Clinic      Anticoagulation Summary as of 9/21/2018     INR goal 2.0-3.0   Today's INR 2.8   Warfarin maintenance plan 2.5 mg (2.5 mg x 1) every day   Full warfarin instructions 2.5 mg every day   Weekly warfarin total 17.5 mg   No change documented Evelyne Bethea RN   Plan last modified Allie Awan RN (4/8/2016)   Next INR check 11/2/2018   Priority INR   Target end date Indefinite    Indications   Long-term (current) use of anticoagulants [Z79.01] [Z79.01]  Paroxysmal atrial fibrillation (H) [I48.0]         Anticoagulation Episode Summary     INR check location     Preferred lab     Send INR reminders to [facility]    Comments       Anticoagulation Care Providers     Provider Role Specialty Phone number    Paul Knowles MD Responsible Internal Medicine [phone]            See the Encounter Report to view Anticoagulation Flowsheet and Dosing Calendar (Go to Encounters tab in chart review, and find the Anticoagulation Therapy Visit)    Dosage adjustment made based on physician directed care plan.    Evelyne Bethea RN

## 2018-09-21 NOTE — MR AVS SNAPSHOT
Alexandru G Eleonora   9/21/2018 10:15 AM   Anticoagulation Therapy Visit    Description:  85 year old male   Provider:  KEYANNA ANTI COAG   Department:  [facility]           INR as of 9/21/2018     Today's INR 2.8      Anticoagulation Summary as of 9/21/2018     INR goal 2.0-3.0   Today's INR 2.8   Full warfarin instructions 2.5 mg every day   Next INR check 11/2/2018    Indications   Long-term (current) use of anticoagulants [Z79.01] [Z79.01]  Paroxysmal atrial fibrillation (H) [I48.0]         Your next Anticoagulation Clinic appointment(s)     Sep 21, 2018 10:15 AM CDT   Anticoagulation Visit with  ANTI COAG   [facility] ([address] [id number] [phone]            Nov 02, 2018 10:15 AM CDT   Anticoagulation Visit with KEYANNA ANTI COAG   [facility] ([place] [id number] [facility] [address] [phone]              Contact Numbers     [facility]  Please call [phone] to cancel and/or reschedule your appointment   Please call [phone] with any problems or questions regarding your therapy.        September 2018 Details    Sun Mon Tue Wed Thu Fri Sat           1                 2               3               4               5               6               7               8                 9               10               11               12               13               14               15                 16               17               18               19               20               21      2.5 mg   See details      22      2.5 mg           23      2.5 mg         24      2.5 mg         25      2.5 mg         26      2.5 mg         27      2.5 mg         28      2.5 mg         29      2.5 mg           30      2.5 mg                Date Details   09/21 This INR check               How to take your warfarin dose     To take:  2.5 mg Take 1 of the 2.5 mg tablets.           October 2018  Details    Sun Mon Tue Wed Thu Fri Sat      1      2.5 mg         2      2.5 mg         3      2.5 mg         4      2.5 mg         5      2.5 mg         6      2.5 mg           7      2.5 mg         8      2.5 mg         9      2.5 mg         10      2.5 mg         11      2.5 mg         12      2.5 mg         13      2.5 mg           14      2.5 mg         15      2.5 mg         16      2.5 mg         17      2.5 mg         18      2.5 mg         19      2.5 mg         20      2.5 mg           21      2.5 mg         22      2.5 mg         23      2.5 mg         24      2.5 mg         25      2.5 mg         26      2.5 mg         27      2.5 mg           28      2.5 mg         29      2.5 mg         30      2.5 mg         31      2.5 mg             Date Details   No additional details            How to take your warfarin dose     To take:  2.5 mg Take 1 of the 2.5 mg tablets.           November 2018 Details    Sun Mon Tue Wed Thu Fri Sat         1      2.5 mg         2            3                 4               5               6               7               8               9               10                 11               12               13               14               15               16               17                 18               19               20               21               22               23               24                 25               26               27               28               29               30                 Date Details   No additional details    Date of next INR:  11/2/2018         How to take your warfarin dose     To take:  2.5 mg Take 1 of the 2.5 mg tablets.

## 2018-09-21 NOTE — TELEPHONE ENCOUNTER
Patient notified that DR. Knowles is fine with Prolia and we will ask [facility] pharmacist to do insurance verification  Patient mentioned that he also has lactose intolerance which is another reason he cannot take fosamax as it contains lactose  He will wait to hear about Prolia coverage/cost    Arelis Ferrara RN

## 2018-09-21 NOTE — TELEPHONE ENCOUNTER
Please see separate telephone encounter.    Kelli Gonsalez PharmD, [facility]  Medication Therapy Management Provider  Pager: [phone]

## 2018-09-21 NOTE — TELEPHONE ENCOUNTER
Submitted Prolia insurance verification request via [employer].  Please leave encounter open; will await response.    Kelli Gonaslez PharmD, [facility]  Medication Therapy Management Provider  Pager: [phone]

## 2018-09-24 NOTE — TELEPHONE ENCOUNTER
Routing to Dr. Knowles to please place any lab orders before receiving the Prolia injection. Emma Leach,

## 2018-09-24 NOTE — TELEPHONE ENCOUNTER
Received Prolia insurance verification coverage back from [employer].  Prolia should be covered 100% - no co-insurance should be expected.  Routing to team coordinators to call to schedule RN injection.    Larry EsquivelD, [facility]  Medication Therapy Management Provider  Pager: [phone]

## 2018-09-25 NOTE — TELEPHONE ENCOUNTER
Patient called.  Lab appointment scheduled for Thursday, September 27th at 9:45 a.m.  Patient will be fasting as Dr. Knowles also ordered a lipid panel.  Emma Leach,

## 2018-09-27 DIAGNOSIS — E78.5 HYPERLIPIDEMIA LDL GOAL <160: ICD-10-CM

## 2018-09-27 DIAGNOSIS — R73.09 ELEVATED GLUCOSE: ICD-10-CM

## 2018-09-27 DIAGNOSIS — M85.80 OSTEOPENIA, UNSPECIFIED LOCATION: ICD-10-CM

## 2018-09-27 LAB
ANION GAP SERPL CALCULATED.3IONS-SCNC: 6 MMOL/L (ref 3–14)
BUN SERPL-MCNC: 26 MG/DL (ref 7–30)
CALCIUM SERPL-MCNC: 9.9 MG/DL (ref 8.5–10.1)
CHLORIDE SERPL-SCNC: 103 MMOL/L (ref 94–109)
CHOLEST SERPL-MCNC: 155 MG/DL
CO2 SERPL-SCNC: 31 MMOL/L (ref 20–32)
CREAT SERPL-MCNC: 0.79 MG/DL (ref 0.66–1.25)
DEPRECATED CALCIDIOL+CALCIFEROL SERPL-MC: 45 UG/L (ref 20–75)
GFR SERPL CREATININE-BSD FRML MDRD: >90 ML/MIN/1.7M2
GLUCOSE SERPL-MCNC: 98 MG/DL (ref 70–99)
HBA1C MFR BLD: 5.5 % (ref 0–5.6)
HDLC SERPL-MCNC: 52 MG/DL
LDLC SERPL CALC-MCNC: 92 MG/DL
MAGNESIUM SERPL-MCNC: 2.4 MG/DL (ref 1.6–2.3)
NONHDLC SERPL-MCNC: 103 MG/DL
PHOSPHATE SERPL-MCNC: 3.3 MG/DL (ref 2.5–4.5)
POTASSIUM SERPL-SCNC: 4.8 MMOL/L (ref 3.4–5.3)
SODIUM SERPL-SCNC: 140 MMOL/L (ref 133–144)
TRIGL SERPL-MCNC: 53 MG/DL

## 2018-09-27 PROCEDURE — 83735 ASSAY OF MAGNESIUM: CPT | Performed by: INTERNAL MEDICINE

## 2018-09-27 PROCEDURE — 80061 LIPID PANEL: CPT | Performed by: INTERNAL MEDICINE

## 2018-09-27 PROCEDURE — 82306 VITAMIN D 25 HYDROXY: CPT | Performed by: INTERNAL MEDICINE

## 2018-09-27 PROCEDURE — 83036 HEMOGLOBIN GLYCOSYLATED A1C: CPT | Performed by: INTERNAL MEDICINE

## 2018-09-27 PROCEDURE — 80048 BASIC METABOLIC PNL TOTAL CA: CPT | Performed by: INTERNAL MEDICINE

## 2018-09-27 PROCEDURE — 36415 COLL VENOUS BLD VENIPUNCTURE: CPT | Performed by: INTERNAL MEDICINE

## 2018-09-27 PROCEDURE — 84100 ASSAY OF PHOSPHORUS: CPT | Performed by: INTERNAL MEDICINE

## 2018-09-28 NOTE — TELEPHONE ENCOUNTER
LM for patient to return call to schedule Prolia injection.  Need to make sure patient has not had any dental or jaw work, extractions within the last month before scheduling. Once scheduled, will need to have LAURA Eller order medication.  Emma Leach,

## 2018-09-28 NOTE — TELEPHONE ENCOUNTER
Notes Recorded by Paul Knowles MD on 9/27/2018 at 8:07 PM  These laboratory studies are ok. Ok for Denosumab injection (Prolia)   Paul Knowles MD    Please call patient to schedule injection.   Thanks  Josselyn Corona RN

## 2018-09-28 NOTE — TELEPHONE ENCOUNTER
Next 5 appointments (look out 90 days)     Oct 10, 2018 10:30 AM CDT   Nurse Only with FZ RN   [facility] ([facility])    [address] [facility]  [place] [address]   [phone]               Radha Crandall,     Sent the email for Daphnie to order     No dental work done. Dental cleaning only.

## 2018-10-10 ENCOUNTER — ALLIED HEALTH/NURSE VISIT (OUTPATIENT)
Dept: NURSING | Facility: CLINIC | Age: 83
End: 2018-10-10
Payer: MEDICARE

## 2018-10-10 DIAGNOSIS — M81.0 AGE-RELATED OSTEOPOROSIS WITHOUT CURRENT PATHOLOGICAL FRACTURE: ICD-10-CM

## 2018-10-10 PROCEDURE — 96372 THER/PROPH/DIAG INJ SC/IM: CPT

## 2018-10-10 NOTE — PROGRESS NOTES
Indication: Prolia  (denosumab) is a prescription medicine used to treat osteoporosis in patients who:     Are at high risk for fracture, meaning patients who have had a fracture related to osteoporosis, or who have multiple risk factors for fracture     Cannot use another osteoporosis medicine or other osteoporosis medicines did not work well   The timeline for early/late injections would be 4 weeks early and any time after the 6 month ruperto. If a patient receives their injection late, then the subsequent injection would be 6 months from the date that they actually received the injection    1.  When was the last injection?  Several years ago  2.  Did they check with their insurance for this calendar year?  Yes  3.  Is there an order in the chart?  Yes  4.  Has the patient had dental work involving the bone in the past month or will have work in the next 6 months?  No  5.  Did you have the patient wait 15 minutes after the injection?  Yes  6.  Remember to use .injection under the medication notes    The following steps were completed to comply with the REMS program for Prolia:    Reviewed information in the Medication Guide and Patient Counseling Chart, including the serious risks of Prolia  and the symptoms of each risk.    Advised patient to seek prompt medical attention if they have signs or symptoms of any of the serious risks.  Provided each patient a copy of the Medication Guide and Patient Brochure.    Arelis Ferrara RN

## 2018-10-10 NOTE — MR AVS SNAPSHOT
After Visit Summary   10/10/2018    Alexandru Crews    MRN: [id number]           Patient Information     Date Of Birth          9/22/1932        Visit Information        Provider Department      10/10/2018 10:30 AM [facility] [place] [facility] Yang        Today's Diagnoses     Age-related osteoporosis without current pathological fracture          Care Instructions    You Received your Prolia injection today  Your next Injection is due in 6 months (around 4/10/2019)  If you plan on having any dental work done within the next 6 months, please let your dentist know that you are on this medication.  We will call in advance to have your next injection scheduled.   Make sure you do not have any dental work completed involving the jaw bone within 1 month prior to your scheduled injection            Follow-ups after your visit        Follow-up notes from your care team     Return in about 6 months (around 4/10/2019).      Your next 10 appointments already scheduled     Nov 02, 2018 10:15 AM CDT   Anticoagulation Visit with FZ ANTI COAG   [facility] [place] ([facility])    [address]  [place] [address]   [phone]            Dec 19, 2018 10:00 AM CST   New Visit with Obie Raines MD   [facility] [place] ([facility])    [address]  [place] [address]   [phone]              Who to contact     If you have questions or need follow up information about today's clinic visit or your schedule please contact [facility] [place] directly at [phone].  Normal or non-critical lab and imaging results will be communicated to you by MyChart, letter or phone within 4 business days after the clinic has received the results. If you do not hear from us within 7 days, please contact the clinic through MyChart or phone. If you have a critical or abnormal lab result, we will notify you by phone as soon as possible.  Submit refill requests  through [employer] or call your pharmacy and they will forward the refill request to us. Please allow 3 business days for your refill to be completed.          Additional Information About Your Visit        Care EveryWhere ID     This is your Care EveryWhere ID. This could be used by other organizations to access your [facility] medical records  [id number]         Blood Pressure from Last 3 Encounters:   05/22/18 126/62   03/13/18 112/70   02/20/18 132/66    Weight from Last 3 Encounters:   05/22/18 120 lb (54.4 kg)   03/13/18 128 lb (58.1 kg)   02/20/18 128 lb (58.1 kg)              We Performed the Following     C DENOSUMAB INJECTION, 1 MG        Primary Care Provider Office Phone # Fax #    Paul Knowles [phone] [address] [address]        Equal Access to Services     [facility]: Hadii aad ku hadasho Soomaali, waaxda luqadaha, qaybta kaalmada adeegyada, vlad londono hayjoselyn baez . So [facility] [phone].    ATENCIÓN: Si habla español, tiene a jackson disposición servicios gratuitos de asistencia lingüística. Dorinda al [phone].    We comply with applicable federal civil rights laws and [place] laws. We do not discriminate on the basis of race, color, national origin, age, disability, sex, sexual orientation, or gender identity.            Thank you!     Thank you for choosing [facility]  for your care. Our goal is always to provide you with excellent care. Hearing back from our patients is one way we can continue to improve our services. Please take a few minutes to complete the written survey that you may receive in the mail after your visit with us. Thank you!             Your Updated Medication List - Protect others around you: Learn how to safely use, store and throw away your medicines at www.disposemymeds.org.          This list is accurate as of 10/10/18 11:00 AM.  Always use your most recent med list.                   Brand Name  Dispense Instructions for use Diagnosis    calcium carbonate 1250 (500 Ca) MG Chew      Takes one every other day-unsure of dose        DAILY VITAMINS PO      1 tablet daily        denosumab 60 MG/ML Soln injection    PROLIA    1 mL    Inject 1 mL (60 mg) Subcutaneous once for 1 dose    Age-related osteoporosis without current pathological fracture       FIBER PO      Take 1 tsp. by mouth daily        metoprolol succinate 25 MG 24 hr tablet    TOPROL-XL     Take 25 mg by mouth 2 times daily        morphine 15 MG 12 hr tablet    MS CONTIN    60 tablet    Take 1 tablet (15 mg) by mouth every 12 hours 28 days or more between refills for controlled medications    Chronic pain syndrome       oxyCODONE-acetaminophen 5-325 MG per tablet    PERCOCET    90 tablet    Take 1 tablet by mouth 3 times daily as needed for moderate to severe pain 28 days or more between refills for controlled medications    Chronic pain syndrome       senna-docusate 8.6-50 MG per tablet    SENOKOT-S;PERICOLACE     Take 1 tablet by mouth 3 times daily as needed        sotalol 120 MG tablet    BETAPACE     Take 120 mg by mouth 2 times daily        triamcinolone 0.1 % cream    KENALOG    60 g    Apply topically 2 times daily as needed to affected area as directed.    Dermatitis       VITAMIN D (CHOLECALCIFEROL) PO      Take 1,000 Units by mouth 2 times daily        warfarin 2.5 MG tablet    COUMADIN    90 tablet    TAKE ONE TABLET BY MOUTH ONE TIME DAILY OR AS DIRECTED    Long-term (current) use of anticoagulants, Atrial fibrillation, unspecified type (H)

## 2018-10-10 NOTE — PATIENT INSTRUCTIONS
You Received your Prolia injection today  Your next Injection is due in 6 months (around 4/10/2019)  If you plan on having any dental work done within the next 6 months, please let your dentist know that you are on this medication.  We will call in advance to have your next injection scheduled.   Make sure you do not have any dental work completed involving the jaw bone within 1 month prior to your scheduled injection

## 2018-10-10 NOTE — TELEPHONE ENCOUNTER
Postponing this encounter-    Patient due for Prolia injection around 4/10/2019    1. Please ask provider to put in a new order for Prolia injection if one is not current, and any labs orders if needed (it is up to the provider's discretion on how often labs are checked once Prolia injections have been started)    2. Please contact patient to schedule the injection with RN    3. Ask MA to re-order med 1 week in advance    Make sure patient has not had any dental work including the jaw bone (i.e teeth extraction) 1 month prior to injection  If insurance has changed or it is a new calendar year, will need [facility] pharmacist, Kelli Gonsalez, to do insurance verification again prior to scheduling and ordering med    Arelis Ferrara RN

## 2018-10-11 ENCOUNTER — TELEPHONE (OUTPATIENT)
Dept: INTERNAL MEDICINE | Facility: CLINIC | Age: 83
End: 2018-10-11

## 2018-10-11 NOTE — TELEPHONE ENCOUNTER
"Lets review the summary and recommendations of the DEXA scan     IMPRESSION  Osteopenia.      There has been significant decrease in bone density of the forearm. There has been no significant change in bone density of the hip(s).      Recommendations include ensuring adequate Calcium and Vitamin D.      The current NOF Guidelines recommend treatment for patients with prior hip or vertebral fracture, T-score -2.5 or below, or 10 year risk of any major osteoporotic fracture >20% or 10 year risk of hip fracture >3%, as calculated using the FRAX calculator (www.shef.ac.uk/FRAX or you can google \"FRAX\").        This patient's risks based on available information, with the use of FRAX, are 7.8 % for major osteoporotic fracture and 3.3 % for hip fracture.   Based on these guidelines, treatment (in addition to calcium and vitamin D) is recommended for this patient, after ruling out other causes of osteoporosis.  This is meant as an aid to clinical decision making; one must still use clinical judgement.    So what does this all mean ?    He's correct that in an objective analysis he has osteopenia not osteoporosis , however , using the FRAX calculator (www.shef.ac.uk/FRAX or you can google \"FRAX\") he has an osteoporotic range of risk for fractures and the official overread of the film per Trinidad Agustin MD is that treatment beyond calcium and vitamin D is recommended     Paul Knowles MD    "

## 2018-10-11 NOTE — TELEPHONE ENCOUNTER
Pt called RN hotline. States he read on his bone density test that he has osteopenia, but on the diagnosis for prolia, it said age related osteoporosis without current pathological fracture.   Pt wants to clarify. Does he have osteoporosis and osteopenia? States he did not think his numbers on the bone density added up to osteoporosis. Please advise.    Evelyne Bethea RN  [facility]

## 2018-10-11 NOTE — TELEPHONE ENCOUNTER
Left message on voicemail for patient to return call to RN hotline at # [phone].    Please explain to patient that based on the images, it looks like osteopenia  However, based on these results AND his personal risk factors, his risk for bone fractures is in the osteoporosis risk range and he should continue with prolia injections to lower his risk of fracture    Arelis Ferrara RN

## 2018-10-12 NOTE — TELEPHONE ENCOUNTER
Spoke with pt. Gave him message below and he agrees with this plan.     Evelyne Bethea RN  [facility]

## 2018-10-22 ENCOUNTER — TRANSFERRED RECORDS (OUTPATIENT)
Dept: HEALTH INFORMATION MANAGEMENT | Facility: CLINIC | Age: 83
End: 2018-10-22

## 2018-10-26 ENCOUNTER — TELEPHONE (OUTPATIENT)
Dept: INTERNAL MEDICINE | Facility: CLINIC | Age: 83
End: 2018-10-26

## 2018-10-26 NOTE — TELEPHONE ENCOUNTER
The construction of orthotics is a craft, this is usually done by a specialist called a  . A  is a professional who has specialized training to modify footwear and employ supportive devices to address conditions which affect the feet and lower limbs such as ankle-foot orthoses braces, etc.    These professionals work out of places such as [facility] [facility] or [employer] [facility] [facility] , etc. There's many different places in the [facility]. I am uncertain where he should go.    These specialists won't fit patient with orthotics without a prescription for durable medical equipment or supplies and a firm diagnosis . We need more information to create a prescription for durable medical equipment or supplies and it's possible patient will need to see Dr. Dago Fortune, podiatrist if we can't root out all the necessary information , such as what was the diagnostic codes / diagnoses that was used to get him his last orthotics ! Sorry to be so technical but we need to move forwards with as much correctness as possible !    Paul Knowles MD

## 2018-10-26 NOTE — TELEPHONE ENCOUNTER
"Pt called RN hotline. States he would like to have orthotics made for his left foot. States he had the orthotics made years ago in \"the other building\" Who would Dr. Knowles recommend to get orthotics made? Ok to leave a detailed message at [phone]. Please advise.    Evelyne Bethea RN  [facility]    "

## 2018-10-29 NOTE — TELEPHONE ENCOUNTER
LMTC(left mess to call)  Ok to give pt an Rx for Orthotics. Need to clarify which location he received his last set. In [place]?     Edith Jack, CMA

## 2018-10-29 NOTE — TELEPHONE ENCOUNTER
Patient was seen by Dr. Fortune, Podiatrist on 3/13/18  Can Dr. Fortune please help or advise on this request?    Arelis Ferrara RN

## 2018-10-30 NOTE — TELEPHONE ENCOUNTER
Patient returned call. Would like a call back If a message needs to be left patient would like a direct number to call back to. Thank you

## 2018-11-02 ENCOUNTER — ANTICOAGULATION THERAPY VISIT (OUTPATIENT)
Dept: NURSING | Facility: CLINIC | Age: 83
End: 2018-11-02
Payer: MEDICARE

## 2018-11-02 ENCOUNTER — TELEPHONE (OUTPATIENT)
Dept: INTERNAL MEDICINE | Facility: CLINIC | Age: 83
End: 2018-11-02

## 2018-11-02 DIAGNOSIS — I48.20 CHRONIC ATRIAL FIBRILLATION (H): Primary | ICD-10-CM

## 2018-11-02 DIAGNOSIS — I48.0 PAROXYSMAL ATRIAL FIBRILLATION (H): ICD-10-CM

## 2018-11-02 LAB — INR POINT OF CARE: 1.7 (ref 0.86–1.14)

## 2018-11-02 PROCEDURE — 36416 COLLJ CAPILLARY BLOOD SPEC: CPT

## 2018-11-02 PROCEDURE — 85610 PROTHROMBIN TIME: CPT | Mod: QW

## 2018-11-02 PROCEDURE — 99207 ZZC NO CHARGE NURSE ONLY: CPT

## 2018-11-02 NOTE — TELEPHONE ENCOUNTER
INR clinic referral has . Please review and sign pended referral.    Has the patient previously taken warfarin? yes  If yes, for what indication? Chronic atrial fibrillation    Does the patient have any of the following indications for a higher range of 2.5-3.5:    Mitral position mechanical valve? no    Ruiz-Shiley, Ball and Cage or Monoleaflet valve (regardless of position) no    Other (if yes, please explain) no    Evelyne Bethea RN  [facility]

## 2018-11-02 NOTE — PROGRESS NOTES
ANTICOAGULATION FOLLOW-UP CLINIC VISIT    Patient Name:  Alexandru Crews  Date:  11/2/2018  Contact Type:  Face to Face    SUBJECTIVE:     Patient Findings     Positives Change in medications (Just started prolia injections), Missed doses (in the last couple of weeks)           OBJECTIVE    INR Protime   Date Value Ref Range Status   11/02/2018 1.7 (A) 0.86 - 1.14 Final       ASSESSMENT / PLAN  INR assessment SUB    Recheck INR In: 2 WEEKS    INR Location Clinic      Anticoagulation Summary as of 11/2/2018     INR goal 2.0-3.0   Today's INR 1.7!   Warfarin maintenance plan 2.5 mg (2.5 mg x 1) every day   Full warfarin instructions 11/2: 5 mg; Otherwise 2.5 mg every day   Weekly warfarin total 17.5 mg   Plan last modified Allie Awan RN (4/8/2016)   Next INR check 11/16/2018   Priority INR   Target end date Indefinite    Indications   Long-term (current) use of anticoagulants [Z79.01] [Z79.01]  Paroxysmal atrial fibrillation (H) [I48.0]         Anticoagulation Episode Summary     INR check location     Preferred lab     Send INR reminders to [facility]    Comments       Anticoagulation Care Providers     Provider Role Specialty Phone number    Paul Knowles MD Responsible Internal Medicine [phone]            See the Encounter Report to view Anticoagulation Flowsheet and Dosing Calendar (Go to Encounters tab in chart review, and find the Anticoagulation Therapy Visit)    Dosage adjustment made based on physician directed care plan.    Evelyne Bethea RN

## 2018-11-02 NOTE — MR AVS SNAPSHOT
Alexandru CARRERA Eleonora   11/2/2018 10:15 AM   Anticoagulation Therapy Visit    Description:  86 year old male   Provider:  KEYANNA ANTI COAG   Department:  [facility]           INR as of 11/2/2018     Today's INR 1.7!      Anticoagulation Summary as of 11/2/2018     INR goal 2.0-3.0   Today's INR 1.7!   Full warfarin instructions 11/2: 5 mg; Otherwise 2.5 mg every day   Next INR check 11/16/2018    Indications   Long-term (current) use of anticoagulants [Z79.01] [Z79.01]  Paroxysmal atrial fibrillation (H) [I48.0]         Instructions    Some signs and symptoms of clots include: pain or tenderness in arm or leg, swelling in arm or leg, changes in skin color, or area is warm to touch, shortness or breath, trouble breathing.  Numbness or weakness especially on 1 side of the body, sudden trouble speaking or swallowing, sudden trouble seeing, sudden confusion, dizzy spells or headache.  If you have these please call 911 or seek medical care immediately.           Your next Anticoagulation Clinic appointment(s)     Nov 16, 2018 10:15 AM CST   Anticoagulation Visit with KEYANNA ANTI COADEBI   [facility]    [address] [address] [phone]              Contact Numbers     [facility]  Please call [phone] to cancel and/or reschedule your appointment   Please call [phone] with any problems or questions regarding your therapy.        November 2018 Details    Sun Mon Tue Wed Thu Fri Sat         1               2      5 mg   See details      3      2.5 mg           4      2.5 mg         5      2.5 mg         6      2.5 mg         7      2.5 mg         8      2.5 mg         9      2.5 mg         10      2.5 mg           11      2.5 mg         12      2.5 mg         13      2.5 mg         14      2.5 mg         15      2.5 mg         16            17                 18               19               20               21               22               23                24                 25               26               27               28               29               30                 Date Details   11/02 This INR check       Date of next INR:  11/16/2018         How to take your warfarin dose     To take:  2.5 mg Take 1 of the 2.5 mg tablets.    To take:  5 mg Take 2 of the 2.5 mg tablets.

## 2018-11-05 NOTE — TELEPHONE ENCOUNTER
Alexandru has had the Rx for Orthotics given back in March 2018 by Dr. Fortune. He states his original question was for Dr. Knowles as to who else beside Dr. Fortune/Gerry  that can make him a set of Orthotics. He was thinking he could still come to the [place] office and get them made there. When he was told that was not an option any more that is when he wanted to know where else he could go. I did give him the name of of [facility], but he states now he will stay with in [place] and see Arron Ragland at the [place] location.     Alexandru claims his reasoning  to wanting to change locations for Orthotics is that he saw the MD's signature and thought it was horrible. ~      Edith Jack, CMA

## 2018-11-09 ENCOUNTER — OFFICE VISIT (OUTPATIENT)
Dept: FAMILY MEDICINE | Facility: CLINIC | Age: 83
End: 2018-11-09
Payer: MEDICARE

## 2018-11-09 ENCOUNTER — TELEPHONE (OUTPATIENT)
Dept: FAMILY MEDICINE | Facility: CLINIC | Age: 83
End: 2018-11-09

## 2018-11-09 VITALS
RESPIRATION RATE: 16 BRPM | HEART RATE: 75 BPM | OXYGEN SATURATION: 96 % | BODY MASS INDEX: 22.28 KG/M2 | WEIGHT: 121.5 LBS | DIASTOLIC BLOOD PRESSURE: 60 MMHG | SYSTOLIC BLOOD PRESSURE: 120 MMHG | TEMPERATURE: 96.7 F

## 2018-11-09 DIAGNOSIS — H90.3 ASYMMETRICAL SENSORINEURAL HEARING LOSS: Primary | ICD-10-CM

## 2018-11-09 DIAGNOSIS — G89.4 CHRONIC PAIN SYNDROME: ICD-10-CM

## 2018-11-09 PROCEDURE — 99214 OFFICE O/P EST MOD 30 MIN: CPT | Performed by: INTERNAL MEDICINE

## 2018-11-09 RX ORDER — OXYCODONE AND ACETAMINOPHEN 5; 325 MG/1; MG/1
1 TABLET ORAL 3 TIMES DAILY PRN
Qty: 90 TABLET | Refills: 0 | Status: SHIPPED | OUTPATIENT
Start: 2018-11-09 | End: 2018-12-26

## 2018-11-09 RX ORDER — MORPHINE SULFATE 15 MG/1
15 TABLET, FILM COATED, EXTENDED RELEASE ORAL EVERY 12 HOURS
Qty: 60 TABLET | Refills: 0 | Status: SHIPPED | OUTPATIENT
Start: 2018-11-09 | End: 2018-12-26

## 2018-11-09 ASSESSMENT — PATIENT HEALTH QUESTIONNAIRE - PHQ9
SUM OF ALL RESPONSES TO PHQ QUESTIONS 1-9: 21
5. POOR APPETITE OR OVEREATING: SEVERAL DAYS

## 2018-11-09 ASSESSMENT — ANXIETY QUESTIONNAIRES
IF YOU CHECKED OFF ANY PROBLEMS ON THIS QUESTIONNAIRE, HOW DIFFICULT HAVE THESE PROBLEMS MADE IT FOR YOU TO DO YOUR WORK, TAKE CARE OF THINGS AT HOME, OR GET ALONG WITH OTHER PEOPLE: SOMEWHAT DIFFICULT
6. BECOMING EASILY ANNOYED OR IRRITABLE: SEVERAL DAYS
3. WORRYING TOO MUCH ABOUT DIFFERENT THINGS: SEVERAL DAYS
GAD7 TOTAL SCORE: 5
5. BEING SO RESTLESS THAT IT IS HARD TO SIT STILL: NOT AT ALL
7. FEELING AFRAID AS IF SOMETHING AWFUL MIGHT HAPPEN: NOT AT ALL
1. FEELING NERVOUS, ANXIOUS, OR ON EDGE: SEVERAL DAYS
2. NOT BEING ABLE TO STOP OR CONTROL WORRYING: SEVERAL DAYS

## 2018-11-09 NOTE — PROGRESS NOTES
SUBJECTIVE:   Alexandru Crews is a 86 year old male who presents to clinic today for the following health issues:    Asymmetrical Sensorineural Hearing Loss Left  He noted last night for the first time that his left ear was giving him trouble and was unable to hear his daughter on the phone. He changed the phone and still was only able to make out a few words here and there. When he woke up in the middle of the night he noted his hearing was restricted to just the right side and when he covered his right ear he was unable to hear little if at all. Alexandru denies pressure in his ears. He's never needed hearing aids in the past and this is an acute issue for him. Yesterday he was working outside in the cold and came in several times to warm up. He plans to follow with an ENT physician.    Problem list and histories reviewed & adjusted, as indicated.  Additional history: as documented    Patient Active Problem List   Diagnosis     Family history of colon cancer     Osteopenia     HYPERLIPIDEMIA LDL GOAL <160     Vitreous condensations, od > os     Pseudophakia, Yag Caps, ou     Chronic pain syndrome     Advanced directives, counseling/discussion     BPH (benign prostatic hyperplasia)     Paroxysmal atrial fibrillation (H)     History of shingles     Chronic fatigue     Lumbar spinal stenosis     DJD (degenerative joint disease), lumbar and thoracic     Diverticulosis of large intestine     H/O cardiac pacemaker     Hypertension goal BP (blood pressure) < 140/90     Long-term (current) use of anticoagulants [Z79.01]     Chronic nonintractable headache, unspecified headache type     Prediabetes     Chronic pain     Elevated glucose     De Quervain's disease (tenosynovitis)     Primary osteoarthritis of first carpometacarpal joint of left hand     Cubital tunnel syndrome, right     Primary osteoarthritis of right knee     History of total knee arthroplasty, left     Chronic atrial fibrillation (H)     Pacemaker     Past  Surgical History:   Procedure Laterality Date     APPENDECTOMY  12/2004     ARTHROSCOPY KNEE RT/LT  1/2006    Rt & Lt, Dr Arriaga     BACK SURGERY  1978 / 2003    x 3 in 1978, fusions and one surgery in 2003     C APPENDECTOMY  12/31/2004     CARPAL TUNNEL RELEASE RT/LT  three    2 on left, one on right     CATARACT IOL, RT/LT       COLONOSCOPY  1995,2000,2005     INJECT EPIDURAL CERVICAL  5/24/2011    [facility]     INJECT EPIDURAL LUMBAR  11/9/2010    [facility]     INJECT EPIDURAL LUMBAR  1/4/2011    [facility]     INJECT EPIDURAL LUMBAR  4/27/2011    [facility]     LASER YAG CAPSULOTOMY  11/2016; 12/2016    left eye; right eye     PHACOEMULSIFICATION WITH STANDARD INTRAOCULAR LENS IMPLANT  6/2008; 8/2008    right eye; left eye     RECTAL SURGERY      fissurectomy and proctoplasty     RELEASE DEQUERVAINS WRIST Right 04/28/2017    DML     RELEASE TRIGGER FINGER      right hand     SHOULDER SURGERY      X four [ right x 2 and left x 2 ][[[[[     TUNA         Social History   Substance Use Topics     Smoking status: Former Smoker     Years: 15.00     Types: Cigarettes     Quit date: 6/30/1975     Smokeless tobacco: Never Used     Alcohol use Yes      Comment: 1 per week     Family History   Problem Relation Age of Onset     Cancer Mother      ovarian     Cancer - colorectal Brother          BP Readings from Last 3 Encounters:   11/09/18 120/60   05/22/18 126/62   03/13/18 112/70    Wt Readings from Last 3 Encounters:   11/09/18 55.1 kg (121 lb 8 oz)   05/22/18 54.4 kg (120 lb)   03/13/18 58.1 kg (128 lb)        Reviewed and updated as needed this visit by clinical staff       Reviewed and updated as needed this visit by Provider       ROS:  Constitutional, HEENT, cardiovascular, pulmonary, GI, , musculoskeletal, neuro, skin, endocrine and psych systems are negative, except as otherwise noted.    This document serves as a record of the services and decisions personally performed and made by  Paul Knowles MD. It was created on his behalf by Leandro Reyna, a trained medical scribe. The creation of this document is based on the provider's statements to the medical scribe.  Leandro Reyna 11:36 AM November 9, 2018    OBJECTIVE:     /60  Pulse 75  Temp 96.7  F (35.9  C) (Oral)  Resp 16  Wt 55.1 kg (121 lb 8 oz)  SpO2 96%  BMI 22.28 kg/m2  Body mass index is 22.28 kg/(m^2).  GENERAL: healthy, alert and no distress  EYES: Eyes grossly normal to inspection, PERRL and conjunctivae and sclerae normal  HENT: ear canals and TM's normal, he had no cerumen impaction and ear exam was completely normal  SKIN: no suspicious lesions or rashes to visible skin   NEURO: Normal strength and tone, mentation intact and speech normal  PSYCH: mentation appears normal, affect normal/bright    Diagnostic Test Results:  No results found for this or any previous visit (from the past 24 hour(s)).    ASSESSMENT/PLAN:     (H90.5) Asymmetrical sensorineural hearing loss  (primary encounter diagnosis)  Comment: this is a puzzling and sudden onset of asymmetrical hearing loss. I had hoped we would find wax and / or evidence of Eustachian Tube Dysfunction but there is absolutely neither  Plan: needs to see Ear, Nose, and Throat specialist MD     (G89.4) Chronic pain syndrome  Comment: refills provided   Plan: morphine (MS CONTIN) 15 MG 12 hr tablet,         oxyCODONE-acetaminophen (PERCOCET) 5-325 MG per        tablet              See Patient Instructions    The information in this document, created by the medical scribe for me, accurately reflects the services I personally performed and the decisions made by me. I have reviewed and approved this document for accuracy prior to leaving the patient care area.  November 9, 2018 11:30 AM    Paul Knowles MD  [facility]

## 2018-11-09 NOTE — TELEPHONE ENCOUNTER
"Alexandru Crews is a 86 year old male who calls with noticed last night he couldn't hear in L ear, feels like R ear is getting worse too. Denies ear pain, fever, stiff neck, swelling, drainage, dizziness, light-headedness, or other negative symptoms.     NURSING ASSESSMENT:  Description:  Loss of hearing in L ear, R ear feels like it's getting worse  Onset/duration:  11/8/2018  Precip. factors:  N/A  Associated symptoms:  Eyes feel \"sticky\", nasal drainage (states he always has that)  Improves/worsens symptoms:  N/A  Pain scale (0-10)   0/10  Last exam/Treatment:  5/22/18  Allergies:   Allergies   Allergen Reactions     Lactose Nausea and Vomiting     Sulindac      Upset stomach     Tramadol      Itching     Valdecoxib Itching     Vicodin [Hydrocodone-Acetaminophen]      Itching     Vioxx Itching     Rofecoxib Itching and Rash     Sulfa Drugs Rash     Muscle aches     MEDICATIONS:   Taking medication(s) as prescribed? Yes  Taking over the counter medication(s?) Yes  Any medication side effects? No significant side effects    Any barriers to taking medication(s) as prescribed?  No  Medication(s) improving/managing symptoms?  N/A  Medication reconciliation completed: No    NURSING PLAN: Huddle with provider, plan includes see patient today    RECOMMENDED DISPOSITION:  See in 4 hours, another person to drive - yes. Scheduled pt for appt at 11:10 am today  Will comply with recommendation: Yes  If further questions/concerns or if symptoms do not improve, worsen or new symptoms develop, call your PCP or [facility] as soon as possible.    Guideline used:  Telephone Triage Protocols for Nurses, Fifth Edition, Eufemia Escalera RN  "

## 2018-11-09 NOTE — PATIENT INSTRUCTIONS
[facility]    If you have any questions regarding to your visit please contact your care team:     Team Pink:   Clinic Hours Telephone Number   Internal Medicine:  Dr. Nini Landaverde NP 7am-7pm  Monday - Thursday   7am-5pm  Fridays  [phone]  (Appointment scheduling available 24/7)   Urgent Care - [place] and [facility] - 11am-9pm Monday-Friday Saturday-Sunday- 9am-5pm   [place] - 5pm-9pm Monday-Friday Saturday-Sunday- 9am-5pm  [phone] - [place]  [phone] - [place]       What options do I have for a visit other than an office visit? We offer electronic visits (e-visits) and telephone visits, when medically appropriate.  Please check with your medical insurance to see if these types of visits are covered, as you will be responsible for any charges that are not paid by your insurance.      You can use [employer] (secure electronic communication) to access to your chart, send your primary care provider a message, or make an appointment. Ask a team member how to get started.     For a price quote for your services, please call our Consumer Price Line at [phone] or our Imaging Cost estimation line at [phone] (for imaging tests).

## 2018-11-09 NOTE — TELEPHONE ENCOUNTER
Patient called and left  on RN Hotline.   Patient states he has lost hearing in L ear and has a problem with R ear and wants further direction.     Beatriz Escalera RN

## 2018-11-09 NOTE — MR AVS SNAPSHOT
After Visit Summary   11/9/2018    Alexandru Crews    MRN: [id number]           Patient Information     Date Of Birth          9/22/1932        Visit Information        Provider Department      11/9/2018 11:10 AM [facility]les MD [facility]        Today's Diagnoses     Asymmetrical sensorineural hearing loss    -  1    Chronic pain syndrome          Care Instructions    [facility]    If you have any questions regarding to your visit please contact your care team:     Team Pink:   Clinic Hours Telephone Number   Internal Medicine:  Dr. Nini Landaverde NP 7am-7pm  Monday - Thursday   7am-5pm  Fridays  [phone]  (Appointment scheduling available 24/7)   Urgent Care - [place] - 11am-9pm Monday-Friday Saturday-Sunday- 9am-5pm   [place] - 5pm-9pm Monday-Friday Saturday-Sunday- 9am-5pm  [phone] - Eloisa Schwarz  [phone] - [place]       What options do I have for a visit other than an office visit? We offer electronic visits (e-visits) and telephone visits, when medically appropriate.  Please check with your medical insurance to see if these types of visits are covered, as you will be responsible for any charges that are not paid by your insurance.      You can use [email or web address] (secure electronic communication) to access to your chart, send your primary care provider a message, or make an appointment. Ask a team member how to get started.     For a price quote for your services, please call our Consumer Price Line at [phone] or our Imaging Cost estimation line at [phone] (for imaging tests).            Follow-ups after your visit        Your next 10 appointments already scheduled     Nov 16, 2018 10:15 AM CST   Anticoagulation Visit with JAYESH ANTI COAG   [facility] [place] ([facility] [place])    [address]  [place] [address]   [phone]            Dec 19, 2018 10:00  AM CST   New Visit with Obie Raines MD   [facility] [place] ([facility])    [address]  [place] [address] [phone]              Who to contact     If you have questions or need follow up information about today's clinic visit or your schedule please contact [facility] directly at [phone].  Normal or non-critical lab and imaging results will be communicated to you by MyChart, letter or phone within 4 business days after the clinic has received the results. If you do not hear from us within 7 days, please contact the clinic through MyChart or phone. If you have a critical or abnormal lab result, we will notify you by phone as soon as possible.  Submit refill requests through [employer] or call your pharmacy and they will forward the refill request to us. Please allow 3 business days for your refill to be completed.          Additional Information About Your Visit        Care EveryWhere ID     This is your Care EveryWhere ID. This could be used by other organizations to access your [place] medical records  [id number]        Your Vitals Were     Pulse Temperature Respirations Pulse Oximetry BMI (Body Mass Index)       75 96.7  F (35.9  C) (Oral) 16 96% 22.28 kg/m2        Blood Pressure from Last 3 Encounters:   11/09/18 120/60   05/22/18 126/62   03/13/18 112/70    Weight from Last 3 Encounters:   11/09/18 121 lb 8 oz (55.1 kg)   05/22/18 120 lb (54.4 kg)   03/13/18 128 lb (58.1 kg)              Today, you had the following     No orders found for display         Where to get your medicines      Some of these will need a paper prescription and others can be bought over the counter.  Ask your nurse if you have questions.     Bring a paper prescription for each of these medications     morphine 15 MG 12 hr tablet    oxyCODONE-acetaminophen 5-325 MG per tablet         Information about OPIOIDS     PRESCRIPTION OPIOIDS: WHAT YOU NEED TO KNOW   We gave you an  opioid (narcotic) pain medicine. It is important to manage your pain, but opioids are not always the best choice. You should first try all the other options your care team gave you. Take this medicine for as short a time (and as few doses) as possible.    Some activities can increase your pain, such as bandage changes or therapy sessions. It may help to take your pain medicine 30 to 60 minutes before these activities. Reduce your stress by getting enough sleep, working on hobbies you enjoy and practicing relaxation or meditation. Talk to your care team about ways to manage your pain beyond prescription opioids.    These medicines have risks:    DO NOT drive when on new or higher doses of pain medicine. These medicines can affect your alertness and reaction times, and you could be arrested for driving under the influence (DUI). If you need to use opioids long-term, talk to your care team about driving.    DO NOT operate heavy machinery    DO NOT do any other dangerous activities while taking these medicines.    DO NOT drink any alcohol while taking these medicines.     If the opioid prescribed includes acetaminophen, DO NOT take with any other medicines that contain acetaminophen. Read all labels carefully. Look for the word  acetaminophen  or  Tylenol.  Ask your pharmacist if you have questions or are unsure.    You can get addicted to pain medicines, especially if you have a history of addiction (chemical, alcohol or substance dependence). Talk to your care team about ways to reduce this risk.    All opioids tend to cause constipation. Drink plenty of water and eat foods that have a lot of fiber, such as fruits, vegetables, prune juice, apple juice and high-fiber cereal. Take a laxative (Miralax, milk of magnesia, Colace, Senna) if you don t move your bowels at least every other day. Other side effects include upset stomach, sleepiness, dizziness, throwing up, tolerance (needing more of the medicine to have the  same effect), physical dependence and slowed breathing.    Store your pills in a secure place, locked if possible. We will not replace any lost or stolen medicine. If you don t finish your medicine, please throw away (dispose) as directed by your pharmacist. The [place] Pollution Control Agency has more information about safe disposal: https://www.pca.[email or web address].[place].us/living-green/managing-unwanted-medications         Primary Care Provider Office Phone # Fax #    Paul Knowles [phone] [address] [address]        Equal Access to Services     [facility]: Hadii aad ku hadasho Soomaali, waaxda luqadaha, qaybta kaalmada adeegyada, vlad baez . So [facility] [phone].    ATENCIÓN: Si habla español, tiene a jackson disposición servicios gratuitos de asistencia lingüística. Llame al [phone].    We comply with applicable federal civil rights laws and [place] laws. We do not discriminate on the basis of race, color, national origin, age, disability, sex, sexual orientation, or gender identity.            Thank you!     Thank you for choosing [facility]  for your care. Our goal is always to provide you with excellent care. Hearing back from our patients is one way we can continue to improve our services. Please take a few minutes to complete the written survey that you may receive in the mail after your visit with us. Thank you!             Your Updated Medication List - Protect others around you: Learn how to safely use, store and throw away your medicines at www.disposemymeds.org.          This list is accurate as of 11/9/18 11:42 AM.  Always use your most recent med list.                   Brand Name Dispense Instructions for use Diagnosis    calcium carbonate 1250 (500 Ca) MG Chew      Takes one every other day-unsure of dose        DAILY VITAMINS PO      1 tablet daily        denosumab 60 MG/ML Soln injection    PROLIA    1 mL     Inject 1 mL (60 mg) Subcutaneous once for 1 dose    Age-related osteoporosis without current pathological fracture       FIBER PO      Take 1 tsp. by mouth daily        metoprolol succinate 25 MG 24 hr tablet    TOPROL-XL     Take 25 mg by mouth 2 times daily        morphine 15 MG 12 hr tablet    MS CONTIN    60 tablet    Take 1 tablet (15 mg) by mouth every 12 hours 28 days or more between refills for controlled medications    Chronic pain syndrome       oxyCODONE-acetaminophen 5-325 MG per tablet    PERCOCET    90 tablet    Take 1 tablet by mouth 3 times daily as needed for moderate to severe pain 28 days or more between refills for controlled medications    Chronic pain syndrome       senna-docusate 8.6-50 MG per tablet    SENOKOT-S;PERICOLACE     Take 1 tablet by mouth 3 times daily as needed        sotalol 120 MG tablet    BETAPACE     Take 120 mg by mouth 2 times daily        triamcinolone 0.1 % cream    KENALOG    60 g    Apply topically 2 times daily as needed to affected area as directed.    Dermatitis       VITAMIN D (CHOLECALCIFEROL) PO      Take 1,000 Units by mouth 2 times daily        warfarin 2.5 MG tablet    COUMADIN    90 tablet    TAKE ONE TABLET BY MOUTH ONE TIME DAILY OR AS DIRECTED    Long-term (current) use of anticoagulants, Atrial fibrillation, unspecified type (H)

## 2018-11-10 ASSESSMENT — ANXIETY QUESTIONNAIRES: GAD7 TOTAL SCORE: 5

## 2018-11-16 ENCOUNTER — ANTICOAGULATION THERAPY VISIT (OUTPATIENT)
Dept: NURSING | Facility: CLINIC | Age: 83
End: 2018-11-16
Payer: MEDICARE

## 2018-11-16 DIAGNOSIS — I48.0 PAROXYSMAL ATRIAL FIBRILLATION (H): ICD-10-CM

## 2018-11-16 LAB — INR POINT OF CARE: 2.3 (ref 0.86–1.14)

## 2018-11-16 PROCEDURE — 99207 ZZC NO CHARGE NURSE ONLY: CPT

## 2018-11-16 PROCEDURE — 85610 PROTHROMBIN TIME: CPT | Mod: QW

## 2018-11-16 PROCEDURE — 36416 COLLJ CAPILLARY BLOOD SPEC: CPT

## 2018-11-16 NOTE — MR AVS SNAPSHOT
Alexandru G Eleonora   11/16/2018 10:15 AM   Anticoagulation Therapy Visit    Description:  86 year old male   Provider:  [facility] ANTI COAG   Department:  [facility]           INR as of 11/16/2018     Today's INR 2.3      Anticoagulation Summary as of 11/16/2018     INR goal 2.0-3.0   Today's INR 2.3   Full warfarin instructions 2.5 mg every day   Next INR check 12/28/2018    Indications   Long-term (current) use of anticoagulants [Z79.01] [Z79.01]  Paroxysmal atrial fibrillation (H) [I48.0]         Your next Anticoagulation Clinic appointment(s)     Nov 16, 2018 10:15 AM CST   Anticoagulation Visit with  ANTI COAG   [facility] ([facility]    [address] [address] [phone]            Dec 28, 2018 10:30 AM CST   Anticoagulation Visit with  ANTI COAG   [facility] ([facility]    [address] [address] [phone]              Contact Numbers     [facility]  Please call [phone] to cancel and/or reschedule your appointment   Please call [phone] with any problems or questions regarding your therapy.        November 2018 Details    Sun Mon Tue Wed Thu Fri Sat         1               2               3                 4               5               6               7               8               9               10                 11               12               13               14               15               16      2.5 mg   See details      17      2.5 mg           18      2.5 mg         19      2.5 mg         20      2.5 mg         21      2.5 mg         22      2.5 mg         23      2.5 mg         24      2.5 mg           25      2.5 mg         26      2.5 mg         27      2.5 mg         28      2.5 mg         29      2.5 mg         30      2.5 mg           Date Details   11/16 This INR check               How to take your warfarin dose     To take:  2.5 mg Take 1 of the 2.5 mg tablets.            December 2018 Details    Sun Mon Tue Wed Thu Fri Sat           1      2.5 mg           2      2.5 mg         3      2.5 mg         4      2.5 mg         5      2.5 mg         6      2.5 mg         7      2.5 mg         8      2.5 mg           9      2.5 mg         10      2.5 mg         11      2.5 mg         12      2.5 mg         13      2.5 mg         14      2.5 mg         15      2.5 mg           16      2.5 mg         17      2.5 mg         18      2.5 mg         19      2.5 mg         20      2.5 mg         21      2.5 mg         22      2.5 mg           23      2.5 mg         24      2.5 mg         25      2.5 mg         26      2.5 mg         27      2.5 mg         28            29                 30               31                     Date Details   No additional details    Date of next INR:  12/28/2018         How to take your warfarin dose     To take:  2.5 mg Take 1 of the 2.5 mg tablets.

## 2018-11-16 NOTE — PROGRESS NOTES
ANTICOAGULATION FOLLOW-UP CLINIC VISIT    Patient Name:  Alexandru Crews  Date:  11/16/2018  Contact Type:  Face to Face    SUBJECTIVE:     Patient Findings     Positives No Problem Findings    Comments Bleeding Signs/Symptoms: NO  Thromboembolic Signs/Symptoms: NO     Medication Changes:  NO  Dietary Changes:  NO  Bacterial/Viral Infection: NO     Missed Coumadin Doses: NO  Other Concerns:  NO             OBJECTIVE    INR Protime   Date Value Ref Range Status   11/16/2018 2.3 (A) 0.86 - 1.14 Final       ASSESSMENT / PLAN  INR assessment THER    Recheck INR In: 6 WEEKS    INR Location Clinic      Anticoagulation Summary as of 11/16/2018     INR goal 2.0-3.0   Today's INR 2.3   Warfarin maintenance plan 2.5 mg (2.5 mg x 1) every day   Full warfarin instructions 2.5 mg every day   Weekly warfarin total 17.5 mg   No change documented Rose Navarrete RN   Plan last modified Allie Awan RN (4/8/2016)   Next INR check 12/28/2018   Priority INR   Target end date Indefinite    Indications   Long-term (current) use of anticoagulants [Z79.01] [Z79.01]  Paroxysmal atrial fibrillation (H) [I48.0]         Anticoagulation Episode Summary     INR check location     Preferred lab     Send INR reminders to [facility]    Comments       Anticoagulation Care Providers     Provider Role Specialty Phone number    Paul Knowles MD [facility] [phone]            See the Encounter Report to view Anticoagulation Flowsheet and Dosing Calendar (Go to Encounters tab in chart review, and find the Anticoagulation Therapy Visit)    Dosage adjustment made based on physician directed care plan.    Rose Navarrete RN

## 2018-11-21 ENCOUNTER — TELEPHONE (OUTPATIENT)
Dept: INTERNAL MEDICINE | Facility: CLINIC | Age: 83
End: 2018-11-21

## 2018-11-27 ENCOUNTER — OFFICE VISIT (OUTPATIENT)
Dept: PHARMACY | Facility: CLINIC | Age: 83
End: 2018-11-27
Payer: COMMERCIAL

## 2018-11-27 DIAGNOSIS — I50.30 (HFPEF) HEART FAILURE WITH PRESERVED EJECTION FRACTION (H): ICD-10-CM

## 2018-11-27 DIAGNOSIS — R21 RASH: ICD-10-CM

## 2018-11-27 DIAGNOSIS — K59.00 CONSTIPATION, UNSPECIFIED CONSTIPATION TYPE: ICD-10-CM

## 2018-11-27 DIAGNOSIS — I48.91 ATRIAL FIBRILLATION, UNSPECIFIED TYPE (H): ICD-10-CM

## 2018-11-27 DIAGNOSIS — I10 HYPERTENSION GOAL BP (BLOOD PRESSURE) < 140/90: ICD-10-CM

## 2018-11-27 DIAGNOSIS — G89.4 CHRONIC PAIN SYNDROME: ICD-10-CM

## 2018-11-27 DIAGNOSIS — M85.80 OSTEOPENIA, UNSPECIFIED LOCATION: Primary | ICD-10-CM

## 2018-11-27 PROCEDURE — 99607 MTMS BY PHARM ADDL 15 MIN: CPT | Performed by: PHARMACIST

## 2018-11-27 PROCEDURE — 99606 MTMS BY PHARM EST 15 MIN: CPT | Performed by: PHARMACIST

## 2018-11-27 NOTE — MR AVS SNAPSHOT
After Visit Summary   11/27/2018    Alexandru Crews    MRN: [id number]           Patient Information     Date Of Birth          9/22/1932        Visit Information        Provider Department      11/27/2018 11:00 AM Kelli Gonsalez, [facility] MTM        Care Instructions    Recommendations from today's MTM visit:                                                    MTM (medication therapy management) is a service provided by a clinical pharmacist designed to help you get the most of out of your medicines.   Today we reviewed what your medicines are for, how to know if they are working, that your medicines are safe and how to make your medicine regimen as easy as possible.     1.  You can put your fiber in any hot/cold drink or mix with applesauce, pudding, yogurt, etc.    Next MTM visit:  1 year, sooner if needed.  You'll be due for a Prolia injection in March.    To schedule another MTM appointment, please call the clinic directly or you may call the MTM scheduling line at [phone] or toll-free at [phone].     My Clinical Pharmacist's contact information:                                                      It was a pleasure talking with you today!  Please feel free to contact me with any questions or concerns you have.      Kelli Gonsalez, PharmD, [facility]  Medication Therapy Management Provider  Pager: [phone]     You may receive a survey about the MTM services you received.  I would appreciate your feedback to help me serve you better in the future. Please fill it out and return it when you can. Your comments will be anonymous.                   Follow-ups after your visit        Your next 10 appointments already scheduled     Dec 04, 2018 10:30 AM CST   [facility] Julio Conner   [facility]    [address] [place] [address]   [phone]            Dec 04, 2018 11:00 AM CST   New Visit with  Andrew Doll MD   [facility] ([facility]    [address] [address]   [phone]            Dec 19, 2018 10:00 AM CST   New Visit with Obie Raines MD   [facility] ([facility])    [address] [address]   [phone]            Dec 28, 2018 10:30 AM CST   Anticoagulation Visit with JAYESH ANTI COAG   [facility] ([facility])    [address] [address] [phone]              Who to contact     If you have questions or need follow up information about today's clinic visit or your schedule please contact [facility] MTM directly at [phone].  Normal or non-critical lab and imaging results will be communicated to you by MyChart, letter or phone within 4 business days after the clinic has received the results. If you do not hear from us within 7 days, please contact the clinic through MyChart or phone. If you have a critical or abnormal lab result, we will notify you by phone as soon as possible.  Submit refill requests through [employer] or call your pharmacy and they will forward the refill request to us. Please allow 3 business days for your refill to be completed.          Additional Information About Your Visit        Care EveryWhere ID     This is your Care EveryWhere ID. This could be used by other organizations to access your [place] medical records  [id number]         Blood Pressure from Last 3 Encounters:   11/09/18 120/60   05/22/18 126/62   03/13/18 112/70    Weight from Last 3 Encounters:   11/09/18 121 lb 8 oz (55.1 kg)   05/22/18 120 lb (54.4 kg)   03/13/18 128 lb (58.1 kg)              Today, you had the following     No orders found for display       Primary Care Provider Office Phone # Fax #    Paul Knowles [phone]       [address]  [facility] [address]        Equal Access to Services     KIRTI  GAAR : Hadii aad benji alba Sher, waaxda luqadaha, qaybta kamatiasda imaniandrewclarisse, waxdaly spring hayjoselyn ibaneztonycarleen gale. So [facility] [phone].    ATENCIÓN: Si habla español, tiene a jackson disposición servicios gratuitos de asistencia lingüística. Llame al [phone].    We comply with applicable federal civil rights laws and [place] laws. We do not discriminate on the basis of race, color, national origin, age, disability, sex, sexual orientation, or gender identity.            Thank you!     Thank you for choosing [facility]  for your care. Our goal is always to provide you with excellent care. Hearing back from our patients is one way we can continue to improve our services. Please take a few minutes to complete the written survey that you may receive in the mail after your visit with us. Thank you!             Your Updated Medication List - Protect others around you: Learn how to safely use, store and throw away your medicines at www.disposemymeds.org.          This list is accurate as of 11/27/18 11:45 AM.  Always use your most recent med list.                   Brand Name Dispense Instructions for use Diagnosis    calcium carbonate 1250 (500 Ca) MG Chew      Takes one every other day-unsure of dose        DAILY VITAMINS PO      1 tablet daily        denosumab 60 MG/ML Soln injection    PROLIA     Inject 60 mg Subcutaneous every 6 months        FIBER PO      Take 1 tsp. by mouth daily        metoprolol succinate 25 MG 24 hr tablet    TOPROL-XL     Take 25 mg by mouth 2 times daily        morphine 15 MG CR tablet    MS CONTIN    60 tablet    Take 1 tablet (15 mg) by mouth every 12 hours 28 days or more between refills for controlled medications    Chronic pain syndrome       oxyCODONE-acetaminophen 5-325 MG tablet    PERCOCET    90 tablet    Take 1 tablet by mouth 3 times daily as needed for moderate to severe pain 28 days or more between refills for controlled medications    Chronic pain  syndrome       senna-docusate 8.6-50 MG tablet    SENOKOT-S/PERICOLACE     Take 1 tablet by mouth 2 times daily        sotalol 120 MG tablet    BETAPACE     Take 120 mg by mouth 2 times daily        triamcinolone 0.1 % cream    KENALOG    60 g    Apply topically 2 times daily as needed to affected area as directed.    Dermatitis       VITAMIN D (CHOLECALCIFEROL) PO      Take 1,000 Units by mouth 2 times daily        warfarin 2.5 MG tablet    COUMADIN    90 tablet    TAKE ONE TABLET BY MOUTH ONE TIME DAILY OR AS DIRECTED    Long-term (current) use of anticoagulants, Atrial fibrillation, unspecified type (H)

## 2018-11-27 NOTE — PATIENT INSTRUCTIONS
Recommendations from today's MTM visit:                                                    MTM (medication therapy management) is a service provided by a clinical pharmacist designed to help you get the most of out of your medicines.   Today we reviewed what your medicines are for, how to know if they are working, that your medicines are safe and how to make your medicine regimen as easy as possible.     1.  You can put your fiber in any hot/cold drink or mix with applesauce, pudding, yogurt, etc.    Next MTM visit:  1 year, sooner if needed.  You'll be due for a Prolia injection in March.    To schedule another MTM appointment, please call the clinic directly or you may call the MTM scheduling line at [phone] or toll-free at [phone].     My Clinical Pharmacist's contact information:                                                      It was a pleasure talking with you today!  Please feel free to contact me with any questions or concerns you have.      Kelli Gonsalez PharmD, [facility]  Medication Therapy Management Provider  Pager: [phone]     You may receive a survey about the MTM services you received.  I would appreciate your feedback to help me serve you better in the future. Please fill it out and return it when you can. Your comments will be anonymous.

## 2018-11-27 NOTE — PROGRESS NOTES
"SUBJECTIVE/OBJECTIVE:                Alexandru Crews is a 86 year old male coming in for a follow-up visit for Medication Therapy Management.  He was referred to me from Dr. Knowles.     Chief Complaint: Follow up from our visit on 3/13/18.  He has no questions or concerns today.    Tobacco: History of tobacco dependence - quit 1975  Alcohol: 1-3 beverages / week    Medication Adherence/Access: no issues reported    Osteopenia:  Current therapy includes: Prolia 60mg every 6 months (last injection 9/20/18), Vitamin D 2000 IU daily and calcium/vitamin D every other day (dose unknown), dietary intake (gets at least 800mg of calcium/day) and his MVI (contains 210mg of calcium). Pt is not experiencing side effects.   Last vitamin D level: 9/27/18 = 45ug/L  DEXA History: 9/17/18 - Osteopenia.  This patient's risks based on available information, with the use of FRAX, are 7.8 % for major osteoporotic fracture and 3.3 % for hip fracture.   High risk: No     Chronic Pain: Current medication regimen includes MS Contin 15mg BID and Percocet 5/325mg TID PRN (he generally takes 1/2 tablet 4 times daily PRN, usually less than QID).  He again notes that he \"should\" have several different surgeries to treat the pain, but he doesn't feel he'd do well with these (and for some has been told he's not a candidate).  He denies side effects of therapy.  He feels he's doing ok with this regimen.  He notes pain isn't optimally controlled but he isn't interested in increasing the doses or changing medications at all.  He does sometimes use topical therapy to help with headaches, which he finds effective.  Has not tried elsewhere because \"I'd be rubbing it all over my body.\"    Hypertension/A. Fib/HFpEF: Current medications include metoprolol ER 25mg BID, sotalol 120mg BID and warfarin as directed.   Patient reports no current medication side effects including s/s bruising/bleeding.      Constipation: He takes fiber daily as well as " senna/docuate BID.  Feels bowels are overall stable with this regimen.  He has some questions about what type of liquid he can put his fiber in - only water?  He's using a generic Benefiber currently.    Rash:  He uses triamcinolone cream as needed, which he finds effective.  He denies side effects of therapy.    ASSESSMENT:              Current medications were reviewed today as discussed above.      Medication Adherence: good, no issues identified    Osteopenia: Stable.  Will be due for repeat Prolia in March.      Chronic Pain: Unimproved.  He declines med changes.    Hypertension/A. Fib/HFpEF: Stable.    Constipation: Needed additional education regarding fiber supplementation.    Rash: Stable.     PLAN:                  1.  You can put your fiber in any hot/cold drink or mix with applesauce, pudding, yogurt, etc.    I spent 45 minutes with this patient today. All changes were made via collaborative practice agreement with Dr. Knowles. A copy of the visit note was provided to the patient's primary care provider.     Will follow up in 1 year, sooner if needed.    The patient was given a summary of these recommendations as an after visit summary.    Kelli Gonsalez, PharmD, [facility]  Medication Therapy Management Provider  Pager: [phone]

## 2018-12-04 ENCOUNTER — OFFICE VISIT (OUTPATIENT)
Dept: AUDIOLOGY | Facility: CLINIC | Age: 83
End: 2018-12-04
Payer: MEDICARE

## 2018-12-04 ENCOUNTER — OFFICE VISIT (OUTPATIENT)
Dept: OTOLARYNGOLOGY | Facility: CLINIC | Age: 83
End: 2018-12-04
Payer: MEDICARE

## 2018-12-04 VITALS
SYSTOLIC BLOOD PRESSURE: 139 MMHG | WEIGHT: 121 LBS | DIASTOLIC BLOOD PRESSURE: 82 MMHG | BODY MASS INDEX: 22.19 KG/M2 | HEART RATE: 50 BPM

## 2018-12-04 DIAGNOSIS — H90.3 SNHL (SENSORY-NEURAL HEARING LOSS), ASYMMETRICAL: Primary | ICD-10-CM

## 2018-12-04 DIAGNOSIS — H90.3 SENSORINEURAL HEARING LOSS, BILATERAL: Primary | ICD-10-CM

## 2018-12-04 PROCEDURE — 92557 COMPREHENSIVE HEARING TEST: CPT | Performed by: AUDIOLOGIST

## 2018-12-04 PROCEDURE — 92567 TYMPANOMETRY: CPT | Performed by: AUDIOLOGIST

## 2018-12-04 PROCEDURE — 99203 OFFICE O/P NEW LOW 30 MIN: CPT | Performed by: OTOLARYNGOLOGY

## 2018-12-04 PROCEDURE — 99207 ZZC NO CHARGE LOS: CPT | Performed by: AUDIOLOGIST

## 2018-12-04 NOTE — MR AVS SNAPSHOT
After Visit Summary   12/4/2018    Alexandru Crews    MRN: [id number]           Patient Information     Date Of Birth          9/22/1932        Visit Information        Provider Department      12/4/2018 11:00 AM Andrew Doll MD [facility]        Today's Diagnoses     SNHL (sensory-neural hearing loss), asymmetrical    -  1      Care Instructions    General Scheduling Information  To schedule your CT/MRI scan, please contact [place] [facility] at [phone] [address] [place] [address]    To schedule your Surgery, please contact our Specialty Schedulers at [phone]    ENT Clinic Locations Clinic Hours Telephone Number     [facility]  [address]  [place] [address]   Tuesday:       8:00am -- 4:00pm    Wednesday:  8:00am - 4:00pm   To schedule an appointment with   Dr. Doll,   please contact our   Specialty Scheduling Department at:     [phone]       [facility]  [address] [place] [address]   Friday:          8:00am - 4:00pm         Urgent Care Locations Clinic Hours Telephone Numbers     [facility]  [address] [place] [address]     Monday-Friday:     11:00pm - 9:00pm    Saturday-Sunday:  9:00am - 5:00pm   [phone]     [facility]  [address] [place] [address]     Monday-Friday:      5:00pm - 9:00pm     Saturday-Sunday:  9:00am - 5:00pm   [phone]                   Follow-ups after your visit        Additional Services     AUDIOLOGY ADULT REFERRAL       Your provider has referred you to: [facility]: [facility] [phone]   [email or web address]    Treatment:  Evaluation & Treatment  Specialty Testing:  Audiogram w/Tymps and Reflexes    Please be aware that coverage of these services is subject to the terms and limitations of your health insurance plan.  Call member services at your health plan with any benefit or coverage questions.       Please bring the following to your appointment:  >>   Any x-rays, CTs or MRIs which have been performed.  Contact the facility where they were done to arrange for  prior to your scheduled appointment.   >>   List of current medications  >>   This referral request   >>   Any documents/labs given to you for this referral                  Your next 10 appointments already scheduled     Dec 19, 2018 10:00 AM CST   New Visit with Obie Raines MD   [facility]    [address] [place] [address]  [place] [address] [phone]            Dec 28, 2018 10:30 AM CST   Anticoagulation Visit with JAYESH ANTI COAG   [facility]    [address] [facility] [address] [id number] [phone]              Who to contact     If you have questions or need follow up information about today's clinic visit or your schedule please contact [facility] directly at [phone].  Normal or non-critical lab and imaging results will be communicated to you by MyChart, letter or phone within 4 business days after the clinic has received the results. If you do not hear from us within 7 days, please contact the clinic through MyChart or phone. If you have a critical or abnormal lab result, we will notify you by phone as soon as possible.  Submit refill requests through [facility] or call your pharmacy and they will forward the refill request to us. Please allow 3 business days for your refill to be completed.          Additional Information About Your Visit        Care EveryWhere ID     This is your Care EveryWhere ID. This could be used by other organizations to access your [facility] medical records  [id number]        Your Vitals Were     Pulse BMI (Body Mass Index)                50 22.19 kg/m2           Blood Pressure from Last 3 Encounters:   12/04/18 139/82   11/09/18 120/60   05/22/18 126/62    Weight from Last 3 Encounters:   12/04/18 54.9 kg  (121 lb)   11/09/18 55.1 kg (121 lb 8 oz)   05/22/18 54.4 kg (120 lb)              We Performed the Following     AUDIOLOGY ADULT REFERRAL        Primary Care Provider Office Phone # Fax #    Paul Knowles [phone] [address]  [place] [address]        Equal Access to Services     [facility]: Hadii aad ku hadasho Soomaali, waaxda luqadaha, qaybta kaalmada adeegyada, waxay idiin hayaan adeeg kharash la'aan ah. So [facility] [phone].    ATENCIÓN: Si habla español, tiene a jackson disposición servicios gratuitos de asistencia lingüística. [facility] [phone].    We comply with applicable federal civil rights laws and [place] laws. We do not discriminate on the basis of race, color, national origin, age, disability, sex, sexual orientation, or gender identity.            Thank you!     Thank you for choosing [facility]  for your care. Our goal is always to provide you with excellent care. Hearing back from our patients is one way we can continue to improve our services. Please take a few minutes to complete the written survey that you may receive in the mail after your visit with us. Thank you!             Your Updated Medication List - Protect others around you: Learn how to safely use, store and throw away your medicines at www.disposemymeds.org.          This list is accurate as of 12/4/18  2:38 PM.  Always use your most recent med list.                   Brand Name Dispense Instructions for use Diagnosis    calcium carbonate 1250 (500 Ca) MG Chew      Takes one every other day-unsure of dose        DAILY VITAMINS PO      1 tablet daily        denosumab 60 MG/ML Soln injection    PROLIA     Inject 60 mg Subcutaneous every 6 months        FIBER PO      Take 1 tsp. by mouth daily        metoprolol succinate ER 25 MG 24 hr tablet    TOPROL-XL     Take 25 mg by mouth 2 times daily        morphine 15 MG CR tablet    MS CONTIN    60 tablet    Take 1 tablet (15 mg) by mouth  every 12 hours 28 days or more between refills for controlled medications    Chronic pain syndrome       oxyCODONE-acetaminophen 5-325 MG tablet    PERCOCET    90 tablet    Take 1 tablet by mouth 3 times daily as needed for moderate to severe pain 28 days or more between refills for controlled medications    Chronic pain syndrome       senna-docusate 8.6-50 MG tablet    SENOKOT-S/PERICOLACE     Take 1 tablet by mouth 2 times daily        sotalol 120 MG tablet    BETAPACE     Take 120 mg by mouth 2 times daily        triamcinolone 0.1 % external cream    KENALOG    60 g    Apply topically 2 times daily as needed to affected area as directed.    Dermatitis       VITAMIN D (CHOLECALCIFEROL) PO      Take 1,000 Units by mouth 2 times daily        warfarin 2.5 MG tablet    COUMADIN    90 tablet    TAKE ONE TABLET BY MOUTH ONE TIME DAILY OR AS DIRECTED    Long-term (current) use of anticoagulants, Atrial fibrillation, unspecified type (H)

## 2018-12-04 NOTE — PROGRESS NOTES
Chief Complaint - Hearing loss    History of Present Illness - Alexandru Crews is a 86 year old male who presents to me today with hearing loss in the left ear.  It has been present and noticeable for approximately 1 month.  It was sudden in onset. He said it gave it and he had no hearing. Next day it came back. He feels overall his hearing is okay. There is no history of recent head trauma, or chronic ear disease or ear surgery.  With regards to recreational, , and work-related noise exposure has none. No family history of hearing loss at a young age. The patient denies vertigo, otalgia.     Past Medical History -   Patient Active Problem List   Diagnosis     Family history of colon cancer     Osteopenia     HYPERLIPIDEMIA LDL GOAL <160     Vitreous condensations, od > os     Pseudophakia, Yag Caps, ou     Chronic pain syndrome     Advanced directives, counseling/discussion     BPH (benign prostatic hyperplasia)     Paroxysmal atrial fibrillation (H)     History of shingles     Chronic fatigue     Lumbar spinal stenosis     DJD (degenerative joint disease), lumbar and thoracic     Diverticulosis of large intestine     H/O cardiac pacemaker     Hypertension goal BP (blood pressure) < 140/90     Long-term (current) use of anticoagulants [Z79.01]     Chronic nonintractable headache, unspecified headache type     Prediabetes     Chronic pain     Elevated glucose     De Quervain's disease (tenosynovitis)     Primary osteoarthritis of first carpometacarpal joint of left hand     Cubital tunnel syndrome, right     Primary osteoarthritis of right knee     History of total knee arthroplasty, left     Chronic atrial fibrillation (H)     Pacemaker       Current Medications -   Current Outpatient Prescriptions:      calcium carbonate 1250 (500 CA) MG CHEW, Takes one every other day-unsure of dose, Disp: , Rfl:      DAILY VITAMINS PO, 1 tablet daily, Disp: , Rfl:      denosumab (PROLIA) 60 MG/ML SOLN injection, Inject  60 mg Subcutaneous every 6 months, Disp: , Rfl:      FIBER PO, Take 1 tsp. by mouth daily , Disp: , Rfl:      metoprolol (TOPROL-XL) 25 MG 24 hr tablet, Take 25 mg by mouth 2 times daily, Disp: , Rfl:      morphine (MS CONTIN) 15 MG 12 hr tablet, Take 1 tablet (15 mg) by mouth every 12 hours 28 days or more between refills for controlled medications, Disp: 60 tablet, Rfl: 0     oxyCODONE-acetaminophen (PERCOCET) 5-325 MG per tablet, Take 1 tablet by mouth 3 times daily as needed for moderate to severe pain 28 days or more between refills for controlled medications, Disp: 90 tablet, Rfl: 0     senna-docusate (SENOKOT-S;PERICOLACE) 8.6-50 MG per tablet, Take 1 tablet by mouth 2 times daily , Disp: , Rfl:      sotalol (BETAPACE) 120 MG tablet, Take 120 mg by mouth 2 times daily , Disp: , Rfl:      triamcinolone (KENALOG) 0.1 % cream, Apply topically 2 times daily as needed to affected area as directed., Disp: 60 g, Rfl: 2     VITAMIN D, CHOLECALCIFEROL, PO, Take 1,000 Units by mouth 2 times daily , Disp: , Rfl:      warfarin (COUMADIN) 2.5 MG tablet, TAKE ONE TABLET BY MOUTH ONE TIME DAILY OR AS DIRECTED, Disp: 90 tablet, Rfl: 1    Allergies -   Allergies   Allergen Reactions     Lactose Nausea and Vomiting     Sulindac      Upset stomach     Tramadol      Itching     Valdecoxib Itching     Vicodin [Hydrocodone-Acetaminophen]      Itching     Vioxx Itching     Rofecoxib Itching and Rash     Sulfa Drugs Rash     Muscle aches       Social History -   Social History     Social History     Marital status:      Spouse name: N/A     Number of children: N/A     Years of education: N/A     Social History Main Topics     Smoking status: Former Smoker     Years: 15.00     Types: Cigarettes     Quit date: 6/30/1975     Smokeless tobacco: Never Used     Alcohol use Yes      Comment: 1 per week     Drug use: No     Sexual activity: Not Currently     Partners: Female     Other Topics Concern     None     Social History  Narrative       Family History -   Family History   Problem Relation Age of Onset     Cancer Mother      ovarian     Cancer - colorectal Brother        Review of Systems - As per HPI and PMHx, otherwise 7 system review of the head and neck negative.    Physical Exam  /82  Pulse 50  Wt 54.9 kg (121 lb)  BMI 22.19 kg/m2  General - The patient is in no distress.  Alert and oriented to person and place, answers questions and cooperates with examination appropriately.   Voice and Breathing - The patient was breathing comfortably without the use of accessory muscles. There was no wheezing, stridor, or stertor.  The patients voice was clear and strong.  Ears - The auricles are normal. The tympanic membranes are normal in appearance, bony landmarks are intact.  No retraction, perforation, or masses.  No fluid or purulence was seen in the external canal or the middle ear. No evidence of infection of the middle ear or external canal, cerumen was normal in appearance.  Eyes - Extraocular movements intact.  Sclera were not icteric or injected.  Neck - Palpation of the occipital, submental, submandibular, internal jugular chain, and supraclavicular nodes did not demonstrate any abnormal lymph nodes or masses. Parotid glands had no masses. Palpation of the thyroid was soft and smooth, with no nodules or goiter appreciated.  The trachea was mobile and midline.  Neurological - Cranial nerves 2 through 12 were grossly intact. House-Brackmann grade 1 out of 6 bilaterally.       Audiologic Studies - An audiogram and tympanogram were performed today as part of the evaluation and personally reviewed. The tympanogram shows a normal Type A curve, with normal canal volume and middle ear pressure.  There is no sign of eustachian tube dysfunction or middle ear effusion.  The audiogram showed a significant down-sloping high frequency sensorineural hearing loss.  Mild asymmetry with left worse.       Assessment and Plan - Alexandru CARRERA  Eleonora is a 86 year old male who presents to me today with hearing loss.  I am unsure the etiology behind the sudden drop in hearing with subsequent return.  We did discuss the possibility of vasospasm, viral infection, or possibly but unlikely retrocochlear pathology or tumor.  He has bilateral sensorineural hearing loss and I recommend a hearing aid consult and hearing aids.  Certainly if he has more fluctuation in his hearing we should consider MRI.  He will return in 6 months for repeat audiogram, sooner if he develops worsening hearing, tinnitus, or fluctuation.      Andrew Doll MD  Otolaryngology  [facility]

## 2018-12-04 NOTE — LETTER
12/4/2018         RE: Alexandru Crews  [address] [place] [address]        Dear Colleague,    Thank you for referring your patient, Alexandru Crews, to the [facility]. Please see a copy of my visit note below.    Chief Complaint - Hearing loss    History of Present Illness - Alexandru Crews is a 86 year old male who presents to me today with hearing loss in the left ear.  It has been present and noticeable for approximately 1 month.  It was sudden in onset. He said it gave it and he had no hearing. Next day it came back. He feels overall his hearing is okay. There is no history of recent head trauma, or chronic ear disease or ear surgery.  With regards to recreational, , and work-related noise exposure has none. No family history of hearing loss at a young age. The patient denies vertigo, otalgia.     Past Medical History -   Patient Active Problem List   Diagnosis     Family history of colon cancer     Osteopenia     HYPERLIPIDEMIA LDL GOAL <160     Vitreous condensations, od > os     Pseudophakia, Yag Caps, ou     Chronic pain syndrome     Advanced directives, counseling/discussion     BPH (benign prostatic hyperplasia)     Paroxysmal atrial fibrillation (H)     History of shingles     Chronic fatigue     Lumbar spinal stenosis     DJD (degenerative joint disease), lumbar and thoracic     Diverticulosis of large intestine     H/O cardiac pacemaker     Hypertension goal BP (blood pressure) < 140/90     Long-term (current) use of anticoagulants [Z79.01]     Chronic nonintractable headache, unspecified headache type     Prediabetes     Chronic pain     Elevated glucose     De Quervain's disease (tenosynovitis)     Primary osteoarthritis of first carpometacarpal joint of left hand     Cubital tunnel syndrome, right     Primary osteoarthritis of right knee     History of total knee arthroplasty, left     Chronic atrial fibrillation (H)     Pacemaker       Current Medications -    Current Outpatient Prescriptions:      calcium carbonate 1250 (500 CA) MG CHEW, Takes one every other day-unsure of dose, Disp: , Rfl:      DAILY VITAMINS PO, 1 tablet daily, Disp: , Rfl:      denosumab (PROLIA) 60 MG/ML SOLN injection, Inject 60 mg Subcutaneous every 6 months, Disp: , Rfl:      FIBER PO, Take 1 tsp. by mouth daily , Disp: , Rfl:      metoprolol (TOPROL-XL) 25 MG 24 hr tablet, Take 25 mg by mouth 2 times daily, Disp: , Rfl:      morphine (MS CONTIN) 15 MG 12 hr tablet, Take 1 tablet (15 mg) by mouth every 12 hours 28 days or more between refills for controlled medications, Disp: 60 tablet, Rfl: 0     oxyCODONE-acetaminophen (PERCOCET) 5-325 MG per tablet, Take 1 tablet by mouth 3 times daily as needed for moderate to severe pain 28 days or more between refills for controlled medications, Disp: 90 tablet, Rfl: 0     senna-docusate (SENOKOT-S;PERICOLACE) 8.6-50 MG per tablet, Take 1 tablet by mouth 2 times daily , Disp: , Rfl:      sotalol (BETAPACE) 120 MG tablet, Take 120 mg by mouth 2 times daily , Disp: , Rfl:      triamcinolone (KENALOG) 0.1 % cream, Apply topically 2 times daily as needed to affected area as directed., Disp: 60 g, Rfl: 2     VITAMIN D, CHOLECALCIFEROL, PO, Take 1,000 Units by mouth 2 times daily , Disp: , Rfl:      warfarin (COUMADIN) 2.5 MG tablet, TAKE ONE TABLET BY MOUTH ONE TIME DAILY OR AS DIRECTED, Disp: 90 tablet, Rfl: 1    Allergies -   Allergies   Allergen Reactions     Lactose Nausea and Vomiting     Sulindac      Upset stomach     Tramadol      Itching     Valdecoxib Itching     Vicodin [Hydrocodone-Acetaminophen]      Itching     Vioxx Itching     Rofecoxib Itching and Rash     Sulfa Drugs Rash     Muscle aches       Social History -   Social History     Social History     Marital status:      Spouse name: N/A     Number of children: N/A     Years of education: N/A     Social History Main Topics     Smoking status: Former Smoker     Years: 15.00     Types:  Cigarettes     Quit date: 6/30/1975     Smokeless tobacco: Never Used     Alcohol use Yes      Comment: 1 per week     Drug use: No     Sexual activity: Not Currently     Partners: Female     Other Topics Concern     None     Social History Narrative       Family History -   Family History   Problem Relation Age of Onset     Cancer Mother      ovarian     Cancer - colorectal Brother        Review of Systems - As per HPI and PMHx, otherwise 7 system review of the head and neck negative.    Physical Exam  /82  Pulse 50  Wt 54.9 kg (121 lb)  BMI 22.19 kg/m2  General - The patient is in no distress.  Alert and oriented to person and place, answers questions and cooperates with examination appropriately.   Voice and Breathing - The patient was breathing comfortably without the use of accessory muscles. There was no wheezing, stridor, or stertor.  The patients voice was clear and strong.  Ears - The auricles are normal. The tympanic membranes are normal in appearance, bony landmarks are intact.  No retraction, perforation, or masses.  No fluid or purulence was seen in the external canal or the middle ear. No evidence of infection of the middle ear or external canal, cerumen was normal in appearance.  Eyes - Extraocular movements intact.  Sclera were not icteric or injected.  Neck - Palpation of the occipital, submental, submandibular, internal jugular chain, and supraclavicular nodes did not demonstrate any abnormal lymph nodes or masses. Parotid glands had no masses. Palpation of the thyroid was soft and smooth, with no nodules or goiter appreciated.  The trachea was mobile and midline.  Neurological - Cranial nerves 2 through 12 were grossly intact. House-Brackmann grade 1 out of 6 bilaterally.       Audiologic Studies - An audiogram and tympanogram were performed today as part of the evaluation and personally reviewed. The tympanogram shows a normal Type A curve, with normal canal volume and middle ear  pressure.  There is no sign of eustachian tube dysfunction or middle ear effusion.  The audiogram showed a significant down-sloping high frequency sensorineural hearing loss.  Mild asymmetry with left worse.       Assessment and Plan - Alexandru Crews is a 86 year old male who presents to me today with hearing loss.  I am unsure the etiology behind the sudden drop in hearing with subsequent return.  We did discuss the possibility of vasospasm, viral infection, or possibly but unlikely retrocochlear pathology or tumor.  He has bilateral sensorineural hearing loss and I recommend a hearing aid consult and hearing aids.  Certainly if he has more fluctuation in his hearing we should consider MRI.  He will return in 6 months for repeat audiogram, sooner if he develops worsening hearing, tinnitus, or fluctuation.      Andrew Doll MD  Otolaryngology  [facility]        Again, thank you for allowing me to participate in the care of your patient.        Sincerely,        Andrew Doll MD

## 2018-12-04 NOTE — PATIENT INSTRUCTIONS
General Scheduling Information  To schedule your CT/MRI scan, please contact [place] [facility] at [phone]   [address] [place] [address]    To schedule your Surgery, please contact our Specialty Schedulers at [phone]    ENT Clinic Locations Clinic Hours Telephone Number     Gerry Yang  [address] [place] [address]   Tuesday:       8:00am -- 4:00pm    Wednesday:  8:00am - 4:00pm   To schedule an appointment with   Dr. Doll,   please contact our   Specialty Scheduling Department at:     [phone]       Gerry Huffman  [address] [place] [address]   Friday:          8:00am - 4:00pm         Urgent Care Locations Clinic Hours Telephone Numbers     Gerry Schwarz  [address] [place] [address]     Monday-Friday:     11:00pm - 9:00pm    Saturday-Sunday:  9:00am - 5:00pm   [phone]     Gerry Huffman  [address] [place] [address]     Monday-Friday:      5:00pm - 9:00pm     Saturday-Sunday:  9:00am - 5:00pm   [phone]

## 2018-12-04 NOTE — PROGRESS NOTES
AUDIOLOGY REPORT:    Patient was referred to Audiology from ENT by Dr. Doll for a hearing examination. Patient reports that on November 9th in the evening he experienced a sudden decline of the hearing in his left ear. Patient reports that the left ear hearing returned on the 10th of November. Patient denies tinnitus. They were accompanied today by their wife.    Testing:    Otoscopy:   Otoscopic exam indicates ears are clear of cerumen bilaterally     Tympanograms:    RIGHT: normal eardrum mobility     LEFT:   normal eardrum mobility    Thresholds:   Pure Tone Thresholds assessed using conventional audiometry with good  reliability from 250-8000 Hz bilaterally using insert earphones     RIGHT:  normal hearing sensitivity through 500 Hz then a mild to moderately severe sensorineural hearing loss    LEFT:    normal and borderline-normal hearing sensitivity through 1000 Hz then a mild sensorineural hearing loss which drops precipitously to moderate sensorineural hearing loss at 3000 Hz.    NOTE: there is a significant asymmetry between the thresholds of the right and left ear at 0451-1240 Hz    Speech Reception Threshold:    RIGHT: 15 dB HL    LEFT:   20 dB HL    Word Recognition Score:     RIGHT: 100% at 60 dB HL using NU-6 recorded word list.    LEFT:   92% at 70 dB HL using NU-6 recorded word list.    Discussed results with the patient and his wife. Patient requested and was provided a copy of his audiogram.      Patient was returned to ENT for follow up.     Marshall Cazares CCC-A  Licensed Audiologist  12/4/2018

## 2018-12-04 NOTE — MR AVS SNAPSHOT
After Visit Summary   12/4/2018    Alexandru Crews    MRN: [id number]           Patient Information     Date Of Birth          9/22/1932        Visit Information        Provider Department      12/4/2018 10:30 AM Marshall Saalzar AuD [facility]        Today's Diagnoses     Sensorineural hearing loss, bilateral    -  1       Follow-ups after your visit        Your next 10 appointments already scheduled     Dec 19, 2018 10:00 AM CST   New Visit with Obie Raines MD   [facility] ([facility]    [address] [address] [phone]            Dec 28, 2018 10:30 AM CST   Anticoagulation Visit with JAYESH ANTI COAG   [facility] ([facility])    [address] [id number] [phone]              Who to contact     If you have questions or need follow up information about today's clinic visit or your schedule please contact [facility] directly at [phone].  Normal or non-critical lab and imaging results will be communicated to you by MyChart, letter or phone within 4 business days after the clinic has received the results. If you do not hear from us within 7 days, please contact the clinic through MyChart or phone. If you have a critical or abnormal lab result, we will notify you by phone as soon as possible.  Submit refill requests through [employer] or call your pharmacy and they will forward the refill request to us. Please allow 3 business days for your refill to be completed.          Additional Information About Your Visit        Care EveryWhere ID     This is your Care EveryWhere ID. This could be used by other organizations to access your [facility] medical records  [id number]         Blood Pressure from Last 3 Encounters:   12/04/18 139/82   11/09/18 120/60   05/22/18 126/62    Weight from Last 3 Encounters:   12/04/18 121 lb (54.9 kg)   11/09/18 121 lb 8 oz (55.1 kg)    05/22/18 120 lb (54.4 kg)              We Performed the Following     AUDIOGRAM/TYMPANOGRAM - INTERFACE     COMPREHENSIVE HEARING TEST     TYMPANOMETRY        Primary Care Provider Office Phone # Fax #    Paul Knowles [phone] [address] [address]        Equal Access to Services     [facility] : Hadii aad ku hadasho Soomaali, waaxda luqadaha, qaybta kaalmada adeegyada, waxay idiin hayaan adeeg kharash la'aan . So [facility] [phone].    ATENCIÓN: Si habla español, tiene a jackson disposición servicios gratuitos de asistencia lingüística. Llame al [phone].    We comply with applicable federal civil rights laws and [place] laws. We do not discriminate on the basis of race, color, national origin, age, disability, sex, sexual orientation, or gender identity.            Thank you!     Thank you for choosing [facility]  for your care. Our goal is always to provide you with excellent care. Hearing back from our patients is one way we can continue to improve our services. Please take a few minutes to complete the written survey that you may receive in the mail after your visit with us. Thank you!             Your Updated Medication List - Protect others around you: Learn how to safely use, store and throw away your medicines at www.disposemymeds.org.          This list is accurate as of 12/4/18 11:07 AM.  Always use your most recent med list.                   Brand Name Dispense Instructions for use Diagnosis    calcium carbonate 1250 (500 Ca) MG Chew      Takes one every other day-unsure of dose        DAILY VITAMINS PO      1 tablet daily        denosumab 60 MG/ML Soln injection    PROLIA     Inject 60 mg Subcutaneous every 6 months        FIBER PO      Take 1 tsp. by mouth daily        metoprolol succinate ER 25 MG 24 hr tablet    TOPROL-XL     Take 25 mg by mouth 2 times daily        morphine 15 MG CR tablet    MS CONTIN    60 tablet    Take 1 tablet  (15 mg) by mouth every 12 hours 28 days or more between refills for controlled medications    Chronic pain syndrome       oxyCODONE-acetaminophen 5-325 MG tablet    PERCOCET    90 tablet    Take 1 tablet by mouth 3 times daily as needed for moderate to severe pain 28 days or more between refills for controlled medications    Chronic pain syndrome       senna-docusate 8.6-50 MG tablet    SENOKOT-S/PERICOLACE     Take 1 tablet by mouth 2 times daily        sotalol 120 MG tablet    BETAPACE     Take 120 mg by mouth 2 times daily        triamcinolone 0.1 % external cream    KENALOG    60 g    Apply topically 2 times daily as needed to affected area as directed.    Dermatitis       VITAMIN D (CHOLECALCIFEROL) PO      Take 1,000 Units by mouth 2 times daily        warfarin 2.5 MG tablet    COUMADIN    90 tablet    TAKE ONE TABLET BY MOUTH ONE TIME DAILY OR AS DIRECTED    Long-term (current) use of anticoagulants, Atrial fibrillation, unspecified type (H)

## 2018-12-18 ENCOUNTER — TELEPHONE (OUTPATIENT)
Dept: FAMILY MEDICINE | Facility: CLINIC | Age: 83
End: 2018-12-18

## 2018-12-18 NOTE — TELEPHONE ENCOUNTER
Patient called and left VM on RN Hotline.   Patient states that R eye sclera is completely bloodshot.   Patient requesting call back with on next steps to take.     Beatriz Escalera RN

## 2018-12-18 NOTE — TELEPHONE ENCOUNTER
Called and spoke with patient's wife and gave information below.   Verbalized understanding & no further questions.     Beatriz Escalera RN

## 2018-12-18 NOTE — TELEPHONE ENCOUNTER
This is what sounds like subconjunctival hemorrhage     He would need office visit to be sure. Generally these are spontaneous and self-limited illness that resolve on it's own without treatment     Paul Knowles MD

## 2018-12-18 NOTE — TELEPHONE ENCOUNTER
Spoke with patient, he has noticed redness on the white part of his eye for 3 days now and it is getting worse.  Eye feels like there is something irritating it but not itchy.  Denies drainage and pain.  Denies vision changes.   Please advise   Josselyn Corona RN

## 2018-12-19 ENCOUNTER — OFFICE VISIT (OUTPATIENT)
Dept: OPHTHALMOLOGY | Facility: CLINIC | Age: 83
End: 2018-12-19
Payer: MEDICARE

## 2018-12-19 DIAGNOSIS — Z96.1 PSEUDOPHAKIA: Primary | ICD-10-CM

## 2018-12-19 DIAGNOSIS — H52.4 PRESBYOPIA: ICD-10-CM

## 2018-12-19 DIAGNOSIS — H43.399 VITREOUS OPACITIES: ICD-10-CM

## 2018-12-19 PROCEDURE — 92014 COMPRE OPH EXAM EST PT 1/>: CPT | Performed by: OPHTHALMOLOGY

## 2018-12-19 PROCEDURE — 92015 DETERMINE REFRACTIVE STATE: CPT | Mod: GY | Performed by: OPHTHALMOLOGY

## 2018-12-19 ASSESSMENT — CUP TO DISC RATIO
OS_RATIO: 0.2
OD_RATIO: 0.2

## 2018-12-19 ASSESSMENT — CONF VISUAL FIELD
OS_NORMAL: 1
OD_NORMAL: 1

## 2018-12-19 ASSESSMENT — REFRACTION_WEARINGRX
OS_AXIS: 155
OD_CYLINDER: +0.25
OS_ADD: +3.00
OS_SPHERE: -0.50
OD_ADD: +3.00
OS_CYLINDER: +0.75
OD_SPHERE: -0.25
SPECS_TYPE: TRIFOCAL
OD_AXIS: 136

## 2018-12-19 ASSESSMENT — EXTERNAL EXAM - RIGHT EYE: OD_EXAM: 2+ BROW PTOSIS

## 2018-12-19 ASSESSMENT — EXTERNAL EXAM - LEFT EYE: OS_EXAM: 2+ BROW PTOSIS

## 2018-12-19 ASSESSMENT — REFRACTION_MANIFEST
OS_ADD: +3.00
OD_SPHERE: -0.75
OS_CYLINDER: +1.00
OD_ADD: +3.00
OS_SPHERE: -1.00
OD_AXIS: 180
OD_CYLINDER: +0.75
OS_AXIS: 160

## 2018-12-19 ASSESSMENT — TONOMETRY
IOP_METHOD: APPLANATION
OS_IOP_MMHG: 08
OD_IOP_MMHG: 10

## 2018-12-19 ASSESSMENT — VISUAL ACUITY
OD_CC: 20/20
METHOD: SNELLEN - LINEAR
OS_CC+: -1
CORRECTION_TYPE: GLASSES
OS_CC: 20/20
OD_CC+: -3

## 2018-12-19 ASSESSMENT — SLIT LAMP EXAM - LIDS
COMMENTS: 1+ DERMATOCHALASIS - UPPER LID
COMMENTS: 1+ DERMATOCHALASIS - UPPER LID

## 2018-12-19 NOTE — PROGRESS NOTES
Current Eye Medications: none      Subjective:  Here for complete today.  Has a red right eye for about three days now, on Warfarin daily. No pain with the eye, just a slight feeling that there is a coating over it. Recommended to him that he should not do anything about the occipital nerve.  He has had headaches for 3 days, but says he has to live with it.     Objective:  See Ophthalmology Exam.       Assessment:  Stable eye exam.      ICD-10-CM    1. Pseudophakia, Yag Caps, ou Z96.1 EYE EXAM (SIMPLE-NONBILLABLE)   2. 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 Presbyopia H52.4 REFRACTION        Plan:  Glasses Rx given - optional.  Use artificial tears up to 4 times daily both eyes.  (Refresh Tears, Systane Ultra/Balance, or Theratears)  Call in August 2019 for an appointment in December 2019 for Complete Exam.    Dr. Raines [phone]

## 2018-12-19 NOTE — PATIENT INSTRUCTIONS
Glasses Rx given - optional.  Use artificial tears up to 4 times daily both eyes.  (Refresh Tears, Systane Ultra/Balance, or Theratears)  Call in August 2019 for an appointment in December 2019 for Complete Exam.    Dr. Raines [phone]

## 2018-12-19 NOTE — LETTER
12/19/2018         RE: Alexandru Crews  [address] [place] [address]        Dear Colleague,    Thank you for referring your patient, Alexandru Crews, to the [facility]. Please see a copy of my visit note below.     Current Eye Medications: none      Subjective:  Here for complete today.  Has a red right eye for about three days now, on Warfarin daily. No pain with the eye, just a slight feeling that there is a coating over it. Recommended to him that he should not do anything about the occipital nerve.  He has had headaches for 3 days, but says he has to live with it.     Objective:  See Ophthalmology Exam.       Assessment:  Stable eye exam.      ICD-10-CM    1. Pseudophakia, Yag Caps, ou Z96.1 EYE EXAM (SIMPLE-NONBILLABLE)   2. 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 Presbyopia H52.4 REFRACTION        Plan:  Glasses Rx given - optional.  Use artificial tears up to 4 times daily both eyes.  (Refresh Tears, Systane Ultra/Balance, or Theratears)  Call in August 2019 for an appointment in December 2019 for Complete Exam.    Dr. Raines [phone]           Again, thank you for allowing me to participate in the care of your patient.        Sincerely,        Obie Raines MD

## 2018-12-26 ENCOUNTER — TELEPHONE (OUTPATIENT)
Dept: INTERNAL MEDICINE | Facility: CLINIC | Age: 83
End: 2018-12-26

## 2018-12-26 DIAGNOSIS — G89.4 CHRONIC PAIN SYNDROME: ICD-10-CM

## 2018-12-26 DIAGNOSIS — G89.29 CHRONIC PAIN: ICD-10-CM

## 2018-12-26 RX ORDER — OXYCODONE AND ACETAMINOPHEN 5; 325 MG/1; MG/1
1 TABLET ORAL 3 TIMES DAILY PRN
Qty: 90 TABLET | Refills: 0 | Status: SHIPPED | OUTPATIENT
Start: 2018-12-26 | End: 2019-01-28

## 2018-12-26 RX ORDER — MORPHINE SULFATE 15 MG/1
15 TABLET, FILM COATED, EXTENDED RELEASE ORAL EVERY 12 HOURS
Qty: 60 TABLET | Refills: 0 | Status: SHIPPED | OUTPATIENT
Start: 2018-12-26 | End: 2019-01-28

## 2018-12-26 NOTE — TELEPHONE ENCOUNTER
Addended by: MELVIN TA on: 8/28/2018 02:42 PM     Modules accepted: Orders     Please call patient back when completed  Patient requesting refills on percocet and morphine    [place] reviewed:  reviewed- no concerns    [place]  [email or web address]      Controlled Substance Refill Request for Percocet  Problem List Complete:  Yes   checked in past 3 months?  Yes today     Controlled Substance Refill Request for Morphine  Problem List Complete:  Yes   checked in past 3 months?  Yes today     Overview  Edited:  Arelis Ferrara RN  Today  Patient is followed by Paul Knowles MD for ongoing prescription of pain medication. All refills should be approved by this provider, or covering partner.     Medication(s): MS-Contin (Morphine Sulfate Controlled-Release) 15 milligrams 3 times a day , percocet 5/325 tablets 2 a day  Maximum quantity per month: 90/60  Clinic visit frequency required: Q 3 months      Controlled substance agreement:      Encounter-Level CSA - 4/25/16:                   Controlled Substance Agreement - Scan on 5/6/2016 1:14 PM : CONTROLLED SUBSTANCE AGREEMENT (below)                Pain Clinic evaluation in the past: Yes  Date/Location:      DIRE Total Score(s):  No flowsheet data found.     Last [facility] website verification: 12/26/18  [email or web address]     Controlled substance agreement: 8/24/18           Arelis Ferrara RN

## 2018-12-27 NOTE — TELEPHONE ENCOUNTER
Called patient left message that prescriptions were ready to  at .  Raquel Foster,       Alexandru picked up envelope 12-28-18

## 2018-12-28 ENCOUNTER — ANTICOAGULATION THERAPY VISIT (OUTPATIENT)
Dept: NURSING | Facility: CLINIC | Age: 83
End: 2018-12-28
Payer: MEDICARE

## 2018-12-28 DIAGNOSIS — I48.0 PAROXYSMAL ATRIAL FIBRILLATION (H): ICD-10-CM

## 2018-12-28 LAB — INR POINT OF CARE: 2.8 (ref 0.86–1.14)

## 2018-12-28 PROCEDURE — 99207 ZZC NO CHARGE NURSE ONLY: CPT

## 2018-12-28 PROCEDURE — 36416 COLLJ CAPILLARY BLOOD SPEC: CPT

## 2018-12-28 PROCEDURE — 85610 PROTHROMBIN TIME: CPT | Mod: QW

## 2018-12-28 NOTE — PATIENT INSTRUCTIONS
[facility]:  Please call [phone] to cancel and/or reschedule your appointment   Please call [phone] with any problems or questions regarding your Warfarin therapy.

## 2018-12-28 NOTE — PROGRESS NOTES
ANTICOAGULATION FOLLOW-UP CLINIC VISIT    Patient Name:  Alexandru Crews  Date:  2018  Contact Type:  Face to Face    SUBJECTIVE:     Patient Findings     Positives:   No Problem Findings           OBJECTIVE    INR Protime   Date Value Ref Range Status   2018 2.8 (A) 0.86 - 1.14 Final       ASSESSMENT / PLAN  INR assessment THER    Recheck INR In: 6 WEEKS    INR Location Clinic      Anticoagulation Summary  As of 2018    INR goal:   2.0-3.0   TTR:   90.4 % (2.7 y)   INR used for dosin.8 (2018)   Warfarin maintenance plan:   2.5 mg (2.5 mg x 1) every day   Full warfarin instructions:   2.5 mg every day   Weekly warfarin total:   17.5 mg   No change documented:   Evelyne Bethea RN   Plan last modified:   Allie Awan RN (2016)   Next INR check:   2019   Priority:   INR   Target end date:   Indefinite    Indications    Long-term (current) use of anticoagulants [Z79.01] [Z79.01]  Paroxysmal atrial fibrillation (H) [I48.0]             Anticoagulation Episode Summary     INR check location:       Preferred lab:       Send INR reminders to:    [facility]    Comments:         Anticoagulation Care Providers     Provider Role Specialty Phone number    Paul Knowles MD [facility] [phone]            See the Encounter Report to view Anticoagulation Flowsheet and Dosing Calendar (Go to Encounters tab in chart review, and find the Anticoagulation Therapy Visit)    Dosage adjustment made based on physician directed care plan.    Evelyne Bethea RN

## 2019-01-20 DIAGNOSIS — Z79.01 LONG TERM CURRENT USE OF ANTICOAGULANT THERAPY: ICD-10-CM

## 2019-01-20 DIAGNOSIS — I48.91 ATRIAL FIBRILLATION, UNSPECIFIED TYPE (H): ICD-10-CM

## 2019-01-21 RX ORDER — WARFARIN SODIUM 2.5 MG/1
TABLET ORAL
Qty: 90 TABLET | Refills: 0 | Status: SHIPPED | OUTPATIENT
Start: 2019-01-21 | End: 2019-04-21

## 2019-01-28 ENCOUNTER — TELEPHONE (OUTPATIENT)
Dept: FAMILY MEDICINE | Facility: CLINIC | Age: 84
End: 2019-01-28

## 2019-01-28 DIAGNOSIS — G89.4 CHRONIC PAIN SYNDROME: ICD-10-CM

## 2019-01-28 RX ORDER — MORPHINE SULFATE 15 MG/1
15 TABLET, FILM COATED, EXTENDED RELEASE ORAL EVERY 12 HOURS
Qty: 60 TABLET | Refills: 0 | Status: SHIPPED | OUTPATIENT
Start: 2019-01-28 | End: 2019-03-04

## 2019-01-28 RX ORDER — OXYCODONE AND ACETAMINOPHEN 5; 325 MG/1; MG/1
1 TABLET ORAL 3 TIMES DAILY PRN
Qty: 90 TABLET | Refills: 0 | Status: SHIPPED | OUTPATIENT
Start: 2019-01-28 | End: 2019-03-04

## 2019-01-28 NOTE — TELEPHONE ENCOUNTER
Patient called and left  on RN Hotline.   States he needs a refill on his 2 pain medications (morphine 15 mg) and Percocet.    Controlled Substance Refill Request for Morphine (MS Contin) 15 mg CR tablet  Problem List Complete:  Yes   checked in past 3 months?  Yes checked on 12/28/2018    Controlled Substance Refill Request for Oxycodone-acetaminophone (Percocet) 5/325 mg tablet  Problem List Complete:  Yes   checked in past 3 months?  Yes checked on 12/28/18    Please advise.     Beatriz Escalera, RN

## 2019-01-28 NOTE — TELEPHONE ENCOUNTER
MS Contin and Percocet prescriptions walked down to the [facility] pharmacy.  Emma Leach,

## 2019-02-08 ENCOUNTER — ANTICOAGULATION THERAPY VISIT (OUTPATIENT)
Dept: NURSING | Facility: CLINIC | Age: 84
End: 2019-02-08
Payer: MEDICARE

## 2019-02-08 DIAGNOSIS — I48.0 PAROXYSMAL ATRIAL FIBRILLATION (H): ICD-10-CM

## 2019-02-08 LAB — INR POINT OF CARE: 2.7 (ref 0.86–1.14)

## 2019-02-08 PROCEDURE — 85610 PROTHROMBIN TIME: CPT | Mod: QW

## 2019-02-08 PROCEDURE — 99207 ZZC NO CHARGE NURSE ONLY: CPT

## 2019-02-08 PROCEDURE — 36416 COLLJ CAPILLARY BLOOD SPEC: CPT

## 2019-02-08 NOTE — PATIENT INSTRUCTIONS
[facility]:  Please call [phone] to cancel and/or reschedule your appointment   Please call [phone] with any problems or questions regarding your Warfarin therapy.

## 2019-02-08 NOTE — PROGRESS NOTES
ANTICOAGULATION FOLLOW-UP CLINIC VISIT    Patient Name:  Alexandru Crews  Date:  2019  Contact Type:  Face to Face    SUBJECTIVE:     Patient Findings     Positives:   No Problem Findings    Comments:   Bleeding Signs/Symptoms: NO  Thromboembolic Signs/Symptoms: NO     Medication Changes:  NO  Dietary Changes:  NO  Bacterial/Viral Infection: NO     Missed Coumadin Doses: NO  Other Concerns:  NO             OBJECTIVE    INR Protime   Date Value Ref Range Status   2019 2.7 (A) 0.86 - 1.14 Final       ASSESSMENT / PLAN  No question data found.  Anticoagulation Summary  As of 2019    INR goal:   2.0-3.0   TTR:   90.8 % (2.8 y)   INR used for dosin.7 (2019)   Warfarin maintenance plan:   2.5 mg (2.5 mg x 1) every day   Full warfarin instructions:   2.5 mg every day   Weekly warfarin total:   17.5 mg   No change documented:   Rose Navarrete RN   Plan last modified:   Allie Awan RN (2016)   Next INR check:   3/22/2019   Priority:   INR   Target end date:   Indefinite    Indications    Long-term (current) use of anticoagulants [Z79.01] [Z79.01]  Paroxysmal atrial fibrillation (H) [I48.0]             Anticoagulation Episode Summary     INR check location:       Preferred lab:       Send INR reminders to:   [facility]    Comments:         Anticoagulation Care Providers     Provider Role Specialty Phone number    Paul Knowles MD [facility] Internal [facility] [phone]            See the Encounter Report to view Anticoagulation Flowsheet and Dosing Calendar (Go to Encounters tab in chart review, and find the Anticoagulation Therapy Visit)        Rose Navarrete RN

## 2019-03-04 DIAGNOSIS — G89.4 CHRONIC PAIN SYNDROME: ICD-10-CM

## 2019-03-04 RX ORDER — OXYCODONE AND ACETAMINOPHEN 5; 325 MG/1; MG/1
1 TABLET ORAL 3 TIMES DAILY PRN
Qty: 90 TABLET | Refills: 0 | Status: SHIPPED | OUTPATIENT
Start: 2019-03-04 | End: 2019-04-02

## 2019-03-04 RX ORDER — MORPHINE SULFATE 15 MG/1
15 TABLET, FILM COATED, EXTENDED RELEASE ORAL EVERY 12 HOURS
Qty: 60 TABLET | Refills: 0 | Status: SHIPPED | OUTPATIENT
Start: 2019-03-04 | End: 2019-04-02

## 2019-03-04 NOTE — TELEPHONE ENCOUNTER
LM for patient that Percocet and MS Contin prescriptions are ready for  at the first floor . Emma Leach,

## 2019-03-04 NOTE — TELEPHONE ENCOUNTER
Routing refill request to provider for review/approval because:  Drug not on the [facility] refill protocol     Controlled Substance Refill Request for Morphine (MS Contin) 15 mg tablet  Problem List Complete:  Yes   checked in past 3 months?  Yes , last checked on 12/28/2018.    Controlled Substance Refill Request for Oxycodone-acetaminophen   Problem List Complete:  Yes   checked in past 3 months?  Yes , last checked 12/28/2018    Please call and notify patient when Rx is ready to be picked up, wants it at .     Beatriz Escalera RN

## 2019-03-22 ENCOUNTER — ANTICOAGULATION THERAPY VISIT (OUTPATIENT)
Dept: NURSING | Facility: CLINIC | Age: 84
End: 2019-03-22
Payer: MEDICARE

## 2019-03-22 DIAGNOSIS — I48.0 PAROXYSMAL ATRIAL FIBRILLATION (H): ICD-10-CM

## 2019-03-22 LAB — INR POINT OF CARE: 2.4 (ref 0.86–1.14)

## 2019-03-22 PROCEDURE — 36416 COLLJ CAPILLARY BLOOD SPEC: CPT

## 2019-03-22 PROCEDURE — 85610 PROTHROMBIN TIME: CPT | Mod: QW

## 2019-03-22 PROCEDURE — 99207 ZZC NO CHARGE NURSE ONLY: CPT

## 2019-03-22 NOTE — PATIENT INSTRUCTIONS
[facility]:  Please call [phone] to cancel and/or reschedule your appointment   Please call [phone] with any problems or questions regarding your Warfarin therapy.

## 2019-03-22 NOTE — PROGRESS NOTES
ANTICOAGULATION FOLLOW-UP CLINIC VISIT    Patient Name:  Alexandru Crews  Date:  3/22/2019  Contact Type:  Face to Face    SUBJECTIVE:     Patient Findings     Comments:   Bleeding Signs/Symptoms: NO  Thromboembolic Signs/Symptoms: NO     Medication Changes:  NO  Dietary Changes:  NO  Bacterial/Viral Infection: NO     Missed Coumadin Doses: NO  Other Concerns:  NO             OBJECTIVE    INR Protime   Date Value Ref Range Status   2019 2.4 (A) 0.86 - 1.14 Final       ASSESSMENT / PLAN  No question data found.  Anticoagulation Summary  As of 3/22/2019    INR goal:   2.0-3.0   TTR:   91.1 % (2.9 y)   INR used for dosin.4 (3/22/2019)   Warfarin maintenance plan:   2.5 mg (2.5 mg x 1) every day   Full warfarin instructions:   2.5 mg every day   Weekly warfarin total:   17.5 mg   No change documented:   Rose Navarrete RN   Plan last modified:   Allie Awan RN (2016)   Next INR check:   5/3/2019   Priority:   INR   Target end date:   Indefinite    Indications    Long-term (current) use of anticoagulants [Z79.01] [Z79.01]  Paroxysmal atrial fibrillation (H) [I48.0]             Anticoagulation Episode Summary     INR check location:       Preferred lab:       Send INR reminders to:   [facility]    Comments:         Anticoagulation Care Providers     Provider Role Specialty Phone number    Paul Knowles MD [facility] [phone]            See the Encounter Report to view Anticoagulation Flowsheet and Dosing Calendar (Go to Encounters tab in chart review, and find the Anticoagulation Therapy Visit)        Rose Navarrete RN

## 2019-03-25 ENCOUNTER — TELEPHONE (OUTPATIENT)
Dept: INTERNAL MEDICINE | Facility: CLINIC | Age: 84
End: 2019-03-25

## 2019-03-26 NOTE — TELEPHONE ENCOUNTER
Submitted Prolia insurance verification request via [employer].  Please leave encounter open; will await response.    Kelli Gonsalez PharmD, [facility]  Medication Therapy Management Provider  Pager: [phone]

## 2019-04-01 ENCOUNTER — DOCUMENTATION ONLY (OUTPATIENT)
Dept: OPHTHALMOLOGY | Facility: CLINIC | Age: 84
End: 2019-04-01

## 2019-04-02 DIAGNOSIS — G89.4 CHRONIC PAIN SYNDROME: ICD-10-CM

## 2019-04-02 RX ORDER — MORPHINE SULFATE 15 MG/1
15 TABLET, FILM COATED, EXTENDED RELEASE ORAL EVERY 12 HOURS
Qty: 60 TABLET | Refills: 0 | Status: SHIPPED | OUTPATIENT
Start: 2019-04-02 | End: 2019-04-25

## 2019-04-02 RX ORDER — OXYCODONE AND ACETAMINOPHEN 5; 325 MG/1; MG/1
1 TABLET ORAL 3 TIMES DAILY PRN
Qty: 90 TABLET | Refills: 0 | Status: SHIPPED | OUTPATIENT
Start: 2019-04-02 | End: 2019-04-25

## 2019-04-02 NOTE — TELEPHONE ENCOUNTER
Received notification from "[employer] indicating that Prolia is no longer covered for Alexandru (we are seeing this more commonly occurring with dx of osteopenia).  Routing to team coordinators to notify pt.  Please have him schedule a f/up appt with me or Dr. Knowles to discuss alternative options.    Thanks!  Kelli Gonsalez, LarryD, [facility]  Medication Therapy Management Provider  Pager: [phone]

## 2019-04-02 NOTE — TELEPHONE ENCOUNTER
Percocet and Ms Contin prescriptions at  for .  Patient called and informed.  Appointment scheduled for Thursday, April 25th at 11:30 a.m. Emma Leach,

## 2019-04-02 NOTE — TELEPHONE ENCOUNTER
Patient left message on RN hotline requesting a refill on his 2 pain medications.  Patient would like a call back.      Controlled Substance Refill Request for Percocet and MS Contin  Problem List Complete:  Yes  Patient is followed by Paul Knowles MD for ongoing prescription of pain medication.  All refills should be approved by this provider, or covering partner.    Medication(s): MS-Contin (Morphine Sulfate Controlled-Release) 15 milligrams 3 times a day , percocet 5/325 tablets 2 a day  Maximum quantity per month: 90/60  Clinic visit frequency required: Q 3 months     Controlled substance agreement:  Encounter-Level CSA - 4/25/16:               Controlled Substance Agreement - Scan on 5/6/2016  1:14 PM : CONTROLLED SUBSTANCE AGREEMENT (below)            Pain Clinic evaluation in the past: Yes       Date/Location:      DIRE Total Score(s):  No flowsheet data found.    Last [facility] website verification:  12/26/18,4/2/19   [email or web address]    Controlled substance agreement: 8/24/18     checked in past 3 months?  Yes 4/2/19  [place] reviewed:  reviewed- no concerns, last filled on 3/8/19    [place]  [email or web address] (acquired by [employer])/    Josselyn Corona RN

## 2019-04-02 NOTE — TELEPHONE ENCOUNTER
Patient has prescriptions generated . Will need face to face encounter in 2 months [ out to a total of 6 months since last face to face encounter ]    Paul Knowles MD

## 2019-04-02 NOTE — TELEPHONE ENCOUNTER
Patient called and informed.  Appointment scheduled with Dr. Knowles for Thursday, April 25th at 11:30 a.m. Emma Leach,

## 2019-04-21 DIAGNOSIS — I48.91 ATRIAL FIBRILLATION, UNSPECIFIED TYPE (H): ICD-10-CM

## 2019-04-21 DIAGNOSIS — Z79.01 LONG TERM CURRENT USE OF ANTICOAGULANT THERAPY: ICD-10-CM

## 2019-04-22 RX ORDER — WARFARIN SODIUM 2.5 MG/1
TABLET ORAL
Qty: 90 TABLET | Refills: 0 | Status: SHIPPED | OUTPATIENT
Start: 2019-04-22 | End: 2019-09-16

## 2019-04-25 ENCOUNTER — OFFICE VISIT (OUTPATIENT)
Dept: FAMILY MEDICINE | Facility: CLINIC | Age: 84
End: 2019-04-25
Payer: MEDICARE

## 2019-04-25 VITALS
DIASTOLIC BLOOD PRESSURE: 60 MMHG | RESPIRATION RATE: 20 BRPM | HEART RATE: 90 BPM | TEMPERATURE: 97 F | HEIGHT: 62 IN | OXYGEN SATURATION: 97 % | BODY MASS INDEX: 22.26 KG/M2 | SYSTOLIC BLOOD PRESSURE: 96 MMHG | WEIGHT: 121 LBS

## 2019-04-25 DIAGNOSIS — R53.83 FATIGUE, UNSPECIFIED TYPE: ICD-10-CM

## 2019-04-25 DIAGNOSIS — Z95.0 PACEMAKER: ICD-10-CM

## 2019-04-25 DIAGNOSIS — H90.6 MIXED CONDUCTIVE AND SENSORINEURAL HEARING LOSS OF BOTH EARS: ICD-10-CM

## 2019-04-25 DIAGNOSIS — G89.4 CHRONIC PAIN SYNDROME: ICD-10-CM

## 2019-04-25 DIAGNOSIS — I50.30 (HFPEF) HEART FAILURE WITH PRESERVED EJECTION FRACTION (H): ICD-10-CM

## 2019-04-25 DIAGNOSIS — Z23 NEED FOR SHINGLES VACCINE: ICD-10-CM

## 2019-04-25 DIAGNOSIS — M85.80 OSTEOPENIA, UNSPECIFIED LOCATION: ICD-10-CM

## 2019-04-25 DIAGNOSIS — I48.0 PAROXYSMAL ATRIAL FIBRILLATION (H): Primary | ICD-10-CM

## 2019-04-25 PROCEDURE — 99214 OFFICE O/P EST MOD 30 MIN: CPT | Performed by: INTERNAL MEDICINE

## 2019-04-25 RX ORDER — MORPHINE SULFATE 15 MG/1
15 TABLET, FILM COATED, EXTENDED RELEASE ORAL EVERY 12 HOURS
Qty: 60 TABLET | Refills: 0 | Status: SHIPPED | OUTPATIENT
Start: 2019-04-25 | End: 2019-07-05

## 2019-04-25 RX ORDER — MORPHINE SULFATE 15 MG/1
15 TABLET, FILM COATED, EXTENDED RELEASE ORAL EVERY 12 HOURS
Qty: 60 TABLET | Refills: 0 | Status: SHIPPED | OUTPATIENT
Start: 2019-04-25 | End: 2019-04-25

## 2019-04-25 RX ORDER — OXYCODONE AND ACETAMINOPHEN 5; 325 MG/1; MG/1
1 TABLET ORAL 3 TIMES DAILY PRN
Qty: 90 TABLET | Refills: 0 | Status: SHIPPED | OUTPATIENT
Start: 2019-04-25 | End: 2019-04-25

## 2019-04-25 RX ORDER — OXYCODONE AND ACETAMINOPHEN 5; 325 MG/1; MG/1
1 TABLET ORAL 3 TIMES DAILY PRN
Qty: 90 TABLET | Refills: 0 | Status: SHIPPED | OUTPATIENT
Start: 2019-04-25 | End: 2019-07-31

## 2019-04-25 ASSESSMENT — MIFFLIN-ST. JEOR: SCORE: 1106.51

## 2019-04-25 NOTE — PROGRESS NOTES
SUBJECTIVE:   Alexnadru Crews is a 86 year old male who presents to clinic today for the following   health issues:       Paroxysmal atrial fibrillation (H)  Pacemaker  (HFpEF) heart failure with preserved ejection fraction (H)  Need for shingles vaccine  Osteopenia, unspecified location  Chronic pain syndrome  Fatigue, unspecified type  Mixed conductive and sensorineural hearing loss of both ears     Last appointment with me was 11-9-2018     Needs opioid pain medication refills     Concerns about Denosumab injection (Prolia) - this appears more to be a question about insurance coverage for this treatment ? Will need to review with pharmacist and / or medication therapy management pharmacist      Preventative healthcare maintenance goals including Heart failure action plan [printed] . ShingRx vaccination against herpes zoster, advanced directive counseling and discussion, urine drug screen, ALT [ liver test ], basic metabolic panel , continues with atrial fibrillation , last cardiac evaluation was 5-2-18, see care everywhere , follow up in 18 months recommended. He has a pacemaker and tachy-sam syndrome as well as paroxysmal atrial fibrillation , he maintains also, treatment with Betapace (sotalol)      Last complete set of laboratory studies from 6 months ago, can forego in my opinion today      Other treatment options for osteoporosis include Forteo (Teriparatide (rDNA origin) Injection) versus Reclast (Zoledronic Acid Injection) versus oral bone building drug like Actonel (Risedronate Sodium) or Boniva (Ibandronate Sodium) or Fosamax (alendronate)      Last DEXA scan from 9- showed This patient's risks based on available information, with the use of FRAX, are 7.8 % for major osteoporotic fracture and 3.3 % for hip fracture.   Based on these guidelines, treatment (in addition to calcium and vitamin D) is recommended for this patient, after ruling out other causes of osteoporosis.  This is meant as an  aid to clinical decision making; one must still use clinical judgement.    Chronic Pain   Prolia will no longer be covered by his insurance he claims. However, I suspect that this is due to the fact that he required a face to face visit with me in order to continue this treatment. I will discuss his case with [facility] pharmacist.     Cardiovascular   He reports that he notes atrial fibrillation symptoms are more frequent recently. He has been drinking little fluids. He denies additional dizziness or lightheadedness symptoms. Last visit with cardiologist was about a year ago. He plans to see cardiology and also discuss symptoms of fatigue (see below).     BP Readings from Last 6 Encounters:   04/25/19 96/60   12/04/18 139/82   11/09/18 120/60   05/22/18 126/62   03/13/18 112/70   02/20/18 132/66     Hearing Loss  He reports that since last week he's had almost no hearing in his left ear and right ear partial hearing loss. He says that at a previous visit he had similar hearing loss symptoms with no cerumen impaction. He has been using a prescription cream on the ear and is curious is whether or not he plugged this up when applying this treatment. He saw an ENT Dr. Doll on 12/04/19 who recommended an MRI if symptoms continue to fluctuate. His hearing loss symptoms have been waxing and waning with sudden loss and sudden regaining of hearing for some months now.  I will ask Dr. Doll if he should still have this MRI. Alexandru doesn't use hearing aids.    Fatigue   He also reports being easily fatigued and improved with rest.  Denies chest tightness, shortness of breath, weight loss. I spent some time exploring his symptoms and there are no obvious pathological process features. I suspect his symptoms are age related issues      Wt Readings from Last 5 Encounters:   04/25/19 54.9 kg (121 lb)   12/04/18 54.9 kg (121 lb)   11/09/18 55.1 kg (121 lb 8 oz)   05/22/18 54.4 kg (120 lb)   03/13/18 58.1 kg (128 lb)        Additional  Information   New shingrix vaccine discussed with patient today. He also reports symptoms of itching and is curious whether this is due to his prescription pain medication.     Additional history: as documented    Reviewed  and updated as needed this visit by clinical staff  Tobacco  Allergies  Meds  Med Hx  Surg Hx  Fam Hx  Soc Hx      Reviewed and updated as needed this visit by Provider       Patient Active Problem List   Diagnosis     Family history of colon cancer     Osteopenia     HYPERLIPIDEMIA LDL GOAL <160     Vitreous condensations, od > os     Pseudophakia, Yag Caps, ou     Chronic pain syndrome     Advanced directives, counseling/discussion     BPH (benign prostatic hyperplasia)     Paroxysmal atrial fibrillation (H)     History of shingles     Chronic fatigue     Lumbar spinal stenosis     DJD (degenerative joint disease), lumbar and thoracic     Diverticulosis of large intestine     H/O cardiac pacemaker     Hypertension goal BP (blood pressure) < 140/90     Long-term (current) use of anticoagulants [Z79.01]     Chronic nonintractable headache, unspecified headache type     Prediabetes     Chronic pain     Elevated glucose     De Quervain's disease (tenosynovitis)     Primary osteoarthritis of first carpometacarpal joint of left hand     Cubital tunnel syndrome, right     Primary osteoarthritis of right knee     History of total knee arthroplasty, left     Chronic atrial fibrillation (H)     Pacemaker     Past Surgical History:   Procedure Laterality Date     APPENDECTOMY  12/2004     ARTHROSCOPY KNEE RT/LT  1/2006    Rt & Lt, Dr Arriaga     BACK SURGERY  1978 / 2003    x 3 in 1978, fusions and one surgery in 2003     C APPENDECTOMY  12/31/2004     CARPAL TUNNEL RELEASE RT/LT  three    2 on left, one on right     CATARACT IOL, RT/LT       COLONOSCOPY  1995,2000,2005     INJECT EPIDURAL CERVICAL  5/24/2011    [facility]     INJECT EPIDURAL LUMBAR  11/9/2010    [facility]     INJECT  "EPIDURAL LUMBAR  2011    [facility]     INJECT EPIDURAL LUMBAR  2011    [facility]     LASER YAG CAPSULOTOMY  2016; 2016    left eye; right eye     PHACOEMULSIFICATION WITH STANDARD INTRAOCULAR LENS IMPLANT  2008; 2008    right eye; left eye     RECTAL SURGERY      fissurectomy and proctoplasty     RELEASE DEQUERVAINS WRIST Right 2017    DML     RELEASE TRIGGER FINGER      right hand     SHOULDER SURGERY      X four [ right x 2 and left x 2 ][[[[[     TUNA         Social History     Tobacco Use     Smoking status: Former Smoker     Years: 15.00     Types: Cigarettes     Last attempt to quit: 1975     Years since quittin.8     Smokeless tobacco: Never Used   Substance Use Topics     Alcohol use: Yes     Comment: 1 per week     Family History   Problem Relation Age of Onset     Cancer Mother         ovarian     Cancer - colorectal Brother          BP Readings from Last 3 Encounters:   19 96/60   18 139/82   18 120/60    Wt Readings from Last 3 Encounters:   19 54.9 kg (121 lb)   18 54.9 kg (121 lb)   18 55.1 kg (121 lb 8 oz)        ROS:  Constitutional, HEENT, cardiovascular, pulmonary, GI, , musculoskeletal, neuro, skin, endocrine and psych systems are negative, except as otherwise noted.    This document serves as a record of the services and decisions personally performed and made by Paul Knowles MD. It was created on his behalf by Leandro Reyna, a trained medical scribe. The creation of this document is based on the provider's statements to the medical scribe.  Leandro Reyna 11:59 AM 2019    OBJECTIVE:     BP 96/60   Pulse 90   Temp 97  F (36.1  C) (Oral)   Resp 20   Ht 1.572 m (5' 1.9\")   Wt 54.9 kg (121 lb)   SpO2 97%   BMI 22.20 kg/m    Body mass index is 22.2 kg/m .  GENERAL: healthy, alert and no distress  EYES: Eyes grossly normal to inspection, PERRL and conjunctivae and sclerae normal  HENT: ear canals " and TM's normal with no cerumen impaction, nose and mouth without ulcers or lesions  SKIN: no suspicious lesions or rashes to visible skin   NEURO: Normal strength and tone, mentation intact and speech normal  PSYCH: mentation appears normal, affect normal/bright    Diagnostic Test Results:  No results found for this or any previous visit (from the past 24 hour(s)).    ASSESSMENT/PLAN:     (I48.0) Paroxysmal atrial fibrillation (H)  (primary encounter diagnosis)  Comment: ongoing treatment   Plan: see care everywhere     (Z95.0) Pacemaker  Comment: as above   Plan: as above     (I50.30) (HFpEF) heart failure with preserved ejection fraction (H)  Comment: as above   Plan: as above     (Z23) Need for shingles vaccine  Comment: pharmacy router form given   Plan:     (M85.80) Osteopenia, unspecified location  Comment: will review and follow up with patient   Plan:     (G89.4) Chronic pain syndrome  Comment: refills provided   Plan: morphine (MS CONTIN) 15 MG CR tablet,         oxyCODONE-acetaminophen (PERCOCET) 5-325 MG         tablet, DISCONTINUED: morphine (MS CONTIN) 15         MG CR tablet, DISCONTINUED:         oxyCODONE-acetaminophen (PERCOCET) 5-325 MG         tablet, DISCONTINUED: morphine (MS CONTIN) 15         MG CR tablet, DISCONTINUED:         oxyCODONE-acetaminophen (PERCOCET) 5-325 MG         tablet            (R53.83) Fatigue, unspecified type  Comment: nonspecific   Plan: age related issues  . We discussed what to be on the watch for      (H90.6) Mixed conductive and sensorineural hearing loss of both ears  Comment: his smouldering loss of hearing symptoms aren't clear to me . Will discuss with Dr. Doll  Plan: next step not entirely clear to me     See Patient Instructions    The information in this document, created by the medical scribe for me, accurately reflects the services I personally performed and the decisions made by me. I have reviewed and approved this document for accuracy prior to leaving  the patient care area.  April 25, 2019 11:59 AM    Paul Knowles MD  [facility]

## 2019-04-25 NOTE — Clinical Note
Do you want to see this felipe or order the MRI of his labyrinth structures ? He's lost his hearing again

## 2019-04-25 NOTE — PROGRESS NOTES
Last appointment with me was 11-9-2018    Needs opioid pain medication refills    Concerns about Denosumab injection (Prolia)     Preventative healthcare maintenance goals including Heart failure action plan [printed] . ShingRx vaccination against herpes zoster, advanced directive counseling and discussion, urine drug screen, ALT [ liver test ], basic metabolic panel , continues with atrial fibrillation , last cardiac evaluation was 5-2-18, see care everywhere , follow up in 18 months recommended. He has a pacemaker and tachy-sam syndrome as well as paroxysmal atrial fibrillation , he maintains also, treatment with Betapace (sotalol)     Last complete set of laboratory studies from 6 months ago, can forego in my opinion today     Other treatments including Forteo (Teriparatide (rDNA origin) Injection) versus Reclast (Zoledronic Acid Injection) versus oral bone building drug like Actonel (Risedronate Sodium) or Boniva (Ibandronate Sodium) or Fosamax (alendronate)     Last DEXA scan from 9- showed This patient's risks based on available information, with the use of FRAX, are 7.8 % for major osteoporotic fracture and 3.3 % for hip fracture.   Based on these guidelines, treatment (in addition to calcium and vitamin D) is recommended for this patient, after ruling out other causes of osteoporosis.  This is meant as an aid to clinical decision making; one must still use clinical judgement.

## 2019-04-25 NOTE — PATIENT INSTRUCTIONS
We discussed quite a few things today     1. We will follow up with you regarding ongoing treatment with Denosumab injection (Prolia)     2. I will send your office visit from today over to Dr. Andrew Doll, [facility] and see if they want to order the MRI     3. Your fatigue to me seems most likely related to aging itself and I don't see clear cut evidence of a sinister dangerous diagnoses or pathological process.    4. Recommended the ShingRx vaccination against herpes zoster     5. Refills provided , recheck in 6 months [ we can redo your prescriptions in 3 months for the chronic pain medication ]    6. Low blood pressure readings can be improved by drinking more fluid ! 4-6 glasses of liquid per day     Paul Knowles MD

## 2019-04-25 NOTE — LETTER
My Heart Failure Action Plan   Name: Alexandru Crews    YOB: 1932   Date: 4/24/2019    My doctor: Paul Knowles     [address]  [place] [id number] [phone]  My Diagnosis: Combined Heart Failure   My Ejection Fraction: Over 50%    My Exercise Goal: 30 minutes daily  .     My Weight Goal:   Wt Readings from Last 2 Encounters:   12/04/18 54.9 kg (121 lb)   11/09/18 55.1 kg (121 lb 8 oz)     Weigh yourself daily using the same scale. If you gain more than 2 pounds in 24 hours or 5 pounds in a week call the clinic    My Diet Goal: No added salt    Emergency Room Visits:    Our goal is to improve your quality of life and help you avoid a visit to the emergency room or hospital.  If we work together, we can achieve this goal. But, if you feel you need to call 911 or go to the emergency room, please do so.  If you go to the emergency room, please bring your list of medicines and your daily weight chart with you.       GREEN ZONE     Doing well today    Weight gained is no more than 2 pounds a day or 5 pounds a week.    No swelling in feet, ankles, legs or stomach.    No more swelling than usual.    No more trouble breathing than usual.    No change in my sleep.    No other problems. Actions:    I am doing fine.  I will take my medicine, follow my diet, see my doctor, exercise, and watch for symptoms.           YELLOW ZONE         Having a bad day or flare up    Weight gain of more than 2 pounds in one day or 5 pounds in one week.    New swelling in ankle, leg, knee or thigh.    Bloating in belly, pants feel tighter.    Swelling in hands or face.    Coughing or trouble breathing while walking or talking.    Harder to breathe last night.    Have trouble sleeping, wake up short of breath.    Much more tired than usual.    Not eating.    Pain in my chest or bad leg cramps.    Feel weak or dizzy. Actions:    I need to take action and call my doctor or nurse  today.                 RED ZONE         Need medical care now    Weight gain of 5 pounds overnight.    Chest pain or pressure that does not go away.    Feel less alert.    Wheezing or have trouble breathing when at rest.    Cannot sleep lying down.    Cannot take my water pill.    Pass out or faint. Actions:    I need to call my doctor or nurse now!    Call 911 if I have chest pain or cannot breathe.

## 2019-04-26 ENCOUNTER — TELEPHONE (OUTPATIENT)
Dept: INTERNAL MEDICINE | Facility: CLINIC | Age: 84
End: 2019-04-26

## 2019-04-26 DIAGNOSIS — M81.0 OSTEOPOROSIS: Primary | ICD-10-CM

## 2019-04-26 NOTE — TELEPHONE ENCOUNTER
The only problem is the diagnosis. We can ask medication therapy management pharmacist to help arrange a changed diagnosis code to allow Denosumab injection (Prolia) coverage ?    Paul Knowles MD

## 2019-04-26 NOTE — TELEPHONE ENCOUNTER
Can you please just pass this information on to patient ? That I sent the message to Lavon and he directed patient to follow up with him for appointment for further input, if patient remains concerned     Paul Knowles MD

## 2019-04-26 NOTE — TELEPHONE ENCOUNTER
Called patient and left detailed message. Patient will call back if anything else is needed.     Leonila ESPOSITO CMA ([facility])

## 2019-04-26 NOTE — TELEPHONE ENCOUNTER
Patient called RN hotline to follow up on visit from yesterday, patient was looking for alternative to Prolia as it is not covered and wasn't sure if he gave the correct name for he medication and didn't want there to be confusion. Notes from visit yesterday do say that Prolia was discussed- advised patient of this and he will wait to hear back regarding alternatives.     Copied from 3/25/19 MTM note: Received notification from [employer] indicating that Prolia is no longer covered for Alexandru (we are seeing this more commonly occurring with dx of osteopenia).  Routing to team coordinators to notify pt.  Please have him schedule a f/up appt with [facility]TA[facility] or Dr. Knowles to discuss alternative options.  Please advise   Josselyn Corona RN

## 2019-04-30 NOTE — TELEPHONE ENCOUNTER
Dr. Knowles, if you are ok with me putting in a diagnosis of osteoporosis (although the patient has progressed back to osteopenia with treatment), I can try that and resubmit. Please let me know.     Monique Colmenares, PharmD  Medication Therapy Management Pharmacist  [phone]

## 2019-05-01 NOTE — TELEPHONE ENCOUNTER
Desmond Pereira, my Prolia account still isn't active - can you submit (with osteoporosis diagnosis) for Alexandru?    Thank you!   Monique Colmenares, PharmD  Medication Therapy Management Pharmacist  [phone]

## 2019-05-02 NOTE — TELEPHONE ENCOUNTER
It appears the [employer] site is down right now, will try again tomorrow!    Kelli Gonsalez PharmD, [facility]  Medication Therapy Management Provider  Pager: [phone]

## 2019-05-03 ENCOUNTER — ANTICOAGULATION THERAPY VISIT (OUTPATIENT)
Dept: NURSING | Facility: CLINIC | Age: 84
End: 2019-05-03
Payer: MEDICARE

## 2019-05-03 DIAGNOSIS — I48.0 PAROXYSMAL ATRIAL FIBRILLATION (H): ICD-10-CM

## 2019-05-03 LAB — INR POINT OF CARE: 3 (ref 0.86–1.14)

## 2019-05-03 PROCEDURE — 36416 COLLJ CAPILLARY BLOOD SPEC: CPT

## 2019-05-03 PROCEDURE — 99207 ZZC NO CHARGE NURSE ONLY: CPT

## 2019-05-03 PROCEDURE — 85610 PROTHROMBIN TIME: CPT | Mod: QW

## 2019-05-03 NOTE — PATIENT INSTRUCTIONS
[facility]:  Please call [phone] to cancel and/or reschedule your appointment   Please call [phone] with any problems or questions regarding your Warfarin therapy.

## 2019-05-03 NOTE — TELEPHONE ENCOUNTER
Resubmitted Prolia insurance verification request with dx of osteoporosis per Dr. Knowles.  Will await response.    Larry EsquivelD, [facility]  Medication Therapy Management Provider  Pager: [phone]

## 2019-05-03 NOTE — PROGRESS NOTES
ANTICOAGULATION FOLLOW-UP CLINIC VISIT    Patient Name:  Alexandru Crews  Date:  5/3/2019  Contact Type:  Face to Face    SUBJECTIVE:     Patient Findings     Comments:   Bleeding Signs/Symptoms: NO  Thromboembolic Signs/Symptoms: NO     Medication Changes:  NO  Dietary Changes:  NO  Bacterial/Viral Infection: NO     Missed Coumadin Doses: NO  Other Concerns:  NO             OBJECTIVE    INR Protime   Date Value Ref Range Status   05/03/2019 3.0 (A) 0.86 - 1.14 Final       ASSESSMENT / PLAN  No question data found.  Anticoagulation Summary  As of 5/3/2019    INR goal:   2.0-3.0   TTR:   91.5 % (3 y)   INR used for dosing:   3.0 (5/3/2019)   Warfarin maintenance plan:   2.5 mg (2.5 mg x 1) every day   Full warfarin instructions:   2.5 mg every day   Weekly warfarin total:   17.5 mg   No change documented:   Rose Navarrete RN   Plan last modified:   Allie Awan RN (4/8/2016)   Next INR check:   6/14/2019   Priority:   INR   Target end date:   Indefinite    Indications    Long-term (current) use of anticoagulants [Z79.01] [Z79.01]  Paroxysmal atrial fibrillation (H) [I48.0]             Anticoagulation Episode Summary     INR check location:       Preferred lab:       Send INR reminders to:   [facility]    Comments:         Anticoagulation Care Providers     Provider Role Specialty Phone number    Paul Knowles MD [facility] [phone]            See the Encounter Report to view Anticoagulation Flowsheet and Dosing Calendar (Go to Encounters tab in chart review, and find the Anticoagulation Therapy Visit)        Rose Navarrete RN

## 2019-05-08 ENCOUNTER — OFFICE VISIT (OUTPATIENT)
Dept: AUDIOLOGY | Facility: CLINIC | Age: 84
End: 2019-05-08
Payer: MEDICARE

## 2019-05-08 ENCOUNTER — OFFICE VISIT (OUTPATIENT)
Dept: OTOLARYNGOLOGY | Facility: CLINIC | Age: 84
End: 2019-05-08
Payer: MEDICARE

## 2019-05-08 VITALS
DIASTOLIC BLOOD PRESSURE: 62 MMHG | HEART RATE: 64 BPM | SYSTOLIC BLOOD PRESSURE: 117 MMHG | HEIGHT: 62 IN | RESPIRATION RATE: 12 BRPM | WEIGHT: 121 LBS | BODY MASS INDEX: 22.26 KG/M2 | OXYGEN SATURATION: 95 %

## 2019-05-08 DIAGNOSIS — H90.3 ASYMMETRIC SNHL (SENSORINEURAL HEARING LOSS): Primary | ICD-10-CM

## 2019-05-08 DIAGNOSIS — H90.3 SNHL (SENSORY-NEURAL HEARING LOSS), ASYMMETRICAL: Primary | ICD-10-CM

## 2019-05-08 PROCEDURE — 99214 OFFICE O/P EST MOD 30 MIN: CPT | Performed by: OTOLARYNGOLOGY

## 2019-05-08 PROCEDURE — 92567 TYMPANOMETRY: CPT | Mod: 52 | Performed by: AUDIOLOGIST

## 2019-05-08 PROCEDURE — 92557 COMPREHENSIVE HEARING TEST: CPT | Performed by: AUDIOLOGIST

## 2019-05-08 PROCEDURE — 99207 ZZC NO CHARGE LOS: CPT | Performed by: AUDIOLOGIST

## 2019-05-08 ASSESSMENT — MIFFLIN-ST. JEOR: SCORE: 1106.51

## 2019-05-08 NOTE — PATIENT INSTRUCTIONS
General Scheduling Information  To schedule your CT/MRI scan, please contact [place] [facility] at [phone]   [address] [place] [address]    To schedule your Surgery, please contact our Specialty Schedulers at [phone]    ENT Clinic Locations Clinic Hours Telephone Number     [facility]  [address] [place] [address]   Tuesday:       8:00am -- 4:00pm    Wednesday:  8:00am - 4:00pm   To schedule an appointment with   Dr. Doll,   please contact our   Specialty Scheduling Department at:     [phone]       G[facility] Huffman  [address] [place] [address]   Friday:          8:00am - 4:00pm         Urgent Care Locations Clinic Hours Telephone Numbers     Gerry Schwarz  [address] [place] [address]     Monday-Friday:     11:00pm - 9:00pm    Saturday-Sunday:  9:00am - 5:00pm   [phone]     Gerry Huffman  [address] [place] [address]     Monday-Friday:      5:00pm - 9:00pm     Saturday-Sunday:  9:00am - 5:00pm   [phone]

## 2019-05-08 NOTE — PROGRESS NOTES
AUDIOLOGY REPORT:    Patient was referred from ENT by Andrew Doll MD for audiology evaluation. Patient was last seen in this clinic for evaluation on 12/4/2018.  Patient reports a significant drop in hearing in his right ear which occurred suddenly about 3 weeks ago.    Testing:    Otoscopy:   Otoscopic exam indicates ears are clear of cerumen bilaterally     Tympanograms:    RIGHT: normal eardrum mobility     LEFT:   normal eardrum mobility    Thresholds:   Pure Tone Thresholds assessed using conventional audiometry with good reliability from 250-8000 Hz bilaterally using insert earphones and circumaural headphones      RIGHT:  borderline-normal sloping to severe sensorineural hearing loss at 1000 Hz and above    LEFT:    normal sloping to moderate sensorineural hearing loss at 6559-8381 Hz    Speech Reception Threshold:    RIGHT: 60 dB HL    LEFT:   25 dB HL  Results are in agreement with pure tone average.     Word Recognition Score:     RIGHT: 28% at 90 dB HL using NU-6 recorded word list.    LEFT:   100% at 65 dB HL using NU-6 recorded word list.    Discussed results with the patient.     Patient was returned to ENT for follow up.     Wilmer Sandra MA, CCC-A  Licensed Audiologist #[id number]  5/8/2019

## 2019-05-08 NOTE — PROGRESS NOTES
Chief Complaint - Hearing loss    History of Present Illness - Alexandru Crews is a 86 year old male who returns to me today with hearing loss in the right ear. It went out suddenly. This happened 4/14/2019. 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 He said he lost all of his hearing on the left then, but the next day it came back. When I saw him both ears were similar. This time the right ear hearing hasn't returned to normal. He can hear some louder sounds on the right. There is no history of chronic ear disease or ear surgery.  With regards to recreational, , and work-related noise exposure he has none. No family history of hearing loss at a young age. The patient denies vertigo, otalgia. He has worsening fatigue especially with exertion. He has a cardiac history.     Past Medical History -   Patient Active Problem List   Diagnosis     Family history of colon cancer     Osteopenia     HYPERLIPIDEMIA LDL GOAL <160     Vitreous condensations, od > os     Pseudophakia, Yag Caps, ou     Chronic pain syndrome     Advanced directives, counseling/discussion     BPH (benign prostatic hyperplasia)     Paroxysmal atrial fibrillation (H)     History of shingles     Chronic fatigue     Lumbar spinal stenosis     DJD (degenerative joint disease), lumbar and thoracic     Diverticulosis of large intestine     H/O cardiac pacemaker     Hypertension goal BP (blood pressure) < 140/90     Long-term (current) use of anticoagulants [Z79.01]     Chronic nonintractable headache, unspecified headache type     Prediabetes     Chronic pain     Elevated glucose     De Quervain's disease (tenosynovitis)     Primary osteoarthritis of first carpometacarpal joint of left hand     Cubital tunnel syndrome, right     Primary osteoarthritis of right knee     History of total knee arthroplasty, left     Chronic atrial fibrillation (H)     Pacemaker     (HFpEF) heart failure with preserved ejection  fraction (H)       Current Medications -   Current Outpatient Medications:      calcium carbonate 1250 (500 CA) MG CHEW, Takes one every other day-unsure of dose, Disp: , Rfl:      DAILY VITAMINS PO, 1 tablet daily, Disp: , Rfl:      denosumab (PROLIA) 60 MG/ML SOLN injection, Inject 60 mg Subcutaneous every 6 months, Disp: , Rfl:      FIBER PO, Take 1 tsp. by mouth daily , Disp: , Rfl:      metoprolol (TOPROL-XL) 25 MG 24 hr tablet, Take 25 mg by mouth 2 times daily, Disp: , Rfl:      morphine (MS CONTIN) 15 MG CR tablet, Take 1 tablet (15 mg) by mouth every 12 hours 28 days or more between refills for controlled medications, Disp: 60 tablet, Rfl: 0     oxyCODONE-acetaminophen (PERCOCET) 5-325 MG tablet, Take 1 tablet by mouth 3 times daily as needed for moderate to severe pain 28 days or more between refills for controlled medications, Disp: 90 tablet, Rfl: 0     senna-docusate (SENOKOT-S;PERICOLACE) 8.6-50 MG per tablet, Take 1 tablet by mouth 2 times daily , Disp: , Rfl:      sotalol (BETAPACE) 120 MG tablet, Take 120 mg by mouth 2 times daily , Disp: , Rfl:      triamcinolone (KENALOG) 0.1 % cream, Apply topically 2 times daily as needed to affected area as directed., Disp: 60 g, Rfl: 2     VITAMIN D, CHOLECALCIFEROL, PO, Take 1,000 Units by mouth 2 times daily , Disp: , Rfl:      warfarin (COUMADIN) 2.5 MG tablet, TAKE ONE TABLET BY MOUTH EVERY DAY AS DIRECTED, Disp: 90 tablet, Rfl: 0    Allergies -   Allergies   Allergen Reactions     Lactose Nausea and Vomiting     Sulindac      Upset stomach     Tramadol      Itching     Valdecoxib Itching     Vicodin [Hydrocodone-Acetaminophen]      Itching     Vioxx Itching     Rofecoxib Itching and Rash     Sulfa Drugs Rash     Muscle aches       Social History -   Social History     Social History     Marital status:      Spouse name: N/A     Number of children: N/A     Years of education: N/A     Social History Main Topics     Smoking status: Former Smoker      "Years: 15.00     Types: Cigarettes     Quit date: 6/30/1975     Smokeless tobacco: Never Used     Alcohol use Yes      Comment: 1 per week     Drug use: No     Sexual activity: Not Currently     Partners: Female     Other Topics Concern     None     Social History Narrative       Family History -   Family History   Problem Relation Age of Onset     Cancer Mother         ovarian     Cancer - colorectal Brother        Review of Systems - As per HPI and PMHx, otherwise 7 system review of the head and neck negative.    Physical Exam  /62   Pulse 64   Resp 12   Ht 1.572 m (5' 1.9\")   Wt 54.9 kg (121 lb)   SpO2 95%   BMI 22.20 kg/m    General - The patient is in no distress.  Alert and oriented to person and place, answers questions and cooperates with examination appropriately. Seems fatigued.   Voice and Breathing - The patient was breathing comfortably without the use of accessory muscles. There was no wheezing, stridor, or stertor.  The patients voice was clear and strong.  Ears - The auricles are normal. The tympanic membranes are normal in appearance, bony landmarks are intact.  No retraction, perforation, or masses.  No fluid or purulence was seen in the external canal or the middle ear. No evidence of infection of the middle ear or external canal, cerumen was normal in appearance.  Eyes - Extraocular movements intact.  Sclera were not icteric or injected.  Neck - Palpation of the occipital, submental, submandibular, internal jugular chain, and supraclavicular nodes did not demonstrate any abnormal lymph nodes or masses. Parotid glands had no masses. Palpation of the thyroid was soft and smooth, with no nodules or goiter appreciated.  The trachea was mobile and midline.  Neurological - Cranial nerves 2 through 12 were grossly intact. House-Brackmann grade 1 out of 6 bilaterally.     Audiogram - type A tymps. Bilateral down-sloping sensorineural hearing loss both ears, but the right is significantly " reduced. A significant change since his last audiogram.      Assessment and Plan - Alexandru Crews is a 86 year old male who returns to me today with sudden onset right sensorineural hearing loss. I am unsure the etiology behind the sudden drop in hearing with subsequent return on the left, followed by a sudden drop without return on the right.  We did discuss the possibility of vasospasm, viral infection, or possibly but unlikely retrocochlear pathology or tumor. I recommend an MRI to rule-out retrococlear pathology. We can then consider systemic steroids or a transtympanic dexamethasone injection. I worry about systemic steroids given how he has felt. I want him to see his cardiologist to make sure his fatigue isn't due to worsening heart disease. Some studies have shown correlation of sudden sensorineural hearing loss and vascular disease. Return following MRI.     Andrew Doll MD  Otolaryngology  [facility]

## 2019-05-08 NOTE — TELEPHONE ENCOUNTER
Received notification patient is covered for Prolia with osteoporosis dx (M81.0). Expected out of pocket cost is $0.    Routing to [employer] to take from here.    Monique Colmenares, PharmD  Medication Therapy Management Pharmacist  [phone]

## 2019-05-08 NOTE — LETTER
5/8/2019         RE: Alexandru Crews  [address] [place] [address]        Dear Colleague,    Thank you for referring your patient, Alexandru Crews, to the [facility]. Please see a copy of my visit note below.    Chief Complaint - Hearing loss    History of Present Illness - Alexandru Crews is a 86 year old male who returns to me today with hearing loss in the right ear. It went out suddenly. This happened 4/14/2019. 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 He said he lost all of his hearing on the left then, but the next day it came back. When I saw him both ears were similar. This time the right ear hearing hasn't returned to normal. He can hear some louder sounds on the right. There is no history of chronic ear disease or ear surgery.  With regards to recreational, , and work-related noise exposure he has none. No family history of hearing loss at a young age. The patient denies vertigo, otalgia. He has worsening fatigue especially with exertion. He has a cardiac history.     Past Medical History -   Patient Active Problem List   Diagnosis     Family history of colon cancer     Osteopenia     HYPERLIPIDEMIA LDL GOAL <160     Vitreous condensations, od > os     Pseudophakia, Yag Caps, ou     Chronic pain syndrome     Advanced directives, counseling/discussion     BPH (benign prostatic hyperplasia)     Paroxysmal atrial fibrillation (H)     History of shingles     Chronic fatigue     Lumbar spinal stenosis     DJD (degenerative joint disease), lumbar and thoracic     Diverticulosis of large intestine     H/O cardiac pacemaker     Hypertension goal BP (blood pressure) < 140/90     Long-term (current) use of anticoagulants [Z79.01]     Chronic nonintractable headache, unspecified headache type     Prediabetes     Chronic pain     Elevated glucose     De Quervain's disease (tenosynovitis)     Primary osteoarthritis of first  carpometacarpal joint of left hand     Cubital tunnel syndrome, right     Primary osteoarthritis of right knee     History of total knee arthroplasty, left     Chronic atrial fibrillation (H)     Pacemaker     (HFpEF) heart failure with preserved ejection fraction (H)       Current Medications -   Current Outpatient Medications:      calcium carbonate 1250 (500 CA) MG CHEW, Takes one every other day-unsure of dose, Disp: , Rfl:      DAILY VITAMINS PO, 1 tablet daily, Disp: , Rfl:      denosumab (PROLIA) 60 MG/ML SOLN injection, Inject 60 mg Subcutaneous every 6 months, Disp: , Rfl:      FIBER PO, Take 1 tsp. by mouth daily , Disp: , Rfl:      metoprolol (TOPROL-XL) 25 MG 24 hr tablet, Take 25 mg by mouth 2 times daily, Disp: , Rfl:      morphine (MS CONTIN) 15 MG CR tablet, Take 1 tablet (15 mg) by mouth every 12 hours 28 days or more between refills for controlled medications, Disp: 60 tablet, Rfl: 0     oxyCODONE-acetaminophen (PERCOCET) 5-325 MG tablet, Take 1 tablet by mouth 3 times daily as needed for moderate to severe pain 28 days or more between refills for controlled medications, Disp: 90 tablet, Rfl: 0     senna-docusate (SENOKOT-S;PERICOLACE) 8.6-50 MG per tablet, Take 1 tablet by mouth 2 times daily , Disp: , Rfl:      sotalol (BETAPACE) 120 MG tablet, Take 120 mg by mouth 2 times daily , Disp: , Rfl:      triamcinolone (KENALOG) 0.1 % cream, Apply topically 2 times daily as needed to affected area as directed., Disp: 60 g, Rfl: 2     VITAMIN D, CHOLECALCIFEROL, PO, Take 1,000 Units by mouth 2 times daily , Disp: , Rfl:      warfarin (COUMADIN) 2.5 MG tablet, TAKE ONE TABLET BY MOUTH EVERY DAY AS DIRECTED, Disp: 90 tablet, Rfl: 0    Allergies -   Allergies   Allergen Reactions     Lactose Nausea and Vomiting     Sulindac      Upset stomach     Tramadol      Itching     Valdecoxib Itching     Vicodin [Hydrocodone-Acetaminophen]      Itching     Vioxx Itching     Rofecoxib Itching and Rash     Sulfa Drugs  "Rash     Muscle aches       Social History -   Social History     Social History     Marital status:      Spouse name: N/A     Number of children: N/A     Years of education: N/A     Social History Main Topics     Smoking status: Former Smoker     Years: 15.00     Types: Cigarettes     Quit date: 6/30/1975     Smokeless tobacco: Never Used     Alcohol use Yes      Comment: 1 per week     Drug use: No     Sexual activity: Not Currently     Partners: Female     Other Topics Concern     None     Social History Narrative       Family History -   Family History   Problem Relation Age of Onset     Cancer Mother         ovarian     Cancer - colorectal Brother        Review of Systems - As per HPI and PMHx, otherwise 7 system review of the head and neck negative.    Physical Exam  /62   Pulse 64   Resp 12   Ht 1.572 m (5' 1.9\")   Wt 54.9 kg (121 lb)   SpO2 95%   BMI 22.20 kg/m     General - The patient is in no distress.  Alert and oriented to person and place, answers questions and cooperates with examination appropriately. Seems fatigued.   Voice and Breathing - The patient was breathing comfortably without the use of accessory muscles. There was no wheezing, stridor, or stertor.  The patients voice was clear and strong.  Ears - The auricles are normal. The tympanic membranes are normal in appearance, bony landmarks are intact.  No retraction, perforation, or masses.  No fluid or purulence was seen in the external canal or the middle ear. No evidence of infection of the middle ear or external canal, cerumen was normal in appearance.  Eyes - Extraocular movements intact.  Sclera were not icteric or injected.  Neck - Palpation of the occipital, submental, submandibular, internal jugular chain, and supraclavicular nodes did not demonstrate any abnormal lymph nodes or masses. Parotid glands had no masses. Palpation of the thyroid was soft and smooth, with no nodules or goiter appreciated.  The trachea was " mobile and midline.  Neurological - Cranial nerves 2 through 12 were grossly intact. House-Brackmann grade 1 out of 6 bilaterally.     Audiogram - type A tymps. Bilateral down-sloping sensorineural hearing loss both ears, but the right is significantly reduced. A significant change since his last audiogram.      Assessment and Plan - Alexandru Crews is a 86 year old male who returns to me today with sudden onset right sensorineural hearing loss. I am unsure the etiology behind the sudden drop in hearing with subsequent return on the left, followed by a sudden drop without return on the right.  We did discuss the possibility of vasospasm, viral infection, or possibly but unlikely retrocochlear pathology or tumor. I recommend an MRI to rule-out retrococlear pathology. We can then consider systemic steroids or a transtympanic dexamethasone injection. I worry about systemic steroids given how he has felt. I want him to see his cardiologist to make sure his fatigue isn't due to worsening heart disease. Some studies have shown correlation of sudden sensorineural hearing loss and vascular disease. Return following MRI.     Andrew Doll MD  Otolaryngology  [facility]        Again, thank you for allowing me to participate in the care of your patient.        Sincerely,        Andrew Doll MD

## 2019-05-09 ENCOUNTER — TELEPHONE (OUTPATIENT)
Dept: OTOLARYNGOLOGY | Facility: CLINIC | Age: 84
End: 2019-05-09

## 2019-05-09 NOTE — TELEPHONE ENCOUNTER
Spoke with patient and informed him that I called and spoke with radiology at [facility] and faxed over his referral for MRI with a pacemaker to the number they provided. A lady named Fariba took the call and said the fax would go straight to her and that she would call the patient to get it scheduled. Alexandru agreed and that if he did not hear from them sometime today that he would call them

## 2019-05-09 NOTE — TELEPHONE ENCOUNTER
Patient notified and agreed to proceed with Prolia injection  He has not had any dental work involving jaw bone in the last 1 month  Appointment made with RN on 5/23/19 per patient's request (immediately prior to his appointment with Dr. Knowles for wellness exam that day)    Prolia injection order placed into epic  Emailed Evelyne Carballo CMA, to order in med    Routing to Dr. Harley- do you need any labs for prolia prior to the injection? If so, please also add any previsit labs needed. Otherwise, patient would like to avoid multiple trips and will just get previsit labs at the wellness exam if ok    Please close encounter if no labs are needed for the injection- patient is not expecting a call back unless lab appointment is needed    Arelis Ferrara RN

## 2019-05-09 NOTE — TELEPHONE ENCOUNTER
Patient calling. He needs to go to [facility] for mri with a pacemaker. Please send order to [facility].

## 2019-05-15 ENCOUNTER — TELEPHONE (OUTPATIENT)
Dept: OTOLARYNGOLOGY | Facility: CLINIC | Age: 84
End: 2019-05-15

## 2019-05-15 DIAGNOSIS — H90.3 SNHL (SENSORY-NEURAL HEARING LOSS), ASYMMETRICAL: Primary | ICD-10-CM

## 2019-05-15 RX ORDER — PREDNISONE 20 MG/1
TABLET ORAL
Qty: 13 TABLET | Refills: 0 | Status: SHIPPED | OUTPATIENT
Start: 2019-05-15 | End: 2019-08-05

## 2019-05-15 NOTE — TELEPHONE ENCOUNTER
I spoke with Alexandru on the phone.  I gave him his MRI results which show no cause for his asymmetric sensorineural hearing loss.  I recommend a trial of prednisone burst and taper to try to regain the hearing.  I sent this to come.  He is not having any nausea and therefore he does not need any Zofran.

## 2019-05-15 NOTE — TELEPHONE ENCOUNTER
Spoke with patient and informed him that zofran rx was sent.  He is requesting that it be sent to [facility] pharmacy in [place].    Blayne Metz RN....5/15/2019 8:34 AM '

## 2019-05-15 NOTE — TELEPHONE ENCOUNTER
Called patient however phone line was busy.  Will try calling again later.    Blayne Metz RN....5/15/2019 8:02 AM

## 2019-05-23 ENCOUNTER — TELEPHONE (OUTPATIENT)
Dept: INTERNAL MEDICINE | Facility: CLINIC | Age: 84
End: 2019-05-23

## 2019-05-23 ENCOUNTER — ALLIED HEALTH/NURSE VISIT (OUTPATIENT)
Dept: NURSING | Facility: CLINIC | Age: 84
End: 2019-05-23
Payer: MEDICARE

## 2019-05-23 ENCOUNTER — OFFICE VISIT (OUTPATIENT)
Dept: FAMILY MEDICINE | Facility: CLINIC | Age: 84
End: 2019-05-23
Payer: MEDICARE

## 2019-05-23 ENCOUNTER — ANTICOAGULATION THERAPY VISIT (OUTPATIENT)
Dept: NURSING | Facility: CLINIC | Age: 84
End: 2019-05-23
Payer: MEDICARE

## 2019-05-23 VITALS
RESPIRATION RATE: 20 BRPM | BODY MASS INDEX: 22.2 KG/M2 | OXYGEN SATURATION: 98 % | SYSTOLIC BLOOD PRESSURE: 116 MMHG | WEIGHT: 121 LBS | HEART RATE: 93 BPM | DIASTOLIC BLOOD PRESSURE: 62 MMHG

## 2019-05-23 DIAGNOSIS — Z00.00 MEDICARE ANNUAL WELLNESS VISIT, SUBSEQUENT: ICD-10-CM

## 2019-05-23 DIAGNOSIS — R09.3 ABNORMAL SPUTUM COLOR: ICD-10-CM

## 2019-05-23 DIAGNOSIS — I48.0 PAROXYSMAL ATRIAL FIBRILLATION (H): ICD-10-CM

## 2019-05-23 DIAGNOSIS — Z13.220 SCREENING FOR HYPERLIPIDEMIA: Primary | ICD-10-CM

## 2019-05-23 DIAGNOSIS — M81.0 OSTEOPOROSIS: Primary | ICD-10-CM

## 2019-05-23 DIAGNOSIS — M85.80 OSTEOPENIA, UNSPECIFIED LOCATION: Primary | ICD-10-CM

## 2019-05-23 DIAGNOSIS — Z23 NEED FOR SHINGLES VACCINE: ICD-10-CM

## 2019-05-23 DIAGNOSIS — R73.09 ELEVATED GLUCOSE: ICD-10-CM

## 2019-05-23 DIAGNOSIS — M85.80 OSTEOPENIA: ICD-10-CM

## 2019-05-23 DIAGNOSIS — I50.30 (HFPEF) HEART FAILURE WITH PRESERVED EJECTION FRACTION (H): ICD-10-CM

## 2019-05-23 DIAGNOSIS — E78.5 HYPERLIPIDEMIA LDL GOAL <160: ICD-10-CM

## 2019-05-23 DIAGNOSIS — Z71.89 ADVANCED DIRECTIVES, COUNSELING/DISCUSSION: ICD-10-CM

## 2019-05-23 DIAGNOSIS — I48.20 CHRONIC ATRIAL FIBRILLATION (H): ICD-10-CM

## 2019-05-23 DIAGNOSIS — I10 HYPERTENSION GOAL BP (BLOOD PRESSURE) < 140/90: ICD-10-CM

## 2019-05-23 LAB
ALT SERPL W P-5'-P-CCNC: 32 U/L (ref 0–70)
ANION GAP SERPL CALCULATED.3IONS-SCNC: 5 MMOL/L (ref 3–14)
BUN SERPL-MCNC: 23 MG/DL (ref 7–30)
CALCIUM SERPL-MCNC: 9.8 MG/DL (ref 8.5–10.1)
CHLORIDE SERPL-SCNC: 104 MMOL/L (ref 94–109)
CHOLEST SERPL-MCNC: 158 MG/DL
CO2 SERPL-SCNC: 28 MMOL/L (ref 20–32)
CREAT SERPL-MCNC: 0.74 MG/DL (ref 0.66–1.25)
ERYTHROCYTE [DISTWIDTH] IN BLOOD BY AUTOMATED COUNT: 15.4 % (ref 10–15)
GFR SERPL CREATININE-BSD FRML MDRD: 83 ML/MIN/{1.73_M2}
GLUCOSE SERPL-MCNC: 122 MG/DL (ref 70–99)
HBA1C MFR BLD: 5.4 % (ref 0–5.6)
HCT VFR BLD AUTO: 39.9 % (ref 40–53)
HDLC SERPL-MCNC: 64 MG/DL
HGB BLD-MCNC: 13.1 G/DL (ref 13.3–17.7)
INR POINT OF CARE: 5 (ref 0.86–1.14)
LDLC SERPL CALC-MCNC: 81 MG/DL
MCH RBC QN AUTO: 30.9 PG (ref 26.5–33)
MCHC RBC AUTO-ENTMCNC: 32.8 G/DL (ref 31.5–36.5)
MCV RBC AUTO: 94 FL (ref 78–100)
NONHDLC SERPL-MCNC: 94 MG/DL
PLATELET # BLD AUTO: 126 10E9/L (ref 150–450)
POTASSIUM SERPL-SCNC: 4.7 MMOL/L (ref 3.4–5.3)
RBC # BLD AUTO: 4.24 10E12/L (ref 4.4–5.9)
SODIUM SERPL-SCNC: 137 MMOL/L (ref 133–144)
TRIGL SERPL-MCNC: 64 MG/DL
WBC # BLD AUTO: 4.2 10E9/L (ref 4–11)

## 2019-05-23 PROCEDURE — 85027 COMPLETE CBC AUTOMATED: CPT | Performed by: INTERNAL MEDICINE

## 2019-05-23 PROCEDURE — G0439 PPPS, SUBSEQ VISIT: HCPCS | Performed by: INTERNAL MEDICINE

## 2019-05-23 PROCEDURE — 36416 COLLJ CAPILLARY BLOOD SPEC: CPT

## 2019-05-23 PROCEDURE — 99207 ZZC NO CHARGE NURSE ONLY: CPT

## 2019-05-23 PROCEDURE — 96372 THER/PROPH/DIAG INJ SC/IM: CPT

## 2019-05-23 PROCEDURE — 80061 LIPID PANEL: CPT | Performed by: INTERNAL MEDICINE

## 2019-05-23 PROCEDURE — 85610 PROTHROMBIN TIME: CPT | Mod: QW

## 2019-05-23 PROCEDURE — 83036 HEMOGLOBIN GLYCOSYLATED A1C: CPT | Performed by: INTERNAL MEDICINE

## 2019-05-23 PROCEDURE — 99214 OFFICE O/P EST MOD 30 MIN: CPT | Mod: 25 | Performed by: INTERNAL MEDICINE

## 2019-05-23 PROCEDURE — 80048 BASIC METABOLIC PNL TOTAL CA: CPT | Performed by: INTERNAL MEDICINE

## 2019-05-23 PROCEDURE — 84460 ALANINE AMINO (ALT) (SGPT): CPT | Performed by: INTERNAL MEDICINE

## 2019-05-23 RX ORDER — AMIODARONE HYDROCHLORIDE 200 MG/1
200 TABLET ORAL DAILY
COMMUNITY
Start: 2019-05-23 | End: 2019-07-05

## 2019-05-23 ASSESSMENT — ACTIVITIES OF DAILY LIVING (ADL): CURRENT_FUNCTION: HOUSEWORK REQUIRES ASSISTANCE

## 2019-05-23 NOTE — TELEPHONE ENCOUNTER
Patient is scheduled for prolia injection at 11am- this should be canceled because medication is not covered, patient can discuss further at visit with provider today at 11:30.   Josselyn Corona RN

## 2019-05-23 NOTE — PROGRESS NOTES
"SUBJECTIVE:   Alexandru Crews is a 86 year old male who presents for Preventive Visit.    Are you in the first 12 months of your Medicare coverage?  No    Healthy Habits:    In general, how would you rate your overall health?  Good    Frequency of exercise:  None    Do you usually eat at least 4 servings of fruit and vegetables a day, include whole grains    & fiber and avoid regularly eating high fat or \"junk\" foods?  No    Taking medications regularly:  Yes    Barriers to taking medications:  Problems remembering to take them (Confusing )    Medication side effects:  Not applicable    Ability to successfully perform activities of daily living:  Housework requires assistance (family, and hire people )    Home Safety:  No safety concerns identified    Hearing Impairment:  Difficulty understanding speech on the telephone (No hearing in the right ear )    In the past 6 months, have you been bothered by leaking of urine?  No    In general, how would you rate your overall mental or emotional health?  Good      PHQ-2 Total Score:    Additional concerns today:  No    Do you feel safe in your environment? Yes    Do you have a Health Care Directive? Yes: Patient states has Advance Directive and will bring in a copy to clinic.    Fall risk  Fallen 2 or more times in the past year?: Yes  Any fall with injury in the past year?: No    Cognitive Screening   1) Repeat 3 items (Leader, Season, Table)    2) Clock draw: NORMAL  3) 3 item recall: Recalls 3 objects  Results: 3 items recalled: COGNITIVE IMPAIRMENT LESS LIKELY    Mini-CogTM Copyright ALFREDO Nolen. Licensed by the author for use in [facility]; reprinted with permission (cory@.[email or web address]). All rights reserved.      Do you have sleep apnea, excessive snoring or daytime drowsiness?: no    Abnormal Sputum Color   He mentions that he has been coughing up some dark colored sputum over the last 3-4 weeks. He already had a chest x-ray and will take a posture of observation " with this. Denies chills and fevers and coughing is minimal.     Physical Deconditioning   Walking has become particularly difficult for him. However, he did make it into the clinic today and drive himself here without a wheelchair. He is using a cane at this time.     Mood  He reports that he doesn't feel depressed as much as he just wishes he could do a lot more than he can.     Hearing   At last visit with me he had complained of hearing loss and since then he followed up with ENT, had a brain MRI and had other testing including further follow-up with cardiology. He had some modifications to his medications after cardiology visit he says.     MR Head Brain    Clinical History: Asymmetric sensorineural hearing loss.    Technique: Multiplanar multisequence MRI of the brain and internal auditory canals. Exam performed without and with intravenous gadolinium.    Comparison: Head CT 1/24/2016.    Findings: The 7th and 8th cranial nerve complexes are unremarkable. There is no cerebellopontine angle cistern or IAC lesion. There is no abnormal enhancement. The mastoid air cells are clear. The cerebellum and brainstem are unremarkable.    There is mild cerebral volume loss. There is a small area of encephalomalacia in the mid right frontal lobe. No diffusion restriction. No evidence of recent infarction, mass or hemorrhage. Patchy FLAIR signal hyperintensity in the white matter is nonspecific, likely due to mild small vessel disease. The paranasal sinuses are clear.    Impression:  1. IAC structures are unremarkable bilaterally.  2. Small area of encephalomalacia in the right frontal lobe, likely due to previous ischemia versus trauma.  3. Mild small vessel disease.  [facility]    Additional Information   He does mention that he's had 2 falls in the last 6 months or so but no specific injuries noted from these.     Reviewed and updated as needed this visit by clinical staff  Tobacco  Allergies  Med Hx  Surg  Hx  Fam Hx  Soc Hx      Reviewed and updated as needed this visit by Provider        Social History     Tobacco Use     Smoking status: Former Smoker     Years: 15.00     Types: Cigarettes     Last attempt to quit: 1975     Years since quittin.9     Smokeless tobacco: Never Used   Substance Use Topics     Alcohol use: Yes     Comment: 1 per week     Alcohol Use 2017   Prescreen: >3 drinks/day or >7 drinks/week? The patient does not drink >3 drinks per day nor >7 drinks per week.       Current providers sharing in care for this patient include:   Patient Care Team:  Paul Knowles MD as PCP - General (Internal Medicine)  Paul Knowles MD as Assigned PCP  Kelli Gonsalez RPH as Pharmacist (Pharmacist)    The following health maintenance items are reviewed in Epic and correct as of today:  Health Maintenance   Topic Date Due     URINE DRUG SCREEN  1932     DEPRESSION ACTION PLAN  1932     ZOSTER IMMUNIZATION (1 of 2) 1982     ADVANCED DIRECTIVE PLANNING  2016     OP ANNUAL INR REFERRAL  2017     ALT  2019     BMP  2019     FALL RISK ASSESSMENT  2019     MEDICARE ANNUAL WELLNESS VISIT  2019     CBC  2019     LIPID  2019     BARBARA ASSESSMENT  2019     PHQ-9  2019     DIABETIC EYE EXAM  2019     DEXA  2021     HF ACTION PLAN  2022     DTAP/TDAP/TD IMMUNIZATION (4 - Td) 2023     INFLUENZA VACCINE  Completed     PNEUMOCOCCAL IMMUNIZATION 65+ LOW/MEDIUM RISK  Completed     IPV IMMUNIZATION  Aged Out     MENINGITIS IMMUNIZATION  Aged Out     BP Readings from Last 3 Encounters:   19 116/62   19 117/62   19 96/60    Wt Readings from Last 3 Encounters:   19 54.9 kg (121 lb)   19 54.9 kg (121 lb)   19 54.9 kg (121 lb)        Patient Active Problem List   Diagnosis     Family history of colon cancer     Osteopenia     HYPERLIPIDEMIA LDL GOAL <160     Vitreous condensations, od >  os     Pseudophakia, Yag Caps, ou     Chronic pain syndrome     Advanced directives, counseling/discussion     BPH (benign prostatic hyperplasia)     Paroxysmal atrial fibrillation (H)     History of shingles     Chronic fatigue     Lumbar spinal stenosis     DJD (degenerative joint disease), lumbar and thoracic     Diverticulosis of large intestine     H/O cardiac pacemaker     Hypertension goal BP (blood pressure) < 140/90     Long-term (current) use of anticoagulants [Z79.01]     Chronic nonintractable headache, unspecified headache type     Prediabetes     Chronic pain     Elevated glucose     De Quervain's disease (tenosynovitis)     Primary osteoarthritis of first carpometacarpal joint of left hand     Cubital tunnel syndrome, right     Primary osteoarthritis of right knee     History of total knee arthroplasty, left     Chronic atrial fibrillation (H)     Pacemaker     (HFpEF) heart failure with preserved ejection fraction (H)     Past Surgical History:   Procedure Laterality Date     APPENDECTOMY  12/2004     ARTHROSCOPY KNEE RT/LT  1/2006    Rt & Lt, Dr Arriaga     BACK SURGERY  1978 / 2003    x 3 in 1978, fusions and one surgery in 2003     C APPENDECTOMY  12/31/2004     CARPAL TUNNEL RELEASE RT/LT  three    2 on left, one on right     CATARACT IOL, RT/LT       COLONOSCOPY  1995,2000,2005     INJECT EPIDURAL CERVICAL  5/24/2011    [facility]     INJECT EPIDURAL LUMBAR  11/9/2010    [facility]     INJECT EPIDURAL LUMBAR  1/4/2011    [facility]     INJECT EPIDURAL LUMBAR  4/27/2011    [facility]     LASER YAG CAPSULOTOMY  11/2016; 12/2016    left eye; right eye     PHACOEMULSIFICATION WITH STANDARD INTRAOCULAR LENS IMPLANT  6/2008; 8/2008    right eye; left eye     RECTAL SURGERY      fissurectomy and proctoplasty     RELEASE DEQUERVAINS WRIST Right 04/28/2017    DML     RELEASE TRIGGER FINGER      right hand     SHOULDER SURGERY      X four [ right x 2 and left x 2 ][[[[[     TUNA       "   Social History     Tobacco Use     Smoking status: Former Smoker     Years: 15.00     Types: Cigarettes     Last attempt to quit: 1975     Years since quittin.9     Smokeless tobacco: Never Used   Substance Use Topics     Alcohol use: Yes     Comment: 1 per week     Family History   Problem Relation Age of Onset     Cancer Mother         ovarian     Cancer - colorectal Brother          Review of Systems  Constitutional, HEENT, cardiovascular, pulmonary, GI, , musculoskeletal, neuro, skin, endocrine and psych systems are negative, except as otherwise noted.    This document serves as a record of the services and decisions personally performed and made by Paul Knowles MD. It was created on his behalf by Leandro Reyna, a trained medical scribe. The creation of this document is based on the provider's statements to the medical scribe.  Leandro Reyna 11:54 AM May 23, 2019    OBJECTIVE:   /62   Pulse 93   Resp 20   Wt 54.9 kg (121 lb)   SpO2 98%   BMI 22.20 kg/m   Estimated body mass index is 22.2 kg/m  as calculated from the following:    Height as of 19: 1.572 m (5' 1.9\").    Weight as of this encounter: 54.9 kg (121 lb).  Physical Exam  GENERAL: healthy, alert and no distress  EYES: Eyes grossly normal to inspection, PERRL and conjunctivae and sclerae normal  HENT: ear canals and TM's normal, nose and mouth without ulcers or lesions  NECK: no adenopathy, no asymmetry, masses, or scars and thyroid normal to palpation  RESP: lungs clear to auscultation - no rales, rhonchi or wheezes  CV: regular rate and rhythm, normal S1 S2, no S3 or S4, no murmur, click or rub, no peripheral edema and peripheral pulses strong  ABDOMEN: soft, nontender, no hepatosplenomegaly, no masses and bowel sounds normal  MS: no gross musculoskeletal defects noted, no edema  SKIN: no suspicious lesions or rashes to visible skin   NEURO: Normal strength and tone, mentation intact and speech normal  PSYCH: mentation " appears normal, affect normal/bright  An additional point is that he pulled out a small package with his sputum from home and he wanted me to help him see what this was, it's dark colored and probably has blood in it, as an aside, the INR / coumadin monitoring nurse also came and commented that patient's INR was greater then 5 today. See assessment and plan section      Diagnostic Test Results:  Labs reviewed in Epic  No results found for this or any previous visit (from the past 24 hour(s)).    ASSESSMENT / PLAN:   (Z13.220) Screening for hyperlipidemia  (primary encounter diagnosis)  Comment: routine screening   Plan: Lipid panel reflex to direct LDL Fasting            (Z71.89) Advanced directives, counseling/discussion  Comment: counseling and discussion    Plan: see patient after-visit summary     (Z23) Need for shingles vaccine  Comment: pharmacy router form given   Plan:     (I10) Hypertension goal BP (blood pressure) < 140/90  Comment: controlled within acceptable limits   Plan: BASIC METABOLIC PANEL            (E78.5) Hyperlipidemia LDL goal <160  Comment: controlled within acceptable limits   Plan: Lipid panel reflex to direct LDL Fasting            (I48.0) Paroxysmal atrial fibrillation (H)  Comment: continue current plan of care    Plan: CBC with Platelets  , ALT, BASIC METABOLIC         PANEL        INR directions given by inr clinic     (R73.09) Elevated glucose  Comment: doubt clinical significance but warrants hemoglobin a1c  [ diabetes test ]   Plan: BASIC METABOLIC PANEL, Hemoglobin A1c            (I50.30) (HFpEF) heart failure with preserved ejection fraction (H)  Comment: no current evidence of active congestive heart failure   Plan: CBC with Platelets  , ALT, BASIC METABOLIC         PANEL            (R09.3) Abnormal sputum color  Comment: this event is of uncertain significance. Of course this is possibly hemoptysis and if he has continued production of sputum like this we will need a  "bronchoscopy and chest CT scan. His current history is entirely benign and he was not keen on aggressive measures today   Plan: we discussed what to be on the watch for  And update me if significant changes have taken place . Further follow up depending on how things go over the next few days     End of Life Planning:  Patient currently has an advanced directive: Yes.  Practitioner is supportive of decision.    COUNSELING:  Reviewed preventive health counseling, as reflected in patient instructions    Estimated body mass index is 22.2 kg/m  as calculated from the following:    Height as of 5/8/19: 1.572 m (5' 1.9\").    Weight as of this encounter: 54.9 kg (121 lb).    Weight management plan noted, stable and monitoring     reports that he quit smoking about 43 years ago. His smoking use included cigarettes. He quit after 15.00 years of use. He has never used smokeless tobacco.      Appropriate preventive services were discussed with this patient, including applicable screening as appropriate for cardiovascular disease, diabetes, osteopenia/osteoporosis, and glaucoma.  As appropriate for age/gender, discussed screening for colorectal cancer, prostate cancer, breast cancer, and cervical cancer. Checklist reviewing preventive services available has been given to the patient.    Reviewed patients plan of care and provided an AVS. The Complex Care Plan (for patients with higher acuity and needing more deliberate coordination of services) for Alexandru meets the Care Plan requirement. This Care Plan has been established and reviewed with the Patient.    Counseling Resources:  ATP IV Guidelines  Pooled Cohorts Equation Calculator  Breast Cancer Risk Calculator  FRAX Risk Assessment  ICSI Preventive Guidelines  Dietary Guidelines for Americans, 2010  USDA's MyPlate  ASA Prophylaxis  Lung CA Screening    The information in this document, created by the medical scribe for me, accurately reflects the services I personally " performed and the decisions made by me. I have reviewed and approved this document for accuracy prior to leaving the patient care area.  May 23, 2019 11:57 AM    Paul Knowles MD  [facility]    Identified Health Risks:

## 2019-05-23 NOTE — TELEPHONE ENCOUNTER
Spoke with patient he states he was told this is now covered.  See 4/26/19 encounter- prescription was resubmitted and is now covered.   Josselyn Corona RN

## 2019-05-23 NOTE — PATIENT INSTRUCTIONS
1) Please send us a copy of your living will (this is a health care directive) in order that we can scan this into your chart.     2) Remember that you are taking amiodarone and to make sure that cardiology is checking you for liver, lung and thyroid function.     3) Keep an eye on sputum and if you have any changes in this, if it continues or you develop other symptoms please follow-up.

## 2019-05-23 NOTE — TELEPHONE ENCOUNTER
Postponing this encounter-    Patient due for Prolia injection around 11/25/19    1. Please ask provider to put in a new order for Prolia injection if one is not current, and any lab orders if needed (it is up to the provider's discretion on how often labs are checked once Prolia injections have been started)    2. Please contact patient to schedule the injection with RN    3. Ask MA to re-order med 1 week in advance    Make sure patient has not had any dental work including the jaw bone (i.e teeth extraction) 1 month prior to injection  If insurance has changed or it is a new calendar year, will need [facility] pharmacist, Monique Colmenares, to do insurance verification again prior to scheduling and ordering med    Josselyn Corona RN

## 2019-05-23 NOTE — LETTER
[facility]  [address] [place] [address]    May 24, 2019    Alexandru Crews  [address] [place] [address]          Dear Alexandru,    All of these tests are within acceptable limits. Things look good !     Enclosed is a copy of your results.     Results for orders placed or performed in visit on 05/23/19   Lipid panel reflex to direct LDL Fasting   Result Value Ref Range    Cholesterol 158 <200 mg/dL    Triglycerides 64 <150 mg/dL    HDL Cholesterol 64 >39 mg/dL    LDL Cholesterol Calculated 81 <100 mg/dL    Non HDL Cholesterol 94 <130 mg/dL   CBC with Platelets     Result Value Ref Range    WBC 4.2 4.0 - 11.0 10e9/L    RBC Count 4.24 (L) 4.4 - 5.9 10e12/L    Hemoglobin 13.1 (L) 13.3 - 17.7 g/dL    Hematocrit 39.9 (L) 40.0 - 53.0 %    MCV 94 78 - 100 fl    MCH 30.9 26.5 - 33.0 pg    MCHC 32.8 31.5 - 36.5 g/dL    RDW 15.4 (H) 10.0 - 15.0 %    Platelet Count 126 (L) 150 - 450 10e9/L   ALT   Result Value Ref Range    ALT 32 0 - 70 U/L   BASIC METABOLIC PANEL   Result Value Ref Range    Sodium 137 133 - 144 mmol/L    Potassium 4.7 3.4 - 5.3 mmol/L    Chloride 104 94 - 109 mmol/L    Carbon Dioxide 28 20 - 32 mmol/L    Anion Gap 5 3 - 14 mmol/L    Glucose 122 (H) 70 - 99 mg/dL    Urea Nitrogen 23 7 - 30 mg/dL    Creatinine 0.74 0.66 - 1.25 mg/dL    GFR Estimate 83 >60 mL/min/[1.73_m2]    GFR Estimate If Black >90 >60 mL/min/[1.73_m2]    Calcium 9.8 8.5 - 10.1 mg/dL   Hemoglobin A1c   Result Value Ref Range    Hemoglobin A1C 5.4 0 - 5.6 %       If you have any questions or concerns, please call myself or my nurse at [phone].      Sincerely,        Paul Knowles MD/vincent

## 2019-05-23 NOTE — PROGRESS NOTES
ANTICOAGULATION FOLLOW-UP CLINIC VISIT    Patient Name:  Alexandru Crews  Date:  2019  Contact Type:  Face to Face    SUBJECTIVE:  Patient Findings     Positives:   Change in medications (Amiodarone started on Monday and is taking prednisone)    Comments:   Cardiologist requested he have an INR ASAP. He had a sample of possible hemoptysis in a container and showed this to writer. Writer huddled with Dr. Knowles regarding elevated INR and hemoptysis. Pt to continue to monitor and go to ER if symptoms worsen. Discussed pt with Karina LEVI Pt to hold coumadin today, tomorrow, take 1.25mg Sa, Pyle, M recheck INR on T.        Clinical Outcomes     Comments:   Cardiologist requested he have an INR ASAP. He had a sample of possible hemoptysis in a container and showed this to writer. Writer huddled with Dr. Knowles regarding elevated INR and hemoptysis. Pt to continue to monitor and go to ER if symptoms worsen. Discussed pt with Karina LEVI Pt to hold coumadin today, tomorrow, take 1.25mg Sa, Pyle, M recheck INR on T.           OBJECTIVE    INR Protime   Date Value Ref Range Status   2019 5.0 (A) 0.86 - 1.14 Final       ASSESSMENT / PLAN  INR assessment SUPRA    Recheck INR In: 5 DAYS    INR Location Clinic      Anticoagulation Summary  As of 2019    INR goal:   2.0-3.0   TTR:   89.8 % (3.1 y)   INR used for dosin.0! (2019)   Warfarin maintenance plan:   2.5 mg (2.5 mg x 1) every day   Full warfarin instructions:   : Hold; : Hold; : 1.25 mg; : 1.25 mg; : 1.25 mg; Otherwise 2.5 mg every day   Weekly warfarin total:   17.5 mg   Plan last modified:   Allie Awan RN (2016)   Next INR check:   2019   Priority:   INR   Target end date:   Indefinite    Indications    Long-term (current) use of anticoagulants [Z79.01] [Z79.01]  Paroxysmal atrial fibrillation (H) [I48.0]             Anticoagulation Episode Summary     INR check location:       Preferred lab:        Send INR reminders to:   [facility]    Comments:         Anticoagulation Care Providers     Provider Role Specialty Phone number    Paul Knowles MD [facility] Internal Medicine [phone]            See the Encounter Report to view Anticoagulation Flowsheet and Dosing Calendar (Go to Encounters tab in chart review, and find the Anticoagulation Therapy Visit)    Dosage adjustment made based on physician directed care plan.    Evelyne Bethea RN

## 2019-05-23 NOTE — PROGRESS NOTES
Prior to injection, verified patient identity using patient's name and date of birth.  Due to injection administration, patient instructed to remain in clinic for 15 minutes  afterwards, and to report any adverse reaction to me immediately.    Indication: Prolia  (denosumab) is a prescription medicine used to treat osteoporosis in patients who:   Are at high risk for fracture, meaning patients who have had a fracture related to osteoporosis, or who have multiple risk factors for fracture   Cannot use another osteoporosis medicine or other osteoporosis medicines did not work well   The timeline for early/late injections would be 4 weeks early and any time after the 6 month ruperto. If a patient receives their injection late, then the subsequent injection would be 6 months from the date that they actually received the injection    1.  When was the last injection?  10/10/182  2.  Did they check with their insurance for this calendar year?  yes  3.  Is there an order in the chart?  yes  4.  Has the patient had dental work involving the bone in the past month or will have work in the next 6 months?  No    The following steps were completed to comply with the REMS program for Prolia:  Reviewed information in the Medication Guide and Patient Counseling Chart, including the serious risks of Prolia  and the symptoms of each risk.  Advised patient to seek prompt medical attention if they have signs or symptoms of any of the serious risks.  Provided each patient a copy of the Medication Guide and Patient Brochure.            Drug Amount Wasted:  None.  Vial/Syringe: Single dose vial  Expiration Date:  07/21    Josselyn Corona RN

## 2019-05-23 NOTE — PATIENT INSTRUCTIONS
[facility]:  Please call [phone] to cancel and/or reschedule your appointment   Please call [phone] with any problems or questions regarding your Warfarin therapy.    Some signs and symptoms of bleeding include: Nose bleed or cut that does not stop bleeding in 10 minutes, bleeding of the gums, vomiting (will look like coffee grounds) or coughing up blood, unusual, easy or large areas of bruising, increased or unexpected vaginal bleeding or increased menstrual flow, red or black stools, red or orange urine, prolonged or severe headache, pale skin, unusual or constant tiredness.  If you have these please call 911 or seek medical care immediately.

## 2019-05-24 ENCOUNTER — TELEPHONE (OUTPATIENT)
Dept: FAMILY MEDICINE | Facility: CLINIC | Age: 84
End: 2019-05-24

## 2019-05-24 NOTE — TELEPHONE ENCOUNTER
Patient just got his Prolia injection yesterday   Please explain the workflow to patient  We do not schedule these this far in advance.  We will contact him in roughly 6 months when he is due for his next injection after we complete all the background work involving this for each injection    (There is already an encounter that had been postponed til then)    Arelis Ferrara RN

## 2019-05-24 NOTE — TELEPHONE ENCOUNTER
Reason for Call:  Other appointment    Detailed comments: Patient needs to make an appointment for his prolia injection. Please call    Phone Number Patient can be reached at: Home number on file [phone] (home)    Best Time: Any    Can we leave a detailed message on this number? YES    Call taken on 5/24/2019 at 10:54 AM by Jyoti Osman

## 2019-05-28 ENCOUNTER — ANTICOAGULATION THERAPY VISIT (OUTPATIENT)
Dept: NURSING | Facility: CLINIC | Age: 84
End: 2019-05-28
Payer: MEDICARE

## 2019-05-28 ENCOUNTER — OFFICE VISIT (OUTPATIENT)
Dept: INTERNAL MEDICINE | Facility: CLINIC | Age: 84
End: 2019-05-28
Payer: MEDICARE

## 2019-05-28 ENCOUNTER — TELEPHONE (OUTPATIENT)
Dept: INTERNAL MEDICINE | Facility: CLINIC | Age: 84
End: 2019-05-28

## 2019-05-28 VITALS
RESPIRATION RATE: 12 BRPM | TEMPERATURE: 97.7 F | SYSTOLIC BLOOD PRESSURE: 122 MMHG | DIASTOLIC BLOOD PRESSURE: 68 MMHG | BODY MASS INDEX: 22.57 KG/M2 | WEIGHT: 123 LBS | OXYGEN SATURATION: 98 % | HEART RATE: 85 BPM

## 2019-05-28 DIAGNOSIS — I48.0 PAROXYSMAL ATRIAL FIBRILLATION (H): ICD-10-CM

## 2019-05-28 DIAGNOSIS — Z79.01 LONG TERM CURRENT USE OF ANTICOAGULANT THERAPY: ICD-10-CM

## 2019-05-28 DIAGNOSIS — A69.20 ERYTHEMA MIGRANS (LYME DISEASE): Primary | ICD-10-CM

## 2019-05-28 LAB — INR POINT OF CARE: 2 (ref 0.86–1.14)

## 2019-05-28 PROCEDURE — 99207 ZZC NO CHARGE NURSE ONLY: CPT

## 2019-05-28 PROCEDURE — 36416 COLLJ CAPILLARY BLOOD SPEC: CPT

## 2019-05-28 PROCEDURE — 85610 PROTHROMBIN TIME: CPT | Mod: QW

## 2019-05-28 PROCEDURE — 99213 OFFICE O/P EST LOW 20 MIN: CPT | Performed by: INTERNAL MEDICINE

## 2019-05-28 RX ORDER — DOXYCYCLINE 100 MG/1
100 TABLET ORAL 2 TIMES DAILY
Qty: 28 TABLET | Refills: 0 | Status: SHIPPED | OUTPATIENT
Start: 2019-05-28 | End: 2019-08-05

## 2019-05-28 ASSESSMENT — PAIN SCALES - GENERAL: PAINLEVEL: MODERATE PAIN (5)

## 2019-05-28 NOTE — PROGRESS NOTES
ANTICOAGULATION FOLLOW-UP CLINIC VISIT    Patient Name:  Alexandru Crews  Date:  2019  Contact Type:  Face to Face    SUBJECTIVE:  Patient Findings     Positives:   Other complaints (tick bite on right leg measured 3in x 1.5in)    Comments:   No warmth. Has noticed an increase in redness since yesterday. No fever or chills. No widespread rash. Couldn't hold his balance last night. No chest tightness. Weekly total is 8.75mg today. Will have pt continue with 1.25mg daily due to amiodarone use.      pt will see Dr. Boateng today for tick bite  Clinical Outcomes     Comments:   No warmth. Has noticed an increase in redness since yesterday. No fever or chills. No widespread rash. Couldn't hold his balance last night. No chest tightness. Weekly total is 8.75mg today. Will have pt continue with 1.25mg daily due to amiodarone use.           OBJECTIVE    INR Protime   Date Value Ref Range Status   2019 2.0 (A) 0.86 - 1.14 Final       ASSESSMENT / PLAN  INR assessment THER    Recheck INR In: 1 WEEK    INR Location Clinic      Anticoagulation Summary  As of 2019    INR goal:   2.0-3.0   TTR:   89.6 % (3.1 y)   INR used for dosin.0 (2019)   Warfarin maintenance plan:   2.5 mg (2.5 mg x 1) every day   Full warfarin instructions:   : 1.25 mg; : 1.25 mg; : 1.25 mg; : 1.25 mg; : 1.25 mg; : 1.25 mg; 6/3: 1.25 mg; /: 1.25 mg; Otherwise 2.5 mg every day   Weekly warfarin total:   17.5 mg   Plan last modified:   Allie Awan RN (2016)   Next INR check:   2019   Priority:   INR   Target end date:   Indefinite    Indications    Long-term (current) use of anticoagulants [Z79.01] [Z79.01]  Paroxysmal atrial fibrillation (H) [I48.0]             Anticoagulation Episode Summary     INR check location:       Preferred lab:       Send INR reminders to:   [facility]    Comments:         Anticoagulation Care Providers     Provider Role Specialty Phone number    Paul Knowles,  MD [facility] Internal Medicine [phone]            See the Encounter Report to view Anticoagulation Flowsheet and Dosing Calendar (Go to Encounters tab in chart review, and find the Anticoagulation Therapy Visit)    Dosage adjustment made based on physician directed care plan.    Evelyne Bethea RN

## 2019-05-28 NOTE — PROGRESS NOTES
Subjective     Alexandru Crews is a 86 year old male who presents to clinic today for the following health issues:    HPI   Patient is here today for a tick bite he states this happened a week ago tomorrow 5/22/2019. Patient states it is on his right lower leg. Patient states its not too painful but there is feeling. He states it was a little itchy but not bad.       He has had some knee pain, but he was working in the garden and therefore he feels that is the cause of right knee pain.  No headache, neck pain or fever/chills.  He doesn't have chest pain. He has chronic pain for which he is on narcotics.     He is on warfarin and recently placed on amiodarone.  There has been difficulty getting his INR in line.     He feels the rash is getting bigger.  He did have his wife remove the tick but he threw it away and wasn't sure what type of tick it was. The leg is a little swollen today         Patient Active Problem List   Diagnosis     Family history of colon cancer     Osteopenia     HYPERLIPIDEMIA LDL GOAL <160     Vitreous condensations, od > os     Pseudophakia, Yag Caps, ou     Chronic pain syndrome     Advanced directives, counseling/discussion     BPH (benign prostatic hyperplasia)     Paroxysmal atrial fibrillation (H)     History of shingles     Chronic fatigue     Lumbar spinal stenosis     DJD (degenerative joint disease), lumbar and thoracic     Diverticulosis of large intestine     H/O cardiac pacemaker     Hypertension goal BP (blood pressure) < 140/90     Long term current use of anticoagulant therapy     Chronic nonintractable headache, unspecified headache type     Prediabetes     Chronic pain     Elevated glucose     De Quervain's disease (tenosynovitis)     Primary osteoarthritis of first carpometacarpal joint of left hand     Cubital tunnel syndrome, right     Primary osteoarthritis of right knee     History of total knee arthroplasty, left     Chronic atrial fibrillation (H)     Pacemaker      (HFpEF) heart failure with preserved ejection fraction (H)     Past Surgical History:   Procedure Laterality Date     APPENDECTOMY  2004     ARTHROSCOPY KNEE RT/LT  2006    Rt & Lt, Dr Arriaga     BACK SURGERY  1978 / 2003    x 3 in , fusions and one surgery in      C APPENDECTOMY  2004     CARPAL TUNNEL RELEASE RT/LT  three    2 on left, one on right     CATARACT IOL, RT/LT       COLONOSCOPY  ,,     INJECT EPIDURAL CERVICAL  2011    [facility]     INJECT EPIDURAL LUMBAR  2010    [facility]     INJECT EPIDURAL LUMBAR  2011    [facility]     INJECT EPIDURAL LUMBAR  2011    [facility]     LASER YAG CAPSULOTOMY  2016; 2016    left eye; right eye     PHACOEMULSIFICATION WITH STANDARD INTRAOCULAR LENS IMPLANT  2008; 2008    right eye; left eye     RECTAL SURGERY      fissurectomy and proctoplasty     RELEASE DEQUERVAINS WRIST Right 2017    DML     RELEASE TRIGGER FINGER      right hand     SHOULDER SURGERY      X four [ right x 2 and left x 2 ][[[[[     TUNA         Social History     Tobacco Use     Smoking status: Former Smoker     Years: 15.00     Types: Cigarettes     Last attempt to quit: 1975     Years since quittin.9     Smokeless tobacco: Never Used   Substance Use Topics     Alcohol use: Yes     Comment: 1 per week     Family History   Problem Relation Age of Onset     Cancer Mother         ovarian     Cancer - colorectal Brother          Current Outpatient Medications   Medication Sig Dispense Refill     amiodarone (PACERONE/CODARONE) 200 MG tablet Take 1 tablet (200 mg) by mouth 2 times daily       calcium carbonate 1250 (500 CA) MG CHEW Takes one every other day-unsure of dose       DAILY VITAMINS PO 1 tablet daily       denosumab (PROLIA) 60 MG/ML SOSY injection Inject 1 mL (60 mg) Subcutaneous every 6 months 1 mL 1     doxycycline monohydrate (ADOXA) 100 MG tablet Take 1 tablet (100 mg) by mouth 2 times daily  for 14 days 28 tablet 0     FIBER PO Take 1 tsp. by mouth daily        metoprolol (TOPROL-XL) 25 MG 24 hr tablet Take 25 mg by mouth 2 times daily       morphine (MS CONTIN) 15 MG CR tablet Take 1 tablet (15 mg) by mouth every 12 hours 28 days or more between refills for controlled medications 60 tablet 0     oxyCODONE-acetaminophen (PERCOCET) 5-325 MG tablet Take 1 tablet by mouth 3 times daily as needed for moderate to severe pain 28 days or more between refills for controlled medications 90 tablet 0     senna-docusate (SENOKOT-S;PERICOLACE) 8.6-50 MG per tablet Take 1 tablet by mouth 2 times daily        triamcinolone (KENALOG) 0.1 % cream Apply topically 2 times daily as needed to affected area as directed. 60 g 2     VITAMIN D, CHOLECALCIFEROL, PO Take 1,000 Units by mouth 2 times daily        warfarin (COUMADIN) 2.5 MG tablet TAKE ONE TABLET BY MOUTH EVERY DAY AS DIRECTED 90 tablet 0     Allergies   Allergen Reactions     Lactose Nausea and Vomiting     Sulindac      Upset stomach     Tramadol      Itching     Valdecoxib Itching     Vicodin [Hydrocodone-Acetaminophen]      Itching     Vioxx Itching     Rofecoxib Itching and Rash     Sulfa Drugs Rash     Muscle aches         Reviewed and updated as needed this visit by Provider  Tobacco  Allergies  Meds  Problems  Med Hx  Surg Hx  Fam Hx         Review of Systems    ROS: 4 point ROS neg other than the symptoms noted above in the HPI.        Objective    /68 (BP Location: Left arm, Cuff Size: Adult Regular)   Pulse 85   Temp 97.7  F (36.5  C) (Oral)   Resp 12   Wt 55.8 kg (123 lb)   SpO2 98%   BMI 22.57 kg/m    Body mass index is 22.57 kg/m .  Physical Exam   GENERAL APPEARANCE: walks with a cane, alert and no distress  RESP: lungs clear to auscultation - no rales, rhonchi or wheezes  CV: irregular rates and rhythm and normal S1 S2, no S3 or S4  PSYCH: mentation appears normal. and affect normal/bright  Skin with open area consistent with tick  bite with surrounding erythema and some subtle central clearing, right lower leg        Diagnostic Test Results:  Labs reviewed in Epic        Assessment & Plan     1. Erythema migrans (Lyme disease)  Wound was wiped with alcohol, examined and a dark firm piece of matter was removed with a forceps.  Bacitracin was placed on the wound and covered with a bandage.  There was no bleeding or pain noted.     The rash is classic for EM after a tick bite.  This is presumed to be Lyme Disease and patient will start treatment.  Follow up with PCP at the end of treatment course to determine if another week is needed.      - doxycycline monohydrate (ADOXA) 100 MG tablet; Take 1 tablet (100 mg) by mouth 2 times daily for 14 days  Dispense: 28 tablet; Refill: 0    2. Long term current use of anticoagulant therapy  Doxy does interact with warfarin.  Discussed with [facility] nurse and patient will be seen in 3 days for a recheck.           Patient Instructions   Doxycycline 1 tab twice daily for 14 days.  I will have the protime clinic contact you with further instructions.       Return in about 2 weeks (around 6/11/2019) for with Dr. Knowles.    Nini Boateng MD  [facility]

## 2019-05-28 NOTE — PATIENT INSTRUCTIONS
Doxycycline 1 tab twice daily for 14 days.  I will have the protime clinic contact you with further instructions.

## 2019-05-28 NOTE — TELEPHONE ENCOUNTER
Patient left message on RN hotline and would like a call back regarding tick bite.  He found the tick on Wed 5/22/19 but is not sure how long it was attached.  He would like INR nurse to look at area today- advised patient that INR nurse can look at bite but if patient needs to be see by provider he will need to schedule appointment or go to .  Patient reports area is swollen and red, there may be a [place] around area- there is a color difference but patient denied ring around bite.   Josselyn Corona RN

## 2019-05-28 NOTE — PATIENT INSTRUCTIONS
[facility]:  Please call [phone] to cancel and/or reschedule your appointment   Please call [phone] with any problems or questions regarding your Warfarin therapy.

## 2019-05-29 ENCOUNTER — TELEPHONE (OUTPATIENT)
Dept: PHARMACY | Facility: CLINIC | Age: 84
End: 2019-05-29

## 2019-05-31 ENCOUNTER — TELEPHONE (OUTPATIENT)
Dept: INTERNAL MEDICINE | Facility: CLINIC | Age: 84
End: 2019-05-31

## 2019-05-31 ENCOUNTER — ANTICOAGULATION THERAPY VISIT (OUTPATIENT)
Dept: NURSING | Facility: CLINIC | Age: 84
End: 2019-05-31
Payer: MEDICARE

## 2019-05-31 DIAGNOSIS — Z79.01 LONG TERM CURRENT USE OF ANTICOAGULANT THERAPY: ICD-10-CM

## 2019-05-31 DIAGNOSIS — I48.0 PAROXYSMAL ATRIAL FIBRILLATION (H): ICD-10-CM

## 2019-05-31 LAB — INR POINT OF CARE: 1.7 (ref 0.86–1.14)

## 2019-05-31 PROCEDURE — 36416 COLLJ CAPILLARY BLOOD SPEC: CPT

## 2019-05-31 PROCEDURE — 99207 ZZC NO CHARGE NURSE ONLY: CPT

## 2019-05-31 PROCEDURE — 85610 PROTHROMBIN TIME: CPT | Mod: QW

## 2019-05-31 NOTE — PROGRESS NOTES
ANTICOAGULATION FOLLOW-UP CLINIC VISIT    Patient Name:  Alexandru Crews  Date:  2019  Contact Type:  Face to Face    SUBJECTIVE:  Patient Findings     Positives:   Change in medications (on doxycycline for a tick bite)    Comments:   Measured tick bite and it is 3.5in by 2in. Looks more swollen today. Denies pain, warmth, numbness or tingling. Pt is taking doxycycline. Message routed to PCP        Clinical Outcomes     Comments:   Measured tick bite and it is 3.5in by 2in. Looks more swollen today. Denies pain, warmth, numbness or tingling. Pt is taking doxycycline. Message routed to PCP           OBJECTIVE    INR Protime   Date Value Ref Range Status   2019 1.7 (A) 0.86 - 1.14 Final       ASSESSMENT / PLAN  INR assessment SUB    Recheck INR In: 5 DAYS    INR Location Clinic      Anticoagulation Summary  As of 2019    INR goal:   2.0-3.0   TTR:   89.3 % (3.1 y)   INR used for dosin.7! (2019)   Warfarin maintenance plan:   2.5 mg (2.5 mg x 1) every day   Full warfarin instructions:   6/1: 1.25 mg; 6/2: 1.25 mg; 6/3: 1.25 mg; 6/4: 1.25 mg; Otherwise 2.5 mg every day   Weekly warfarin total:   17.5 mg   Plan last modified:   Allie Awan, RN (2016)   Next INR check:   2019   Priority:   INR   Target end date:   Indefinite    Indications    Long term current use of anticoagulant therapy [Z79.01]  Paroxysmal atrial fibrillation (H) [I48.0]             Anticoagulation Episode Summary     INR check location:       Preferred lab:       Send INR reminders to:    [facility]    Comments:         Anticoagulation Care Providers     Provider Role Specialty Phone number    Paul Knowles MD [facility] Internal Medicine [phone]            See the Encounter Report to view Anticoagulation Flowsheet and Dosing Calendar (Go to Encounters tab in chart review, and find the Anticoagulation Therapy Visit)    Dosage adjustment made based on physician directed care plan.    Evelyne GONZALEZ  [place] RN

## 2019-05-31 NOTE — PATIENT INSTRUCTIONS
[facility]:  Please call [phone] to cancel and/or reschedule your appointment   Please call [phone] with any problems or questions regarding your Warfarin therapy.    Some signs and symptoms of clots include: pain or tenderness in arm or leg, swelling in arm or leg, changes in skin color, or area is warm to touch, shortness or breath, trouble breathing.  Numbness or weakness especially on 1 side of the body, sudden trouble speaking or swallowing, sudden trouble seeing, sudden confusion, dizzy spells or headache.  If you have these please call 911 or seek medical care immediately.

## 2019-05-31 NOTE — TELEPHONE ENCOUNTER
Called and left detailed message with provider's message as written. Asked that pt call back to the RN hotline if any further questions or concerns.    Evelyne Bethea RN  [facility] [place]

## 2019-05-31 NOTE — TELEPHONE ENCOUNTER
Proper treatment is being followed. I don't know what else to do beyond offer to see patient to check his rash again. Sometimes if this is a skin reaction it can look worse and worse for a while. Further follow up is not absolutely necessary though    Paul Knowles MD

## 2019-05-31 NOTE — TELEPHONE ENCOUNTER
Pt seen today in INR. INR 1.7.  Pt was seen on 5/28 for tick bite. Redness is  spreading. Area now measures 3.5in x 2in and appears more swollen today. Denies pain, warmth, numbness or tingling. Pt is taking doxycycline 100mg 1 tab bid x 14 days. Please advise if any changes should be made at this time. Advised to monitor for pain, warmth, numbness or tingling. Ok to leave a detailed message.    Evelyne Bethea RN  [facility]

## 2019-06-03 NOTE — TELEPHONE ENCOUNTER
Pt called back to the RN hotline. States his messages were accidentally deleted so he is calling back to see if there was a message. Reviewed with pt. Pt agrees with plan. He will keep appt with PCP for 6/11.    Evelyne Bethea RN  [facility]

## 2019-06-05 ENCOUNTER — ANTICOAGULATION THERAPY VISIT (OUTPATIENT)
Dept: NURSING | Facility: CLINIC | Age: 84
End: 2019-06-05
Payer: MEDICARE

## 2019-06-05 DIAGNOSIS — Z79.01 LONG TERM CURRENT USE OF ANTICOAGULANT THERAPY: ICD-10-CM

## 2019-06-05 DIAGNOSIS — I48.0 PAROXYSMAL ATRIAL FIBRILLATION (H): ICD-10-CM

## 2019-06-05 LAB — INR POINT OF CARE: 2.2 (ref 0.86–1.14)

## 2019-06-05 PROCEDURE — 99207 ZZC NO CHARGE NURSE ONLY: CPT

## 2019-06-05 PROCEDURE — 85610 PROTHROMBIN TIME: CPT | Mod: QW

## 2019-06-05 PROCEDURE — 36416 COLLJ CAPILLARY BLOOD SPEC: CPT

## 2019-06-05 NOTE — PROGRESS NOTES
ANTICOAGULATION FOLLOW-UP CLINIC VISIT    Patient Name:  Alexandru Crews  Date:  2019  Contact Type:  Face to Face    SUBJECTIVE:  Patient Findings     Comments:   Bleeding Signs/Symptoms: NO  Thromboembolic Signs/Symptoms: NO     Medication Changes:  NO  Dietary Changes:  NO  Bacterial/Viral Infection: NO     Missed Coumadin Doses: NO  Other Concerns:  NO          Clinical Outcomes     Negatives:   Major bleeding event, Thromboembolic event, Anticoagulation-related hospital admission, Anticoagulation-related ED visit, Anticoagulation-related fatality    Comments:   Bleeding Signs/Symptoms: NO  Thromboembolic Signs/Symptoms: NO     Medication Changes:  NO  Dietary Changes:  NO  Bacterial/Viral Infection: NO     Missed Coumadin Doses: NO  Other Concerns:  NO             OBJECTIVE    INR Protime   Date Value Ref Range Status   2019 2.2 (A) 0.86 - 1.14 Final       ASSESSMENT / PLAN  No question data found.  Anticoagulation Summary  As of 2019    INR goal:   2.0-3.0   TTR:   89.1 % (3.1 y)   INR used for dosin.2 (2019)   Warfarin maintenance plan:   2.5 mg (2.5 mg x 1) every day   Full warfarin instructions:   : 1.25 mg; : 1.25 mg; : 1.25 mg; : 1.25 mg; 6/10: 1.25 mg; : 1.25 mg; : 1.25 mg; : 1.25 mg; 15: 1.25 mg; 16: 1.25 mg; : 1.25 mg; : 1.25 mg; : 1.25 mg; : 1.25 mg; Otherwise 2.5 mg every day   Weekly warfarin total:   17.5 mg   Plan last modified:   Allie Awan, RN (2016)   Next INR check:   2019   Priority:   INR   Target end date:   Indefinite    Indications    Long term current use of anticoagulant therapy [Z79.01]  Paroxysmal atrial fibrillation (H) [I48.0]             Anticoagulation Episode Summary     INR check location:       Preferred lab:       Send INR reminders to:   [facility]    Comments:         Anticoagulation Care Providers     Provider Role Specialty Phone number    Paul Knowles MD Responsible Internal  Medicine [phone]            See the Encounter Report to view Anticoagulation Flowsheet and Dosing Calendar (Go to Encounters tab in chart review, and find the Anticoagulation Therapy Visit)        Rose Navarrete RN

## 2019-06-10 LAB — INR PPP: 3 (ref 0.9–1.1)

## 2019-06-11 LAB — INR PPP: 3.4 (ref 0.9–1.1)

## 2019-06-12 ENCOUNTER — TELEPHONE (OUTPATIENT)
Dept: FAMILY MEDICINE | Facility: CLINIC | Age: 84
End: 2019-06-12

## 2019-06-12 NOTE — TELEPHONE ENCOUNTER
Reason for Call: Request for an order or referral:    Order or referral being requested:  Order for holding coumadin or bridging. Scheduling a endo ultrasound.     Date needed: as soon as possible poss 7-2-19    Has the patient been seen by the PCP for this problem? NO    Additional comments:  Na     Phone number Patient can be reached at:  Other phone number:  [phone]     Best Time:   Any     Can we leave a detailed message on this number?  YES    Call taken on 6/12/2019 at 2:04 PM by Eloisa Tierney

## 2019-06-13 LAB — INR PPP: 3.6 (ref 0.9–1.1)

## 2019-06-14 NOTE — TELEPHONE ENCOUNTER
Call to [facility] [employer] informed Eufemia on VM that pt does not require bridging and has OK to hold warfarin x 5 days.    Pt also called informed via VM of approval to hold warfarin 5 days, no bridging required. Pt has [facility] appt 6/19/19, calendar can be given with holding warfarin noted and start up doisng.

## 2019-06-14 NOTE — PROGRESS NOTES
"Subjective     Alexandru Crews is a 86 year old male who presents to clinic today wife (Nkechi) for the following health issues:    [facility]       Hospital Follow-up Visit:    Hospital/Nursing Home/IP Rehab Facility: [facility]  Date of Admission: 06/10/2019  Date of Discharge: 06/13/2019  Reason(s) for Admission: Acute Pancreatitis            Problems taking medications regularly:  None       Medication changes since discharge: None       Problems adhering to non-medication therapy:  None    Summary of hospitalization:  [facility] discharge summary reviewed  Diagnostic Tests/Treatments reviewed.  Follow up needed: Hematology, GI  Other Healthcare Providers Involved in Patient s Care:         Specialist appointment - Hematology, GI  Update since discharge: improved.     Post Discharge Medication Reconciliation: discharge medications reconciled, continue medications without change.  Plan of care communicated with patient     Coding guidelines for this visit:  Type of Medical   Decision Making Face-to-Face Visit       within 7 Days of discharge Face-to-Face Visit        within 14 days of discharge   Moderate Complexity [id number]   High Complexity [id number]              Review of Systems     Constitutional, HEENT, cardiovascular, pulmonary, GI, , musculoskeletal, neuro, skin, endocrine and psych systems are negative, except as otherwise noted.      Objective    BP 94/52   Pulse 95   Temp 98.1  F (36.7  C) (Oral)   Resp 20   Ht 1.572 m (5' 1.9\")   Wt 55.4 kg (122 lb 3.2 oz)   SpO2 97%   BMI 22.42 kg/m    Body mass index is 22.42 kg/m .  Physical Exam   GENERAL: healthy, alert and no distress  EYES: Eyes grossly normal to inspection, PERRL and conjunctivae and sclerae normal  HENT: ear canals and TM's normal, nose and mouth without ulcers or lesions  NECK: no adenopathy, no asymmetry, masses, or scars and thyroid normal to palpation  RESP: lungs clear to auscultation - no rales, rhonchi or " wheezes  CV: regular rates and rhythm, normal S1 S2, no S3 or S4, grade 4/6  RAMA murmur heard best over the PMI and  bilateral lower extremity pitting edema to anterior shin    ABDOMEN: Increased BS right upper quadrant with right lower quadrant and left lower quadrant tenderness to palpation  MS: no gross musculoskeletal defects noted, no edema  SKIN: no suspicious lesions or rashes  NEURO: Normal strength and tone, mentation intact and speech normal  PSYCH: mentation appears normal, affect normal/bright    Diagnostic Test Results:  Labs reviewed in Epic        Assessment & Plan     1. Acute biliary pancreatitis without infection or necrosis    2. Chronic heart failure with preserved ejection fraction (H)    3. Hypertension goal BP (blood pressure) < 140/90    Follow up per GI  Follow up per Hematology  Follow up per Cardiology  BP checks every evening.   Follow up in 1 month    No follow-ups on file.    Wolfgang Vaughn PA-C  [facility]

## 2019-06-14 NOTE — TELEPHONE ENCOUNTER
Pt will be having Endo Ultrasound on TBD.     Please review pt's history and advise if he/she will need to be bridged with Lovenox.     Per INR protocol if bridged with Lovenox it is recommended he stop Warfarin 5 days prior to procedure, start Lovenox 3 days prior to procedure, stop Lovenox 24 hours prior to procedure and restart Lovenox 24 hours after procedure and continue until INR is >2.3    If bridged do you want: 1mg/kg bid or 1.5 mg daily     If no bridging needed, is it OK to stop coumadin 5 days prior to procedure and resume usual dose 12-24 hours after procedure?     Please advise.    Rose Navarrete RN - BC

## 2019-06-14 NOTE — TELEPHONE ENCOUNTER
CHADS-Vasc 2 score of 4.  No need for bridging.  Patient may hold warfarin for 5 days prior to procedure.    Ciara Landaverde, CNP

## 2019-06-17 ENCOUNTER — TELEPHONE (OUTPATIENT)
Dept: FAMILY MEDICINE | Facility: CLINIC | Age: 84
End: 2019-06-17

## 2019-06-17 ENCOUNTER — ANTICOAGULATION THERAPY VISIT (OUTPATIENT)
Dept: NURSING | Facility: CLINIC | Age: 84
End: 2019-06-17
Payer: MEDICARE

## 2019-06-17 DIAGNOSIS — I48.0 PAROXYSMAL ATRIAL FIBRILLATION (H): ICD-10-CM

## 2019-06-17 DIAGNOSIS — Z79.01 LONG TERM CURRENT USE OF ANTICOAGULANT THERAPY: ICD-10-CM

## 2019-06-17 NOTE — TELEPHONE ENCOUNTER
Reason for call:  Other   Patient called regarding (reason for call): call back  Additional comments: Patient is calling because he needs his warfarin (COUMADIN) 2.5 MG tablet dosage, please call back asap. If he does not answer please leave a message on how much he should take on his voicemail    Phone number to reach patient:  Home number on file [phone] (home)    Best Time:  any    Can we leave a detailed message on this number?  YES

## 2019-06-17 NOTE — TELEPHONE ENCOUNTER
See anticoag note for hospitalization.    Karina Avila, PharmD BCACP  Anticoagulation Clinical Pharmacist

## 2019-06-18 ENCOUNTER — OFFICE VISIT (OUTPATIENT)
Dept: FAMILY MEDICINE | Facility: CLINIC | Age: 84
End: 2019-06-18
Payer: MEDICARE

## 2019-06-18 VITALS
OXYGEN SATURATION: 97 % | SYSTOLIC BLOOD PRESSURE: 94 MMHG | DIASTOLIC BLOOD PRESSURE: 52 MMHG | WEIGHT: 122.2 LBS | RESPIRATION RATE: 20 BRPM | HEIGHT: 62 IN | TEMPERATURE: 98.1 F | BODY MASS INDEX: 22.49 KG/M2 | HEART RATE: 95 BPM

## 2019-06-18 DIAGNOSIS — Z79.01 LONG TERM CURRENT USE OF ANTICOAGULANT THERAPY: ICD-10-CM

## 2019-06-18 DIAGNOSIS — I50.32 CHRONIC HEART FAILURE WITH PRESERVED EJECTION FRACTION (H): ICD-10-CM

## 2019-06-18 DIAGNOSIS — I48.0 PAROXYSMAL ATRIAL FIBRILLATION (H): ICD-10-CM

## 2019-06-18 DIAGNOSIS — I10 HYPERTENSION GOAL BP (BLOOD PRESSURE) < 140/90: ICD-10-CM

## 2019-06-18 DIAGNOSIS — K85.10 ACUTE BILIARY PANCREATITIS WITHOUT INFECTION OR NECROSIS: Primary | ICD-10-CM

## 2019-06-18 LAB — INR PPP: 1.37 (ref 0.86–1.14)

## 2019-06-18 PROCEDURE — 85610 PROTHROMBIN TIME: CPT | Performed by: INTERNAL MEDICINE

## 2019-06-18 PROCEDURE — 36415 COLL VENOUS BLD VENIPUNCTURE: CPT | Performed by: INTERNAL MEDICINE

## 2019-06-18 PROCEDURE — 99214 OFFICE O/P EST MOD 30 MIN: CPT | Performed by: PHYSICIAN ASSISTANT

## 2019-06-18 RX ORDER — ANALGESIC BALM 1.74; 4.06 G/29G; G/29G
OINTMENT TOPICAL PRN
COMMUNITY
End: 2020-01-01

## 2019-06-18 RX ORDER — FUROSEMIDE 20 MG
20 TABLET ORAL DAILY PRN
COMMUNITY
Start: 2019-06-14 | End: 2019-06-21

## 2019-06-18 ASSESSMENT — MIFFLIN-ST. JEOR: SCORE: 1111.96

## 2019-06-18 NOTE — NURSING NOTE
Huddled with provider (Wolfgang Vaughn), patient should continue same dose of Warfarin and INR nurse will dose further tomorrow. Left detailed message on patient phone with information- advised to call back RN hotline at [phone] if any questions.   Josselyn Corona RN

## 2019-06-19 ENCOUNTER — ANTICOAGULATION THERAPY VISIT (OUTPATIENT)
Dept: FAMILY MEDICINE | Facility: CLINIC | Age: 84
End: 2019-06-19

## 2019-06-19 DIAGNOSIS — Z79.01 LONG TERM CURRENT USE OF ANTICOAGULANT THERAPY: ICD-10-CM

## 2019-06-19 DIAGNOSIS — I48.0 PAROXYSMAL ATRIAL FIBRILLATION (H): ICD-10-CM

## 2019-06-19 PROCEDURE — 99207 ZZC NO CHARGE NURSE ONLY: CPT | Performed by: INTERNAL MEDICINE

## 2019-06-19 NOTE — PROGRESS NOTES
ANTICOAGULATION FOLLOW-UP CLINIC VISIT    Patient Name:  Alexandru Crews  Date:  2019  Contact Type:  Telephone    SUBJECTIVE:  Patient Findings     Positives:   Change in health (having leg swelling), Missed doses (intentional hold over weekend), Change in medications (On amiodarone), Hospital admission (was discharged last weekend)    Comments:   Requesting copy of notes from Wolfgang's appointment.        Clinical Outcomes     Comments:   Requesting copy of notes from Wolfgang's appointment.           OBJECTIVE    INR   Date Value Ref Range Status   2019 1.37 (H) 0.86 - 1.14 Final       ASSESSMENT / PLAN  INR assessment SUB    Recheck INR In: 1 WEEK    INR Location Clinic      Anticoagulation Summary  As of 2019    INR goal:   2.0-3.0   TTR:   88.7 % (3.2 y)   INR used for dosin.37! (2019)   Warfarin maintenance plan:   2.5 mg (2.5 mg x 1) every Fri; 1.25 mg (2.5 mg x 0.5) all other days   Full warfarin instructions:   : 2.5 mg; : 1.25 mg; : 1.25 mg; : 2.5 mg; Otherwise 2.5 mg every Fri; 1.25 mg all other days   Weekly warfarin total:   10 mg   Plan last modified:   Rose Navarrete RN (2019)   Next INR check:   2019   Priority:   INR   Target end date:   Indefinite    Indications    Long term current use of anticoagulant therapy [Z79.01]  Paroxysmal atrial fibrillation (H) [I48.0]             Anticoagulation Episode Summary     INR check location:       Preferred lab:       Send INR reminders to:   [facility]    Comments:         Anticoagulation Care Providers     Provider Role Specialty Phone number    Paul Knowles MD Responsible Internal Medicine [phone]            See the Encounter Report to view Anticoagulation Flowsheet and Dosing Calendar (Go to Encounters tab in chart review, and find the Anticoagulation Therapy Visit)    Will restart warfarin towards previous dose, keeping in mind now on amiodarone. Has cardiology recheck with outside heart  clinic next week.  Leg swelling is bilateral.  Cautioned to let us know if more unilateral.    Karina Avila, [facility]

## 2019-06-20 ENCOUNTER — TELEPHONE (OUTPATIENT)
Dept: FAMILY MEDICINE | Facility: CLINIC | Age: 84
End: 2019-06-20

## 2019-06-20 DIAGNOSIS — R60.0 BILATERAL LOWER EXTREMITY EDEMA: ICD-10-CM

## 2019-06-20 DIAGNOSIS — Z92.89 HISTORY OF RECENT HOSPITALIZATION: Primary | ICD-10-CM

## 2019-06-20 DIAGNOSIS — K59.00 CONSTIPATION, UNSPECIFIED CONSTIPATION TYPE: ICD-10-CM

## 2019-06-20 DIAGNOSIS — K85.10 ACUTE BILIARY PANCREATITIS, UNSPECIFIED COMPLICATION STATUS: ICD-10-CM

## 2019-06-20 DIAGNOSIS — I10 HYPERTENSION GOAL BP (BLOOD PRESSURE) < 140/90: ICD-10-CM

## 2019-06-20 DIAGNOSIS — D61.89 ANEMIA DUE TO OTHER BONE MARROW FAILURE (H): ICD-10-CM

## 2019-06-20 NOTE — TELEPHONE ENCOUNTER
Patient was seen for hospital follow-up by Wolfgang Vaughn PA-C on 6/18/19    No signed consent on file to discuss PHI with daughter  Will need to obtain verbal consent from patient to discuss PHI with daughter this time only or get signed consent     Called daughter, Dionne.  She stated that she is also a nurse and understands that writer cannot discuss PHI with her at this time  However, she would like to just relay her concerns to provider and understands that we would need to call patient directly if we have any instructions  Dionne is not sure what all was done at the recent OV  Per Dionne, she understands that patient had pancreatitis, possible gallstones, and pancytopenia while in the hospital  He was put on Lasix x 4 days post discharge and edema is not any better  Patient also had abd distention and Xray found that he was constipated and backed up  He was given laxative and has been taking Senna S 1 tab BID at home  She is wondering if this was brought up at the OV and if his abd was reassessed.  She is not sure how patient's BMs have been   She asked if potassium should be rechecked at this point due to recent lasix use along with CBC, electrolytes, liver function test, and any other labs provider feel appropriate     She stated that patient has upcoming appointments with Cardiology, GI, and hematology appointment   Hematology appointment is not until 7/22/19 with [facility] Dr. Young.   She feels like this is too far out  Patient also has an endoscopic US in scheduled for July   ENT appointment on 7/9/19  Advised her that they could also ask GI about the constipation     Patient and his wife live at home alone and his wife is currently trying to manage all of his cares  Daughters try to get as involved and help as much as possible but patient and his wife do not want to bother their daughters or ask for too much help  Dionne is concerned that they are getting close to the point where they will  not be able to manage on their own  Patient and his wife have their names on the wait list to a senior living facility but she thinks they are on the independent living side     Discussed possibility of having Care Coordination involved to help with appointments and resources for both patient and his caregiver (spouse) and Dionne felt that would be great  Care Coordination referral placed  Explained to Dionne that patient has the right to decline further calls from CC if he does not agree to have them involved     - please mail out consent to communicate form to patient per Dionne's request. One of the daughters will help explain and fill out the form with patient  Routing to Wolfgang Vaughn PA-C to please advise regarding any recheck labs needed or any other recommendations for cares at this time    Arelis Ferrara RN

## 2019-06-20 NOTE — TELEPHONE ENCOUNTER
Reason for call:  Other   Patient called regarding (reason for call): call back  Additional comments: Patient's daughter is calling with concerns regarding his last appointment. She states he should have had labs/CBC because of his hospitalization. The are also worried about home care and further treatment. States the patient and wife are not sharing enough information to help patient and wife out. Please call to discuss with family. Family is struggling with his care.    Also suggested reaching out to care coordinatiors.    Phone number to reach patient:  Other phone number:  [phone]*    Best Time:  Any    Can we leave a detailed message on this number?  YES

## 2019-06-21 ENCOUNTER — TELEPHONE (OUTPATIENT)
Dept: INTERNAL MEDICINE | Facility: CLINIC | Age: 84
End: 2019-06-21

## 2019-06-21 ENCOUNTER — PATIENT OUTREACH (OUTPATIENT)
Dept: CARE COORDINATION | Facility: CLINIC | Age: 84
End: 2019-06-21

## 2019-06-21 DIAGNOSIS — K85.10 ACUTE BILIARY PANCREATITIS, UNSPECIFIED COMPLICATION STATUS: ICD-10-CM

## 2019-06-21 DIAGNOSIS — D61.89 ANEMIA DUE TO OTHER BONE MARROW FAILURE (H): ICD-10-CM

## 2019-06-21 LAB
ALBUMIN SERPL-MCNC: 3.8 G/DL (ref 3.4–5)
ALP SERPL-CCNC: 86 U/L (ref 40–150)
ALT SERPL W P-5'-P-CCNC: 29 U/L (ref 0–70)
AMYLASE SERPL-CCNC: 50 U/L (ref 30–110)
ANION GAP SERPL CALCULATED.3IONS-SCNC: 4 MMOL/L (ref 3–14)
AST SERPL W P-5'-P-CCNC: 25 U/L (ref 0–45)
BASOPHILS # BLD AUTO: 0 10E9/L (ref 0–0.2)
BASOPHILS NFR BLD AUTO: 0.4 %
BILIRUB SERPL-MCNC: 0.7 MG/DL (ref 0.2–1.3)
BUN SERPL-MCNC: 25 MG/DL (ref 7–30)
CALCIUM SERPL-MCNC: 9.2 MG/DL (ref 8.5–10.1)
CHLORIDE SERPL-SCNC: 102 MMOL/L (ref 94–109)
CO2 SERPL-SCNC: 30 MMOL/L (ref 20–32)
CREAT SERPL-MCNC: 1.06 MG/DL (ref 0.66–1.25)
DIFFERENTIAL METHOD BLD: ABNORMAL
EOSINOPHIL # BLD AUTO: 0.1 10E9/L (ref 0–0.7)
EOSINOPHIL NFR BLD AUTO: 2.5 %
ERYTHROCYTE [DISTWIDTH] IN BLOOD BY AUTOMATED COUNT: 15.3 % (ref 10–15)
GFR SERPL CREATININE-BSD FRML MDRD: 63 ML/MIN/{1.73_M2}
GLUCOSE SERPL-MCNC: 111 MG/DL (ref 70–99)
HCT VFR BLD AUTO: 34.5 % (ref 40–53)
HGB BLD-MCNC: 10.9 G/DL (ref 13.3–17.7)
LIPASE SERPL-CCNC: 162 U/L (ref 73–393)
LYMPHOCYTES # BLD AUTO: 0.6 10E9/L (ref 0.8–5.3)
LYMPHOCYTES NFR BLD AUTO: 20.6 %
MCH RBC QN AUTO: 30.8 PG (ref 26.5–33)
MCHC RBC AUTO-ENTMCNC: 31.6 G/DL (ref 31.5–36.5)
MCV RBC AUTO: 98 FL (ref 78–100)
MONOCYTES # BLD AUTO: 0.2 10E9/L (ref 0–1.3)
MONOCYTES NFR BLD AUTO: 5.8 %
NEUTROPHILS # BLD AUTO: 2 10E9/L (ref 1.6–8.3)
NEUTROPHILS NFR BLD AUTO: 70.7 %
PLATELET # BLD AUTO: 86 10E9/L (ref 150–450)
POTASSIUM SERPL-SCNC: 4.8 MMOL/L (ref 3.4–5.3)
PROT SERPL-MCNC: 7 G/DL (ref 6.8–8.8)
RBC # BLD AUTO: 3.54 10E12/L (ref 4.4–5.9)
SODIUM SERPL-SCNC: 136 MMOL/L (ref 133–144)
WBC # BLD AUTO: 2.8 10E9/L (ref 4–11)

## 2019-06-21 PROCEDURE — 80053 COMPREHEN METABOLIC PANEL: CPT | Performed by: INTERNAL MEDICINE

## 2019-06-21 PROCEDURE — 85025 COMPLETE CBC W/AUTO DIFF WBC: CPT | Performed by: INTERNAL MEDICINE

## 2019-06-21 PROCEDURE — 36415 COLL VENOUS BLD VENIPUNCTURE: CPT | Performed by: INTERNAL MEDICINE

## 2019-06-21 PROCEDURE — 82150 ASSAY OF AMYLASE: CPT | Performed by: INTERNAL MEDICINE

## 2019-06-21 PROCEDURE — 83690 ASSAY OF LIPASE: CPT | Performed by: INTERNAL MEDICINE

## 2019-06-21 RX ORDER — DIPHENHYD/PHENYLEPH/ACETAMINOP 12.5-5-325
TABLET ORAL
Qty: 1 KIT | Refills: 0 | Status: SHIPPED | OUTPATIENT
Start: 2019-06-21 | End: 2019-06-21

## 2019-06-21 RX ORDER — FUROSEMIDE 20 MG
20 TABLET ORAL DAILY PRN
Qty: 30 TABLET | Refills: 0 | Status: SHIPPED | OUTPATIENT
Start: 2019-06-21 | End: 2019-06-21

## 2019-06-21 RX ORDER — FUROSEMIDE 20 MG
20 TABLET ORAL DAILY PRN
Qty: 30 TABLET | Refills: 0 | Status: SHIPPED | OUTPATIENT
Start: 2019-06-21 | End: 2020-01-01

## 2019-06-21 ASSESSMENT — ACTIVITIES OF DAILY LIVING (ADL): DEPENDENT_IADLS:: INDEPENDENT

## 2019-06-21 NOTE — TELEPHONE ENCOUNTER
Patient would like a call once lab results from 6/21/19 have been finalized  Results should back by beginning of next week (week of 6/24/19)  Please also mail out a copy to him    Arelis Ferrara RN

## 2019-06-21 NOTE — TELEPHONE ENCOUNTER
These issues are quite complicated . I have reviewed both the discharge summary, office visit notes with Wolfgang Vaughn physicians assistant and also reviewed the telephone encounter as detailed below     1. Definitely we can recheck laboratory studies . I want patient to come to the laboratory and do these recheck laboratory studies as soon as possible, while I am still in town so today . I am gone  For 2 week vacation so the results may wind up reviewed by my coverage while I am out. Regardless of results the further follow up with oncologist should be pursued    2. Chronic constipation is a problem and opioid pain medication induced constipation is clearly a contributing factor. Treatment is with 1. High fiber diet 2. Daily am Metamucil (psyllium) 3. Nighttime MiraLax / GlycoLax (polyethylene glycol) . If this is insufficient then we need to add fleets enemas which are purchased over the counter nonprescription and applied also on an as needed basis generally nothing more then 3 per day     3. Further follow up a gastroenterological consultation and upcoming endoscopic ultrasound is prudent. This is from review of chart documentation.    4. Bilateral lower extremity edema is also typically not a crises point and patient has a history of heart failure. He's to use bumetanide (BUMEX) but also needs to understand the need for regular leg elevation above Horizontal for 20 minutes 2-3 times per day and also should wear tight socks to help lessen the edema.    Paul Knowles MD

## 2019-06-21 NOTE — TELEPHONE ENCOUNTER
Patient in clinic with his daughter Samanta   Printed INR results from 6/18/19 and consent to communicate form given to patient-  does not need to mail out    Will keep open for Wolfgang Vaughn PA-C to advise regarding the BP device- see below in bold    Arelis Ferrara RN

## 2019-06-21 NOTE — TELEPHONE ENCOUNTER
Patient LM on RN hotline asking if his CBC w/diff was rechecked and inquired about getting BP device    Called patient and advised that only his INR was checked    Routing to provider to please advise (Wolfgang Vaughn PA-C is out of the office until next Tuesday 6/25/19. Will see if PCP can address this)-  1. Patient has appointment with [facility] on 7/22/19 and they will be rechecking his labs then but he wanted to know if he should have labs done sooner with [facility] to see if his blood counts are still low as well as any other labs if appropriate?  2. He also stated that Wolfgang Vaughn PA-C discussed getting him a blood pressure device at no cost?- prescription for BP monitor pended    - please mail out INR results to patient along with a blank consent to communicate form     Ok to leave message on his VM or speak with wife  He also gave verbal consent to speak with his daughter(s) if needed      Arelis Ferrara RN

## 2019-06-21 NOTE — PROGRESS NOTES
"Clinic Care Coordination Contact    Clinic Care Coordination Contact  OUTREACH    Referral Information:  Referral Source: Care Team    Primary Diagnosis: Cardiovascular - other    Chief Complaint   Patient presents with     Clinic Care Coordination - Initial     RN   Universal Utilization: Inpatient at [facility] from 6/10/19 to 6/14/19 with acute pancreatitis.     Clinic Utilization  Difficulty keeping appointments:: No  Compliance Concerns: No  No PCP office visit in Past Year: No  Utilization    Last refreshed: 6/20/2019  8:25 PM:  Hospital Admissions 0           Last refreshed: 6/20/2019  8:25 PM:  ED Visits 0           Last refreshed: 6/20/2019  8:25 PM:  No Show Count (past year) 0              Current as of: 6/20/2019  8:25 PM          Clinical Concerns:    Current Medical Concerns:  Care team referral after call from daughter, Dionne.    Patient and spouse gave verbal permission to speak with daughter.    Clinic care coordinator spoke with patient's spouse, Nkechi.  Nkechi states patient is VERY tired, her is lying down and napping frequently.  He denies pain. She states he had a little discomfort on his left side last evening but that is all. Spouse states she has him on a low fat, no salt diet.     She states patient's feet and lower legs remain very swollen. He was on lasix for four days for this and she states he has one pill left. The lasix has made not difference at all. He is also short of breath. Clinic care coordinator expressed concern with symptoms of SOB, edema and fatigue and advised patient should be seen. Patient's daughter came to the home during our conversation and stated she thought her Dad \"looked bad.\"  Spouse will call cardiology and see if he can be seen, if not they will consider ED.           Current Behavioral Concerns: No concerns    Education Provided to patient: Role of care coordination and as above.      Pain  Pain (GOAL):: No    Health Maintenance Reviewed: " Due/Overdue   Health Maintenance Due   Topic Date Due     URINE DRUG SCREEN  09/22/1932     ZOSTER IMMUNIZATION (1 of 2) 09/22/1982     OP ANNUAL INR REFERRAL  04/25/2017     Clinical Pathway: None    Medication Management:  Spouse and patient manage together     Functional Status:  Dependent ADLs:: Independent  Dependent IADLs:: Independent  Bed or wheelchair confined:: No  Mobility Status: Independent  Any fall with injury in the past year?: No    Living Situation:  Current living arrangement:: I live in a private home with spouse  Type of residence:: Private home - stairs    Diet/Exercise/Sleep:  Diet:: No added salt, Low saturated fat  Inadequate nutrition (GOAL):: No  Food Insecurity: No  Tube Feeding: No  Exercise:: Currently not exercising  Inadequate activity/exercise (GOAL):: No  Significant changes in sleep pattern (GOAL): No    Transportation:  Transportation concerns (GOAL):: No  Transportation means:: Accessible car, Regular car     Psychosocial:  Zoroastrianism or spiritual beliefs that impact treatment:: No  Mental health management concern (GOAL):: No  Informal Support system:: Spouse, Children     Financial/Insurance:   Financial/Insurance concerns (GOAL):: No     Resources and Interventions:  Current Resources:    Community Resources: None  Supplies used at home:: None  Equipment Currently Used at Home: none    Advance Care Plan/Directive  Advanced Care Plans/Directives on file:: No     Goals:   Goals        General    Functional (pt-stated)     Notes - Note edited  6/21/2019  1:24 PM by Venus Henriquez RN    Goal Statement: I want to feel stronger and not so tired.     Measure of Success: When I am not needing frequent naps    Supportive Steps to Achieve: Discuss with cardiology    Barriers: Fatigue    Strengths: Supportive spouse    Date to Achieve By: October 2019    Patient expressed understanding of goal: Yes          Patient/Caregiver understanding: Caregiver has good understanidng    Outreach  Frequency: monthly  Future Appointments              In 5 days [facility] ANTI COAG [facility] [place]    In 2 weeks Andrew Doll MD [facility] [place]    In 1 month Wolfgang Vaughn PA-C [facility] [place]      Plan: Caregiver will discuss current symptoms with cardiologist.     Clinic care coordinator will continue to follow, monitor chart for updates and out reach in 2-4 weeks.     Venus Henriquez RN, [facility] RN Coordinator  [facility]   6/21/2019    1:50 PM  [phone]

## 2019-06-21 NOTE — LETTER
[facility]  Complex Care Plan  About Me:    Patient Name:  Alexandru Crews    YOB: 1932  Age:         86 year old   [place] MRN:    [id number] Telephone Information:  Home Phone [phone]   Mobile Not on file.       Address:  [address] [place] [address] [address] [place] [address] Email address:  No e-mail address on record      Emergency Contact(s)    Name Relationship Lgl Grd Work Phone Home Phone Mobile Phone   1. ALEXANDRU CREWS* Spouse   [phone]    2. ZO MAE Daughter   [phone]            Primary language:  English     needed? No   [facility]:  [phone] op. 1  Other communication barriers: Glasses  Preferred Method of Communication:  Mail  Current living arrangement: I live in a private home with spouse  Mobility Status/ Medical Equipment: Independent    Health Maintenance  Health Maintenance Reviewed: Due/Overdue   Health Maintenance Due   Topic Date Due     URINE DRUG SCREEN  09/22/1932     ZOSTER IMMUNIZATION (1 of 2) 09/22/1982     OP ANNUAL INR REFERRAL  04/25/2017       My Access Plan  Medical Emergency 911   Primary Clinic Line [facility] - [phone]   24 Hour Appointment Line [phone] or  [phone]) (toll-free)   24 Hour Nurse Line [phone] (toll-free)   Preferred Urgent Care     [facility]  [phone]   Preferred Pharmacy [facility]     Behavioral Health Crisis Line The National Suicide Prevention Lifeline at 1-183.396.9609 or 911             My Care Team Members  Patient Care Team       Relationship Specialty Notifications Start End    Paul Knowles MD PCP - General Internal Medicine  8/9/13     Phone: [phone] Fax: [phone]         [address] [place] [address]    Kelli Gonsalez, [facility] Pharmacist Pharmacist  4/23/19     Phone: [phone] Fax: [phone] [address] [place] [address]     Paul Knowles MD Assigned PCP   4/26/19     Phone: [phone] Fax: [phone] [address] [facility] [place] [address]    Shoshana Colmenares [facility] Pharmacist   5/29/19     Phone: [phone] Fax: [phone] [address] [facility] [address]    Venus Henriquez, RN Lead Care Coordinator Primary Care - CC Admissions 6/21/19     Phone: [phone] Fax: [phone]                My Care Plans  Self Management and Treatment Plan  Goals and (Comments)  Goals        General    Functional (pt-stated)     Notes - Note edited  6/21/2019  1:24 PM by Venus Henriquez, [place]    Goal Statement: I want to feel stronger and not so tired.     Measure of Success: When I am not needing frequent naps    Supportive Steps to Achieve: Discuss with cardiology    Barriers: Fatigue    Strengths: Supportive spouse    Date to Achieve By: October 2019    Patient expressed understanding of goal: Yes               Action Plans on File:            Depression  Heart Failure       Advance Care Plans/Directives Type:        My Medical and Care Information  Problem List   Patient Active Problem List   Diagnosis     Family history of colon cancer     Osteopenia     HYPERLIPIDEMIA LDL GOAL <160     Vitreous condensations, od > os     Pseudophakia, Yag Caps, ou     Chronic pain syndrome     Advanced directives, counseling/discussion     BPH (benign prostatic hyperplasia)     Paroxysmal atrial fibrillation (H)     History of shingles     Chronic fatigue     Lumbar spinal stenosis     DJD (degenerative joint disease), lumbar and thoracic     Diverticulosis of large intestine     H/O cardiac pacemaker     Hypertension goal BP (blood pressure) < 140/90     Long term current use of anticoagulant therapy     Chronic nonintractable headache, unspecified headache type     Prediabetes     Chronic pain     Elevated glucose     De Quervain's disease (tenosynovitis)     Primary osteoarthritis of first carpometacarpal joint of left hand     Cubital tunnel  syndrome, right     Primary osteoarthritis of right knee     History of total knee arthroplasty, left     Chronic atrial fibrillation (H)     Pacemaker     (HFpEF) heart failure with preserved ejection fraction (H)      Current Medications and Allergies:  See printed Medication Report.    Care Coordination Start Date: 6/21/2019   Frequency of Care Coordination: monthly   Form Last Updated: 06/21/2019

## 2019-06-21 NOTE — LETTER
[facility]  [address] [place] [address]. [place] [address]    June 25, 2019    Alexandru Crews  [address] [place] [address]          Dear Alexandru,    Normal electrolytes. Normal kidney function. Normal liver blood test. Normal pancreas test.    Enclosed is a copy of your results.     Results for orders placed or performed in visit on 06/21/19   Amylase   Result Value Ref Range    Amylase 50 30 - 110 U/L   Lipase   Result Value Ref Range    Lipase 162 73 - 393 U/L   Comprehensive metabolic panel   Result Value Ref Range    Sodium 136 133 - 144 mmol/L    Potassium 4.8 3.4 - 5.3 mmol/L    Chloride 102 94 - 109 mmol/L    Carbon Dioxide 30 20 - 32 mmol/L    Anion Gap 4 3 - 14 mmol/L    Glucose 111 (H) 70 - 99 mg/dL    Urea Nitrogen 25 7 - 30 mg/dL    Creatinine 1.06 0.66 - 1.25 mg/dL    GFR Estimate 63 >60 mL/min/[1.73_m2]    GFR Estimate If Black 73 >60 mL/min/[1.73_m2]    Calcium 9.2 8.5 - 10.1 mg/dL    Bilirubin Total 0.7 0.2 - 1.3 mg/dL    Albumin 3.8 3.4 - 5.0 g/dL    Protein Total 7.0 6.8 - 8.8 g/dL    Alkaline Phosphatase 86 40 - 150 U/L    ALT 29 0 - 70 U/L    AST 25 0 - 45 U/L   CBC with platelets differential   Result Value Ref Range    WBC 2.8 (L) 4.0 - 11.0 10e9/L    RBC Count 3.54 (L) 4.4 - 5.9 10e12/L    Hemoglobin 10.9 (L) 13.3 - 17.7 g/dL    Hematocrit 34.5 (L) 40.0 - 53.0 %    MCV 98 78 - 100 fl    MCH 30.8 26.5 - 33.0 pg    MCHC 31.6 31.5 - 36.5 g/dL    RDW 15.3 (H) 10.0 - 15.0 %    Platelet Count 86 (L) 150 - 450 10e9/L    % Neutrophils 70.7 %    % Lymphocytes 20.6 %    % Monocytes 5.8 %    % Eosinophils 2.5 %    % Basophils 0.4 %    Absolute Neutrophil 2.0 1.6 - 8.3 10e9/L    Absolute Lymphocytes 0.6 (L) 0.8 - 5.3 10e9/L    Absolute Monocytes 0.2 0.0 - 1.3 10e9/L    Absolute Eosinophils 0.1 0.0 - 0.7 10e9/L    Absolute Basophils 0.0 0.0 - 0.2 10e9/L    Diff Method Automated Method        If you have any questions or concerns,  please call myself or my nurse at [phone].      Sincerely,        Nini Boateng MD /vincent

## 2019-06-21 NOTE — TELEPHONE ENCOUNTER
"Huddled with Dr. Knowles and he clarified #4 below- patient can continue with Furosemide 20mg daily PRN only when leg swelling is bad/bothersome, otherwise, should elevate as much as possible and use compression stockings. Ok to send 30 tabs with 0 refills  Prescription sent    Called patient and he also had his wife on the line  Discussed Dr. Knowles's message  Scheduled patient for a lab appointment today  He stated that he just feels all \"pooped out\" and tired but he tries to get up and do things because \"I have things to do\"  He does get winded easily but denies any distress  He asked that we cancel the prescription for the BP monitor at the pharmacy as he did not want it ran thru his insurance and end up having to pay out of pocket- called pharmacy and spoke with Franchesca. She will cancel the prescription    Patient stated that Wolfgang Vaughn PA-C was going to somehow get him a blood pressure monitoring device free of charge and that it was not going to be the \"old fashion\" kind  RN not sure what was discussed during OV with Wolfgang Vaughn PA-C   Will route to Wolfgang Vaughn PA-C to advise upon her return next week     will still need to mail out INR results along with consent to communicate form    Arelis Ferrara RN         "

## 2019-06-24 NOTE — TELEPHONE ENCOUNTER
Attempted to call patient but line was busy  Please try calling again another time    Please let patient know-  Normal electrolytes. Normal kidney function. Normal liver blood test. Normal pancreas test.  CBC results are stable to slightly improved since hospital discharge. Follow-up with hem/onc and GI as planned   to mail out results    Arelis Ferrara RN

## 2019-06-24 NOTE — TELEPHONE ENCOUNTER
Notes recorded by Nini Boateng MD on 6/23/2019 at 8:25 AM CDT  Normal electrolytes. Normal kidney function. Normal liver blood test. Normal pancreas test.    Please advise regarding CBC results 6/21/19 and if another recheck is needed   (Patient recently in the hospital and had pancytopenia related to pancreatitis   Had f/u with Wolfgang Vaughn PA-C on 6/18/19 but did not have any recheck labs done at that time  Patient/family were concerned and wanted this rechecked so Dr. Knowles had ordered the labs while Wolfgang Vaughn PA-C was out of the office  Patient does not have an appointment with hematology/oncology and GI for another month and is anxious to know results)    Both providers are out of the office today    Arelis Ferrara RN

## 2019-06-24 NOTE — TELEPHONE ENCOUNTER
CBC results are stable to slightly improved since hospital discharge. Follow-up with hem/onc and GI as planned.  Bee Simon, CNP

## 2019-06-24 NOTE — TELEPHONE ENCOUNTER
Patient notified of Provider's message as written.  Patient verbalized understanding.    - please mail out 6/21/19 labs results and result notes below to patient     Arelis Ferrara RN

## 2019-06-25 NOTE — TELEPHONE ENCOUNTER
I told the patient we would try to run it through insurance to see if it was covered.  If it isn't he can swing by the pharmacy or come for an MA visit for free BP checks.  Martina RAYMOND

## 2019-06-26 ENCOUNTER — ANTICOAGULATION THERAPY VISIT (OUTPATIENT)
Dept: NURSING | Facility: CLINIC | Age: 84
End: 2019-06-26
Payer: MEDICARE

## 2019-06-26 DIAGNOSIS — I48.0 PAROXYSMAL ATRIAL FIBRILLATION (H): ICD-10-CM

## 2019-06-26 DIAGNOSIS — Z79.01 LONG TERM CURRENT USE OF ANTICOAGULANT THERAPY: ICD-10-CM

## 2019-06-26 LAB — INR POINT OF CARE: 1.9 (ref 0.86–1.14)

## 2019-06-26 PROCEDURE — 85610 PROTHROMBIN TIME: CPT | Mod: QW

## 2019-06-26 PROCEDURE — 36416 COLLJ CAPILLARY BLOOD SPEC: CPT

## 2019-06-26 PROCEDURE — 99207 ZZC NO CHARGE NURSE ONLY: CPT

## 2019-06-26 NOTE — PROGRESS NOTES
ANTICOAGULATION FOLLOW-UP CLINIC VISIT    Patient Name:  Alexandru Crews  Date:  2019  Contact Type:  Face to Face    SUBJECTIVE:  Patient Findings         Clinical Outcomes     Negatives:   Major bleeding event, Thromboembolic event, Anticoagulation-related hospital admission, Anticoagulation-related ED visit, Anticoagulation-related fatality           OBJECTIVE    INR Protime   Date Value Ref Range Status   2019 1.9 (A) 0.86 - 1.14 Final       ASSESSMENT / PLAN  INR assessment SUB    Recheck INR In: 1 WEEK    INR Location Clinic      Anticoagulation Summary  As of 2019    INR goal:   2.0-3.0   TTR:   88.2 % (3.2 y)   INR used for dosin.9! (2019)   Warfarin maintenance plan:   2.5 mg (2.5 mg x 1) every Mon, Wed, Fri; 1.25 mg (2.5 mg x 0.5) all other days   Full warfarin instructions:   2.5 mg every Mon, Wed, Fri; 1.25 mg all other days   Weekly warfarin total:   12.5 mg   Plan last modified:   Rose Navarrete RN (2019)   Next INR check:   2019   Priority:   INR   Target end date:   Indefinite    Indications    Long term current use of anticoagulant therapy [Z79.01]  Paroxysmal atrial fibrillation (H) [I48.0]             Anticoagulation Episode Summary     INR check location:       Preferred lab:       Send INR reminders to:   JAY ISRAEL    Comments:         Anticoagulation Care Providers     Provider Role Specialty Phone number    Paul Knowles MD [facility] Internal Medicine [phone]            See the Encounter Report to view Anticoagulation Flowsheet and Dosing Calendar (Go to Encounters tab in chart review, and find the Anticoagulation Therapy Visit)        Rose Navarrete RN

## 2019-06-28 NOTE — TELEPHONE ENCOUNTER
Called and spoke to patient. Informed patient of message and patient will check with his pharmacy.    Frances Castro MA

## 2019-07-03 ENCOUNTER — TELEPHONE (OUTPATIENT)
Dept: FAMILY MEDICINE | Facility: CLINIC | Age: 84
End: 2019-07-03

## 2019-07-03 ENCOUNTER — PATIENT OUTREACH (OUTPATIENT)
Dept: CARE COORDINATION | Facility: CLINIC | Age: 84
End: 2019-07-03

## 2019-07-03 DIAGNOSIS — G89.4 CHRONIC PAIN SYNDROME: ICD-10-CM

## 2019-07-03 RX ORDER — MORPHINE SULFATE 15 MG/1
15 TABLET, FILM COATED, EXTENDED RELEASE ORAL EVERY 12 HOURS
Qty: 60 TABLET | Refills: 0 | Status: CANCELLED | OUTPATIENT
Start: 2019-07-03

## 2019-07-03 ASSESSMENT — ACTIVITIES OF DAILY LIVING (ADL): DEPENDENT_IADLS:: INDEPENDENT

## 2019-07-03 NOTE — TELEPHONE ENCOUNTER
Reason for call:  Other   Patient called regarding (reason for call): call back  Additional comments: Bronson from [facility] pharmacy is calling because he said the patient brought in a prescription dated from April 25th and they need a new one for  morphine (MS CONTIN) 15 MG CR tablet. Please call back     Phone number to reach patient:  Other phone number:  [phone]    Best Time:  any    Can we leave a detailed message on this number?  YES

## 2019-07-03 NOTE — PROGRESS NOTES
"Clinic Care Coordination Contact    Clinic Care Coordination Contact  OUTREACH    Referral Information:  Referral Source: Care Team    Primary Diagnosis: Cardiovascular - other  Chief Complaint   Patient presents with     Clinic Care Coordination - Follow-up     RN   Universal Utilization: Cardiology at [facility].   Clinic Utilization  Difficulty keeping appointments:: No  Compliance Concerns: No  No PCP office visit in Past Year: No  Utilization    Last refreshed: 7/3/2019 12:17 AM:  Hospital Admissions 0           Last refreshed: 7/3/2019 12:17 AM:  ED Visits 0           Last refreshed: 7/3/2019 12:17 AM:  No Show Count (past year) 0              Current as of: 7/3/2019 12:17 AM          Clinical Concerns:     Current Medical Concerns:  Spoke with both patient and spouse.     Patient states his weight this am was 111# and he states he has not taken the lasix for a couple of days and his weight continues to drop a pound a day.  He states if he takes the lasix he needs to be by the bathroom all day. He went to a  yesterday so did not take it. Patient denies edema in feet or lower legs, spouse agrees no edema today.    Patient saw cardiology on 19.  He had his amiodarone stopped on 19. They adjusted his pacemaker settings. He was instructed to take lasix 20 mg daily until his weight was 110#. If weight stays below 110 he does not need to take the lasix. (patient is intentionally not compliant)   Patient is to call in 2 weeks to see if his fatigue has improved. They will consider taking him off metoprolol if no improvement.     He states the fatigue is frustrating. He states all of a sudden he \"just can't go any more and I need to lie down.\"     Patient states he has constant pain in left foot. This needs major reconstruction. He states he would be non-weight bearing for six months and has decided not to pursue the surgery. His right knee needs to be replaced but he probably won't " "be able to do the therapy needed because of his back pain.  Patient is on scheduled MS contin twice a day with oxycodone as needed. This keeps pain 'manageable\" but it is never gone.     Patient states he is seeing ENT because he \"lost the hearing in his right ear all of a sudden.\"     Patient is scheduled for an endoscopic ultrasound at [facility] on 7/16/19. He has a pre-op scheduled in PCP office on 7/9/19.     Patient has oncology appointment on 7/22/19.        Patient feels like he has too much going on.     Current Behavioral Concerns: No concerns    Education Provided to patient: Reviewed medications. Instructed on role of care coordination.      Pain  Pain (GOAL):: No    Health Maintenance Reviewed: Due/Overdue   Health Maintenance Due   Topic Date Due     URINE DRUG SCREEN  09/22/1932     ZOSTER IMMUNIZATION (1 of 2) 09/22/1982     OP ANNUAL INR REFERRAL  04/25/2017     Clinical Pathway: None    Medication Management:  Patient manages his medications independently.      Functional Status:  Dependent ADLs:: Independent  Dependent IADLs:: Independent  Bed or wheelchair confined:: No  Mobility Status: Independent  Fallen 2 or more times in the past year?: No  Any fall with injury in the past year?: No    Living Situation:  Current living arrangement:: I live in a private home with spouse  Type of residence:: Private home - stairs    Diet/Exercise/Sleep:  Diet:: No added salt, Low saturated fat  Inadequate nutrition (GOAL):: No  Food Insecurity: No  Tube Feeding: No  Exercise:: Currently not exercising  Inadequate activity/exercise (GOAL):: No  Significant changes in sleep pattern (GOAL): No    Transportation:  Transportation concerns (GOAL):: No - drives  Transportation means:: Accessible car, Regular car     Psychosocial:  Denominational or spiritual beliefs that impact treatment:: No  Mental health DX:: No  Mental health management concern (GOAL):: No  Informal Support system:: Spouse, Children   "   Financial/Insurance:   Financial/Insurance concerns (GOAL):: No     Resources and Interventions:  Current Resources:    Community Resources: None  Supplies used at home:: None  Equipment Currently Used at Home: none    Advance Care Plan/Directive  Advanced Care Plans/Directives on file:: No  Goals:   Goals        General    Functional (pt-stated)     Notes - Note edited  6/21/2019  1:24 PM by Venus Henriquez RN    Goal Statement: I want to feel stronger and not so tired.     Measure of Success: When I am not needing frequent naps    Supportive Steps to Achieve: Discuss with cardiology    Barriers: Fatigue    Strengths: Supportive spouse    Date to Achieve By: October 2019    Patient expressed understanding of goal: Yes          Patient/Caregiver understanding: Patient expresses understanding     Outreach Frequency: monthly  Future Appointments              In 2 days FZ ANTI COAG [facility]    In 6 days Andrew Doll MD [facility]    In 6 days Wolfgang Vaughn PA-C [facility]    In 3 weeks Wolfgang Vaughn PA-C [facility]        Plan: Patient will attend scheduled appointments.   Patient will follow up with cardiology as instructed.     Clinic care coordinator will continue to follow and and outreach in one month.    Venus Henriquez RN, [facility] RN Coordinator  [facility]   7/3/2019    11:31 AM  [phone]

## 2019-07-03 NOTE — TELEPHONE ENCOUNTER
Controlled Substance Refill Request for morphine (MS CONTIN) 15 MG CR tablet  Problem List Complete:  Yes  Overview  Edited:  Josselyn Corona RN  4/2/2019  Patient is followed by Paul Knowles MD for ongoing prescription of pain medication. All refills should be approved by this provider, or covering partner.     Medication(s): MS-Contin (Morphine Sulfate Controlled-Release) 15 milligrams 3 times a day , percocet 5/325 tablets 2 a day  Maximum quantity per month: 90/60  Clinic visit frequency required: Q 3 months      Controlled substance agreement:      Encounter-Level CSA - 4/25/16:                   Controlled Substance Agreement - Scan on 5/6/2016 1:14 PM : CONTROLLED SUBSTANCE AGREEMENT (below)                Pain Clinic evaluation in the past: Yes  Date/Location:      DIRE Total Score(s):  No flowsheet data found.     Last [facility] website verification: 12/26/18,4/2/19  [email or web address]                     checked in past 3 months?  No, route to RN

## 2019-07-05 ENCOUNTER — ANTICOAGULATION THERAPY VISIT (OUTPATIENT)
Dept: NURSING | Facility: CLINIC | Age: 84
End: 2019-07-05
Payer: MEDICARE

## 2019-07-05 DIAGNOSIS — I48.0 PAROXYSMAL ATRIAL FIBRILLATION (H): ICD-10-CM

## 2019-07-05 DIAGNOSIS — Z79.01 LONG TERM CURRENT USE OF ANTICOAGULANT THERAPY: ICD-10-CM

## 2019-07-05 LAB — INR POINT OF CARE: 3.4 (ref 0.86–1.14)

## 2019-07-05 PROCEDURE — 85610 PROTHROMBIN TIME: CPT | Mod: QW

## 2019-07-05 PROCEDURE — 36416 COLLJ CAPILLARY BLOOD SPEC: CPT

## 2019-07-05 PROCEDURE — 99207 ZZC NO CHARGE NURSE ONLY: CPT

## 2019-07-05 RX ORDER — MORPHINE SULFATE 15 MG/1
15 TABLET, FILM COATED, EXTENDED RELEASE ORAL EVERY 12 HOURS
Qty: 60 TABLET | Refills: 0 | Status: SHIPPED | OUTPATIENT
Start: 2019-07-05 | End: 2019-08-05

## 2019-07-05 NOTE — TELEPHONE ENCOUNTER
Called patient's pharmacy [id number] - [place], [place] - [address]    Called and spoke with the pharmacy. They didn't know what they patient wanted to do. They asked if we could contact the patient to find out if the patient would like to  at the  or mailed directly to the pharmacy.    [id number] - [place], [place] - [address]  [address] [address]  Phone: [phone] Fax: [phone]    Outpatient Medication Detail    Disp Refills Start End TANIA   morphine (MS CONTIN) 15 MG CR tablet 60 tablet 0 7/5/2019  No   Sig - Route: Take 1 tablet (15 mg) by mouth every 12 hours 28 days or more between refills for controlled medications - Oral   Class: Local Print   Earliest Fill Date: 7/5/2019   Order: [id number]

## 2019-07-05 NOTE — TELEPHONE ENCOUNTER
Unable to reach patient. Left a voicemail letting him know that the paper prescription is available at the [facility]  for him to pickup during business hours.    Asked if he is going to have someone pick this up on his behalf to call the clinic and provided the persons full name and they will need to provide a phot ID at the time of .     Paper prescription is down at [facility]  ready for . Radha Crandall,     Designated pick-up person will need to provided a photo ID at time of .    The [facility] hours are:    Monday- Thursday 7:00 am- 7:00 pm  Friday  7:00 am- 5:00 pm    This will different if there is a holiday and this will be posted on the website [email or web address]    [facility]  [address]  [place] [address]  [phone]

## 2019-07-05 NOTE — PATIENT INSTRUCTIONS
Could try [place] Instant Breakfast 2-3 times/week to add more vitamins to diet.  This has Vitamin K in it, which will help add some more into the diet, especially if you don't like vegetables as much!  Warfarin needs vitamin K to work in the body.

## 2019-07-05 NOTE — PROGRESS NOTES
ANTICOAGULATION FOLLOW-UP CLINIC VISIT    Patient Name:  Alexandru Crews  Date:  7/5/2019  Contact Type:  Face to Face    SUBJECTIVE:  Patient Findings     Positives:   Change in health (feeling more tired), Upcoming invasive procedure (07/16/2019, will need 5 day hold.), Change in medications (added furosemide, stopped amiodarone)    Comments:   Has Oncology appointment set in July after [facility] procedure.        Clinical Outcomes     Comments:   Has Oncology appointment set in July after [facility] procedure.           OBJECTIVE    INR Protime   Date Value Ref Range Status   07/05/2019 3.4 (A) 0.86 - 1.14 Final       ASSESSMENT / PLAN  INR assessment SUPRA    Recheck INR In: 5 DAYS    INR Location Clinic      Anticoagulation Summary  As of 7/5/2019    INR goal:   2.0-3.0   TTR:   88.0 % (3.2 y)   INR used for dosing:   3.4! (7/5/2019)   Warfarin maintenance plan:   2.5 mg (2.5 mg x 1) every Mon, Wed, Fri; 1.25 mg (2.5 mg x 0.5) all other days   Full warfarin instructions:   7/5: 1.25 mg; 7/11: Hold; 7/12: Hold; 7/13: Hold; 7/14: Hold; 7/15: Hold; Otherwise 2.5 mg every Mon, Wed, Fri; 1.25 mg all other days   Weekly warfarin total:   12.5 mg   Plan last modified:   Rose Navarrete, RN (6/26/2019)   Next INR check:   7/9/2019   Priority:   INR   Target end date:   Indefinite    Indications    Long term current use of anticoagulant therapy [Z79.01]  Paroxysmal atrial fibrillation (H) [I48.0]             Anticoagulation Episode Summary     INR check location:       Preferred lab:       Send INR reminders to:   JAY ISRAEL    Comments:         Anticoagulation Care Providers     Provider Role Specialty Phone number    Paul Knowles MD [facility] Internal Medicine [phone]            See the Encounter Report to view Anticoagulation Flowsheet and Dosing Calendar (Go to Encounters tab in chart review, and find the Anticoagulation Therapy Visit)    Many health issues, will do a 1/2 hold today and recheck INR at pre-op  for [facility] procedure scheduled 07/16/2019, to see if 5 day hold sufficient, no bridging for procedure.    Karina Avila [facility]

## 2019-07-08 NOTE — PROGRESS NOTES
Chief Complaint - Hearing loss    History of Present Illness - Alexandru Crews is a 86 year old male who returns to me today with hearing loss in the right ear. It went out suddenly. This happened 4/14/2019. 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 He said he lost all of his hearing on the left then, but the next day it came back. Hearing test then showed mostly symmetric down-sloping sensorineural hearing loss. This most recent time the right ear hearing didn't return to normal. There is no history of chronic ear disease or ear surgery.  With regards to recreational, , and work-related noise exposure he has none. No family history of hearing loss. The patient denies vertigo, otalgia. He has a cardiac history. MRI showed no cause of sensorineural hearing loss. I had him try a prednisone burst and taper. He was hospitalized since last visit due to pancreatitis. He returns and the hearing did improve, but it still isn't as strong as the left. No tinnitus. He did have a tick bite 2 months ago, and he was treated for Lyme's disease, but he was told this wasn't Lyme. This was after his hearing went down on the right. He does struggle in his daily living with hearing loss.     Past Medical History -   Patient Active Problem List   Diagnosis     Family history of colon cancer     Osteopenia     HYPERLIPIDEMIA LDL GOAL <160     Vitreous condensations, od > os     Pseudophakia, Yag Caps, ou     Chronic pain syndrome     Advanced directives, counseling/discussion     BPH (benign prostatic hyperplasia)     Paroxysmal atrial fibrillation (H)     History of shingles     Chronic fatigue     Lumbar spinal stenosis     DJD (degenerative joint disease), lumbar and thoracic     Diverticulosis of large intestine     H/O cardiac pacemaker     Hypertension goal BP (blood pressure) < 140/90     Long term current use of anticoagulant therapy     Chronic nonintractable headache, unspecified  headache type     Prediabetes     Chronic pain     Elevated glucose     De Quervain's disease (tenosynovitis)     Primary osteoarthritis of first carpometacarpal joint of left hand     Cubital tunnel syndrome, right     Primary osteoarthritis of right knee     History of total knee arthroplasty, left     Chronic atrial fibrillation (H)     Pacemaker     (HFpEF) heart failure with preserved ejection fraction (H)       Current Medications -   Current Outpatient Medications:      calcium carbonate 1250 (500 CA) MG CHEW, Takes one every other day-unsure of dose, Disp: , Rfl:      DAILY VITAMINS PO, 1 tablet daily, Disp: , Rfl:      denosumab (PROLIA) 60 MG/ML SOSY injection, Inject 1 mL (60 mg) Subcutaneous every 6 months, Disp: 1 mL, Rfl: 1     FIBER PO, Take 1 tsp. by mouth daily , Disp: , Rfl:      furosemide (LASIX) 20 MG tablet, Take 1 tablet (20 mg) by mouth daily as needed when leg swelling is bad. Elevated legs as much as possible and use compression stockings, Disp: 30 tablet, Rfl: 0     methyl salicylate-menthol (MEI-BUNCH) OINT ointment, Apply topically as needed, Disp: , Rfl:      metoprolol (TOPROL-XL) 25 MG 24 hr tablet, Take 25 mg by mouth 2 times daily, Disp: , Rfl:      morphine (MS CONTIN) 15 MG CR tablet, Take 1 tablet (15 mg) by mouth every 12 hours 28 days or more between refills for controlled medications, Disp: 60 tablet, Rfl: 0     oxyCODONE-acetaminophen (PERCOCET) 5-325 MG tablet, Take 1 tablet by mouth 3 times daily as needed for moderate to severe pain 28 days or more between refills for controlled medications, Disp: 90 tablet, Rfl: 0     senna-docusate (SENOKOT-S;PERICOLACE) 8.6-50 MG per tablet, Take 1 tablet by mouth 2 times daily , Disp: , Rfl:      triamcinolone (KENALOG) 0.1 % cream, Apply topically 2 times daily as needed to affected area as directed., Disp: 60 g, Rfl: 2     VITAMIN D, CHOLECALCIFEROL, PO, Take 1,000 Units by mouth 2 times daily , Disp: , Rfl:      warfarin (COUMADIN)  "2.5 MG tablet, TAKE ONE TABLET BY MOUTH EVERY DAY AS DIRECTED, Disp: 90 tablet, Rfl: 0    Current Facility-Administered Medications:      denosumab (PROLIA) injection 60 mg, 60 mg, Subcutaneous, Q6 Months, Paul Knowles MD, 60 mg at 05/23/19 1120    Allergies -   Allergies   Allergen Reactions     Lactose Nausea and Vomiting     Sulindac      Upset stomach     Tramadol      Itching     Valdecoxib Itching     Vicodin [Hydrocodone-Acetaminophen]      Itching     Vioxx Itching     Rofecoxib Itching and Rash     Sulfa Drugs Rash     Muscle aches       Social History -   Social History     Social History     Marital status:      Spouse name: N/A     Number of children: N/A     Years of education: N/A     Social History Main Topics     Smoking status: Former Smoker     Years: 15.00     Types: Cigarettes     Quit date: 6/30/1975     Smokeless tobacco: Never Used     Alcohol use Yes      Comment: 1 per week     Drug use: No     Sexual activity: Not Currently     Partners: Female     Other Topics Concern     None     Social History Narrative       Family History -   Family History   Problem Relation Age of Onset     Cancer Mother         ovarian     Ovarian Cancer Mother      Cancer - colorectal Brother        Review of Systems - As per HPI and PMHx, otherwise 7 system review of the head and neck negative.    Physical Exam  /72   Pulse 99   Ht 1.572 m (5' 1.9\")   Wt 55.3 kg (122 lb)   SpO2 99%   BMI 22.39 kg/m    General - The patient is in no distress.  Alert and oriented to person and place, answers questions and cooperates with examination appropriately. Seems fatigued.   Voice and Breathing - The patient was breathing comfortably without the use of accessory muscles. There was no wheezing, stridor, or stertor.  The patients voice was clear and strong.  Ears - The auricles are normal. The tympanic membranes are normal in appearance, bony landmarks are intact.  No retraction, perforation, or masses.  No " fluid or purulence was seen in the external canal or the middle ear. No evidence of infection of the middle ear or external canal, cerumen was normal in appearance.  Eyes - Extraocular movements intact.  Sclera were not icteric or injected.  Neck - Palpation of the occipital, submental, submandibular, internal jugular chain, and supraclavicular nodes did not demonstrate any abnormal lymph nodes or masses. Parotid glands had no masses. Palpation of the thyroid was soft and smooth, with no nodules or goiter appreciated.  The trachea was mobile and midline.  Neurological - Cranial nerves 2 through 12 were grossly intact. House-Brackmann grade 1 out of 6 bilaterally.     Assessment and Plan - Alexandru Crews is a 86 year old male who returns to me today with sudden onset right sensorineural hearing loss. I am unsure the etiology behind the sudden drop in hearing with subsequent return on the left, followed by a sudden drop without complete return on the right.  We did discuss the possibility of vasospasm, viral infection, or possibly but unlikely retrocochlear pathology or tumor. MRI ruled tumor out. I recommend hearing aids. Recheck hearing in 6 months, sooner if hearing changes.      Andrew Doll MD  Otolaryngology  [facility]

## 2019-07-09 ENCOUNTER — TELEPHONE (OUTPATIENT)
Dept: INTERNAL MEDICINE | Facility: CLINIC | Age: 84
End: 2019-07-09

## 2019-07-09 ENCOUNTER — OFFICE VISIT (OUTPATIENT)
Dept: OTOLARYNGOLOGY | Facility: CLINIC | Age: 84
End: 2019-07-09
Payer: MEDICARE

## 2019-07-09 ENCOUNTER — OFFICE VISIT (OUTPATIENT)
Dept: FAMILY MEDICINE | Facility: CLINIC | Age: 84
End: 2019-07-09
Payer: MEDICARE

## 2019-07-09 ENCOUNTER — ANTICOAGULATION THERAPY VISIT (OUTPATIENT)
Dept: FAMILY MEDICINE | Facility: CLINIC | Age: 84
End: 2019-07-09
Payer: MEDICARE

## 2019-07-09 VITALS
DIASTOLIC BLOOD PRESSURE: 72 MMHG | BODY MASS INDEX: 22.45 KG/M2 | HEIGHT: 62 IN | HEART RATE: 99 BPM | SYSTOLIC BLOOD PRESSURE: 124 MMHG | OXYGEN SATURATION: 99 % | WEIGHT: 122 LBS

## 2019-07-09 VITALS
HEART RATE: 90 BPM | DIASTOLIC BLOOD PRESSURE: 60 MMHG | RESPIRATION RATE: 18 BRPM | HEIGHT: 62 IN | OXYGEN SATURATION: 97 % | SYSTOLIC BLOOD PRESSURE: 100 MMHG | TEMPERATURE: 97.8 F | BODY MASS INDEX: 21.16 KG/M2 | WEIGHT: 115 LBS

## 2019-07-09 DIAGNOSIS — I48.0 PAROXYSMAL ATRIAL FIBRILLATION (H): ICD-10-CM

## 2019-07-09 DIAGNOSIS — Z79.01 LONG TERM CURRENT USE OF ANTICOAGULANT THERAPY: ICD-10-CM

## 2019-07-09 DIAGNOSIS — H90.3 SNHL (SENSORY-NEURAL HEARING LOSS), ASYMMETRICAL: Primary | ICD-10-CM

## 2019-07-09 DIAGNOSIS — K85.10 ACUTE BILIARY PANCREATITIS, UNSPECIFIED COMPLICATION STATUS: ICD-10-CM

## 2019-07-09 DIAGNOSIS — Z01.818 PREOP GENERAL PHYSICAL EXAM: Primary | ICD-10-CM

## 2019-07-09 LAB — INR PPP: 2.7 (ref 0.86–1.14)

## 2019-07-09 PROCEDURE — 99213 OFFICE O/P EST LOW 20 MIN: CPT | Performed by: OTOLARYNGOLOGY

## 2019-07-09 PROCEDURE — 36416 COLLJ CAPILLARY BLOOD SPEC: CPT | Performed by: INTERNAL MEDICINE

## 2019-07-09 PROCEDURE — 85610 PROTHROMBIN TIME: CPT | Performed by: INTERNAL MEDICINE

## 2019-07-09 PROCEDURE — 99207 ZZC NO CHARGE NURSE ONLY: CPT | Performed by: INTERNAL MEDICINE

## 2019-07-09 PROCEDURE — 99214 OFFICE O/P EST MOD 30 MIN: CPT | Performed by: PHYSICIAN ASSISTANT

## 2019-07-09 ASSESSMENT — MIFFLIN-ST. JEOR
SCORE: 1079.3
SCORE: 1111.05

## 2019-07-09 NOTE — PROGRESS NOTES
[facility]  [address] [place] [address]  [phone]  Dept: [phone]    PRE-OP EVALUATION:  Today's date: 2019    Alexandru Crews (: 1932) presents for pre-operative evaluation assessment as requested by Dr. Drea Winston.  He requires evaluation and anesthesia risk assessment prior to undergoing surgery/procedure for treatment of pancreatitis.    Proposed Surgery/ Procedure: Endoscopic Ultrasound  Date of Surgery/ Procedure: 2019  Time of Surgery/ Procedure: to be determined  Hospital/Surgical Facility: [facility]  Fax number for surgical facility: [phone]  Primary Physician: Paul Knowles  Type of Anesthesia Anticipated: to be determined    Patient has a Health Care Directive or Living Will:  YES     1. NO - Do you have a history of heart attack, stroke, stent, bypass or surgery on an artery in the head, neck, heart or legs?  2. NO - Do you ever have any pain or discomfort in your chest?  3. YES - DO YOU HAVE A HISTORY OF HEART FAILURE   4. YES - ARE YOUR TROUBLED BY SHORTNESS OF BREATH WHEN WALKING ON THE LEVEL, UP A SLIGHT HILL OR AT NIGHT?   5. NO - Do you currently have a cold, bronchitis or other respiratory infection?  6. YES - DO YOU HAVE A COUGH, SHORTNESS OF BREATH OR WHEEZING?   7. NO - Do you sometimes get pains in the calves of your legs when you walk?  8. NO - Do you or anyone in your family have previous history of blood clots?  9. NO - Do you or does anyone in your family have a serious bleeding problem such as prolonged bleeding following surgeries or cuts?  10. YES - HAVE YOU EVER HAD PROBLEMS WITH ANEMIA OR BEEN TOLD TO TAKE IRON PILLS?   11. NO - Have you had any abnormal blood loss such as black, tarry or bloody stools, or abnormal vaginal bleeding?  12. NO - Have you ever had a blood transfusion?  13. NO - Have you or any of your relatives ever had problems with anesthesia?  14. YES - DO YOU HAVE SLEEP APNEA, EXCESSIVE  SNORING OR DAYTIME DROWSINESS?   15. NO - Do you have any prosthetic heart valves?  16. YES - DO YOU HAVE PROSTHETIC JOINTS?   17. NO - Is there any chance that you may be pregnant?      HPI:     HPI related to upcoming procedure: Pancreatitis      CHF - Patient has a longstanding history of moderate-severe CHF. Exacerbating conditions include atrial fibrilation. Currently the patient's condition is same. Current treatment regimen includes beta blocker and diuretic. The patient denies chest pain, edema, orthopnea, SOB or recent weight gain. Last Echocardiogram 7/1/2019, EKG See Below.       MEDICAL HISTORY:     Patient Active Problem List    Diagnosis Date Noted     (HFpEF) heart failure with preserved ejection fraction (H) 04/25/2019     Priority: Medium     Pacemaker 02/20/2018     Priority: Medium     Chronic atrial fibrillation (H) 07/27/2017     Priority: Medium     Primary osteoarthritis of right knee 06/05/2017     Priority: Medium     History of total knee arthroplasty, left 06/05/2017     Priority: Medium     De Quervain's disease (tenosynovitis) 04/03/2017     Priority: Medium     Primary osteoarthritis of first carpometacarpal joint of left hand 04/03/2017     Priority: Medium     Cubital tunnel syndrome, right 04/03/2017     Priority: Medium     Elevated glucose 02/14/2017     Priority: Medium     Chronic pain 11/30/2016     Priority: Medium     Patient is followed by Paul Knowles MD for ongoing prescription of pain medication.  All refills should be approved by this provider, or covering partner.    Medication(s): MS-Contin (Morphine Sulfate Controlled-Release) 15 milligrams 3 times a day , percocet 5/325 tablets 2 a day  Maximum quantity per month: 90/60  Clinic visit frequency required: Q 3 months     Controlled substance agreement:  Encounter-Level CSA - 4/25/16:               Controlled Substance Agreement - Scan on 5/6/2016  1:14 PM : CONTROLLED SUBSTANCE AGREEMENT (below)            Pain Clinic  evaluation in the past: Yes       Date/Location:      DIRE Total Score(s):  No flowsheet data found.    Last [facility] website verification:  12/26/18,4/2/19   [email or web address]    Controlled substance agreement: 8/24/18       Prediabetes 05/26/2016     Priority: Medium     Chronic nonintractable headache, unspecified headache type 04/25/2016     Priority: Medium     Long term current use of anticoagulant therapy 04/08/2016     Priority: Medium     Hypertension goal BP (blood pressure) < 140/90 08/10/2015     Priority: Medium     H/O cardiac pacemaker 01/02/2015     Priority: Medium     Diverticulosis of large intestine 07/10/2014     Priority: Medium     Problem list name updated by automated process. Provider to review       Lumbar spinal stenosis 09/09/2013     Priority: Medium     See lumbar mri evaluation with [facility] 10/11/2010 and 7/26/2011       DJD (degenerative joint disease), lumbar and thoracic 09/09/2013     Priority: Medium     History of shingles 07/09/2013     Priority: Medium     Chronic fatigue 07/09/2013     Priority: Medium     Paroxysmal atrial fibrillation (H)      Priority: Medium     BPH (benign prostatic hyperplasia)      Priority: Medium     Has been followed by Dr. Worthington with [facility]        Advanced directives, counseling/discussion 06/24/2011     Priority: Medium     Advance Directive Problem List Overview:   Name Relationship Phone    Primary Health Care Agent            Alternative Health Care Agent          Patient states has Advance Directive and will bring in a copy to clinic. 6/24/2011          Chronic pain syndrome      Priority: Medium     Complicated. Multiple tests. Mainly this is nocturnal leg pain that is unbearable. He has tried the following different medications     Gabapentin [Neurontin] , pramipexole (MIRAPEX) , LYRICA (pregabalin) , nortriptyline (PAMELOR) , Clinoril (Sulindac)  , Bextra (Valdecoxib), ultracet, percocet 5/325 tablets [ what  works best ], HYDROcodone (VICODIN) , tramadol (Ultram) , rofecoxib (Vioxx) , lactose , darvocet n-100 tablets , ropinirole [ Requip ]  , pramipexole (MIRAPEX)     All the others are either ineffective or had side effects mostly of generalized pruritis        Vitreous condensations, od > os 01/19/2011     Priority: Medium     Pseudophakia, Yag Caps, ou 01/19/2011     Priority: Medium     HYPERLIPIDEMIA LDL GOAL <160 10/31/2010     Priority: Medium     Family history of colon cancer      Priority: Medium     Incr. risk (brother)       Osteopenia      Priority: Medium     Dr Arauz, patient sees him there at [facility] and gets Denosumab injection (Prolia) biannual        Past Medical History:   Diagnosis Date     Atrial fibrillation (H)      BPH (benign prostatic hyperplasia)      Cataracts      Chest pain 12/2010    Normal Myocardial perfusion test with [facility]     Chronic pain syndrome      CTS (carpal tunnel syndrome)     Rt, Rx surgery     Diverticulosis      DJD (degenerative joint disease), lumbar and thoracic 9/9/2013     Family history of colon cancer     Incr. risk (brother)     Hydrocele     Rt     Hypertension goal BP (blood pressure) < 140/90 8/10/2015     Osteopenia 2005     Osteoporosis     Dr Arauz     PUD (peptic ulcer disease) 11/1998    h/o, +H. Pylori Rx     Varicocele     Lt side-large     Past Surgical History:   Procedure Laterality Date     APPENDECTOMY  12/2004     ARTHROSCOPY KNEE RT/LT  1/2006    Rt & Lt, Dr Arriaga     BACK SURGERY  1978 / 2003    x 3 in 1978, fusions and one surgery in 2003     C APPENDECTOMY  12/31/2004     CARPAL TUNNEL RELEASE RT/LT  three    2 on left, one on right     CATARACT IOL, RT/LT       COLONOSCOPY  1995,2000,2005     INJECT EPIDURAL CERVICAL  5/24/2011    [facility]     INJECT EPIDURAL LUMBAR  11/9/2010    [facility]     INJECT EPIDURAL LUMBAR  1/4/2011    [facility]     INJECT EPIDURAL LUMBAR  4/27/2011     [facility]     LASER YAG CAPSULOTOMY  11/2016; 12/2016    left eye; right eye     PHACOEMULSIFICATION WITH STANDARD INTRAOCULAR LENS IMPLANT  6/2008; 8/2008    right eye; left eye     RECTAL SURGERY      fissurectomy and proctoplasty     RELEASE DEQUERVAINS WRIST Right 04/28/2017    DML     RELEASE TRIGGER FINGER      right hand     SHOULDER SURGERY      X four [ right x 2 and left x 2 ][[[[[     TUNA       Current Outpatient Medications   Medication Sig Dispense Refill     calcium carbonate 1250 (500 CA) MG CHEW Takes one every other day-unsure of dose       DAILY VITAMINS PO 1 tablet daily       denosumab (PROLIA) 60 MG/ML SOSY injection Inject 1 mL (60 mg) Subcutaneous every 6 months 1 mL 1     FIBER PO Take 1 tsp. by mouth daily        furosemide (LASIX) 20 MG tablet Take 1 tablet (20 mg) by mouth daily as needed when leg swelling is bad. Elevated legs as much as possible and use compression stockings 30 tablet 0     metoprolol (TOPROL-XL) 25 MG 24 hr tablet Take 25 mg by mouth 2 times daily       morphine (MS CONTIN) 15 MG CR tablet Take 1 tablet (15 mg) by mouth every 12 hours 28 days or more between refills for controlled medications 60 tablet 0     oxyCODONE-acetaminophen (PERCOCET) 5-325 MG tablet Take 1 tablet by mouth 3 times daily as needed for moderate to severe pain 28 days or more between refills for controlled medications 90 tablet 0     senna-docusate (SENOKOT-S;PERICOLACE) 8.6-50 MG per tablet Take 1 tablet by mouth 2 times daily        triamcinolone (KENALOG) 0.1 % cream Apply topically 2 times daily as needed to affected area as directed. 60 g 2     VITAMIN D, CHOLECALCIFEROL, PO Take 1,000 Units by mouth 2 times daily        warfarin (COUMADIN) 2.5 MG tablet TAKE ONE TABLET BY MOUTH EVERY DAY AS DIRECTED 90 tablet 0     methyl salicylate-menthol (MEI-BUNCH) OINT ointment Apply topically as needed       OTC products: no recent use of OTC ASA, NSAIDS or Steroids    Allergies   Allergen  "Reactions     Lactose Nausea and Vomiting     Sulindac      Upset stomach     Tramadol      Itching     Valdecoxib Itching     Vicodin [Hydrocodone-Acetaminophen]      Itching     Vioxx Itching     Rofecoxib Itching and Rash     Sulfa Drugs Rash     Muscle aches      Latex Allergy: NO    Social History     Tobacco Use     Smoking status: Former Smoker     Years: 15.00     Types: Cigarettes     Last attempt to quit: 1975     Years since quittin.0     Smokeless tobacco: Never Used   Substance Use Topics     Alcohol use: Yes     Comment: 1 per week     History   Drug Use No       REVIEW OF SYSTEMS:   CONSTITUTIONAL: NEGATIVE for fever, chills, change in weight  ENT/MOUTH: NEGATIVE for ear, mouth and throat problems  RESP: NEGATIVE for significant cough or SOB  CV: NEGATIVE for chest pain, palpitations or peripheral edema    EXAM:   /60   Pulse 90   Temp 97.8  F (36.6  C) (Oral)   Resp 18   Ht 1.572 m (5' 1.9\")   Wt 52.2 kg (115 lb)   SpO2 97%   BMI 21.10 kg/m      GENERAL APPEARANCE: healthy, alert and no distress     EYES: EOMI,  PERRL     HENT: ear canals and TM's normal and nose and mouth without ulcers or lesions     NECK: no adenopathy, no asymmetry, masses, or scars and thyroid normal to palpation     RESP: lungs clear to auscultation - no rales, rhonchi or wheezes     CV: regular rates and rhythm, normal S1 S2, no S3 or S4 and no murmur, click or rub     ABDOMEN:  soft, nontender, no HSM or masses and bowel sounds normal     MS: extremities normal- no gross deformities noted, no evidence of inflammation in joints, FROM in all extremities.     SKIN: no suspicious lesions or rashes     NEURO: Normal strength and tone, sensory exam grossly normal, mentation intact and speech normal     PSYCH: mentation appears normal. and affect normal/bright     LYMPHATICS: No cervical adenopathy    DIAGNOSTICS:   EKG: Atrial-paced rhythm with prolonged AV conduction  Right bundle branch block  Left anterior " fascicular block   Bifascicular block     Recent Labs   Lab Test 07/05/19 06/26/19 06/21/19  1321  05/23/19  1217  09/27/18  1002   HGB  --   --  10.9*  --  13.1*  --   --    PLT  --   --  86*  --  126*  --   --    INR 3.4* 1.9*  --    < >  --    < >  --    NA  --   --  136  --  137  --  140   POTASSIUM  --   --  4.8  --  4.7  --  4.8   CR  --   --  1.06  --  0.74  --  0.79   A1C  --   --   --   --  5.4  --  5.5    < > = values in this interval not displayed.        IMPRESSION:   The proposed surgical procedure is considered INTERMEDIATE risk.    REVISED CARDIAC RISK INDEX  The patient has the following serious cardiovascular risks for perioperative complications such as (MI, PE, VFib and 3  AV Block):  Congestive Heart Failure (pulmonary edema, PND, s3 ricardo, CXR with pulmonary congestion, basilar rales)  INTERPRETATION: 2 risks: Class III (moderate risk - 6.6% complication rate)    The patient has the following additional risks for perioperative complications:  No identified additional risks      ICD-10-CM    1. Preop general physical exam Z01.818 EKG 12-lead complete w/read - Clinics   2. Acute biliary pancreatitis, unspecified complication status K85.10        RECOMMENDATIONS:       Cardiovascular Risk  Patient is already on a Beta Blocker. Continue Betablocker therapy after surgery, using Beta blocker order set as necessary for NPO status.        Anticoagulant or Antiplatelet Medication Use  Patient is discontinuing Warfarin today        APPROVAL GIVEN to proceed with proposed procedure without further diagnostic evaluation.       Signed Electronically by: Wolfgang Vaughn PA-C    Copy of this evaluation report is provided to requesting physician.    [facility] Preop Guidelines    Revised Cardiac Risk Index

## 2019-07-09 NOTE — PATIENT INSTRUCTIONS
General Scheduling Information  To schedule your CT/MRI scan, please contact [place] [facility] at [phone]   [address] [place] [address]    To schedule your Surgery, please contact our Specialty Schedulers at [phone]    ENT Clinic Locations Clinic Hours Telephone Number     [facility]  [address] [place] [address]   Tuesday:       8:00am -- 4:00pm    Wednesday:  8:00am - 4:00pm   To schedule an appointment with   Dr. Doll,   please contact our   Specialty Scheduling Department at:     [phone]       [facility]  [address] [place] [address]   Friday:          8:00am - 4:00pm         Urgent Care Locations Clinic Hours Telephone Numbers     [facility]  [address] [place] [address]     Monday-Friday:     11:00pm - 9:00pm    Saturday-Sunday:  9:00am - 5:00pm   [phone]     [facility]  [address] [place] [address]     Monday-Friday:      5:00pm - 9:00pm     Saturday-Sunday:  9:00am - 5:00pm   [phone]

## 2019-07-09 NOTE — TELEPHONE ENCOUNTER
Advised patient he does not need to fast for pre-op.   Verbalized understanding.     Beatriz Escalera RN

## 2019-07-09 NOTE — TELEPHONE ENCOUNTER
Patient called RN Hotline. States that he has a pre-op today at 2 PM and was wondering if he needs to be fasting for this appointment if labs are going to be done.   Patient having endoscopy completed on 7/16/19.    Please advise.   OK to leave detailed message    Beatriz Escalera RN

## 2019-07-09 NOTE — PROGRESS NOTES
ANTICOAGULATION FOLLOW-UP CLINIC VISIT    Patient Name:  Alexandru Crews  Date:  2019  Contact Type:  Telephone    SUBJECTIVE:  Patient Findings     Positives:   Change in health (getting spells of exhaustion), Upcoming invasive procedure (upcoming surgery 2019), Change in diet/appetite (low fat diet)    Comments:   Negative for bruising and bleeding        Clinical Outcomes     Comments:   Negative for bruising and bleeding           OBJECTIVE    INR   Date Value Ref Range Status   2019 2.70 (H) 0.86 - 1.14 Final       ASSESSMENT / PLAN  INR assessment THER    Recheck INR In: 10 DAYS    INR Location Clinic      Anticoagulation Summary  As of 2019    INR goal:   2.0-3.0   TTR:   87.8 % (3.2 y)   INR used for dosin.70 (2019)   Warfarin maintenance plan:   2.5 mg (2.5 mg x 1) every Mon, Wed, Fri; 1.25 mg (2.5 mg x 0.5) all other days   Full warfarin instructions:   7/10: Hold; : Hold; : Hold; : Hold; : Hold; 7/15: Hold; Otherwise 2.5 mg every Mon, Wed, Fri; 1.25 mg all other days   Weekly warfarin total:   12.5 mg   Plan last modified:   Rose Navarrete RN (2019)   Next INR check:   2019   Priority:   INR   Target end date:   Indefinite    Indications    Long term current use of anticoagulant therapy [Z79.01]  Paroxysmal atrial fibrillation (H) [I48.0]             Anticoagulation Episode Summary     INR check location:       Preferred lab:       Send INR reminders to:   JAY ISRAEL    Comments:         Anticoagulation Care Providers     Provider Role Specialty Phone number    Paul Knowles MD Responsible Internal Medicine [phone]            See the Encounter Report to view Anticoagulation Flowsheet and Dosing Calendar (Go to Encounters tab in chart review, and find the Anticoagulation Therapy Visit)    Due to high INR recently, will dose scheduled 1.25mg tonight, and start hold 1 day early to ensure drift down before procedure 2019, instructed  to restart warfarin per regular schedule and appointment made 07/09/2019 for recheck post-op.    Karina Avila [facility]

## 2019-07-09 NOTE — LETTER
7/9/2019         RE: Alexandru Crews  [address] [place] [address]        Dear Colleague,    Thank you for referring your patient, Alexandru Crews, to the [facility]. Please see a copy of my visit note below.    Chief Complaint - Hearing loss    History of Present Illness - Alexandru Crews is a 86 year old male who returns to me today with hearing loss in the right ear. It went out suddenly. This happened 4/14/2019. 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 He said he lost all of his hearing on the left then, but the next day it came back. Hearing test then showed mostly symmetric down-sloping sensorineural hearing loss. This most recent time the right ear hearing didn't return to normal. There is no history of chronic ear disease or ear surgery.  With regards to recreational, , and work-related noise exposure he has none. No family history of hearing loss. The patient denies vertigo, otalgia. He has a cardiac history. MRI showed no cause of sensorineural hearing loss. I had him try a prednisone burst and taper. He was hospitalized since last visit due to pancreatitis. He returns and the hearing did improve, but it still isn't as strong as the left. No tinnitus. He did have a tick bite 2 months ago, and he was treated for Lyme's disease, but he was told this wasn't Lyme. This was after his hearing went down on the right. He does struggle in his daily living with hearing loss.     Past Medical History -   Patient Active Problem List   Diagnosis     Family history of colon cancer     Osteopenia     HYPERLIPIDEMIA LDL GOAL <160     Vitreous condensations, od > os     Pseudophakia, Yag Caps, ou     Chronic pain syndrome     Advanced directives, counseling/discussion     BPH (benign prostatic hyperplasia)     Paroxysmal atrial fibrillation (H)     History of shingles     Chronic fatigue     Lumbar spinal stenosis     DJD (degenerative  joint disease), lumbar and thoracic     Diverticulosis of large intestine     H/O cardiac pacemaker     Hypertension goal BP (blood pressure) < 140/90     Long term current use of anticoagulant therapy     Chronic nonintractable headache, unspecified headache type     Prediabetes     Chronic pain     Elevated glucose     De Quervain's disease (tenosynovitis)     Primary osteoarthritis of first carpometacarpal joint of left hand     Cubital tunnel syndrome, right     Primary osteoarthritis of right knee     History of total knee arthroplasty, left     Chronic atrial fibrillation (H)     Pacemaker     (HFpEF) heart failure with preserved ejection fraction (H)       Current Medications -   Current Outpatient Medications:      calcium carbonate 1250 (500 CA) MG CHEW, Takes one every other day-unsure of dose, Disp: , Rfl:      DAILY VITAMINS PO, 1 tablet daily, Disp: , Rfl:      denosumab (PROLIA) 60 MG/ML SOSY injection, Inject 1 mL (60 mg) Subcutaneous every 6 months, Disp: 1 mL, Rfl: 1     FIBER PO, Take 1 tsp. by mouth daily , Disp: , Rfl:      furosemide (LASIX) 20 MG tablet, Take 1 tablet (20 mg) by mouth daily as needed when leg swelling is bad. Elevated legs as much as possible and use compression stockings, Disp: 30 tablet, Rfl: 0     methyl salicylate-menthol (MEI-BUNCH) OINT ointment, Apply topically as needed, Disp: , Rfl:      metoprolol (TOPROL-XL) 25 MG 24 hr tablet, Take 25 mg by mouth 2 times daily, Disp: , Rfl:      morphine (MS CONTIN) 15 MG CR tablet, Take 1 tablet (15 mg) by mouth every 12 hours 28 days or more between refills for controlled medications, Disp: 60 tablet, Rfl: 0     oxyCODONE-acetaminophen (PERCOCET) 5-325 MG tablet, Take 1 tablet by mouth 3 times daily as needed for moderate to severe pain 28 days or more between refills for controlled medications, Disp: 90 tablet, Rfl: 0     senna-docusate (SENOKOT-S;PERICOLACE) 8.6-50 MG per tablet, Take 1 tablet by mouth 2 times daily , Disp: , Rfl:  "     triamcinolone (KENALOG) 0.1 % cream, Apply topically 2 times daily as needed to affected area as directed., Disp: 60 g, Rfl: 2     VITAMIN D, CHOLECALCIFEROL, PO, Take 1,000 Units by mouth 2 times daily , Disp: , Rfl:      warfarin (COUMADIN) 2.5 MG tablet, TAKE ONE TABLET BY MOUTH EVERY DAY AS DIRECTED, Disp: 90 tablet, Rfl: 0    Current Facility-Administered Medications:      denosumab (PROLIA) injection 60 mg, 60 mg, Subcutaneous, Q6 Months, Paul Knowles MD, 60 mg at 05/23/19 1120    Allergies -   Allergies   Allergen Reactions     Lactose Nausea and Vomiting     Sulindac      Upset stomach     Tramadol      Itching     Valdecoxib Itching     Vicodin [Hydrocodone-Acetaminophen]      Itching     Vioxx Itching     Rofecoxib Itching and Rash     Sulfa Drugs Rash     Muscle aches       Social History -   Social History     Social History     Marital status:      Spouse name: N/A     Number of children: N/A     Years of education: N/A     Social History Main Topics     Smoking status: Former Smoker     Years: 15.00     Types: Cigarettes     Quit date: 6/30/1975     Smokeless tobacco: Never Used     Alcohol use Yes      Comment: 1 per week     Drug use: No     Sexual activity: Not Currently     Partners: Female     Other Topics Concern     None     Social History Narrative       Family History -   Family History   Problem Relation Age of Onset     Cancer Mother         ovarian     Ovarian Cancer Mother      Cancer - colorectal Brother        Review of Systems - As per HPI and PMHx, otherwise 7 system review of the head and neck negative.    Physical Exam  /72   Pulse 99   Ht 1.572 m (5' 1.9\")   Wt 55.3 kg (122 lb)   SpO2 99%   BMI 22.39 kg/m     General - The patient is in no distress.  Alert and oriented to person and place, answers questions and cooperates with examination appropriately. Seems fatigued.   Voice and Breathing - The patient was breathing comfortably without the use of accessory " muscles. There was no wheezing, stridor, or stertor.  The patients voice was clear and strong.  Ears - The auricles are normal. The tympanic membranes are normal in appearance, bony landmarks are intact.  No retraction, perforation, or masses.  No fluid or purulence was seen in the external canal or the middle ear. No evidence of infection of the middle ear or external canal, cerumen was normal in appearance.  Eyes - Extraocular movements intact.  Sclera were not icteric or injected.  Neck - Palpation of the occipital, submental, submandibular, internal jugular chain, and supraclavicular nodes did not demonstrate any abnormal lymph nodes or masses. Parotid glands had no masses. Palpation of the thyroid was soft and smooth, with no nodules or goiter appreciated.  The trachea was mobile and midline.  Neurological - Cranial nerves 2 through 12 were grossly intact. House-Brackmann grade 1 out of 6 bilaterally.     Assessment and Plan - Alexandru Crews is a 86 year old male who returns to me today with sudden onset right sensorineural hearing loss. I am unsure the etiology behind the sudden drop in hearing with subsequent return on the left, followed by a sudden drop without complete return on the right.  We did discuss the possibility of vasospasm, viral infection, or possibly but unlikely retrocochlear pathology or tumor. MRI ruled tumor out. I recommend hearing aids. Recheck hearing in 6 months, sooner if hearing changes.      Andrew Doll MD  Otolaryngology  [facility]        Again, thank you for allowing me to participate in the care of your patient.        Sincerely,        Andrew Doll MD

## 2019-07-17 ENCOUNTER — TELEPHONE (OUTPATIENT)
Dept: INTERNAL MEDICINE | Facility: CLINIC | Age: 84
End: 2019-07-17

## 2019-07-17 NOTE — TELEPHONE ENCOUNTER
"Per patient, he was in the hospital with pancreatitis and pancytopenia from 6/10-6/14 last month  When he had his CBC rechecked with [facility] on 6/21/19, he was advised that his labs showed, \"Normal electrolytes. Normal kidney function. Normal liver blood test. Normal pancreas test.  CBC results are stable to slightly improved since hospital discharge. Follow-up with hem/onc and GI as planned\"    Patient has appointment with [facility] on 7/25/19, he is wondering if Dr. Knowles thinks he should still keep this appointment if his CBC showed some improvement    Ok to leave detailed message on his VM  Patient understands that Dr. Knowles is not in the office today    Arelis Ferrara RN    "

## 2019-07-18 NOTE — TELEPHONE ENCOUNTER
Patient notified of Provider's message as written.  Patient verbalized understanding.  He will keep his oncology/heme appointment    Arelis Ferrara RN

## 2019-07-18 NOTE — TELEPHONE ENCOUNTER
It's a very difficult call. While his laboratory studies did not look too bad, there remains concerns with this elderly gentleman. I know his cardiologist encouraged him to keep the hematology consultation     Another option I guess is just to do a repeat comprehensive metabolic panel, complete blood count with differential and lipase. If he's interested , RN to place orders as detailed above . This would be within the next week     Paul Knowles MD

## 2019-07-22 ENCOUNTER — TRANSFERRED RECORDS (OUTPATIENT)
Dept: HEALTH INFORMATION MANAGEMENT | Facility: CLINIC | Age: 84
End: 2019-07-22

## 2019-07-22 ENCOUNTER — ANTICOAGULATION THERAPY VISIT (OUTPATIENT)
Dept: NURSING | Facility: CLINIC | Age: 84
End: 2019-07-22
Payer: MEDICARE

## 2019-07-22 DIAGNOSIS — I48.0 PAROXYSMAL ATRIAL FIBRILLATION (H): ICD-10-CM

## 2019-07-22 DIAGNOSIS — Z79.01 LONG TERM CURRENT USE OF ANTICOAGULANT THERAPY: ICD-10-CM

## 2019-07-22 LAB — INR POINT OF CARE: 1.5 (ref 0.86–1.14)

## 2019-07-22 PROCEDURE — 99207 ZZC NO CHARGE NURSE ONLY: CPT

## 2019-07-22 PROCEDURE — 85610 PROTHROMBIN TIME: CPT | Mod: QW

## 2019-07-22 PROCEDURE — 36416 COLLJ CAPILLARY BLOOD SPEC: CPT

## 2019-07-22 NOTE — PROGRESS NOTES
ANTICOAGULATION FOLLOW-UP CLINIC VISIT    Patient Name:  Alexandru Crews  Date:  2019  Contact Type:  Face to Face    SUBJECTIVE:  Patient Findings         Clinical Outcomes     Negatives:   Major bleeding event, Thromboembolic event, Anticoagulation-related hospital admission, Anticoagulation-related ED visit, Anticoagulation-related fatality           OBJECTIVE    INR Protime   Date Value Ref Range Status   2019 1.5 (A) 0.86 - 1.14 Final       ASSESSMENT / PLAN  No question data found.  Anticoagulation Summary  As of 2019    INR goal:   2.0-3.0   TTR:   87.5 % (3.3 y)   INR used for dosin.5! (2019)   Warfarin maintenance plan:   2.5 mg (2.5 mg x 1) every Mon, Wed, Fri; 1.25 mg (2.5 mg x 0.5) all other days   Full warfarin instructions:   2.5 mg every Mon, Wed, Fri; 1.25 mg all other days   Weekly warfarin total:   12.5 mg   No change documented:   Rose Navarrete RN   Plan last modified:   Rose Navarrete RN (2019)   Next INR check:   2019   Priority:   INR   Target end date:   Indefinite    Indications    Long term current use of anticoagulant therapy [Z79.01]  Paroxysmal atrial fibrillation (H) [I48.0]             Anticoagulation Episode Summary     INR check location:       Preferred lab:       Send INR reminders to:   JAY ISRAEL    Comments:         Anticoagulation Care Providers     Provider Role Specialty Phone number    Paul Knowles MD [facility] Internal Medicine [phone]            See the Encounter Report to view Anticoagulation Flowsheet and Dosing Calendar (Go to Encounters tab in chart review, and find the Anticoagulation Therapy Visit)        Rose Navarrete RN

## 2019-07-24 ENCOUNTER — TRANSFERRED RECORDS (OUTPATIENT)
Dept: HEALTH INFORMATION MANAGEMENT | Facility: CLINIC | Age: 84
End: 2019-07-24

## 2019-07-24 ENCOUNTER — TELEPHONE (OUTPATIENT)
Dept: INTERNAL MEDICINE | Facility: CLINIC | Age: 84
End: 2019-07-24

## 2019-07-24 NOTE — TELEPHONE ENCOUNTER
Patient has appointment with Wolfgang Vaughn PA-C on 7/30/19 to f/u on his pancreatitis.   However, he saw [facility] today and they are anticipating Gallbladder removal surgery  Patient will reschedule for a pre-op or f/u with primary care if needed once he knows more about this surgery  He asked that the 7/30/19 appointment be cancelled    Arelis Ferrara RN

## 2019-07-29 ENCOUNTER — ANTICOAGULATION THERAPY VISIT (OUTPATIENT)
Dept: NURSING | Facility: CLINIC | Age: 84
End: 2019-07-29
Payer: MEDICARE

## 2019-07-29 DIAGNOSIS — Z79.01 LONG TERM CURRENT USE OF ANTICOAGULANT THERAPY: ICD-10-CM

## 2019-07-29 DIAGNOSIS — I48.0 PAROXYSMAL ATRIAL FIBRILLATION (H): ICD-10-CM

## 2019-07-29 LAB — INR POINT OF CARE: 2.2 (ref 0.86–1.14)

## 2019-07-29 PROCEDURE — 85610 PROTHROMBIN TIME: CPT | Mod: QW

## 2019-07-29 PROCEDURE — 99207 ZZC NO CHARGE NURSE ONLY: CPT

## 2019-07-29 PROCEDURE — 36416 COLLJ CAPILLARY BLOOD SPEC: CPT

## 2019-07-29 NOTE — PROGRESS NOTES
ANTICOAGULATION FOLLOW-UP CLINIC VISIT    Patient Name:  Alexandru Crews  Date:  2019  Contact Type:  Face to Face    SUBJECTIVE:  Patient Findings     Comments:   Patient is seeing a surgeon tomorrow to see about scheduling a surgery to remove his gall bladder. Will call with date of surgery        Clinical Outcomes     Negatives:   Major bleeding event, Thromboembolic event, Anticoagulation-related hospital admission, Anticoagulation-related ED visit, Anticoagulation-related fatality    Comments:   Patient is seeing a surgeon tomorrow to see about scheduling a surgery to remove his gall bladder. Will call with date of surgery           OBJECTIVE    INR Protime   Date Value Ref Range Status   2019 2.2 (A) 0.86 - 1.14 Final       ASSESSMENT / PLAN  No question data found.  Anticoagulation Summary  As of 2019    INR goal:   2.0-3.0   TTR:   87.2 % (3.3 y)   INR used for dosin.2 (2019)   Warfarin maintenance plan:   2.5 mg (2.5 mg x 1) every Mon, Wed, Fri; 1.25 mg (2.5 mg x 0.5) all other days   Full warfarin instructions:   2.5 mg every Mon, Wed, Fri; 1.25 mg all other days   Weekly warfarin total:   12.5 mg   Plan last modified:   Rose Navarrete RN (2019)   Next INR check:      Priority:   INR   Target end date:   Indefinite    Indications    Long term current use of anticoagulant therapy [Z79.01]  Paroxysmal atrial fibrillation (H) [I48.0]             Anticoagulation Episode Summary     INR check location:       Preferred lab:       Send INR reminders to:   JAY ISRAEL    Comments:         Anticoagulation Care Providers     Provider Role Specialty Phone number    Paul Knowles MD [facility] Internal Medicine [phone]            See the Encounter Report to view Anticoagulation Flowsheet and Dosing Calendar (Go to Encounters tab in chart review, and find the Anticoagulation Therapy Visit)        Rose Navarrete RN

## 2019-07-30 ENCOUNTER — PATIENT OUTREACH (OUTPATIENT)
Dept: CARE COORDINATION | Facility: CLINIC | Age: 84
End: 2019-07-30

## 2019-07-30 ENCOUNTER — TELEPHONE (OUTPATIENT)
Dept: FAMILY MEDICINE | Facility: CLINIC | Age: 84
End: 2019-07-30

## 2019-07-30 NOTE — TELEPHONE ENCOUNTER
Pt calling back with surgery date.  Pt is having gallbladder taken out on 8/13/19    PCP, please advise warfarin hold/bridge orders for upcoming surgery.

## 2019-07-30 NOTE — PROGRESS NOTES
Clinic Care Coordination Contact    Follow Up Progress Note      Assessment: 86 year old male with pancreatitis. Patient has also had pancytopenia and fatigue.     Patient saw GI doctor today and is scheduled for gallbladder removal on August 13th. He states he is scheduled for laparoscopic procedure. He states they told him he might have to stay over night. He is concerned that Medicare may not cover that.     Patient is aware that he will have to hold his waranfin and have a pre-op appointment.  He will schedule this.     Patient has seen Oncology for the pancytopenia. He is having some additional diagnostic testing done. (There is some concern of an under lying process going on per chart notes)    Goals addressed this encounter:   Goals Addressed                 This Visit's Progress      Functional (pt-stated)   On track     Goal Statement: I want to feel stronger and not so tired.     Measure of Success: When I am not needing frequent naps    Supportive Steps to Achieve: Discuss with cardiology    Barriers: Fatigue    Strengths: Supportive spouse    Date to Achieve By: October 2019    Patient expressed understanding of goal: Yes            Intervention/Education provided during outreach: Patient states he continues to use Percocet as needed. We discussed constipation as a side affect. He is using prune juice, yogurt and stool softner to stay on top of the constipation.     We discussed not to use additional tylenol unless absolutely necessary when on Percocet.       Outreach Frequency: monthly    Plan:   Patient will proceed with removal of gallbladder.   Patient will follow up with oncology.    Care Coordinator will follow up in 2 weeks, post surgery.     Venus Henriquez RN, [facility] RN Coordinator  [facility]   7/30/2019    2:54 PM  [phone]

## 2019-07-31 DIAGNOSIS — G89.4 CHRONIC PAIN SYNDROME: ICD-10-CM

## 2019-07-31 RX ORDER — OXYCODONE AND ACETAMINOPHEN 5; 325 MG/1; MG/1
1 TABLET ORAL 3 TIMES DAILY PRN
Qty: 90 TABLET | Refills: 0 | Status: SHIPPED | OUTPATIENT
Start: 2019-07-31 | End: 2019-08-05

## 2019-07-31 NOTE — TELEPHONE ENCOUNTER
His preoperative history and physical examination was with Wolfgang Vaughn, physicians assistant . This should have been discussed then. It looks like directions were given, see preoperative history and physical examination already in place from 7-9-19    Paul Knowles MD

## 2019-07-31 NOTE — TELEPHONE ENCOUNTER
Controlled Substance Refill Request for Oxycodone-acetaminophen 5-325 mg  Problem List Complete:  Yes   checked in past 3 months?  Yes  reviewed & no concerns noted. Problem list updated.    Requested Prescriptions   Pending Prescriptions Disp Refills     oxyCODONE-acetaminophen (PERCOCET) 5-325 MG tablet 90 tablet 0     Sig: Take 1 tablet by mouth 3 times daily as needed for moderate to severe pain 28 days or more between refills for controlled medications       There is no refill protocol information for this order        Beatriz Espinal RN

## 2019-07-31 NOTE — TELEPHONE ENCOUNTER
7/9/19 Pre-op notes states to discontinue warfarin today (7/9/19)  Any bridging afterwards, when can he restart?    Beatriz Espinal, RN

## 2019-08-01 ENCOUNTER — TELEPHONE (OUTPATIENT)
Dept: INTERNAL MEDICINE | Facility: CLINIC | Age: 84
End: 2019-08-01

## 2019-08-01 NOTE — TELEPHONE ENCOUNTER
Discussed Dr. Knowles's message with patient  Explained that writer will call his cardiologist and ask that they assist with warfarin and lovenox instructions in preparation for upcoming surgery and will ask cardiology to call patient back  Patient verbalized understanding.    Pre-op scheduled for 8/5/19 with Dr. Knowles    Called Cardiology and spoke with Dr. Ferrara's nurse, Sanam  She stated that she will send a message to Dr. Ferrara to request that he advise and their office will reach out to patient with further instructions on warfarin hold orders and lovenox bridging    FYI only to Dr. Benson Ferrara, RN

## 2019-08-01 NOTE — TELEPHONE ENCOUNTER
The Pre-op that was done on 7/9 was for a procedure that was being done on 7/16.     The patient will need a new Preop which there is an encounter requesting an appt. For that.      His gallbladder surgery is scheduled for 8/13. Needs pre-op within 30 days of surgery.    Please advise warfarin hold order for this procedure.

## 2019-08-01 NOTE — TELEPHONE ENCOUNTER
Patient left message on RN hotline stating he needs a visit with PCP for Pre-op for upcoming Cholecystectomy on 8/13/19.  Soonest available with PCP is on 8/8/19.  Patient wondering if and when he will need to stop taking his Warfarin please advise.   Josselyn Corona RN

## 2019-08-01 NOTE — TELEPHONE ENCOUNTER
It's typically 5 days prior to the planned procedure but I want to ask patients cardiologist the question about need for bridging lovenox therapy. Please send this question to Dr. Winston Ferrara, [facility] , possibly they will want to see him for cardiac clearance prior to answering this question    Paul Knowles MD

## 2019-08-01 NOTE — TELEPHONE ENCOUNTER
Patient is being paced and under the care of cardiology.  They will likely bridge with Lovenox if he converts to an open cholecystectomy and the surgeon will place orders for this.   I think he is OK to hold warfarin for 5 days with a restart if the procedure is laparoscopic but I am fine with cardiology consultation per Dr. Knowles's recommendation.  Thanks for checking up with us.  Martina RAYMOND

## 2019-08-01 NOTE — TELEPHONE ENCOUNTER
Duplicate encounters open for same issue  See 8/1/19 phone encounter regarding same    Closing this encounter  Please do not add any further messages this encounter    Arelis Ferrara RN

## 2019-08-01 NOTE — TELEPHONE ENCOUNTER
Regarding the need for and scheduling of a new preoperative history and physical examination, if that is necessary he'll need to schedule this. Typically we hand out information regarding preoperative directions at the time of the preoperative history and physical examination     In the meantime , the basic fact is he's safe to be off of his coumadin for the necessary time period, usually this is 5 days prior to the planned procedure and despite the fact that there is some disagreements on this point, based on his indication for coumadin in the first place which is chronic atrial fibrillation  , and based on his [id number] Calculator for risk from atrial fibrillation , he should be treated with bridging lovenox therapy     I would ask for input from patient's cardiologist to confirm the recommendations for this in terms of dosing recommendations / drug of choice .      Paul Knowles MD

## 2019-08-01 NOTE — TELEPHONE ENCOUNTER
Desmond Delgado,  He can just restart after he is discharged home.  They do not need bridging.  Just restart and a recheck within a few days.  Martina RAYMOND

## 2019-08-05 ENCOUNTER — OFFICE VISIT (OUTPATIENT)
Dept: FAMILY MEDICINE | Facility: CLINIC | Age: 84
End: 2019-08-05
Payer: MEDICARE

## 2019-08-05 VITALS
BODY MASS INDEX: 21.29 KG/M2 | TEMPERATURE: 98.1 F | WEIGHT: 116 LBS | HEART RATE: 84 BPM | SYSTOLIC BLOOD PRESSURE: 108 MMHG | RESPIRATION RATE: 18 BRPM | OXYGEN SATURATION: 99 % | DIASTOLIC BLOOD PRESSURE: 60 MMHG

## 2019-08-05 DIAGNOSIS — G89.4 CHRONIC PAIN SYNDROME: ICD-10-CM

## 2019-08-05 DIAGNOSIS — Z01.818 PREOP GENERAL PHYSICAL EXAM: Primary | ICD-10-CM

## 2019-08-05 LAB
ALBUMIN SERPL-MCNC: 3.9 G/DL (ref 3.4–5)
ALP SERPL-CCNC: 79 U/L (ref 40–150)
ALT SERPL W P-5'-P-CCNC: 27 U/L (ref 0–70)
ANION GAP SERPL CALCULATED.3IONS-SCNC: 4 MMOL/L (ref 3–14)
AST SERPL W P-5'-P-CCNC: 25 U/L (ref 0–45)
BASOPHILS # BLD AUTO: 0 10E9/L (ref 0–0.2)
BASOPHILS NFR BLD AUTO: 0.8 %
BILIRUB SERPL-MCNC: 0.4 MG/DL (ref 0.2–1.3)
BUN SERPL-MCNC: 26 MG/DL (ref 7–30)
CALCIUM SERPL-MCNC: 9.2 MG/DL (ref 8.5–10.1)
CHLORIDE SERPL-SCNC: 105 MMOL/L (ref 94–109)
CO2 SERPL-SCNC: 28 MMOL/L (ref 20–32)
CREAT SERPL-MCNC: 0.84 MG/DL (ref 0.66–1.25)
DIFFERENTIAL METHOD BLD: ABNORMAL
EOSINOPHIL # BLD AUTO: 0.1 10E9/L (ref 0–0.7)
EOSINOPHIL NFR BLD AUTO: 4.5 %
ERYTHROCYTE [DISTWIDTH] IN BLOOD BY AUTOMATED COUNT: 15.7 % (ref 10–15)
GFR SERPL CREATININE-BSD FRML MDRD: 79 ML/MIN/{1.73_M2}
GLUCOSE SERPL-MCNC: 95 MG/DL (ref 70–99)
HCT VFR BLD AUTO: 32.7 % (ref 40–53)
HGB BLD-MCNC: 10.3 G/DL (ref 13.3–17.7)
LYMPHOCYTES # BLD AUTO: 1.2 10E9/L (ref 0.8–5.3)
LYMPHOCYTES NFR BLD AUTO: 48.4 %
MCH RBC QN AUTO: 31.1 PG (ref 26.5–33)
MCHC RBC AUTO-ENTMCNC: 31.5 G/DL (ref 31.5–36.5)
MCV RBC AUTO: 99 FL (ref 78–100)
MONOCYTES # BLD AUTO: 0.3 10E9/L (ref 0–1.3)
MONOCYTES NFR BLD AUTO: 10.2 %
NEUTROPHILS # BLD AUTO: 0.9 10E9/L (ref 1.6–8.3)
NEUTROPHILS NFR BLD AUTO: 36.1 %
PLATELET # BLD AUTO: 73 10E9/L (ref 150–450)
POTASSIUM SERPL-SCNC: 5.4 MMOL/L (ref 3.4–5.3)
PROT SERPL-MCNC: 7.1 G/DL (ref 6.8–8.8)
RBC # BLD AUTO: 3.31 10E12/L (ref 4.4–5.9)
SODIUM SERPL-SCNC: 137 MMOL/L (ref 133–144)
WBC # BLD AUTO: 2.4 10E9/L (ref 4–11)

## 2019-08-05 PROCEDURE — 36415 COLL VENOUS BLD VENIPUNCTURE: CPT | Performed by: INTERNAL MEDICINE

## 2019-08-05 PROCEDURE — 99215 OFFICE O/P EST HI 40 MIN: CPT | Performed by: INTERNAL MEDICINE

## 2019-08-05 PROCEDURE — 80053 COMPREHEN METABOLIC PANEL: CPT | Performed by: INTERNAL MEDICINE

## 2019-08-05 PROCEDURE — 85025 COMPLETE CBC W/AUTO DIFF WBC: CPT | Performed by: INTERNAL MEDICINE

## 2019-08-05 RX ORDER — MORPHINE SULFATE 15 MG/1
15 TABLET, FILM COATED, EXTENDED RELEASE ORAL EVERY 12 HOURS
Qty: 60 TABLET | Refills: 0 | Status: SHIPPED | OUTPATIENT
Start: 2019-08-05 | End: 2019-09-03

## 2019-08-05 RX ORDER — OXYCODONE AND ACETAMINOPHEN 5; 325 MG/1; MG/1
1 TABLET ORAL 3 TIMES DAILY PRN
Qty: 90 TABLET | Refills: 0 | Status: SHIPPED | OUTPATIENT
Start: 2019-08-05 | End: 2019-12-18

## 2019-08-05 NOTE — PROGRESS NOTES
[facility]  [address] [place] [address]  [place] [address]  [phone]  Dept: [phone]    PRE-OP EVALUATION:  Today's date: 2019    Alexandru Crews (: 1932) presents for pre-operative evaluation assessment as requested by Dr. Odell .  He requires evaluation and anesthesia risk assessment prior to undergoing surgery/procedure for treatment of Gallbladder removal .    Proposed Surgery/ Procedure: GAllbladder removal   Date of Surgery/ Procedure: 2019  Time of Surgery/ Procedure: unknown  Hospital/Surgical Facility: [facility]  Fax number for surgical facility:   Primary Physician: Paul Knowles  Type of Anesthesia Anticipated: General    Patient has a Health Care Directive or Living Will:  YES     1. NO - Do you have a history of heart attack, stroke, stent, bypass or surgery on an artery in the head, neck, heart or legs?  2. NO - Do you ever have any pain or discomfort in your chest?  3. YES - DO YOU HAVE A HISTORY OF HEART FAILURE last stress test about 2 months ago was normal and he is asymptomatic, has persistent atrial fibrillation . Echocardiogram reveals normal left ventricular ejection fraction   4. YES - ARE YOUR TROUBLED BY SHORTNESS OF BREATH WHEN WALKING ON THE LEVEL, UP A SLIGHT HILL OR AT NIGHT? Physical deconditioning secondary to osteoarthritis   5. NO - Do you currently have a cold, bronchitis or other respiratory infection?  6. NO - Do you have a cough, shortness of breath or wheezing?  7. NO - Do you sometimes get pains in the calves of your legs when you walk?  8. NO - Do you or anyone in your family have previous history of blood clots?  9. NO - Do you or does anyone in your family have a serious bleeding problem such as prolonged bleeding following surgeries or cuts?  10. NO - Have you ever had problems with anemia or been told to take iron pills?  11. NO - Have you had any abnormal blood loss such as black, tarry or bloody stools, or abnormal vaginal  "bleeding?  12. YES - HAVE YOU EVER HAD A BLOOD TRANSFUSION? During back operations many years ago  13. NO - Have you or any of your relatives ever had problems with anesthesia?  14. YES - DO YOU HAVE SLEEP APNEA, EXCESSIVE SNORING OR DAYTIME DROWSINESS? Just tired lately, has no diagnosis of Obstructive Sleep Apnea   15. NO - Do you have any prosthetic heart valves?  16. YES - DO YOU HAVE PROSTHETIC JOINTS? Left total knee replacement   17. NO - Is there any chance that you may be pregnant?    HPI:     HPI related to upcoming procedure:     He has a history of 2 gallstone pancreatitis episodes now. At his most recent hospitalization he had a massive elevation in Lipase measuring 1900+ on 06/10/19. Denies abdominal pain at this time, but he is pretty tired lately he says. He's fearful of further possible attacks of gallstone pancreatitis     He mentions that his dose of metoprolol has been halved at last cardiology appointment. He has occasional episodes where he gets so \"pooped\" that he has to lie down. He ceased use of furosemide [ Lasix ] with no symptoms of congestive heart failure or weight gain .     Denies symptoms of acute illness.    See problem list for active medical problems.  Problems all longstanding and stable, except as noted/documented.  See ROS for pertinent symptoms related to these conditions.    MEDICAL HISTORY:     Patient Active Problem List    Diagnosis Date Noted     (HFpEF) heart failure with preserved ejection fraction (H) 04/25/2019     Priority: Medium     Pacemaker 02/20/2018     Priority: Medium     Chronic atrial fibrillation (H) 07/27/2017     Priority: Medium     Primary osteoarthritis of right knee 06/05/2017     Priority: Medium     History of total knee arthroplasty, left 06/05/2017     Priority: Medium     De Quervain's disease (tenosynovitis) 04/03/2017     Priority: Medium     Primary osteoarthritis of first carpometacarpal joint of left hand 04/03/2017     Priority: Medium     " Cubital tunnel syndrome, right 04/03/2017     Priority: Medium     Elevated glucose 02/14/2017     Priority: Medium     Chronic pain 11/30/2016     Priority: Medium     Patient is followed by Paul Knowles MD for ongoing prescription of pain medication.  All refills should be approved by this provider, or covering partner.    Medication(s): MS-Contin (Morphine Sulfate Controlled-Release) 15 milligrams 3 times a day , percocet 5/325 tablets 2 a day  Maximum quantity per month: 90/60  Clinic visit frequency required: Q 3 months     Controlled substance agreement:  Encounter-Level CSA - 4/25/16:               Controlled Substance Agreement - Scan on 5/6/2016  1:14 PM : CONTROLLED SUBSTANCE AGREEMENT (below)            Pain Clinic evaluation in the past: Yes       Date/Location:      DIRE Total Score(s):  No flowsheet data found.    Last [facility] website verification:  12/26/18,4/2/19, 7/31/19   [email or web address]    Controlled substance agreement: 8/24/18       Prediabetes 05/26/2016     Priority: Medium     Chronic nonintractable headache, unspecified headache type 04/25/2016     Priority: Medium     Long term current use of anticoagulant therapy 04/08/2016     Priority: Medium     Hypertension goal BP (blood pressure) < 140/90 08/10/2015     Priority: Medium     H/O cardiac pacemaker 01/02/2015     Priority: Medium     Diverticulosis of large intestine 07/10/2014     Priority: Medium     Problem list name updated by automated process. Provider to review       Lumbar spinal stenosis 09/09/2013     Priority: Medium     See lumbar mri evaluation with [facility] 10/11/2010 and 7/26/2011       DJD (degenerative joint disease), lumbar and thoracic 09/09/2013     Priority: Medium     History of shingles 07/09/2013     Priority: Medium     Chronic fatigue 07/09/2013     Priority: Medium     Paroxysmal atrial fibrillation (H)      Priority: Medium     BPH (benign prostatic hyperplasia)      Priority:  Medium     Has been followed by Dr. Worthington with [facility]        Advanced directives, counseling/discussion 06/24/2011     Priority: Medium     Advance Directive Problem List Overview:   Name Relationship Phone    Primary Health Care Agent            Alternative Health Care Agent          Patient states has Advance Directive and will bring in a copy to clinic. 6/24/2011          Chronic pain syndrome      Priority: Medium     Complicated. Multiple tests. Mainly this is nocturnal leg pain that is unbearable. He has tried the following different medications     Gabapentin [Neurontin] , pramipexole (MIRAPEX) , LYRICA (pregabalin) , nortriptyline (PAMELOR) , Clinoril (Sulindac)  , Bextra (Valdecoxib), ultracet, percocet 5/325 tablets [ what works best ], HYDROcodone (VICODIN) , tramadol (Ultram) , rofecoxib (Vioxx) , lactose , darvocet n-100 tablets , ropinirole [ Requip ]  , pramipexole (MIRAPEX)     All the others are either ineffective or had side effects mostly of generalized pruritis        Vitreous condensations, od > os 01/19/2011     Priority: Medium     Pseudophakia, Yag Caps, ou 01/19/2011     Priority: Medium     HYPERLIPIDEMIA LDL GOAL <160 10/31/2010     Priority: Medium     Family history of colon cancer      Priority: Medium     Incr. risk (brother)       Osteopenia      Priority: Medium     Dr Arauz, patient sees him there at [facility] and gets Denosumab injection (Prolia) biannual        Past Medical History:   Diagnosis Date     Atrial fibrillation (H)      BPH (benign prostatic hyperplasia)      Cataracts      Chest pain 12/2010    Normal Myocardial perfusion test with [facility]     Chronic pain syndrome      CTS (carpal tunnel syndrome)     Rt, Rx surgery     Diverticulosis      DJD (degenerative joint disease), lumbar and thoracic 9/9/2013     Family history of colon cancer     Incr. risk (brother)     Hydrocele     Rt     Hypertension goal BP (blood pressure)  < 140/90 8/10/2015     Osteopenia 2005     Osteoporosis     Dr Regla DHALIWAL (peptic ulcer disease) 11/1998    h/o, +H. Pylori Rx     Varicocele     Lt side-large     Past Surgical History:   Procedure Laterality Date     APPENDECTOMY  12/2004     ARTHROSCOPY KNEE RT/LT  1/2006    Rt & Lt, Dr Arriaga     BACK SURGERY  1978 / 2003    x 3 in 1978, fusions and one surgery in 2003     C APPENDECTOMY  12/31/2004     CARPAL TUNNEL RELEASE RT/LT  three    2 on left, one on right     CATARACT IOL, RT/LT       COLONOSCOPY  1995,2000,2005     INJECT EPIDURAL CERVICAL  5/24/2011    [facility]     INJECT EPIDURAL LUMBAR  11/9/2010    [facility]     INJECT EPIDURAL LUMBAR  1/4/2011    [facility]     INJECT EPIDURAL LUMBAR  4/27/2011    [facility]     LASER YAG CAPSULOTOMY  11/2016; 12/2016    left eye; right eye     PHACOEMULSIFICATION WITH STANDARD INTRAOCULAR LENS IMPLANT  6/2008; 8/2008    right eye; left eye     RECTAL SURGERY      fissurectomy and proctoplasty     RELEASE DEQUERVAINS WRIST Right 04/28/2017    DML     RELEASE TRIGGER FINGER      right hand     SHOULDER SURGERY      X four [ right x 2 and left x 2 ][[[[[     TUNA       Current Outpatient Medications   Medication Sig Dispense Refill     calcium carbonate 1250 (500 CA) MG CHEW Takes one every other day-unsure of dose       DAILY VITAMINS PO 1 tablet daily       denosumab (PROLIA) 60 MG/ML SOSY injection Inject 1 mL (60 mg) Subcutaneous every 6 months 1 mL 1     FIBER PO Take 1 tsp. by mouth daily        furosemide (LASIX) 20 MG tablet Take 1 tablet (20 mg) by mouth daily as needed when leg swelling is bad. Elevated legs as much as possible and use compression stockings 30 tablet 0     methyl salicylate-menthol (MEI-BUNCH) OINT ointment Apply topically as needed       metoprolol (TOPROL-XL) 25 MG 24 hr tablet Take 25 mg by mouth 2 times daily       morphine (MS CONTIN) 15 MG CR tablet Take 1 tablet (15 mg) by mouth every 12 hours 28 days or  more between refills for controlled medications 60 tablet 0     oxyCODONE-acetaminophen (PERCOCET) 5-325 MG tablet Take 1 tablet by mouth 3 times daily as needed for moderate to severe pain 28 days or more between refills for controlled medications 90 tablet 0     senna-docusate (SENOKOT-S;PERICOLACE) 8.6-50 MG per tablet Take 1 tablet by mouth 2 times daily        triamcinolone (KENALOG) 0.1 % cream Apply topically 2 times daily as needed to affected area as directed. 60 g 2     VITAMIN D, CHOLECALCIFEROL, PO Take 1,000 Units by mouth 2 times daily        warfarin (COUMADIN) 2.5 MG tablet TAKE ONE TABLET BY MOUTH EVERY DAY AS DIRECTED 90 tablet 0     OTC products: None, except as noted above    Allergies   Allergen Reactions     Lactose Nausea and Vomiting     Sulindac      Upset stomach     Tramadol      Itching     Valdecoxib Itching     Vicodin [Hydrocodone-Acetaminophen]      Itching     Vioxx Itching     Rofecoxib Itching and Rash     Sulfa Drugs Rash     Muscle aches      Latex Allergy: NO    Social History     Tobacco Use     Smoking status: Former Smoker     Years: 15.00     Types: Cigarettes     Last attempt to quit: 1975     Years since quittin.1     Smokeless tobacco: Never Used   Substance Use Topics     Alcohol use: Yes     Comment: 1 per week     History   Drug Use No       REVIEW OF SYSTEMS:   Constitutional, neuro, ENT, endocrine, pulmonary, cardiac, gastrointestinal, genitourinary, musculoskeletal, integument and psychiatric systems are negative, except as otherwise noted.    This document serves as a record of the services and decisions personally performed and made by Paul Knowles MD. It was created on his behalf by Leandro Reyna, a trained medical scribe. The creation of this document is based on the provider's statements to the medical scribe.  Leandro Reyna 12:25 PM 2019    EXAM:   /60   Pulse 84   Temp 98.1  F (36.7  C) (Oral)   Resp 18   Wt 52.6 kg (116 lb)    SpO2 99%   BMI 21.29 kg/m      GENERAL APPEARANCE: healthy, alert and no distress, appears his stated age      EYES: EOMI,  PERRL     HENT: ear canals and TM's normal and nose and mouth without ulcers or lesions     NECK: no adenopathy, no asymmetry, masses, or scars and thyroid normal to palpation     RESP: lungs clear to auscultation - no rales, rhonchi or wheezes     CV: regular rates and rhythm, normal S1 S2, no S3 or S4 and no murmur, click or rub     ABDOMEN:  soft, nontender, no HSM or masses and bowel sounds normal     MS: extremities normal- no gross deformities noted, but has a octagenarian physique with lots of arthritic deformities      SKIN: no suspicious lesions or rashes to visible skin      NEURO: Normal strength and tone, sensory exam grossly normal, mentation intact and speech normal     PSYCH: mentation appears normal. and affect normal/bright     LYMPHATICS: No cervical adenopathy    DIAGNOSTICS:     Orders Placed This Encounter   Procedures     CBC with platelets differential     Comprehensive metabolic panel         Recent Labs   Lab Test 07/29/19 07/22/19 06/21/19  1321  05/23/19  1217  09/27/18  1002   HGB  --   --   --  10.9*  --  13.1*  --   --    PLT  --   --   --  86*  --  126*  --   --    INR 2.2* 1.5*   < >  --    < >  --    < >  --    NA  --   --   --  136  --  137  --  140   POTASSIUM  --   --   --  4.8  --  4.7  --  4.8   CR  --   --   --  1.06  --  0.74  --  0.79   A1C  --   --   --   --   --  5.4  --  5.5    < > = values in this interval not displayed.     IMPRESSION:   Reason for surgery/procedure: cholecystectomy   Diagnosis/reason for consult: preoperative evaluation of fitness for surgery and proposed anesthesia    The proposed surgical procedure is considered INTERMEDIATE risk.    REVISED CARDIAC RISK INDEX  The patient has the following serious cardiovascular risks for perioperative complications such as (MI, PE, VFib and 3  AV Block):  No serious cardiac  risks  INTERPRETATION: 0 risks: Class I (very low risk - 0.4% complication rate)    The patient has the following additional risks for perioperative complications:  Patient has an emerging pancytopenia which remains not entirely clear to me , he's had a oncology consultation with Dr. Alex Young with [facility] and there is a possibility of an emerging myelodysplastic disorder diagnosis. The white blood cell count is low but not prohibitive for surgery. I reviewed this case directly with Dr. Alex Young with [facility] and he's requested we have oncology consultation round on this patient in the post operative setting to determine if there is any call for NEUPOGEN  (filgrastim) and because his platelet count is 73,000 it's requested that he receive probably 2 units of platelets right before surgery. This can be managed by Dr. Odell.      ICD-10-CM    1. Preop general physical exam Z01.818        RECOMMENDATIONS:     --Patient is to take all scheduled medications on the day of surgery EXCEPT for modifications listed below.  Stop taking warfarin for 5 days prior to surgery. No call for bridging lovenox therapy as per discussion with offices of Dr. Winston Ferrara, [facility]   Skip Lasix the morning of surgery  we should have oncology round on this patient in the post operative setting to determine if there is any call for NEUPOGEN  (filgrastim) and because his platelet count is 73,000 it's requested that he receive probably 2 units of platelets right before surgery. This can be managed by Dr. Odell.       APPROVAL to proceed with proposed procedure     The information in this document, created by the medical scribe for me, accurately reflects the services I personally performed and the decisions made by me. I have reviewed and approved this document for accuracy prior to leaving the patient care area.  August 5, 2019 12:27 PM    Signed Electronically by: Paul Knowles MD    Copy  of this evaluation report is provided to requesting physician.    [place] Preop Guidelines    Revised Cardiac Risk Index

## 2019-08-05 NOTE — LETTER
[facility]  [address] [place] [address]. [place] [address]    August 6, 2019    Alexandru Crews  [address] [place] [address]          Dear Alexandru,    Enclosed is a copy of your results.  These are all okay for surgery.    Results for orders placed or performed in visit on 08/05/19   CBC with platelets differential   Result Value Ref Range    WBC 2.4 (L) 4.0 - 11.0 10e9/L    RBC Count 3.31 (L) 4.4 - 5.9 10e12/L    Hemoglobin 10.3 (L) 13.3 - 17.7 g/dL    Hematocrit 32.7 (L) 40.0 - 53.0 %    MCV 99 78 - 100 fl    MCH 31.1 26.5 - 33.0 pg    MCHC 31.5 31.5 - 36.5 g/dL    RDW 15.7 (H) 10.0 - 15.0 %    Platelet Count 73 (L) 150 - 450 10e9/L    Diff Method Automated Method     % Neutrophils 36.1 %    % Lymphocytes 48.4 %    % Monocytes 10.2 %    % Eosinophils 4.5 %    % Basophils 0.8 %    Absolute Neutrophil 0.9 (L) 1.6 - 8.3 10e9/L    Absolute Lymphocytes 1.2 0.8 - 5.3 10e9/L    Absolute Monocytes 0.3 0.0 - 1.3 10e9/L    Absolute Eosinophils 0.1 0.0 - 0.7 10e9/L    Absolute Basophils 0.0 0.0 - 0.2 10e9/L   Comprehensive metabolic panel   Result Value Ref Range    Sodium 137 133 - 144 mmol/L    Potassium 5.4 (H) 3.4 - 5.3 mmol/L    Chloride 105 94 - 109 mmol/L    Carbon Dioxide 28 20 - 32 mmol/L    Anion Gap 4 3 - 14 mmol/L    Glucose 95 70 - 99 mg/dL    Urea Nitrogen 26 7 - 30 mg/dL    Creatinine 0.84 0.66 - 1.25 mg/dL    GFR Estimate 79 >60 mL/min/[1.73_m2]    GFR Estimate If Black >90 >60 mL/min/[1.73_m2]    Calcium 9.2 8.5 - 10.1 mg/dL    Bilirubin Total 0.4 0.2 - 1.3 mg/dL    Albumin 3.9 3.4 - 5.0 g/dL    Protein Total 7.1 6.8 - 8.8 g/dL    Alkaline Phosphatase 79 40 - 150 U/L    ALT 27 0 - 70 U/L    AST 25 0 - 45 U/L     If you have any questions or concerns, please call myself or my nurse at [phone].    Sincerely,        Paul Knowles MD/rk

## 2019-08-05 NOTE — Clinical Note
Remind me to do a stat addendum regarding patients laboratory results  From today's complete blood count with differential. I may need to review these numbers with Dr. Alex Young with [facility]

## 2019-08-05 NOTE — TELEPHONE ENCOUNTER
Called [facility] (Dr. Ferrara's office)  Spoke with Sanam  Per Sanam, Dr. Ferrara stated that holding warfarin is usually up to the surgeon but if surgeon wants warfarin held, then ok to hold x 5 days prior to surgery  Resume post procedure when surgeon feels bleeding risk is back to baseline  No bridging needed   F/u with INR clinic as directed    Patient has pre-op scheduled with Dr. Knowles today  Will ask provider to review the hold orders above with patient at     Arelis Ferrara RN

## 2019-08-05 NOTE — PATIENT INSTRUCTIONS
--Patient is to take all scheduled medications on the day of surgery EXCEPT for modifications listed below.  Stop taking warfarin for 5 days prior to surgery.   Skip Lasix the morning of surgery    I think you can cancel your appointment for the INR clinic that is the day before your surgery.     APPROVAL to proceed with proposed procedure is dependant on lab values and follow-up discussion with Dr. Young.If the numbers are stable you can proceed with the procedure. We will discuss possibility of a bone marrow biopsy.     Before Your Surgery      Call your surgeon if there is any change in your health. This includes signs of a cold or flu (such as a sore throat, runny nose, cough, rash or fever).    Do not smoke, drink alcohol or take over the counter medicine (unless your surgeon or primary care doctor tells you to) for the 24 hours before and after surgery.    If you take prescribed drugs: Follow your doctor s orders about which medicines to take and which to stop until after surgery.    Eating and drinking prior to surgery: follow the instructions from your surgeon    Take a shower or bath the night before surgery. Use the soap your surgeon gave you to gently clean your skin. If you do not have soap from your surgeon, use your regular soap. Do not shave or scrub the surgery site.  Wear clean pajamas and have clean sheets on your bed.

## 2019-08-06 ENCOUNTER — TELEPHONE (OUTPATIENT)
Dept: FAMILY MEDICINE | Facility: CLINIC | Age: 84
End: 2019-08-06

## 2019-08-06 NOTE — TELEPHONE ENCOUNTER
Reason for Call:  Other call back    Detailed comments: [facility] would like Dr. Knowles to send over any information pertaining to  Patient's pre op visit 8/5 including any lab or Ekg.    Phone Number Patient can be reached at: Other phone number:  Fax [phone]    Best Time: any    Can we leave a detailed message on this number? YES    Call taken on 8/6/2019 at 10:02 AM by Alonso Rm

## 2019-08-06 NOTE — TELEPHONE ENCOUNTER
EKG was not done at this preop.  Last EKG was done at [facility] on 7-1-19.  Mariann called and informed.  Labs and preop faxed as requested.  Emma Leach,

## 2019-08-08 ENCOUNTER — TELEPHONE (OUTPATIENT)
Dept: INTERNAL MEDICINE | Facility: CLINIC | Age: 84
End: 2019-08-08

## 2019-08-08 NOTE — TELEPHONE ENCOUNTER
Letter signed by Dr. Knowles.    LM for patient that letter is ready for  at the first floor . Emma Leach,

## 2019-08-08 NOTE — TELEPHONE ENCOUNTER
Letter typed with the information below and given to Dr. Knowles to review and sign if appropriate.  Emma Leach,

## 2019-08-08 NOTE — TELEPHONE ENCOUNTER
"Pt called RN hotline. States on his AVS it states: APPROVAL to proceed with proposed procedure is dependant on  lab values and follow-up discussion with Dr. Young.If the  numbers are stable you can proceed with the procedure. We will discuss  possibility of a bone marrow biopsy.    Pt was expecting a call back to let him know if he is approved for surgery. He did not receive a call back. Reviewed below information with pt.  Advised that pt is ok for surgery per lab result note:  \"Ok for surgery, see amended preoperative history and physical examination\"  --Patient is to take all scheduled medications on the day of surgery EXCEPT for modifications listed below.  Stop taking warfarin for 5 days prior to surgery. No call for bridging lovenox therapy as per discussion with offices of Dr. Winston Ferrara, [facility] [place], operated by [facility]   Mario Lasix the morning of surgery  we should have oncology round on this patient in the post operative setting to determine if there is any call for NEUPOGEN  (filgrastim) and because his platelet count is 73,000 it's requested that he receive probably 2 units of platelets right before surgery. This can be managed by Dr. Odell.      Pt is requesting to have the above information in writing as he did not have this info on his AVS. Please put in a letter with this info. Pt would like to pick this up today if possible. Thanks!    Evelyne Bethea RN  [facility]      "

## 2019-08-08 NOTE — LETTER
[facility]  [address] [place] [address]  [phone]          August 8, 2019      Re:  Alexandru Crews    YOB: 1932      To Whom it May Concern:                                                                                                               The above named patient is okay for surgery.    Patient is to take all scheduled medications on the day of surgery EXCEPT for modifications listed below.    Stop taking warfarin for 5 days prior to surgery. No call for bridging lovenox therapy as per discussion with offices of Dr. Winston Ferrara, [facility].     Skip Lasix the morning of surgery.    We should have oncology round on this patient in the post operative setting to determine if there is any call for NEUPOGEN  (filgrastim) and because his platelet count is 73,000 it's requested that he receive probably 2 units of platelets right before surgery. This can be managed by Dr. Odell.     Sincerely,         Paul Knowles MD/kayla

## 2019-08-09 ENCOUNTER — TELEPHONE (OUTPATIENT)
Dept: FAMILY MEDICINE | Facility: CLINIC | Age: 84
End: 2019-08-09

## 2019-08-09 NOTE — TELEPHONE ENCOUNTER
Patient called in INR requesting a copy of his CBC labs from 8/5/19 will route to team please mail out labs.  Miladis Gonzalez,Clinic Rn  [facility]

## 2019-08-12 ENCOUNTER — ANTICOAGULATION THERAPY VISIT (OUTPATIENT)
Dept: NURSING | Facility: CLINIC | Age: 84
End: 2019-08-12
Payer: MEDICARE

## 2019-08-12 DIAGNOSIS — Z79.01 LONG TERM CURRENT USE OF ANTICOAGULANT THERAPY: ICD-10-CM

## 2019-08-12 DIAGNOSIS — I48.0 PAROXYSMAL ATRIAL FIBRILLATION (H): ICD-10-CM

## 2019-08-12 LAB — INR POINT OF CARE: 1.5 (ref 0.86–1.14)

## 2019-08-12 PROCEDURE — 99207 ZZC NO CHARGE NURSE ONLY: CPT

## 2019-08-12 PROCEDURE — 36416 COLLJ CAPILLARY BLOOD SPEC: CPT

## 2019-08-12 PROCEDURE — 85610 PROTHROMBIN TIME: CPT | Mod: QW

## 2019-08-12 NOTE — PATIENT INSTRUCTIONS
[facility]:  Please call [phone] to cancel and/or reschedule your appointment   Please call [phone] with any problems or questions regarding your Warfarin therapy.

## 2019-08-12 NOTE — PROGRESS NOTES
ANTICOAGULATION FOLLOW-UP CLINIC VISIT    Patient Name:  Alexandru Crews  Date:  2019  Contact Type:  Face to Face    SUBJECTIVE:  Patient Findings     Positives:   Upcoming invasive procedure, Missed doses        Clinical Outcomes     Negatives:   Major bleeding event, Thromboembolic event, Anticoagulation-related hospital admission, Anticoagulation-related ED visit, Anticoagulation-related fatality           OBJECTIVE    INR Protime   Date Value Ref Range Status   2019 1.5 (A) 0.86 - 1.14 Final       ASSESSMENT / PLAN  No question data found.  Anticoagulation Summary  As of 2019    INR goal:   2.0-3.0   TTR:   86.5 % (3.3 y)   INR used for dosin.5! (2019)   Warfarin maintenance plan:   2.5 mg (2.5 mg x 1) every Mon, Wed, Fri; 1.25 mg (2.5 mg x 0.5) all other days   Full warfarin instructions:   : Hold; Otherwise 2.5 mg every Mon, Wed, Fri; 1.25 mg all other days   Weekly warfarin total:   12.5 mg   Plan last modified:   Rose Navarrete RN (2019)   Next INR check:   2019   Priority:   INR   Target end date:   Indefinite    Indications    Long term current use of anticoagulant therapy [Z79.01]  Paroxysmal atrial fibrillation (H) [I48.0]             Anticoagulation Episode Summary     INR check location:       Preferred lab:       Send INR reminders to:   JAY ISRAEL    Comments:         Anticoagulation Care Providers     Provider Role Specialty Phone number    Paul Knowles MD [facility] Internal Medicine [phone]            See the Encounter Report to view Anticoagulation Flowsheet and Dosing Calendar (Go to Encounters tab in chart review, and find the Anticoagulation Therapy Visit)        Rose Navarrete RN

## 2019-08-14 ENCOUNTER — PATIENT OUTREACH (OUTPATIENT)
Dept: CARE COORDINATION | Facility: CLINIC | Age: 84
End: 2019-08-14

## 2019-08-14 NOTE — PROGRESS NOTES
Clinic Care Coordination Contact      Patient had lap madyson on 8[facility]13/[facility] for gallstone pancreatitis.     Called patient and left a message on voicemail requesting return call.Reminded patient he is to see PCP in one week and follow up with general surgery in 1-2 weeks.    Will await return call from patient, if no response will outreach in two to three weeks.     Venus Henriquez RN, [facility] - Primary [facility] Clinic RN Coordinator  [facility]   8/14/2019    2:00 PM  [phone]

## 2019-08-19 ENCOUNTER — PATIENT OUTREACH (OUTPATIENT)
Dept: CARE COORDINATION | Facility: CLINIC | Age: 84
End: 2019-08-19

## 2019-08-19 NOTE — PROGRESS NOTES
"Clinic Care Coordination Contact    Follow Up Progress Note      Assessment: Patient is returning clinic care coordinator's call.    Patient states he is feeling \"pretty good\". He still gets very tired and feels the need to lie down and nap.     He denies nausea. He feels that his stomach is \"Swollen\" since he had the surgery. He states he is having BM's as usual. No pain other than incision sites are tender.        Goals addressed this encounter:   Goals Addressed                 This Visit's Progress      Functional (pt-stated)   On track     Goal Statement: I want to feel stronger and not so tired.     Measure of Success: When I am not needing frequent naps    Supportive Steps to Achieve: Discuss with cardiology    Barriers: Fatigue    Strengths: Supportive spouse    Date to Achieve By: October 2019    Patient expressed understanding of goal: Yes          Intervention/Education provided during outreach: Keep follow up appointment with surgeon on 8/2/19.    If he develops abdominal pain, nausea or vomiting her should be seen.       Outreach Frequency: monthly    Plan:   Patient will follow up with surgeon as instructed.    Care Coordinator will follow up in one month.     Venus Henriquez RN, [facility] - Primary Care Clinic RN Coordinator  [facility]   8/19/2019    1:35 PM  [phone]  "

## 2019-08-23 ENCOUNTER — ANTICOAGULATION THERAPY VISIT (OUTPATIENT)
Dept: NURSING | Facility: CLINIC | Age: 84
End: 2019-08-23
Payer: MEDICARE

## 2019-08-23 DIAGNOSIS — Z79.01 LONG TERM CURRENT USE OF ANTICOAGULANT THERAPY: ICD-10-CM

## 2019-08-23 DIAGNOSIS — I48.0 PAROXYSMAL ATRIAL FIBRILLATION (H): ICD-10-CM

## 2019-08-23 LAB — INR POINT OF CARE: 1.8 (ref 0.86–1.14)

## 2019-08-23 PROCEDURE — 99207 ZZC NO CHARGE NURSE ONLY: CPT

## 2019-08-23 PROCEDURE — 36416 COLLJ CAPILLARY BLOOD SPEC: CPT

## 2019-08-23 PROCEDURE — 85610 PROTHROMBIN TIME: CPT | Mod: QW

## 2019-08-23 NOTE — PATIENT INSTRUCTIONS
Anticoagulation Clinic:  Please call [phone] with any problems or questions regarding your Warfarin therapy.

## 2019-08-26 ENCOUNTER — TRANSFERRED RECORDS (OUTPATIENT)
Dept: HEALTH INFORMATION MANAGEMENT | Facility: CLINIC | Age: 84
End: 2019-08-26

## 2019-08-27 ENCOUNTER — TELEPHONE (OUTPATIENT)
Dept: FAMILY MEDICINE | Facility: CLINIC | Age: 84
End: 2019-08-27

## 2019-08-27 DIAGNOSIS — I48.0 PAROXYSMAL ATRIAL FIBRILLATION (H): ICD-10-CM

## 2019-08-27 DIAGNOSIS — Z79.01 LONG TERM CURRENT USE OF ANTICOAGULANT THERAPY: ICD-10-CM

## 2019-08-27 NOTE — TELEPHONE ENCOUNTER
To PCP:  Patient is having a Bone Marrow Biopsy on 9/3/2019. He was asked to hold his coumadin 5 days prior to procedure. Resume coumadin night of procedure if okay with Procedure Provider.    Are you okay with standard anticoagulation hold for patient?    Thank you.  Janice Price, RN, RN  Anticoagulation Nurse - [facility]

## 2019-08-27 NOTE — TELEPHONE ENCOUNTER
Patient called to inform [facility] that he will be having a Bone Marrow Biopsy on 9/3/19. He was asked to hold his coumadin dosing 5 days prior to his procedure.  Anticoagulation Tracking calandar updated. Patient has next INR appt already scheduled for [facility].  Janice Price, RN, RN  Anticoagulation Nurse - [facility], [place]

## 2019-08-28 NOTE — TELEPHONE ENCOUNTER
Noted, thank you.  Janice Price, RN, RN  Anticoagulation Nurse - [facility], [place]

## 2019-09-03 DIAGNOSIS — G89.4 CHRONIC PAIN SYNDROME: ICD-10-CM

## 2019-09-03 RX ORDER — MORPHINE SULFATE 15 MG/1
15 TABLET, FILM COATED, EXTENDED RELEASE ORAL EVERY 12 HOURS
Qty: 60 TABLET | Refills: 0 | Status: SHIPPED | OUTPATIENT
Start: 2019-09-03 | End: 2020-01-01

## 2019-09-03 NOTE — TELEPHONE ENCOUNTER
Patient called for refill of morphine, he would like to pick prescription at .     Controlled Substance Refill Request for Morphine   Problem List Complete:  Yes  Patient is followed by Paul Knowles MD for ongoing prescription of pain medication.  All refills should be approved by this provider, or covering partner.    Medication(s): MS-Contin (Morphine Sulfate Controlled-Release) 15 milligrams 3 times a day , percocet 5/325 tablets 2 a day  Maximum quantity per month: 90/60  Clinic visit frequency required: Q 3 months     Controlled substance agreement:  Encounter-Level CSA - 4/25/16:               Controlled Substance Agreement - Scan on 5/6/2016  1:14 PM : CONTROLLED SUBSTANCE AGREEMENT (below)            Pain Clinic evaluation in the past: Yes       Date/Location:      DIRE Total Score(s):  No flowsheet data found.    Last [facility] website verification:  12/26/18,4/2/19, 7/31/19   [email or web address]    Controlled substance agreement: 8/24/18   checked in past 3 months?  Yes 7/31/19     Josselyn Corona RN

## 2019-09-05 NOTE — TELEPHONE ENCOUNTER
Pt called in regards to his morphine rx. Advised that this was sent electronically to pharmacy on 9/3. Advised pt to call pharmacy and let us know if this was not received. Pt agrees with plan.    Evelyne Bethea RN  [facility]

## 2019-09-06 ENCOUNTER — ANTICOAGULATION THERAPY VISIT (OUTPATIENT)
Dept: NURSING | Facility: CLINIC | Age: 84
End: 2019-09-06
Payer: MEDICARE

## 2019-09-06 DIAGNOSIS — I48.0 PAROXYSMAL ATRIAL FIBRILLATION (H): ICD-10-CM

## 2019-09-06 DIAGNOSIS — Z79.01 LONG TERM CURRENT USE OF ANTICOAGULANT THERAPY: ICD-10-CM

## 2019-09-06 LAB — INR POINT OF CARE: 1.4 (ref 0.86–1.14)

## 2019-09-06 PROCEDURE — 85610 PROTHROMBIN TIME: CPT | Mod: QW

## 2019-09-06 PROCEDURE — 36416 COLLJ CAPILLARY BLOOD SPEC: CPT

## 2019-09-06 PROCEDURE — 99207 ZZC NO CHARGE NURSE ONLY: CPT

## 2019-09-06 NOTE — PROGRESS NOTES
ANTICOAGULATION FOLLOW-UP CLINIC VISIT    Patient Name:  Alexandru Crews  Date:  2019  Contact Type:  Face to Face  Intentional hold will do small increase to get up faster  SUBJECTIVE:  Patient Findings     Positives:   Change in health (has new diagnosis of cancer)    Comments:   Change in medications - had intentional hold of 5 days for biopsy  Had bone marrow biopsy        Clinical Outcomes     Negatives:   Major bleeding event, Thromboembolic event, Anticoagulation-related hospital admission, Anticoagulation-related ED visit, Anticoagulation-related fatality    Comments:   Had bone marrow biopsy           OBJECTIVE    INR Protime   Date Value Ref Range Status   2019 1.4 (A) 0.86 - 1.14 Final       ASSESSMENT / PLAN  INR assessment SUB    Recheck INR In: 1 WEEK    INR Location Clinic      Anticoagulation Summary  As of 2019    INR goal:   2.0-3.0   TTR:   84.8 % (3.4 y)   INR used for dosin.4! (2019)   Warfarin maintenance plan:   2.5 mg (2.5 mg x 1) every Mon, Wed, Fri; 1.25 mg (2.5 mg x 0.5) all other days   Full warfarin instructions:   : 5 mg; : 2.5 mg; Otherwise 2.5 mg every Mon, Wed, Fri; 1.25 mg all other days   Weekly warfarin total:   12.5 mg   Plan last modified:   Rose Navarrete RN (2019)   Next INR check:   2019   Priority:   INR   Target end date:   Indefinite    Indications    Long term current use of anticoagulant therapy [Z79.01]  Paroxysmal atrial fibrillation (H) [I48.0]             Anticoagulation Episode Summary     INR check location:       Preferred lab:       Send INR reminders to:   JAY ISRAEL    Comments:         Anticoagulation Care Providers     Provider Role Specialty Phone number    Paul Knowles MD Responsible Internal Medicine [phone]            See the Encounter Report to view Anticoagulation Flowsheet and Dosing Calendar (Go to Encounters tab in chart review, and find the Anticoagulation Therapy Visit)    Some signs and  symptoms of clots include: pain or tenderness in arm or leg, swelling in arm or leg, changes in skin color, or area is warm to touch, shortness or breath, trouble breathing.  Numbness or weakness especially on 1 side of the body, sudden trouble speaking or swallowing, sudden trouble seeing, sudden confusion, dizzy spells or headache.  If you have these please call 911 or seek medical care immediately.      Miladis Gonzalez RN

## 2019-09-09 ENCOUNTER — TRANSFERRED RECORDS (OUTPATIENT)
Dept: HEALTH INFORMATION MANAGEMENT | Facility: CLINIC | Age: 84
End: 2019-09-09

## 2019-09-10 ENCOUNTER — TELEPHONE (OUTPATIENT)
Dept: INTERNAL MEDICINE | Facility: CLINIC | Age: 84
End: 2019-09-10

## 2019-09-10 NOTE — TELEPHONE ENCOUNTER
Pt called RN hotline. States he is taking morphine. It makes him itch at night. Oncology nurse recommended Benadryl and he would like Dr. Knowles's thoughts on this.  He was diagnosed with cancer and was advised to discuss his treatment with Dr. Knowles. Treatment recommended is Rituximab. Any concerns with this? Ok to leave a detailed message. Ok to wait for 9/12.    Evelyne Bethea RN  [facility]

## 2019-09-11 NOTE — TELEPHONE ENCOUNTER
Left detailed message for patient with information.   Advised to call RN Hotline if questions.     Beatriz Espinal RN

## 2019-09-11 NOTE — TELEPHONE ENCOUNTER
Absolutely both of these treatment options are fine    1. DiphenhydrAMINE (BENADRYL) may really help decreased generalized itchiness     2. Rituxan (rituximab) is a fairly gentle medication according to my understanding and may prolong patients life with this cancer     Paul Knowles MD

## 2019-09-13 LAB — INR PPP: 2.5 (ref 0.8–1.1)

## 2019-09-15 DIAGNOSIS — I48.91 ATRIAL FIBRILLATION, UNSPECIFIED TYPE (H): ICD-10-CM

## 2019-09-15 DIAGNOSIS — Z79.01 LONG TERM CURRENT USE OF ANTICOAGULANT THERAPY: ICD-10-CM

## 2019-09-16 ENCOUNTER — OFFICE VISIT (OUTPATIENT)
Dept: FAMILY MEDICINE | Facility: CLINIC | Age: 84
End: 2019-09-16
Payer: MEDICARE

## 2019-09-16 ENCOUNTER — TELEPHONE (OUTPATIENT)
Dept: INTERNAL MEDICINE | Facility: CLINIC | Age: 84
End: 2019-09-16

## 2019-09-16 VITALS
OXYGEN SATURATION: 98 % | RESPIRATION RATE: 18 BRPM | SYSTOLIC BLOOD PRESSURE: 114 MMHG | BODY MASS INDEX: 20.84 KG/M2 | HEART RATE: 99 BPM | WEIGHT: 113.6 LBS | DIASTOLIC BLOOD PRESSURE: 60 MMHG | TEMPERATURE: 97.8 F

## 2019-09-16 DIAGNOSIS — Z79.01 LONG TERM CURRENT USE OF ANTICOAGULANT THERAPY: ICD-10-CM

## 2019-09-16 DIAGNOSIS — C85.80 MARGINAL ZONE B-CELL LYMPHOMA (H): ICD-10-CM

## 2019-09-16 DIAGNOSIS — R53.82 CHRONIC FATIGUE: ICD-10-CM

## 2019-09-16 DIAGNOSIS — I48.0 PAROXYSMAL ATRIAL FIBRILLATION (H): ICD-10-CM

## 2019-09-16 DIAGNOSIS — Z23 NEED FOR PROPHYLACTIC VACCINATION AND INOCULATION AGAINST INFLUENZA: ICD-10-CM

## 2019-09-16 DIAGNOSIS — Z23 NEED FOR SHINGLES VACCINE: ICD-10-CM

## 2019-09-16 DIAGNOSIS — K59.09 CHRONIC CONSTIPATION WITH OVERFLOW INCONTINENCE: ICD-10-CM

## 2019-09-16 DIAGNOSIS — R54 FRAIL ELDERLY: ICD-10-CM

## 2019-09-16 DIAGNOSIS — R06.02 SOB (SHORTNESS OF BREATH): Primary | ICD-10-CM

## 2019-09-16 DIAGNOSIS — I48.91 ATRIAL FIBRILLATION, UNSPECIFIED TYPE (H): ICD-10-CM

## 2019-09-16 DIAGNOSIS — I50.32 CHRONIC HEART FAILURE WITH PRESERVED EJECTION FRACTION (H): ICD-10-CM

## 2019-09-16 PROCEDURE — G0008 ADMIN INFLUENZA VIRUS VAC: HCPCS | Performed by: INTERNAL MEDICINE

## 2019-09-16 PROCEDURE — 99215 OFFICE O/P EST HI 40 MIN: CPT | Mod: 25 | Performed by: INTERNAL MEDICINE

## 2019-09-16 PROCEDURE — 90662 IIV NO PRSV INCREASED AG IM: CPT | Performed by: INTERNAL MEDICINE

## 2019-09-16 RX ORDER — WARFARIN SODIUM 2.5 MG/1
TABLET ORAL
Qty: 90 TABLET | Refills: 0 | Status: SHIPPED | OUTPATIENT
Start: 2019-09-16 | End: 2019-09-17 | Stop reason: ALTCHOICE

## 2019-09-16 RX ORDER — POLYETHYLENE GLYCOL 3350 17 G/17G
17 POWDER, FOR SOLUTION ORAL
COMMUNITY
Start: 2019-09-14

## 2019-09-16 NOTE — TELEPHONE ENCOUNTER
Pt called RN hotline to report that he was in the hospital from 9/12-9/13. Was advised to follow up with PCP in 1-5 days. He also asked for instructions on his Miralax. Advised per discharge instructions that he is to take 17g in a cap mixed with 8 ounces of fluid. Hospital follow up scheduled for today.    Pt had his INR done on 9/13/19 when in the hospital. Result was 2.5. Pt would like to know when he is due for next INR? States he is taking 2.5mg MWF, 1.25mg ROW. He requested call back and if he does not answer, ok to leave a message.    Evelyne Bethea RN  [facility]

## 2019-09-16 NOTE — PROGRESS NOTES
Subjective     Alexandru Crews is a 86 year old male who presents to clinic today for the following health issues:       SOB (shortness of breath)  Chronic constipation with overflow incontinence  Chronic fatigue  Marginal zone B-cell lymphoma (H)  Chronic heart failure with preserved ejection fraction (H)  Paroxysmal atrial fibrillation (H)  Frail elderly  Long term current use of anticoagulant therapy  Atrial fibrillation, unspecified type (H)  Need for prophylactic vaccination and inoculation against influenza     HPI       Hospital Follow-up Visit:    Hospital/Nursing Home/IP Rehab Facility: [facility]   Date of Admission: 09/12/2019  Date of Discharge: 09/13/2019  Reason(s) for Admission: Epigastric pain (Primary Dx);   Neutropenia, unspecified type (HC            Problems taking medications regularly:  None       Medication changes since discharge: Miralax       Problems adhering to non-medication therapy:  None    Summary of hospitalization:  CareEverywhere information obtained and reviewed - see discharge summary from [facility]   Diagnostic Tests/Treatments reviewed.  Follow up needed: ongoing care with multiple sub-specialists   Other Healthcare Providers Involved in Patient s Care:         cardiology, oncology, IM  Update since discharge: improved.     Post Discharge Medication Reconciliation: discharge medications reconciled and changed, per note/orders (see AVS).  Plan of care communicated with patient     Coding guidelines for this visit:  Type of Medical   Decision Making Face-to-Face Visit       within 7 Days of discharge Face-to-Face Visit        within 14 days of discharge   Moderate Complexity [id number]   High Complexity [id number]          Today is fundamentally a post hospital discharge follow up office visit although patient wished to discuss many different things.    He came in to the hospital because he had such severe abdominal pain that he could hardly believe it. A full 6-7  "hours later they diagnosed a \" blocked colon\" that is to say , a significant fecal impaction. Patient didn't understand because he had some liquidy stools but these were consistent with overflow- incontinence diarrhea . Patient is a significant chronic oral opioid therapy patient and in retrospect this is not a surprise. This was a new event for him. He had been on 2 tablets of senna and nighttime fiber supplement but both of these were stopped and MiraLax / GlycoLax (polyethylene glycol) started. OxyCODONE (ROXICODONE) and Morphine (Yahaira) doses were continued.     But patient is really starting to show his age and he's weaker and has dyspnea on exertion. He has a known condition of a heart failure with preserved ejection fraction (HFpEF) and paroxysmal atrial fibrillation with an automatic implanted cardio-defibrillator / pacemaker. He's seen periodically by [facility] and has a new diagnosis of a lymphoma per bone marrow biopsy with [facility] and is now seen an oncologist for treatment with rituxan (rituximab) and other issues     Patient Active Problem List   Diagnosis     Family history of colon cancer     Osteopenia     HYPERLIPIDEMIA LDL GOAL <160     Vitreous condensations, od > os     Pseudophakia, Yag Caps, ou     Chronic pain syndrome     Advanced directives, counseling/discussion     BPH (benign prostatic hyperplasia)     Paroxysmal atrial fibrillation (H)     History of shingles     Chronic fatigue     Lumbar spinal stenosis     DJD (degenerative joint disease), lumbar and thoracic     Diverticulosis of large intestine     H/O cardiac pacemaker     Hypertension goal BP (blood pressure) < 140/90     Long term current use of anticoagulant therapy     Chronic nonintractable headache, unspecified headache type     Prediabetes     Chronic pain     Elevated glucose     De Quervain's disease (tenosynovitis)     Primary osteoarthritis of first carpometacarpal joint " of left hand     Cubital tunnel syndrome, right     Primary osteoarthritis of right knee     History of total knee arthroplasty, left     Chronic atrial fibrillation (H)     Pacemaker     (HFpEF) heart failure with preserved ejection fraction (H)     Past Surgical History:   Procedure Laterality Date     APPENDECTOMY  2004     ARTHROSCOPY KNEE RT/LT  2006    Rt & Lt, Dr Arriaga     BACK SURGERY  1978 / 2003    x 3 in , fusions and one surgery in      C APPENDECTOMY  2004     CARPAL TUNNEL RELEASE RT/LT  three    2 on left, one on right     CATARACT IOL, RT/LT       COLONOSCOPY  ,,     INJECT EPIDURAL CERVICAL  2011    [facility]     INJECT EPIDURAL LUMBAR  2010    [facility]     INJECT EPIDURAL LUMBAR  2011    [facility]     INJECT EPIDURAL LUMBAR  2011    [facility]     LASER YAG CAPSULOTOMY  2016; 2016    left eye; right eye     PHACOEMULSIFICATION WITH STANDARD INTRAOCULAR LENS IMPLANT  2008; 2008    right eye; left eye     RECTAL SURGERY      fissurectomy and proctoplasty     RELEASE DEQUERVAINS WRIST Right 2017    DML     RELEASE TRIGGER FINGER      right hand     SHOULDER SURGERY      X four [ right x 2 and left x 2 ][[[[[     TUNA         Social History     Tobacco Use     Smoking status: Former Smoker     Years: 15.00     Types: Cigarettes     Last attempt to quit: 1975     Years since quittin.2     Smokeless tobacco: Never Used   Substance Use Topics     Alcohol use: Yes     Comment: 1 per week     Family History   Problem Relation Age of Onset     Cancer Mother         ovarian     Ovarian Cancer Mother      Cancer - colorectal Brother          Current Outpatient Medications   Medication Sig Dispense Refill     calcium carbonate 1250 (500 CA) MG CHEW Takes one every other day-unsure of dose       DAILY VITAMINS PO 1 tablet daily       denosumab (PROLIA) 60 MG/ML SOSY injection Inject 1 mL (60 mg) Subcutaneous  every 6 months 1 mL 1     furosemide (LASIX) 20 MG tablet Take 1 tablet (20 mg) by mouth daily as needed when leg swelling is bad. Elevated legs as much as possible and use compression stockings 30 tablet 0     methyl salicylate-menthol (MEI-BUNCH) OINT ointment Apply topically as needed       metoprolol (TOPROL-XL) 25 MG 24 hr tablet Take 25 mg by mouth 2 times daily Take half of tablet 2 times a day.       morphine (MS CONTIN) 15 MG CR tablet Take 1 tablet (15 mg) by mouth every 12 hours 28 days or more between refills for controlled medications 60 tablet 0     oxyCODONE-acetaminophen (PERCOCET) 5-325 MG tablet Take 1 tablet by mouth 3 times daily as needed for moderate to severe pain 28 days or more between refills for controlled medications 90 tablet 0     polyethylene glycol (MIRALAX/GLYCOLAX) powder Take 17 g by mouth       triamcinolone (KENALOG) 0.1 % cream Apply topically 2 times daily as needed to affected area as directed. 60 g 2     VITAMIN D, CHOLECALCIFEROL, PO Take 1,000 Units by mouth 2 times daily        warfarin ANTICOAGULANT (COUMADIN) 2.5 MG tablet TAKE ONE TABLET BY MOUTH EVERY DAY AS DIRECTED 90 tablet 0     FIBER PO Take 1 tsp. by mouth daily        senna-docusate (SENOKOT-S;PERICOLACE) 8.6-50 MG per tablet Take 1 tablet by mouth 2 times daily        Allergies   Allergen Reactions     Lactose Nausea and Vomiting     Sulindac      Upset stomach     Tramadol      Itching     Valdecoxib Itching     Vicodin [Hydrocodone-Acetaminophen]      Itching     Vioxx Itching     Rofecoxib Itching and Rash     Sulfa Drugs Rash     Muscle aches     BP Readings from Last 3 Encounters:   09/16/19 114/60   08/05/19 108/60   07/09/19 100/60    Wt Readings from Last 3 Encounters:   09/16/19 51.5 kg (113 lb 9.6 oz)   08/05/19 52.6 kg (116 lb)   07/09/19 52.2 kg (115 lb)                 Reviewed and updated as needed this visit by Provider         Review of Systems   ROS COMP: Constitutional, HEENT, cardiovascular,  pulmonary, gi and gu systems are negative, except as otherwise noted. - production review of systems       Objective    /60   Pulse 99   Temp 97.8  F (36.6  C) (Oral)   Resp 18   Wt 51.5 kg (113 lb 9.6 oz)   SpO2 98%   BMI 20.84 kg/m    Body mass index is 20.84 kg/m .  Physical Exam   GENERAL: healthy, alert and no distress, looks older then his stated age   EYES anicteric sclerae , Extraocular Movements Intact   NECK: no adenopathy, no asymmetry, masses, or scars and thyroid normal to palpation  RESP: lungs clear to auscultation - no rales, rhonchi or wheezes  CV: seems like a regular rate and rhythm, normal S1 S2, no S3 or S4, no murmur, click or rub, no peripheral edema and peripheral pulses strong  ABDOMEN: soft, nontender, no hepatosplenomegaly, no masses and bowel sounds normal  MS: no gross musculoskeletal defects noted, no edema  PSYCHIATRIC = normal grooming and affect   NEUROLOGY : intact mental status  , no focal neurological deficits     Diagnostic Test Results:  Labs reviewed in Epic        Assessment & Plan     (R06.02) SOB (shortness of breath)  (primary encounter diagnosis)  Comment: patient asked me about his shortness of breath symptoms and could he see a pulmonologist ?  Plan: I discussed with him that he's entirely free to do so, and if he wishes to be seen by the [facility] pulmonologists he just has to call and make an appointment however I don't fundamentally think this is a lung disease problem. It's a multifactorial dyspnea on exertion related to mostly congestive heart failure and lymphoma with aging and physical deconditioning playing an equal part. We did review his symptoms in significant detail      (K59.09) Chronic constipation with overflow incontinence  Comment: see patient after-visit summary   Plan: continue current plan of care   With MiraLax / GlycoLax (polyethylene glycol) and add bulk fiber laxative products     (R53.82) Chronic  fatigue  Comment: multifactorial   Plan: I don't see the role of additional testing today. More of a long philosophical conversation today     (C85.80) Marginal zone B-cell lymphoma (H)  Comment: continue current plan of care   With [facility]   Plan: see notes scanned into Epic electronic medical records      (I50.32) Chronic heart failure with preserved ejection fraction (H)  Comment: as detailed above   Plan: see office visit notes with  [facility]     (I48.0) Paroxysmal atrial fibrillation (H)  Comment: noted as a point of historical importance   Plan: see office visit notes with [facility]     (R54) Frail elderly  Comment: again, concepts reviewed    Plan: he's looking at assisted care living and the reasons for this are explored further ,. I support him in this voyage    (Z79.01) Long term current use of anticoagulant therapy  Comment: refills provided   Plan: warfarin ANTICOAGULANT (COUMADIN) 2.5 MG tablet            (I48.91) Atrial fibrillation, unspecified type (H)  Comment: refills provided   Plan: warfarin ANTICOAGULANT (COUMADIN) 2.5 MG tablet            (Z23) Need for prophylactic vaccination and inoculation against influenza  Comment: administered   Plan: INFLUENZA (HIGH DOSE) 3 VALENT VACCINE [71727],        Vaccine Administration, Initial [14977]            (Z23) Need for shingles vaccine  Comment: pharmacy router form given   Plan:        Regular exercise not possible   Recheck in 3 months is suggested     No follow-ups on file.    Paul Knowles MD  [facility]

## 2019-09-16 NOTE — PATIENT INSTRUCTIONS
We reviewed your hospitalization with [facility] and the diagnosis of overflow- incontinence diarrhea.    1. We have suggested that definitely stopping the senna makes sense    2. Please do continue with a once daily dose of a bulk fiber laxative product such as Metamucil (psyllium) or Citrucel (methylcellulose) or perdiem bulk fiber laxative occupational therapy others    3. Please do continue treatment with MiraLax / GlycoLax (polyethylene glycol) at bedtime     4. Suggested is a high fiber diet     SOURCES OF HIGH FIBER   1. Beans. Think three-bean salad, bean burritos, chili, soup.  2. Whole grains. That means whole-wheat bread, pasta, etc.  3. Brown rice. White rice doesn't offer much fiber.  4. Popcorn. It's a great source of fiber.  5. Nuts. Almonds, pecans, and walnuts have more fiber than other nuts.  6. Baked potato with skin. It's the skin that's important here.  7. Berries. All those seeds, plus the skin, give great fiber to any berry.  8. Bran cereal. Actually, any cereal that has 5 grams of fiber or more in a serving counts as high fiber.  9. Oatmeal. Whether its microwaved or stove-cooked, oatmeal is good fiber.  10. Vegetables. The crunchier, the better.

## 2019-09-17 ENCOUNTER — TELEPHONE (OUTPATIENT)
Dept: FAMILY MEDICINE | Facility: CLINIC | Age: 84
End: 2019-09-17

## 2019-09-17 RX ORDER — WARFARIN SODIUM 2.5 MG/1
TABLET ORAL
Qty: 90 TABLET | Refills: 0 | Status: SHIPPED | OUTPATIENT
Start: 2019-09-17 | End: 2020-01-01

## 2019-09-17 NOTE — TELEPHONE ENCOUNTER
Reason for Call:  Other prescription IMPORTANT    Detailed comments: Miladis with [facility] called regarding patient Alexandru Crews.  Miladis needs the correct dosage for Warfarin; you sent over 2 different dosages.    Please advise.    Phone Number Patient can be reached at: Other phone number:  [phone]    Best Time: asap    Can we leave a detailed message on this number? YES    Call taken on 9/17/2019 at 11:11 AM by Rowan Rubio

## 2019-09-17 NOTE — TELEPHONE ENCOUNTER
Per 9/6/2019 INR OV notes   Full warfarin instructions:   9/6: 5 mg; 9/7: 2.5 mg; Otherwise 2.5 mg every Mon, Wed, Fri; 1.25 mg all other days     Pharmacy notified     Beatriz Espinal RN

## 2019-09-23 ENCOUNTER — TRANSFERRED RECORDS (OUTPATIENT)
Dept: HEALTH INFORMATION MANAGEMENT | Facility: CLINIC | Age: 84
End: 2019-09-23

## 2019-09-24 ENCOUNTER — PATIENT OUTREACH (OUTPATIENT)
Dept: CARE COORDINATION | Facility: CLINIC | Age: 84
End: 2019-09-24

## 2019-09-24 NOTE — LETTER
[facility]  Complex Care Plan  About Me:    Patient Name:  Alexandru Crews    YOB: 1932  Age:         87 year old   [place] MRN:    [id number] Telephone Information:  Home Phone [phone]   Mobile Not on file.       Address:  [address] [place] [address] [address] [place] [address] Email address:  No e-mail address on record      Emergency Contact(s)    Name Relationship Lgl Grd Work Phone Home Phone Mobile Phone   1. ALEXANDRU CREWS* Spouse   [phone]    2. ZO MAE Daughter   [phone]            Primary language:  English     needed? No   [facility] [employer]:  [phone] op. 1  Other communication barriers: None  Preferred Method of Communication:  Mail  Current living arrangement:    Mobility Status/ Medical Equipment:      Health Maintenance  Health Maintenance Reviewed:      My Access Plan  Medical Emergency 911   Primary Clinic Line [facility] - [phone]   24 Hour Appointment Line [phone] or  [phone]) (toll-free)   24 Hour Nurse Line [phone] (toll-free)   Preferred Urgent Care     Preferred Hospital     Preferred Pharmacy [facility]     Behavioral Health Crisis Line The National Suicide Prevention Lifeline at 1-347.430.5506 or 912             My Care Team Members  Patient Care Team       Relationship Specialty Notifications Start End    Paul Knowles MD PCP - General Internal Medicine  8/9/13     Phone: [phone] Fax: [phone]         [address] [facility] [place] [address]    Paul Knowles MD Assigned PCP   7/21/13     Phone: [phone] Fax: [phone]         [address] [facility] [address]    Shoshana Colmenares formerly [facility] Pharmacist   5/29/19     Phone: [phone] Fax: [phone] [address] [facility] [address]    Venus Henriquez, RN Lead Care Coordinator Primary Care - CC Admissions 6/21/19     Phone: [phone] Fax: [phone]        Symone Earl  Community Health Worker Primary Care -  Admissions 7/30/19     [phone]            My Care Plans  Self Management and Treatment Plan  Goals and (Comments)  Goals        General    Functional (pt-stated)     Notes - Note edited  9/24/2019  9:54 AM by Venus Henriquez RN    Goal Statement: I want to feel stronger and not so tired.     Measure of Success: When I am not needing frequent naps    Supportive Steps to Achieve: Discuss with cardiology     Barriers: Fatigue    Strengths: Supportive spouse    Date to Achieve By:January 2020    Patient expressed understanding of goal: Yes               Action Plans on File:            Depression  Heart Failure       Advance Care Plans/Directives Type:        My Medical and Care Information  Problem List   Patient Active Problem List   Diagnosis     Family history of colon cancer     Osteopenia     HYPERLIPIDEMIA LDL GOAL <160     Vitreous condensations, od > os     Pseudophakia, Yag Caps, ou     Chronic pain syndrome     Advanced directives, counseling/discussion     BPH (benign prostatic hyperplasia)     Paroxysmal atrial fibrillation (H)     History of shingles     Chronic fatigue     Lumbar spinal stenosis     DJD (degenerative joint disease), lumbar and thoracic     Diverticulosis of large intestine     H/O cardiac pacemaker     Hypertension goal BP (blood pressure) < 140/90     Long term current use of anticoagulant therapy     Chronic nonintractable headache, unspecified headache type     Prediabetes     Chronic pain     Elevated glucose     De Quervain's disease (tenosynovitis)     Primary osteoarthritis of first carpometacarpal joint of left hand     Cubital tunnel syndrome, right     Primary osteoarthritis of right knee     History of total knee arthroplasty, left     Chronic atrial fibrillation (H)     Pacemaker     (HFpEF) heart failure with preserved ejection fraction (H)      Current Medications and Allergies:  See printed Medication Report.    Care Coordination  Start Date: 6/21/2019   Frequency of Care Coordination: monthly   Form Last Updated: 09/24/2019

## 2019-09-24 NOTE — PROGRESS NOTES
Clinic Care Coordination Contact    Follow Up Progress Note      Assessment: patient was seen in ED at [facility] on 9/12 to 9/13/19 for abdominal pain due to severe constipation. Patient was having overflow diarrhea.     Patient states that the constipation is resolved and he denies abdominal pain. He states he is able to eat without issue.     Patient states he wishes his joints didn't hurt so much.     Patient is going today for his first cancer treatment. 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 Patient will receive rituxan. He sees [place] [facility] team.      Goals addressed this encounter:   Goals Addressed                 This Visit's Progress      Functional (pt-stated)   On track     Goal Statement: I want to feel stronger and not so tired.     Measure of Success: When I am not needing frequent naps    Supportive Steps to Achieve: Discuss with cardiology     Barriers: Fatigue    Strengths: Supportive spouse    Date to Achieve By:January 2020    Patient expressed understanding of goal: Yes          Intervention/Education provided during outreach: Rest when he needs to. Attempt to eat healthy. Stay active      Outreach Frequency: monthly    Plan:   Patient will attend cancer treatment.    Care Coordinator will follow up in one month.    Venus Henriquez RN, [facility] - Primary Care Clinic RN Coordinator  [facility]   9/24/2019    10:00 AM  [phone]

## 2019-09-26 ENCOUNTER — ANTICOAGULATION THERAPY VISIT (OUTPATIENT)
Dept: NURSING | Facility: CLINIC | Age: 84
End: 2019-09-26
Payer: MEDICARE

## 2019-09-26 DIAGNOSIS — I48.0 PAROXYSMAL ATRIAL FIBRILLATION (H): ICD-10-CM

## 2019-09-26 DIAGNOSIS — Z79.01 LONG TERM CURRENT USE OF ANTICOAGULANT THERAPY: ICD-10-CM

## 2019-09-26 LAB — INR POINT OF CARE: 1.7 (ref 0.86–1.14)

## 2019-09-26 PROCEDURE — 99207 ZZC NO CHARGE NURSE ONLY: CPT

## 2019-09-26 PROCEDURE — 85610 PROTHROMBIN TIME: CPT | Mod: QW

## 2019-09-26 PROCEDURE — 36416 COLLJ CAPILLARY BLOOD SPEC: CPT

## 2019-09-26 NOTE — PROGRESS NOTES
ANTICOAGULATION FOLLOW-UP CLINIC VISIT    Patient Name:  Alexandru Crews  Date:  2019  Contact Type:  Face to Face    SUBJECTIVE:  Patient Findings     Comments:   Bleeding Signs/Symptoms: NO  Thromboembolic Signs/Symptoms: NO     Medication Changes:  Started Chemotherapy, started miralax, stopped senna  Dietary Changes:  NO  Bacterial/Viral Infection: NO     Missed Coumadin Doses: NO  Other Concerns:  NO          Clinical Outcomes     Negatives:   Major bleeding event, Thromboembolic event, Anticoagulation-related hospital admission, Anticoagulation-related ED visit, Anticoagulation-related fatality    Comments:   Bleeding Signs/Symptoms: NO  Thromboembolic Signs/Symptoms: NO     Medication Changes:  Started Chemotherapy, started miralax, stopped senna  Dietary Changes:  NO  Bacterial/Viral Infection: NO     Missed Coumadin Doses: NO  Other Concerns:  NO             OBJECTIVE    INR Protime   Date Value Ref Range Status   2019 1.7 (A) 0.86 - 1.14 Final       ASSESSMENT / PLAN  No question data found.  Anticoagulation Summary  As of 2019    INR goal:   2.0-3.0   TTR:   84.3 % (3.4 y)   INR used for dosin.7! (2019)   Warfarin maintenance plan:   2.5 mg (2.5 mg x 1) every Mon, Wed, Fri; 1.25 mg (2.5 mg x 0.5) all other days   Full warfarin instructions:   : 2.5 mg; Otherwise 2.5 mg every Mon, Wed, Fri; 1.25 mg all other days   Weekly warfarin total:   12.5 mg   Plan last modified:   Rose Navarrete RN (2019)   Next INR check:   10/11/2019   Priority:   INR   Target end date:   Indefinite    Indications    Long term current use of anticoagulant therapy [Z79.01]  Paroxysmal atrial fibrillation (H) [I48.0]             Anticoagulation Episode Summary     INR check location:       Preferred lab:       Send INR reminders to:   JAY ISRAEL    Comments:         Anticoagulation Care Providers     Provider Role Specialty Phone number    Paul Knowles MD Responsible Internal Medicine  [phone]            See the Encounter Report to view Anticoagulation Flowsheet and Dosing Calendar (Go to Encounters tab in chart review, and find the Anticoagulation Therapy Visit)        Rose Navarrete RN

## 2019-09-26 NOTE — PATIENT INSTRUCTIONS
Anticoagulation Clinic:  Please call [phone] with any problems or questions regarding your Warfarin therapy.    Some signs and symptoms of clots include: pain or tenderness in arm or leg, swelling in arm or leg, changes in skin color, or area is warm to touch, shortness or breath, trouble breathing.  Numbness or weakness especially on 1 side of the body, sudden trouble speaking or swallowing, sudden trouble seeing, sudden confusion, dizzy spells or headache.  If you have these please call 911 or seek medical care immediately.

## 2019-09-27 ENCOUNTER — TRANSFERRED RECORDS (OUTPATIENT)
Dept: HEALTH INFORMATION MANAGEMENT | Facility: CLINIC | Age: 84
End: 2019-09-27

## 2019-10-04 ENCOUNTER — TRANSFERRED RECORDS (OUTPATIENT)
Dept: HEALTH INFORMATION MANAGEMENT | Facility: CLINIC | Age: 84
End: 2019-10-04

## 2019-10-07 ENCOUNTER — TELEPHONE (OUTPATIENT)
Dept: INTERNAL MEDICINE | Facility: CLINIC | Age: 84
End: 2019-10-07

## 2019-10-10 ENCOUNTER — ANTICOAGULATION THERAPY VISIT (OUTPATIENT)
Dept: NURSING | Facility: CLINIC | Age: 84
End: 2019-10-10
Payer: MEDICARE

## 2019-10-10 DIAGNOSIS — I48.0 PAROXYSMAL ATRIAL FIBRILLATION (H): ICD-10-CM

## 2019-10-10 DIAGNOSIS — Z79.01 LONG TERM CURRENT USE OF ANTICOAGULANT THERAPY: ICD-10-CM

## 2019-10-10 LAB — INR POINT OF CARE: 2.2 (ref 0.86–1.14)

## 2019-10-10 PROCEDURE — 99207 ZZC NO CHARGE NURSE ONLY: CPT

## 2019-10-10 PROCEDURE — 36416 COLLJ CAPILLARY BLOOD SPEC: CPT

## 2019-10-10 PROCEDURE — 85610 PROTHROMBIN TIME: CPT | Mod: QW

## 2019-10-10 NOTE — PATIENT INSTRUCTIONS
Anticoagulation Clinic:  Please call [phone] with any problems or questions regarding your Warfarin therapy.

## 2019-10-10 NOTE — PROGRESS NOTES
ANTICOAGULATION FOLLOW-UP CLINIC VISIT    Patient Name:  Alexandru Crews  Date:  10/10/2019  Contact Type:  Face to Face    SUBJECTIVE:  Patient Findings     Comments:   Has been having trouble with constipation, thought maybe he had a blockage.         Clinical Outcomes     Negatives:   Major bleeding event, Thromboembolic event, Anticoagulation-related hospital admission, Anticoagulation-related ED visit, Anticoagulation-related fatality    Comments:   Has been having trouble with constipation, thought maybe he had a blockage.            OBJECTIVE    INR Protime   Date Value Ref Range Status   10/10/2019 2.2 (A) 0.86 - 1.14 Final       ASSESSMENT / PLAN  No question data found.  Anticoagulation Summary  As of 10/10/2019    INR goal:   2.0-3.0   TTR:   83.8 % (3.5 y)   INR used for dosin.2 (10/10/2019)   Warfarin maintenance plan:   2.5 mg (2.5 mg x 1) every Mon, Wed, Fri; 1.25 mg (2.5 mg x 0.5) all other days   Full warfarin instructions:   2.5 mg every Mon, Wed, Fri; 1.25 mg all other days   Weekly warfarin total:   12.5 mg   No change documented:   Rose Navarrete RN   Plan last modified:   Rose Navarrete RN (2019)   Next INR check:   2019   Priority:   INR   Target end date:   Indefinite    Indications    Long term current use of anticoagulant therapy [Z79.01]  Paroxysmal atrial fibrillation (H) [I48.0]             Anticoagulation Episode Summary     INR check location:       Preferred lab:       Send INR reminders to:   JAY ISRAEL    Comments:         Anticoagulation Care Providers     Provider Role Specialty Phone number    Paul Knowles MD [facility] Internal Medicine [phone]            See the Encounter Report to view Anticoagulation Flowsheet and Dosing Calendar (Go to Encounters tab in chart review, and find the Anticoagulation Therapy Visit)        Rose Navarrete RN

## 2019-10-28 ENCOUNTER — PATIENT OUTREACH (OUTPATIENT)
Dept: CARE COORDINATION | Facility: CLINIC | Age: 84
End: 2019-10-28

## 2019-10-28 NOTE — PROGRESS NOTES
Clinic Care Coordination Contact    Follow Up Progress Note      Assessment: Message left on patient's voicemail requesting return call.    Patient has started Rituximab infusions for lymphoma treatment with [facility]. Plan per chart review is for four treatments then go to maintenance therapy every 2 months for another total of four doses.      Patient returned clinic care coordinator's call. Patient states he has completed initial four doses of Rituximab. He states he is now on maintenance. He states he sees his oncologist tomorrow (10/29/19)    He states he is still tired, but his worst complaint is the generalized pain he has. He states he hurts all over.  He has eliminated most of his pain medication due to problems with constipation.  He states he take 1/2 oxycodone about once a day.  We discussed the possibility of discussing the use of pain patches (such as duragesic or fentanyl) This would e;eiminate the problem of constipation but provide pain relief.     Patient states for now his constipation is under control. He is using Miralax nightly. He states he does not sleep well. He is up frequently urinating. We discussed limiting fluids after 7 pm.  He states he also has occipital nerve pain and finding the right way to lay his head at night so he does not get shooting pain into his eye, is difficult.         Goals addressed this encounter:   Goals Addressed                 This Visit's Progress      Functional (pt-stated)   On track     Goal Statement: I want to feel stronger and not so tired.     Measure of Success: When I am not needing frequent naps    Supportive Steps to Achieve: Discuss with cardiology   Complete chemotherapy   Monitor blood counts   Follow with oncology    Barriers: Fatigue    Strengths: Supportive spouse    Date to Achieve By:January 2020    Patient expressed understanding of goal: Yes          Intervention/Education provided during outreach: as above     Outreach Frequency:  monthly    Plan: Patient will continue chemo as scheduled by [facility].   Patient will discuss the possibility of pain patches with next PCP appointment.      Care Coordinator will follow up in one month.     Venus Henriquez RN, [facility] - Primary Care Clinic RN Coordinator  [facility]   10/28/2019    2:11 PM  [phone]

## 2019-11-01 ENCOUNTER — ANTICOAGULATION THERAPY VISIT (OUTPATIENT)
Dept: NURSING | Facility: CLINIC | Age: 84
End: 2019-11-01
Payer: MEDICARE

## 2019-11-01 DIAGNOSIS — I48.0 PAROXYSMAL ATRIAL FIBRILLATION (H): ICD-10-CM

## 2019-11-01 DIAGNOSIS — Z79.01 LONG TERM CURRENT USE OF ANTICOAGULANT THERAPY: ICD-10-CM

## 2019-11-01 LAB — INR POINT OF CARE: 2.4 (ref 0.86–1.14)

## 2019-11-01 PROCEDURE — 99207 ZZC NO CHARGE NURSE ONLY: CPT

## 2019-11-01 PROCEDURE — 85610 PROTHROMBIN TIME: CPT | Mod: QW

## 2019-11-01 PROCEDURE — 36416 COLLJ CAPILLARY BLOOD SPEC: CPT

## 2019-11-01 NOTE — PATIENT INSTRUCTIONS
Anticoagulation Clinic:  Please call [phone] with any problems or questions regarding your Warfarin therapy.

## 2019-11-01 NOTE — PROGRESS NOTES
ANTICOAGULATION FOLLOW-UP CLINIC VISIT    Patient Name:  Alexandru Crews  Date:  2019  Contact Type:  Face to Face    SUBJECTIVE:  Patient Findings     Comments:   The patient was assessed for diet, medication, and activity level changes, missed or extra doses, bruising or bleeding, with no problem findings.          Clinical Outcomes     Negatives:   Major bleeding event, Thromboembolic event, Anticoagulation-related hospital admission, Anticoagulation-related ED visit, Anticoagulation-related fatality    Comments:   The patient was assessed for diet, medication, and activity level changes, missed or extra doses, bruising or bleeding, with no problem findings.             OBJECTIVE    INR Protime   Date Value Ref Range Status   2019 2.4 (A) 0.86 - 1.14 Final       ASSESSMENT / PLAN  No question data found.  Anticoagulation Summary  As of 2019    INR goal:   2.0-3.0   TTR:   84.1 % (3.5 y)   INR used for dosin.4 (2019)   Warfarin maintenance plan:   2.5 mg (2.5 mg x 1) every Mon, Wed, Fri; 1.25 mg (2.5 mg x 0.5) all other days   Full warfarin instructions:   2.5 mg every Mon, Wed, Fri; 1.25 mg all other days   Weekly warfarin total:   12.5 mg   No change documented:   Rose Navarrete RN   Plan last modified:   Rose Navarrete RN (2019)   Next INR check:   2019   Priority:   INR   Target end date:   Indefinite    Indications    Long term current use of anticoagulant therapy [Z79.01]  Paroxysmal atrial fibrillation (H) [I48.0]             Anticoagulation Episode Summary     INR check location:       Preferred lab:       Send INR reminders to:   JAY ISRAEL    Comments:         Anticoagulation Care Providers     Provider Role Specialty Phone number    Paul Knowles MD [facility] Internal Medicine [phone]            See the Encounter Report to view Anticoagulation Flowsheet and Dosing Calendar (Go to Encounters tab in chart review, and find the Anticoagulation Therapy  Visit)        Rose Navarrete RN

## 2019-11-18 NOTE — TELEPHONE ENCOUNTER
Routing to Dr. Knowles to advise if any labs are needed prior to next Prolia injection.  Emma Leach,

## 2019-11-18 NOTE — TELEPHONE ENCOUNTER
No laboratory studies necessary right now    Denosumab injection (Prolia) ordered    Paul Knowles MD

## 2019-11-19 NOTE — TELEPHONE ENCOUNTER
Patient returned call.  Patient has not had any dental work, extractions or jaw bone work within the past month.    Appointment for prolia injection scheduled for Tuesday, November 26th at 11:30 a.m.    Message sent to Siria YOUNG) to order medication. Emma Leach,

## 2019-11-26 ENCOUNTER — TELEPHONE (OUTPATIENT)
Dept: INTERNAL MEDICINE | Facility: CLINIC | Age: 84
End: 2019-11-26

## 2019-11-26 ENCOUNTER — ALLIED HEALTH/NURSE VISIT (OUTPATIENT)
Dept: NURSING | Facility: CLINIC | Age: 84
End: 2019-11-26
Payer: MEDICARE

## 2019-11-26 DIAGNOSIS — M81.0 AGE-RELATED OSTEOPOROSIS WITHOUT CURRENT PATHOLOGICAL FRACTURE: Primary | ICD-10-CM

## 2019-11-26 DIAGNOSIS — T50.905S UNSPECIFIED ADVERSE EFFECT OF DRUG OR MEDICAMENT, SEQUELA: ICD-10-CM

## 2019-11-26 DIAGNOSIS — M85.80 OSTEOPENIA, UNSPECIFIED LOCATION: Primary | ICD-10-CM

## 2019-11-26 PROCEDURE — 96372 THER/PROPH/DIAG INJ SC/IM: CPT

## 2019-11-26 NOTE — TELEPHONE ENCOUNTER
Postponing this encounter-    Patient due for Prolia injection around 5/26/2020    1. Please ask provider to put in a new order for Prolia injection if one is not current, and any lab orders if needed (it is up to the provider's discretion on how often labs are checked once Prolia injections have been started)    2. Please contact patient to schedule the injection with RN    3. Ask MA to re-order med 1 week in advance    Make sure patient has not had any dental work including the jaw bone (i.e teeth extraction) 1 month prior to injection  If insurance has changed or it is a new calendar year, will need [facility] pharmacist, Monique Colmenares, to do insurance verification again prior to scheduling and ordering med    Beatriz Espinal RN

## 2019-11-26 NOTE — PATIENT INSTRUCTIONS
You Received your Prolia injection today  Your next Injection is due in 6 months (around 5/26/2020)  If you plan on having any dental work done within the next 6 months, please let your dentist know that you are on this medication.  We will call in advance to have your next injection scheduled.   Make sure you do not have any dental work completed involving the jaw bone within 1 month prior to your scheduled injection

## 2019-11-26 NOTE — PROGRESS NOTES
Clinic Administered Medication Documentation    MEDICATION LIST:   Prolia Documentation    Prior to injection, verified patient identity using patient's name and date of birth. Medication was administered. Please see MAR and medication order for additional information. Patient instructed to remain in clinic for 15 minutes and report any adverse reaction to staff immediately .    Indication: Prolia  (denosumab) is a prescription medicine used to treat osteoporosis in patients who:     Are at high risk for fracture, meaning patients who have had a fracture related to osteoporosis, or who have multiple risk factors for fracture.    Cannot use another osteoporosis medicine or other osteoporosis medicines did not work well.    The timeline for early/late injections would be 4 weeks early and any time after the 6 month ruperto. If a patient receives their injection late, then the subsequent injection would be 6 months from the date that they actually received the injection.    1.  When was the last injection?  5/23/19  2.  Did they check with their insurance for this calendar year?  Yes - see 3/25/19 encounter  3.  Is there an order in the chart?  Yes  4.  Has the patient had dental work involving the bone in the past month or will have work in the next 6 months?  No.    The following steps were completed to comply with the REMS program for Prolia:    Reviewed information in the Medication Guide and Patient Counseling Chart, including the serious risks of Prolia  and the symptoms of each risk.    Advised patient to seek prompt medical attention if they have signs or symptoms of any of the serious risks.    Provided each patient a copy of the Medication Guide and Patient Brochure.    Was entire vial of medication used? Yes  Vial/Syringe: Syringe  Expiration Date:  02/2022    Beatriz Espinal RN

## 2019-12-02 ENCOUNTER — OFFICE VISIT (OUTPATIENT)
Dept: ORTHOPEDICS | Facility: CLINIC | Age: 84
End: 2019-12-02
Payer: MEDICARE

## 2019-12-02 ENCOUNTER — ANCILLARY PROCEDURE (OUTPATIENT)
Dept: GENERAL RADIOLOGY | Facility: CLINIC | Age: 84
End: 2019-12-02
Attending: ORTHOPAEDIC SURGERY
Payer: MEDICARE

## 2019-12-02 ENCOUNTER — PATIENT OUTREACH (OUTPATIENT)
Dept: CARE COORDINATION | Facility: CLINIC | Age: 84
End: 2019-12-02

## 2019-12-02 VITALS
WEIGHT: 113 LBS | DIASTOLIC BLOOD PRESSURE: 76 MMHG | SYSTOLIC BLOOD PRESSURE: 163 MMHG | RESPIRATION RATE: 16 BRPM | BODY MASS INDEX: 21.34 KG/M2 | HEART RATE: 72 BPM | HEIGHT: 61 IN

## 2019-12-02 DIAGNOSIS — Z96.652 STATUS POST TOTAL LEFT KNEE REPLACEMENT: ICD-10-CM

## 2019-12-02 DIAGNOSIS — M17.11 PRIMARY OSTEOARTHRITIS OF RIGHT KNEE: Primary | ICD-10-CM

## 2019-12-02 DIAGNOSIS — M17.11 PRIMARY OSTEOARTHRITIS OF RIGHT KNEE: ICD-10-CM

## 2019-12-02 PROCEDURE — 73562 X-RAY EXAM OF KNEE 3: CPT | Mod: 59

## 2019-12-02 PROCEDURE — 99213 OFFICE O/P EST LOW 20 MIN: CPT | Mod: 25 | Performed by: ORTHOPAEDIC SURGERY

## 2019-12-02 PROCEDURE — 20610 DRAIN/INJ JOINT/BURSA W/O US: CPT | Mod: RT | Performed by: ORTHOPAEDIC SURGERY

## 2019-12-02 RX ORDER — METHYLPREDNISOLONE ACETATE 80 MG/ML
80 INJECTION, SUSPENSION INTRA-ARTICULAR; INTRALESIONAL; INTRAMUSCULAR; SOFT TISSUE
Status: DISCONTINUED | OUTPATIENT
Start: 2019-12-02 | End: 2020-01-01

## 2019-12-02 RX ORDER — LIDOCAINE HYDROCHLORIDE 10 MG/ML
5 INJECTION, SOLUTION INFILTRATION; PERINEURAL
Status: DISCONTINUED | OUTPATIENT
Start: 2019-12-02 | End: 2020-01-01

## 2019-12-02 RX ADMIN — METHYLPREDNISOLONE ACETATE 80 MG: 80 INJECTION, SUSPENSION INTRA-ARTICULAR; INTRALESIONAL; INTRAMUSCULAR; SOFT TISSUE at 15:07

## 2019-12-02 RX ADMIN — LIDOCAINE HYDROCHLORIDE 5 ML: 10 INJECTION, SOLUTION INFILTRATION; PERINEURAL at 15:07

## 2019-12-02 ASSESSMENT — MIFFLIN-ST. JEOR: SCORE: 1050.94

## 2019-12-02 NOTE — PROGRESS NOTES
Large Joint Injection/Arthocentesis: R knee joint  Date/Time: 12/2/2019 3:07 PM  Performed by: Ras Malin MD  Authorized by: Ras Malin MD     Indications:  Pain and osteoarthritis  Needle Size:  22 G  Guidance: landmark guided    Location:  Knee      Medications:  80 mg methylPREDNISolone 80 MG/ML; 5 mL lidocaine 1 %  Outcome:  Tolerated well, no immediate complications  Procedure discussed: discussed risks, benefits, and alternatives    Consent Given by:  Patient  Timeout: timeout called immediately prior to procedure    Prep: patient was prepped and draped in usual sterile fashion    Prep comment:  Betadine

## 2019-12-02 NOTE — PROGRESS NOTES
"Clinic Care Coordination Contact    Follow Up Progress Note      Assessment: Outreach to patient.     Patient states he is not doing well at all. Patient states his right knee is so bad, he is having a very difficult time walking. He is using two canes. He states he can't bend the knee. He has had multiple injections in the knee, he has used knee braces and he has been told he needs a knee replacement. He is not a good candidate for surgery due to his lymphoma and multiple joint and back issues.      Spoke with both patient and spouse. They agree he can not continue with this immobility and pain.  His past ortho has retired. He has seen Dr. Malin with [facility] and will attempt to see him.     Patient is scheduled for another infusion for maintenance chemo on 12/10/19.    We discussed if he is able to have a knee replacement it would be advisable for him to go to rehab following the procedure. His spouse Nkechi agrees, as she states she is not able to care for him until he is back on his feet.       Goals addressed this encounter: He is stable  Goals Addressed                 This Visit's Progress      Functional (pt-stated)   On track     Goal Statement: I want to feel stronger and not so tired.     Measure of Success: When I am not needing frequent naps    Supportive Steps to Achieve: Discuss with cardiology   Complete chemotherapy   Monitor blood counts   Follow with oncology    Barriers: Fatigue    Strengths: Supportive spouse    Date to Achieve By:January 2020    Patient expressed understanding of goal: Yes             Intervention/Education provided during outreach: As above.    Patient called back and stated he called for an appointment with Dr. Malin and was told he had the whole afternoon available for appointments. He worries that this is a \"red flag\" and wonders if he should see him.  Advised him to take an appointment today and at least have his knee evaluated. If he wants to see someone else " later, we can arrange that.  He agrees with plan. (his wife had surgery with Dr. Welsh and it di not help her)         Outreach Frequency: monthly    Plan:   Patient will see ortho today.   Patient will follow up with oncology as scheduled.    Care Coordinator will follow up in one month.     Venus Henriquez RN, [facility] - Primary Care Clinic RN Coordinator  [facility]   12/2/2019    11:30 AM  [phone]

## 2019-12-02 NOTE — LETTER
12/2/2019         RE: Alexandru Crews  [address] [place] [address]        Dear Colleague,    Thank you for referring your patient, Alexandru Crews, to the [facility]. Please see a copy of my visit note below.            Large Joint Injection/Arthocentesis: R knee joint  Date/Time: 12/2/2019 3:07 PM  Performed by: Ras Malin MD  Authorized by: Ras Malin MD     Indications:  Pain and osteoarthritis  Needle Size:  22 G  Guidance: landmark guided    Location:  Knee      Medications:  80 mg methylPREDNISolone 80 MG/ML; 5 mL lidocaine 1 %  Outcome:  Tolerated well, no immediate complications  Procedure discussed: discussed risks, benefits, and alternatives    Consent Given by:  Patient  Timeout: timeout called immediately prior to procedure    Prep: patient was prepped and draped in usual sterile fashion    Prep comment:  Betadine          Alexandru Crews is an 87 years old male seen for follow up left total knee arthroplasty from 2006 by Dr Gasper Arriaga.  He complains of severe right knee pain.  Has occasional left knee pain that he thinks is due to favoring right knee.  He thinks he is not a surgical candidate due to bilateral shoulder cuff tear arthropathy, lumbar spinal stenosis, left foot plano-valgus deformity.  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 Young.  He reports his white count goes down with treatments.  He had his last treatment 2 weeks ago.  Is due again in about 6 weeks.  Exercise:  walking.  He rates his right knee pain as 7-8/10.  This pain present for 4 years.    Past Medical History:   Diagnosis Date     Atrial fibrillation (H)      BPH (benign prostatic hyperplasia)      Cataracts      Chest pain 12/2010    Normal Myocardial perfusion test with [facility]     Chronic pain syndrome      CTS (carpal tunnel syndrome)     Rt, Rx surgery     Diverticulosis      DJD (degenerative joint disease), lumbar  and thoracic 9/9/2013     Family history of colon cancer     Incr. risk (brother)     Hydrocele     Rt     Hypertension goal BP (blood pressure) < 140/90 8/10/2015     Osteopenia 2005     Osteoporosis     Dr Regla DHALIWAL (peptic ulcer disease) 11/1998    h/o, +H. Pylori Rx     Varicocele     Lt side-large       Past Surgical History:   Procedure Laterality Date     APPENDECTOMY  12/2004     ARTHROSCOPY KNEE RT/LT  1/2006    Rt & Lt, Dr Arriaga     BACK SURGERY  1978 / 2003    x 3 in 1978, fusions and one surgery in 2003     C APPENDECTOMY  12/31/2004     CARPAL TUNNEL RELEASE RT/LT  three    2 on left, one on right     CATARACT IOL, RT/LT       COLONOSCOPY  1995,2000,2005     INJECT EPIDURAL CERVICAL  5/24/2011    [facility]     INJECT EPIDURAL LUMBAR  11/9/2010    [facility]     INJECT EPIDURAL LUMBAR  1/4/2011    [facility]     INJECT EPIDURAL LUMBAR  4/27/2011    [facility]     LASER YAG CAPSULOTOMY  11/2016; 12/2016    left eye; right eye     PHACOEMULSIFICATION WITH STANDARD INTRAOCULAR LENS IMPLANT  6/2008; 8/2008    right eye; left eye     RECTAL SURGERY      fissurectomy and proctoplasty     RELEASE DEQUERVAINS WRIST Right 04/28/2017    DML     RELEASE TRIGGER FINGER      right hand     SHOULDER SURGERY      X four [ right x 2 and left x 2 ][[[[[     TUNA         Family History   Problem Relation Age of Onset     Cancer Mother         ovarian     Ovarian Cancer Mother      Cancer - colorectal Brother        Social History     Socioeconomic History     Marital status:      Spouse name: Not on file     Number of children: Not on file     Years of education: Not on file     Highest education level: Not on file   Occupational History     Not on file   Social Needs     Financial resource strain: Not on file     Food insecurity:     Worry: Not on file     Inability: Not on file     Transportation needs:     Medical: Not on file     Non-medical: Not on file   Tobacco Use     Smoking  status: Former Smoker     Years: 15.00     Types: Cigarettes     Last attempt to quit: 1975     Years since quittin.4     Smokeless tobacco: Never Used   Substance and Sexual Activity     Alcohol use: Yes     Comment: 1 per week     Drug use: No     Sexual activity: Not Currently     Partners: Female   Lifestyle     Physical activity:     Days per week: Not on file     Minutes per session: Not on file     Stress: Not on file   Relationships     Social connections:     Talks on phone: Not on file     Gets together: Not on file     Attends Jewish service: Not on file     Active member of club or organization: Not on file     Attends meetings of clubs or organizations: Not on file     Relationship status: Not on file     Intimate partner violence:     Fear of current or ex partner: Not on file     Emotionally abused: Not on file     Physically abused: Not on file     Forced sexual activity: Not on file   Other Topics Concern     Parent/sibling w/ CABG, MI or angioplasty before 65F 55M? Not Asked   Social History Narrative     Not on file       Current Outpatient Medications   Medication Sig Dispense Refill     calcium carbonate 1250 (500 CA) MG CHEW Takes one every other day-unsure of dose       DAILY VITAMINS PO 1 tablet daily       denosumab (PROLIA) 60 MG/ML SOSY injection Inject 1 mL (60 mg) Subcutaneous every 6 months 1 mL 1     FIBER PO Take 1 tsp. by mouth daily        furosemide (LASIX) 20 MG tablet Take 1 tablet (20 mg) by mouth daily as needed when leg swelling is bad. Elevated legs as much as possible and use compression stockings 30 tablet 0     methyl salicylate-menthol (MEI-BUNCH) OINT ointment Apply topically as needed       metoprolol (TOPROL-XL) 25 MG 24 hr tablet Take 25 mg by mouth 2 times daily Take half of tablet 2 times a day.       morphine (MS CONTIN) 15 MG CR tablet Take 1 tablet (15 mg) by mouth every 12 hours 28 days or more between refills for controlled medications 60 tablet 0      oxyCODONE-acetaminophen (PERCOCET) 5-325 MG tablet Take 1 tablet by mouth 3 times daily as needed for moderate to severe pain 28 days or more between refills for controlled medications 90 tablet 0     polyethylene glycol (MIRALAX/GLYCOLAX) powder Take 17 g by mouth       senna-docusate (SENOKOT-S;PERICOLACE) 8.6-50 MG per tablet Take 1 tablet by mouth 2 times daily        triamcinolone (KENALOG) 0.1 % cream Apply topically 2 times daily as needed to affected area as directed. 60 g 2     VITAMIN D, CHOLECALCIFEROL, PO Take 1,000 Units by mouth 2 times daily        warfarin ANTICOAGULANT (COUMADIN) 2.5 MG tablet Take 2.5 mg (1 tablet) Mon/Wed/Fri and 1.25 mg (1/2 tablet) all other days 90 tablet 0     denosumab (PROLIA) 60 MG/ML SOSY injection Inject 1 mL (60 mg) Subcutaneous once for 1 dose 1 mL 0       Allergies   Allergen Reactions     Lactose Nausea and Vomiting     Sulindac      Upset stomach     Tramadol      Itching     Valdecoxib Itching     Vicodin [Hydrocodone-Acetaminophen]      Itching     Vioxx Itching     Rofecoxib Itching and Rash     Sulfa Drugs Rash     Muscle aches       REVIEW OF SYSTEMS:  CONSTITUTIONAL:  NEGATIVE for fever, chills, change in weight, not feeling tired  SKIN:  NEGATIVE for worrisome rashes, no skin lumps, no skin ulcers and no non-healing wounds  EYES:  NEGATIVE for vision changes or irritation.  ENT/MOUTH:  NEGATIVE.  No hearing loss, no hoarseness, no difficulty swallowing.  RESP:  NEGATIVE. No cough or shortness of breath.  BREAST:  NEGATIVE for masses, tenderness or discharge  CV:  NEGATIVE for chest pain, palpitations or peripheral edema  GI:  NEGATIVE for nausea, abdominal pain, heartburn, or change in bowel habits  :  Negative. No dysuria, no hematuria  MUSCULOSKELETAL:  See HPI above  NEURO:  NEGATIVE . No headaches, no dizziness,  no numbness  ENDOCRINE:  NEGATIVE for temperature intolerance, skin/hair changes  HEME/ALLERGY/IMMUNE:  NEGATIVE for bleeding  "problems  PSYCHIATRIC:  NEGATIVE. no anxiety, no depression.      Xray:  Xray images were independently visualized with the patient.  This shows good position of left total knee arthroplasty components.  No sign of loosening or wear.   Right knee has severe medial osteoarthritis with bone-on-bone.  There is some tibial bone erosion medially.    Exam:  Vitals: BP (!) 163/76   Pulse 72   Resp 16   Ht 1.549 m (5' 1\")   Wt 51.3 kg (113 lb)   BMI 21.35 kg/m     BMI= Body mass index is 21.35 kg/m .  Range of motion right  degrees, left 0-125 degrees.  Alignment  Normal on left, moderate varus on right.  Stability:   Right       Lateral collateral ligament no laxity       Medial collateral ligament no laxity  Left:       Lateral collateral ligament no laxity       Medial collateral ligament no laxity  Wound:  Well healed.  Edema: Minor - both legs.  Effusion: Minor - right knee   Tenderness: Right Significant - medial joint line        Left:  None  Sensation, motor, and circulation intact.    Assessment:  left total knee arthroplasty doing well.  Right knee severe osteoarthritis.  Plan:  if symptoms are severe enough, he would be candidate for right total knee arthroplasty, but currently with his chemotherapy treatment and compromised immune system, it would be best to stay with injections.  He  desires injection today of right knee(s).  Risks, benefits, potential complications and alternatives were discussed.   With the patient's consent, sterile prep was performed of right knee(s).  Right knee was injected with Depo Medrol 80 mg and lidocaine at anteromedial site.  Return to clinic as needed.    Continue antibiotics for dental work.      Again, thank you for allowing me to participate in the care of your patient.        Sincerely,        Ras Malin MD    "

## 2019-12-02 NOTE — PROGRESS NOTES
Alexandru Crews is an 87 years old male seen for follow up left total knee arthroplasty from 2006 by Dr Gasper Arriaga.  He complains of severe right knee pain.  Has occasional left knee pain that he thinks is due to favoring right knee.  He thinks he is not a surgical candidate due to bilateral shoulder cuff tear arthropathy, lumbar spinal stenosis, left foot plano-valgus deformity.  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 Young.  He reports his white count goes down with treatments.  He had his last treatment 2 weeks ago.  Is due again in about 6 weeks.  Exercise:  walking.  He rates his right knee pain as 7-8/10.  This pain present for 4 years.    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 risk (brother)     Hydrocele     Rt     Hypertension goal BP (blood pressure) < 140/90 8/10/2015     Osteopenia 2005     Osteoporosis     Dr Regla DHALIWAL (peptic ulcer disease) 11/1998    h/o, +H. Pylori Rx     Varicocele     Lt side-large       Past Surgical History:   Procedure Laterality Date     APPENDECTOMY  12/2004     ARTHROSCOPY KNEE RT/LT  1/2006    Rt & Lt, Dr Arriaga     BACK SURGERY  1978 / 2003    x 3 in 1978, fusions and one surgery in 2003     C APPENDECTOMY  12/31/2004     CARPAL TUNNEL RELEASE RT/LT  three    2 on left, one on right     CATARACT IOL, RT/LT       COLONOSCOPY  1995,2000,2005     INJECT EPIDURAL CERVICAL  5/24/2011    [facility]     INJECT EPIDURAL LUMBAR  11/9/2010    [facility]     INJECT EPIDURAL LUMBAR  1/4/2011    [facility]     INJECT EPIDURAL LUMBAR  4/27/2011    [facility]     LASER YAG CAPSULOTOMY  2016; 2016    left eye; right eye     PHACOEMULSIFICATION WITH STANDARD INTRAOCULAR LENS IMPLANT  2008; 2008    right eye; left eye     RECTAL SURGERY      fissurectomy and proctoplasty     RELEASE DEQUERVAINS WRIST Right 2017    DML     RELEASE TRIGGER FINGER      right hand     SHOULDER SURGERY      X four [ right x 2 and left x 2 ][[[[[     TUNA         Family History   Problem Relation Age of Onset     Cancer Mother         ovarian     Ovarian Cancer Mother      Cancer - colorectal Brother        Social History     Socioeconomic History     Marital status:      Spouse name: Not on file     Number of children: Not on file     Years of education: Not on file     Highest education level: Not on file   Occupational History     Not on file   Social Needs     Financial resource strain: Not on file     Food insecurity:     Worry: Not on file     Inability: Not on file     Transportation needs:     Medical: Not on file     Non-medical: Not on file   Tobacco Use     Smoking status: Former Smoker     Years: 15.00     Types: Cigarettes     Last attempt to quit: 1975     Years since quittin.4     Smokeless tobacco: Never Used   Substance and Sexual Activity     Alcohol use: Yes     Comment: 1 per week     Drug use: No     Sexual activity: Not Currently     Partners: Female   Lifestyle     Physical activity:     Days per week: Not on file     Minutes per session: Not on file     Stress: Not on file   Relationships     Social connections:     Talks on phone: Not on file     Gets together: Not on file     Attends Latter day service: Not on file     Active member of club or organization: Not on file     Attends meetings of clubs or organizations: Not on file     Relationship status: Not on file     Intimate partner violence:     Fear of current or ex partner: Not on file     Emotionally abused: Not on file     Physically abused: Not on file     Forced sexual  activity: Not on file   Other Topics Concern     Parent/sibling w/ CABG, MI or angioplasty before 65F 55M? Not Asked   Social History Narrative     Not on file       Current Outpatient Medications   Medication Sig Dispense Refill     calcium carbonate 1250 (500 CA) MG CHEW Takes one every other day-unsure of dose       DAILY VITAMINS PO 1 tablet daily       denosumab (PROLIA) 60 MG/ML SOSY injection Inject 1 mL (60 mg) Subcutaneous every 6 months 1 mL 1     FIBER PO Take 1 tsp. by mouth daily        furosemide (LASIX) 20 MG tablet Take 1 tablet (20 mg) by mouth daily as needed when leg swelling is bad. Elevated legs as much as possible and use compression stockings 30 tablet 0     methyl salicylate-menthol (MEI-BUNCH) OINT ointment Apply topically as needed       metoprolol (TOPROL-XL) 25 MG 24 hr tablet Take 25 mg by mouth 2 times daily Take half of tablet 2 times a day.       morphine (MS CONTIN) 15 MG CR tablet Take 1 tablet (15 mg) by mouth every 12 hours 28 days or more between refills for controlled medications 60 tablet 0     oxyCODONE-acetaminophen (PERCOCET) 5-325 MG tablet Take 1 tablet by mouth 3 times daily as needed for moderate to severe pain 28 days or more between refills for controlled medications 90 tablet 0     polyethylene glycol (MIRALAX/GLYCOLAX) powder Take 17 g by mouth       senna-docusate (SENOKOT-S;PERICOLACE) 8.6-50 MG per tablet Take 1 tablet by mouth 2 times daily        triamcinolone (KENALOG) 0.1 % cream Apply topically 2 times daily as needed to affected area as directed. 60 g 2     VITAMIN D, CHOLECALCIFEROL, PO Take 1,000 Units by mouth 2 times daily        warfarin ANTICOAGULANT (COUMADIN) 2.5 MG tablet Take 2.5 mg (1 tablet) Mon/Wed/Fri and 1.25 mg (1/2 tablet) all other days 90 tablet 0     denosumab (PROLIA) 60 MG/ML SOSY injection Inject 1 mL (60 mg) Subcutaneous once for 1 dose 1 mL 0       Allergies   Allergen Reactions     Lactose Nausea and Vomiting     Sulindac      Upset  "stomach     Tramadol      Itching     Valdecoxib Itching     Vicodin [Hydrocodone-Acetaminophen]      Itching     Vioxx Itching     Rofecoxib Itching and Rash     Sulfa Drugs Rash     Muscle aches       REVIEW OF SYSTEMS:  CONSTITUTIONAL:  NEGATIVE for fever, chills, change in weight, not feeling tired  SKIN:  NEGATIVE for worrisome rashes, no skin lumps, no skin ulcers and no non-healing wounds  EYES:  NEGATIVE for vision changes or irritation.  ENT/MOUTH:  NEGATIVE.  No hearing loss, no hoarseness, no difficulty swallowing.  RESP:  NEGATIVE. No cough or shortness of breath.  BREAST:  NEGATIVE for masses, tenderness or discharge  CV:  NEGATIVE for chest pain, palpitations or peripheral edema  GI:  NEGATIVE for nausea, abdominal pain, heartburn, or change in bowel habits  :  Negative. No dysuria, no hematuria  MUSCULOSKELETAL:  See HPI above  NEURO:  NEGATIVE . No headaches, no dizziness,  no numbness  ENDOCRINE:  NEGATIVE for temperature intolerance, skin/hair changes  HEME/ALLERGY/IMMUNE:  NEGATIVE for bleeding problems  PSYCHIATRIC:  NEGATIVE. no anxiety, no depression.      Xray:  Xray images were independently visualized with the patient.  This shows good position of left total knee arthroplasty components.  No sign of loosening or wear.   Right knee has severe medial osteoarthritis with bone-on-bone.  There is some tibial bone erosion medially.    Exam:  Vitals: BP (!) 163/76   Pulse 72   Resp 16   Ht 1.549 m (5' 1\")   Wt 51.3 kg (113 lb)   BMI 21.35 kg/m    BMI= Body mass index is 21.35 kg/m .  Range of motion right  degrees, left 0-125 degrees.  Alignment  Normal on left, moderate varus on right.  Stability:   Right       Lateral collateral ligament no laxity       Medial collateral ligament no laxity  Left:       Lateral collateral ligament no laxity       Medial collateral ligament no laxity  Wound:  Well healed.  Edema: Minor - both legs.  Effusion: Minor - right knee   Tenderness: Right " Significant - medial joint line        Left:  None  Sensation, motor, and circulation intact.    Assessment:  left total knee arthroplasty doing well.  Right knee severe osteoarthritis.  Plan:  if symptoms are severe enough, he would be candidate for right total knee arthroplasty, but currently with his chemotherapy treatment and compromised immune system, it would be best to stay with injections.  He  desires injection today of right knee(s).  Risks, benefits, potential complications and alternatives were discussed.   With the patient's consent, sterile prep was performed of right knee(s).  Right knee was injected with Depo Medrol 80 mg and lidocaine at anteromedial site.  Return to clinic as needed.    Continue antibiotics for dental work.

## 2019-12-04 ENCOUNTER — ANTICOAGULATION THERAPY VISIT (OUTPATIENT)
Dept: NURSING | Facility: CLINIC | Age: 84
End: 2019-12-04
Payer: MEDICARE

## 2019-12-04 ENCOUNTER — TELEPHONE (OUTPATIENT)
Dept: FAMILY MEDICINE | Facility: CLINIC | Age: 84
End: 2019-12-04

## 2019-12-04 DIAGNOSIS — Z79.01 LONG TERM CURRENT USE OF ANTICOAGULANT THERAPY: ICD-10-CM

## 2019-12-04 DIAGNOSIS — I48.0 PAROXYSMAL ATRIAL FIBRILLATION (H): ICD-10-CM

## 2019-12-04 LAB — INR POINT OF CARE: 2.6 (ref 0.86–1.14)

## 2019-12-04 PROCEDURE — 36416 COLLJ CAPILLARY BLOOD SPEC: CPT

## 2019-12-04 PROCEDURE — 99207 ZZC NO CHARGE NURSE ONLY: CPT

## 2019-12-04 PROCEDURE — 85610 PROTHROMBIN TIME: CPT | Mod: QW

## 2019-12-04 NOTE — PROGRESS NOTES
ANTICOAGULATION FOLLOW-UP CLINIC VISIT    Patient Name:  Alexandru Crews  Date:  2019  Contact Type:  Face to Face    SUBJECTIVE:  Patient Findings     Comments:   The patient was assessed for diet, medication, and activity level changes, missed or extra doses, bruising or bleeding, with no problem findings.          Clinical Outcomes     Negatives:   Major bleeding event, Thromboembolic event, Anticoagulation-related hospital admission, Anticoagulation-related ED visit, Anticoagulation-related fatality    Comments:   The patient was assessed for diet, medication, and activity level changes, missed or extra doses, bruising or bleeding, with no problem findings.             OBJECTIVE    INR Protime   Date Value Ref Range Status   2019 2.6 (A) 0.86 - 1.14 Final       ASSESSMENT / PLAN  No question data found.  Anticoagulation Summary  As of 2019    INR goal:   2.0-3.0   TTR:   69.7 % (1 y)   INR used for dosin.6 (2019)   Warfarin maintenance plan:   2.5 mg (2.5 mg x 1) every Mon, Wed, Fri; 1.25 mg (2.5 mg x 0.5) all other days   Full warfarin instructions:   2.5 mg every Mon, Wed, Fri; 1.25 mg all other days   Weekly warfarin total:   12.5 mg   No change documented:   Rose Navarrete RN   Plan last modified:   Rose Navarrete RN (2019)   Next INR check:   1/10/2020   Priority:   INR   Target end date:   Indefinite    Indications    Long term current use of anticoagulant therapy [Z79.01]  Paroxysmal atrial fibrillation (H) [I48.0]             Anticoagulation Episode Summary     INR check location:       Preferred lab:       Send INR reminders to:   JAY ISRAEL    Comments:   INR Referral Renewal request sent in 19      Anticoagulation Care Providers     Provider Role Specialty Phone number    Paul Knowles MD [facility] Internal [facility] [phone]            See the Encounter Report to view Anticoagulation Flowsheet and Dosing Calendar (Go to Encounters tab in chart  review, and find the Anticoagulation Therapy Visit)        Rose Navarrete RN

## 2019-12-04 NOTE — PATIENT INSTRUCTIONS
Anticoagulation Clinic:  Please call [phone] with any problems or questions regarding your Warfarin therapy.

## 2019-12-04 NOTE — TELEPHONE ENCOUNTER
ANTICOAGULATION MANAGEMENT    Alexandru Crews due for review and annual renewal of referral to anticoagulation monitoring.      Warfarin indication(s) Atrial Fibrillation   INR goal 2.0-3.0   Current duration of therapy Indefinite/long term therapy   Date of last office visit 9/16/2019       Please advise if Alexandru Crews's anticoagulation management referral can be renewed for next year.     Please confirm renewal approval in new note within this telephone encounter and route back. No further action is necessary at this time (referral orders,etc).     Rose Navarrete RN

## 2019-12-10 ENCOUNTER — TRANSFERRED RECORDS (OUTPATIENT)
Dept: HEALTH INFORMATION MANAGEMENT | Facility: CLINIC | Age: 84
End: 2019-12-10

## 2019-12-18 ENCOUNTER — TELEPHONE (OUTPATIENT)
Dept: FAMILY MEDICINE | Facility: CLINIC | Age: 84
End: 2019-12-18

## 2019-12-18 DIAGNOSIS — G89.4 CHRONIC PAIN SYNDROME: ICD-10-CM

## 2019-12-18 DIAGNOSIS — G89.29 CHRONIC PAIN: ICD-10-CM

## 2019-12-18 RX ORDER — OXYCODONE AND ACETAMINOPHEN 5; 325 MG/1; MG/1
1 TABLET ORAL 3 TIMES DAILY PRN
Qty: 90 TABLET | Refills: 0 | Status: SHIPPED | OUTPATIENT
Start: 2019-12-18 | End: 2020-01-01

## 2019-12-18 NOTE — TELEPHONE ENCOUNTER
Please let patient know when this has been addressed  He will  a script at the  if E-prescription is still not going through    Controlled Substance Refill Request for Percocet  Problem List Complete:  Yes     Chronic pain Edit Notes Medium  Today   Arelis Ferrara RN     Details  Code: G89.29  Noted: 11/30/2016  Share w/ Pt: []    Change Dx  Resolve       Overview  Edited:  Arelis Ferrara RN  Today  Patient is followed by Paul Knowles MD for ongoing prescription of pain medication. All refills should be approved by this provider, or covering partner.     Medication(s): MS-Contin (Morphine Sulfate Controlled-Release) 15 milligrams 3 times a day , percocet 5/325 tablets 2 a day  Maximum quantity per month: 90/60  Clinic visit frequency required: Q 3 months      Controlled substance agreement:      Encounter-Level CSA - 4/25/16:                   Controlled Substance Agreement - Scan on 5/6/2016 1:14 PM : CONTROLLED SUBSTANCE AGREEMENT (below)                Pain Clinic evaluation in the past: Yes  Date/Location:      DIRE Total Score(s):  No flowsheet data found.     Last [facility] website verification: 12/18/19  [email or web address]     Controlled substance agreement: 8/24/18                       checked in past 3 months?  Yes today , no concerns. Last filled on 8/1/19    Arelis Ferrara RN

## 2019-12-19 NOTE — TELEPHONE ENCOUNTER
Still not going through    Hard copy prescription generated , Prescription sent to [facility]     Back in the office tomorrow     Paul Knowles MD

## 2020-01-01 ENCOUNTER — PATIENT OUTREACH (OUTPATIENT)
Dept: CARE COORDINATION | Facility: CLINIC | Age: 85
End: 2020-01-01

## 2020-01-01 ENCOUNTER — TELEPHONE (OUTPATIENT)
Dept: PHARMACY | Facility: CLINIC | Age: 85
End: 2020-01-01

## 2020-01-01 ENCOUNTER — ANTICOAGULATION THERAPY VISIT (OUTPATIENT)
Dept: FAMILY MEDICINE | Facility: CLINIC | Age: 85
End: 2020-01-01

## 2020-01-01 ENCOUNTER — ANTICOAGULATION THERAPY VISIT (OUTPATIENT)
Dept: NURSING | Facility: CLINIC | Age: 85
End: 2020-01-01

## 2020-01-01 ENCOUNTER — MEDICAL CORRESPONDENCE (OUTPATIENT)
Dept: HEALTH INFORMATION MANAGEMENT | Facility: CLINIC | Age: 85
End: 2020-01-01

## 2020-01-01 ENCOUNTER — TELEPHONE (OUTPATIENT)
Dept: FAMILY MEDICINE | Facility: CLINIC | Age: 85
End: 2020-01-01

## 2020-01-01 ENCOUNTER — TRANSFERRED RECORDS (OUTPATIENT)
Dept: HEALTH INFORMATION MANAGEMENT | Facility: CLINIC | Age: 85
End: 2020-01-01

## 2020-01-01 ENCOUNTER — OFFICE VISIT (OUTPATIENT)
Dept: INTERNAL MEDICINE | Facility: CLINIC | Age: 85
End: 2020-01-01
Payer: MEDICARE

## 2020-01-01 ENCOUNTER — TELEPHONE (OUTPATIENT)
Dept: INTERNAL MEDICINE | Facility: CLINIC | Age: 85
End: 2020-01-01

## 2020-01-01 ENCOUNTER — PATIENT OUTREACH (OUTPATIENT)
Dept: NURSING | Facility: CLINIC | Age: 85
End: 2020-01-01
Payer: MEDICARE

## 2020-01-01 ENCOUNTER — DOCUMENTATION ONLY (OUTPATIENT)
Facility: CLINIC | Age: 85
End: 2020-01-01

## 2020-01-01 ENCOUNTER — ALLIED HEALTH/NURSE VISIT (OUTPATIENT)
Dept: NURSING | Facility: CLINIC | Age: 85
End: 2020-01-01
Payer: MEDICARE

## 2020-01-01 ENCOUNTER — ANTICOAGULATION THERAPY VISIT (OUTPATIENT)
Dept: NURSING | Facility: CLINIC | Age: 85
End: 2020-01-01
Payer: MEDICARE

## 2020-01-01 ENCOUNTER — APPOINTMENT (OUTPATIENT)
Dept: LAB | Facility: CLINIC | Age: 85
End: 2020-01-01
Attending: FAMILY MEDICINE
Payer: MEDICARE

## 2020-01-01 ENCOUNTER — OFFICE VISIT (OUTPATIENT)
Dept: OPHTHALMOLOGY | Facility: CLINIC | Age: 85
End: 2020-01-01
Payer: MEDICARE

## 2020-01-01 ENCOUNTER — DOCUMENTATION ONLY (OUTPATIENT)
Dept: OTHER | Facility: CLINIC | Age: 85
End: 2020-01-01

## 2020-01-01 ENCOUNTER — DOCUMENTATION ONLY (OUTPATIENT)
Dept: FAMILY MEDICINE | Facility: CLINIC | Age: 85
End: 2020-01-01

## 2020-01-01 ENCOUNTER — ALLIED HEALTH/NURSE VISIT (OUTPATIENT)
Dept: PHARMACY | Facility: CLINIC | Age: 85
End: 2020-01-01
Payer: COMMERCIAL

## 2020-01-01 ENCOUNTER — ANTICOAGULATION THERAPY VISIT (OUTPATIENT)
Dept: FAMILY MEDICINE | Facility: CLINIC | Age: 85
End: 2020-01-01
Payer: MEDICARE

## 2020-01-01 VITALS
TEMPERATURE: 98.8 F | DIASTOLIC BLOOD PRESSURE: 48 MMHG | OXYGEN SATURATION: 94 % | SYSTOLIC BLOOD PRESSURE: 82 MMHG | HEART RATE: 89 BPM | RESPIRATION RATE: 20 BRPM

## 2020-01-01 VITALS
HEART RATE: 105 BPM | TEMPERATURE: 97.6 F | RESPIRATION RATE: 18 BRPM | DIASTOLIC BLOOD PRESSURE: 64 MMHG | OXYGEN SATURATION: 97 % | SYSTOLIC BLOOD PRESSURE: 102 MMHG

## 2020-01-01 DIAGNOSIS — Z79.01 LONG TERM CURRENT USE OF ANTICOAGULANT THERAPY: ICD-10-CM

## 2020-01-01 DIAGNOSIS — I48.0 PAROXYSMAL ATRIAL FIBRILLATION (H): ICD-10-CM

## 2020-01-01 DIAGNOSIS — I50.32 CHRONIC HEART FAILURE WITH PRESERVED EJECTION FRACTION (H): ICD-10-CM

## 2020-01-01 DIAGNOSIS — R10.9 ABDOMINAL PAIN, UNSPECIFIED ABDOMINAL LOCATION: ICD-10-CM

## 2020-01-01 DIAGNOSIS — K21.9 GASTROESOPHAGEAL REFLUX DISEASE, ESOPHAGITIS PRESENCE NOT SPECIFIED: ICD-10-CM

## 2020-01-01 DIAGNOSIS — I48.91 ATRIAL FIBRILLATION, UNSPECIFIED TYPE (H): ICD-10-CM

## 2020-01-01 DIAGNOSIS — K59.00 CONSTIPATION, UNSPECIFIED CONSTIPATION TYPE: ICD-10-CM

## 2020-01-01 DIAGNOSIS — M85.80 OSTEOPENIA, UNSPECIFIED LOCATION: Primary | ICD-10-CM

## 2020-01-01 DIAGNOSIS — Z00.00 ENCOUNTER FOR MEDICARE ANNUAL WELLNESS EXAM: Primary | ICD-10-CM

## 2020-01-01 DIAGNOSIS — M81.0 OSTEOPOROSIS: ICD-10-CM

## 2020-01-01 DIAGNOSIS — C85.80 MARGINAL ZONE LYMPHOMA (H): ICD-10-CM

## 2020-01-01 DIAGNOSIS — T50.905S UNSPECIFIED ADVERSE EFFECT OF DRUG OR MEDICAMENT, SEQUELA: ICD-10-CM

## 2020-01-01 DIAGNOSIS — C85.90 LYMPHOMA (H): ICD-10-CM

## 2020-01-01 DIAGNOSIS — G89.29 CHRONIC PAIN: ICD-10-CM

## 2020-01-01 DIAGNOSIS — Z01.00 EXAMINATION OF EYES AND VISION: ICD-10-CM

## 2020-01-01 DIAGNOSIS — M81.0 AGE-RELATED OSTEOPOROSIS WITHOUT CURRENT PATHOLOGICAL FRACTURE: ICD-10-CM

## 2020-01-01 DIAGNOSIS — Z78.9 TAKES DIETARY SUPPLEMENTS: ICD-10-CM

## 2020-01-01 DIAGNOSIS — G47.01 INSOMNIA DUE TO MEDICAL CONDITION: ICD-10-CM

## 2020-01-01 DIAGNOSIS — G89.4 CHRONIC PAIN SYNDROME: ICD-10-CM

## 2020-01-01 DIAGNOSIS — I48.20 CHRONIC ATRIAL FIBRILLATION (H): ICD-10-CM

## 2020-01-01 DIAGNOSIS — G89.4 CHRONIC PAIN SYNDROME: Primary | ICD-10-CM

## 2020-01-01 DIAGNOSIS — C85.80 MARGINAL ZONE B-CELL LYMPHOMA (H): ICD-10-CM

## 2020-01-01 DIAGNOSIS — R06.02 SOB (SHORTNESS OF BREATH): ICD-10-CM

## 2020-01-01 DIAGNOSIS — H43.399 VITREOUS OPACITIES: ICD-10-CM

## 2020-01-01 DIAGNOSIS — R21 RASH: ICD-10-CM

## 2020-01-01 DIAGNOSIS — Z71.89 ADVANCED DIRECTIVES, COUNSELING/DISCUSSION: ICD-10-CM

## 2020-01-01 DIAGNOSIS — L29.9 PRURITIC DISORDER: Primary | ICD-10-CM

## 2020-01-01 DIAGNOSIS — R10.12 ABDOMINAL PAIN, LEFT UPPER QUADRANT: ICD-10-CM

## 2020-01-01 DIAGNOSIS — M81.0 OSTEOPOROSIS: Primary | ICD-10-CM

## 2020-01-01 DIAGNOSIS — H52.4 PRESBYOPIA: ICD-10-CM

## 2020-01-01 DIAGNOSIS — R06.00 DYSPNEA, UNSPECIFIED TYPE: ICD-10-CM

## 2020-01-01 DIAGNOSIS — K59.09 CHRONIC CONSTIPATION: ICD-10-CM

## 2020-01-01 DIAGNOSIS — I10 HYPERTENSION GOAL BP (BLOOD PRESSURE) < 140/90: ICD-10-CM

## 2020-01-01 DIAGNOSIS — F51.02 ADJUSTMENT INSOMNIA: Primary | ICD-10-CM

## 2020-01-01 DIAGNOSIS — Z92.29 PERSONAL HISTORY OF OTHER DRUG THERAPY: ICD-10-CM

## 2020-01-01 DIAGNOSIS — K59.00 CONSTIPATION: ICD-10-CM

## 2020-01-01 DIAGNOSIS — I50.9 CHF (CONGESTIVE HEART FAILURE) (H): Primary | ICD-10-CM

## 2020-01-01 DIAGNOSIS — C85.80 MARGINAL ZONE B-CELL LYMPHOMA (H): Primary | ICD-10-CM

## 2020-01-01 DIAGNOSIS — Z96.1 PSEUDOPHAKIA: Primary | ICD-10-CM

## 2020-01-01 LAB
ALP SERPL-CCNC: 85 U/L (ref 40–150)
ANION GAP SERPL CALCULATED.3IONS-SCNC: 3 MMOL/L (ref 3–14)
ANION GAP SERPL CALCULATED.3IONS-SCNC: 5 MMOL/L (ref 3–14)
BUN SERPL-MCNC: 28 MG/DL (ref 7–30)
BUN SERPL-MCNC: 31 MG/DL (ref 7–30)
CALCIUM SERPL-MCNC: 8.6 MG/DL (ref 8.5–10.1)
CALCIUM SERPL-MCNC: 9.6 MG/DL (ref 8.5–10.1)
CAPILLARY BLOOD COLLECTION: NORMAL
CHLORIDE SERPL-SCNC: 101 MMOL/L (ref 94–109)
CHLORIDE SERPL-SCNC: 106 MMOL/L (ref 94–109)
CO2 SERPL-SCNC: 28 MMOL/L (ref 20–32)
CO2 SERPL-SCNC: 33 MMOL/L (ref 20–32)
CREAT SERPL-MCNC: 0.64 MG/DL (ref 0.66–1.25)
CREAT SERPL-MCNC: 0.87 MG/DL (ref 0.66–1.25)
DEPRECATED CALCIDIOL+CALCIFEROL SERPL-MC: 58 UG/L (ref 20–75)
GFR SERPL CREATININE-BSD FRML MDRD: 77 ML/MIN/{1.73_M2}
GFR SERPL CREATININE-BSD FRML MDRD: 87 ML/MIN/{1.73_M2}
GLUCOSE SERPL-MCNC: 121 MG/DL (ref 70–99)
GLUCOSE SERPL-MCNC: 136 MG/DL (ref 70–99)
INR POINT OF CARE: 1.8 (ref 0.86–1.14)
INR POINT OF CARE: 1.9 (ref 0.86–1.14)
INR POINT OF CARE: 2 (ref 0.86–1.14)
INR POINT OF CARE: 2.5 (ref 0.86–1.14)
INR PPP: 1.5 (ref 0.86–1.14)
INR PPP: 1.5 (ref 0.9–1.1)
INR PPP: 1.8 (ref 0.86–1.14)
INR PPP: 1.8 (ref 0.9–1.1)
INR PPP: 1.8 (ref 0.9–1.1)
INR PPP: 2.1 (ref 0.86–1.14)
INR PPP: 2.2 (ref 0.86–1.14)
INR PPP: 2.2 (ref 0.86–1.14)
INR PPP: 2.3 (ref 0.86–1.14)
INR PPP: 2.3 (ref 0.86–1.14)
INR PPP: 2.4 (ref 0.9–1.1)
INR PPP: 2.5 (ref 0.9–1.1)
INR PPP: 2.7 (ref 0.86–1.14)
INR PPP: 3 (ref 0.9–1.1)
INR PPP: 3.3 (ref 0.86–1.14)
INR PPP: 3.3 (ref 0.86–1.14)
POTASSIUM SERPL-SCNC: 4 MMOL/L (ref 3.4–5.3)
POTASSIUM SERPL-SCNC: 4.4 MMOL/L (ref 3.4–5.3)
PTH-INTACT SERPL-MCNC: 83 PG/ML (ref 18–80)
SODIUM SERPL-SCNC: 137 MMOL/L (ref 133–144)
SODIUM SERPL-SCNC: 139 MMOL/L (ref 133–144)

## 2020-01-01 PROCEDURE — 92014 COMPRE OPH EXAM EST PT 1/>: CPT | Performed by: OPHTHALMOLOGY

## 2020-01-01 PROCEDURE — 99207 ZZC NO CHARGE NURSE ONLY: CPT

## 2020-01-01 PROCEDURE — 36416 COLLJ CAPILLARY BLOOD SPEC: CPT | Performed by: INTERNAL MEDICINE

## 2020-01-01 PROCEDURE — 36415 COLL VENOUS BLD VENIPUNCTURE: CPT | Performed by: INTERNAL MEDICINE

## 2020-01-01 PROCEDURE — 99207 ZZC NO CHARGE NURSE ONLY: CPT | Performed by: INTERNAL MEDICINE

## 2020-01-01 PROCEDURE — 92015 DETERMINE REFRACTIVE STATE: CPT | Mod: GY | Performed by: OPHTHALMOLOGY

## 2020-01-01 PROCEDURE — 85610 PROTHROMBIN TIME: CPT | Performed by: INTERNAL MEDICINE

## 2020-01-01 PROCEDURE — 83970 ASSAY OF PARATHORMONE: CPT | Performed by: INTERNAL MEDICINE

## 2020-01-01 PROCEDURE — 36416 COLLJ CAPILLARY BLOOD SPEC: CPT

## 2020-01-01 PROCEDURE — 84075 ASSAY ALKALINE PHOSPHATASE: CPT | Performed by: INTERNAL MEDICINE

## 2020-01-01 PROCEDURE — 85610 PROTHROMBIN TIME: CPT | Mod: GV | Performed by: INTERNAL MEDICINE

## 2020-01-01 PROCEDURE — G0439 PPPS, SUBSEQ VISIT: HCPCS | Performed by: INTERNAL MEDICINE

## 2020-01-01 PROCEDURE — 85610 PROTHROMBIN TIME: CPT | Mod: QW

## 2020-01-01 PROCEDURE — 99607 MTMS BY PHARM ADDL 15 MIN: CPT | Performed by: PHARMACIST

## 2020-01-01 PROCEDURE — 80048 BASIC METABOLIC PNL TOTAL CA: CPT | Performed by: INTERNAL MEDICINE

## 2020-01-01 PROCEDURE — 36416 COLLJ CAPILLARY BLOOD SPEC: CPT | Mod: GV | Performed by: INTERNAL MEDICINE

## 2020-01-01 PROCEDURE — 82306 VITAMIN D 25 HYDROXY: CPT | Performed by: INTERNAL MEDICINE

## 2020-01-01 PROCEDURE — 99214 OFFICE O/P EST MOD 30 MIN: CPT | Mod: 25 | Performed by: INTERNAL MEDICINE

## 2020-01-01 PROCEDURE — 96372 THER/PROPH/DIAG INJ SC/IM: CPT

## 2020-01-01 PROCEDURE — 99605 MTMS BY PHARM NP 15 MIN: CPT | Performed by: PHARMACIST

## 2020-01-01 PROCEDURE — 99207 ZZC NO CHARGE LOS: CPT

## 2020-01-01 PROCEDURE — 99215 OFFICE O/P EST HI 40 MIN: CPT | Performed by: INTERNAL MEDICINE

## 2020-01-01 PROCEDURE — 80048 BASIC METABOLIC PNL TOTAL CA: CPT | Performed by: FAMILY MEDICINE

## 2020-01-01 RX ORDER — GABAPENTIN 100 MG/1
100 CAPSULE ORAL
COMMUNITY
Start: 2020-01-01 | End: 2020-01-01

## 2020-01-01 RX ORDER — WARFARIN SODIUM 2.5 MG/1
TABLET ORAL
Qty: 25 TABLET | Refills: 0 | Status: SHIPPED | OUTPATIENT
Start: 2020-01-01 | End: 2020-01-01

## 2020-01-01 RX ORDER — HYDROMORPHONE HYDROCHLORIDE 2 MG/1
2 TABLET ORAL 4 TIMES DAILY PRN
Qty: 120 TABLET | Refills: 0 | Status: SHIPPED | OUTPATIENT
Start: 2020-01-01

## 2020-01-01 RX ORDER — SENNOSIDES 8.6 MG/1
TABLET ORAL
COMMUNITY
Start: 2020-01-01 | End: 2020-01-01

## 2020-01-01 RX ORDER — FUROSEMIDE 20 MG
20 TABLET ORAL 2 TIMES DAILY
Qty: 60 TABLET | Refills: 1 | Status: SHIPPED | OUTPATIENT
Start: 2020-01-01

## 2020-01-01 RX ORDER — FUROSEMIDE 20 MG
20 TABLET ORAL 2 TIMES DAILY
Qty: 60 TABLET | Refills: 0 | Status: SHIPPED | OUTPATIENT
Start: 2020-01-01 | End: 2020-01-01

## 2020-01-01 RX ORDER — ALBUTEROL SULFATE 90 UG/1
2 AEROSOL, METERED RESPIRATORY (INHALATION) EVERY 6 HOURS PRN
Qty: 1 INHALER | Refills: 3 | Status: SHIPPED | OUTPATIENT
Start: 2020-01-01

## 2020-01-01 RX ORDER — WARFARIN SODIUM 2.5 MG/1
TABLET ORAL
Qty: 66 TABLET | Refills: 0 | Status: SHIPPED | OUTPATIENT
Start: 2020-01-01 | End: 2020-01-01

## 2020-01-01 RX ORDER — HYDROMORPHONE HYDROCHLORIDE 2 MG/1
1-2 TABLET ORAL
COMMUNITY
Start: 2020-01-01 | End: 2020-01-01

## 2020-01-01 RX ORDER — FUROSEMIDE 20 MG
20 TABLET ORAL
COMMUNITY
Start: 2020-01-01 | End: 2020-01-01

## 2020-01-01 RX ORDER — GABAPENTIN 100 MG/1
100 CAPSULE ORAL 2 TIMES DAILY
Qty: 180 CAPSULE | Refills: 0 | Status: SHIPPED | OUTPATIENT
Start: 2020-01-01 | End: 2020-01-01 | Stop reason: SINTOL

## 2020-01-01 RX ORDER — METHYLPHENIDATE HYDROCHLORIDE 5 MG/1
5 TABLET ORAL 2 TIMES DAILY
Refills: 0 | COMMUNITY
Start: 2020-01-01

## 2020-01-01 RX ORDER — WARFARIN SODIUM 2.5 MG/1
TABLET ORAL
Qty: 90 TABLET | Refills: 0 | Status: SHIPPED | OUTPATIENT
Start: 2020-01-01 | End: 2020-01-01

## 2020-01-01 RX ORDER — SENNOSIDES A AND B 8.6 MG/1
2 TABLET, FILM COATED ORAL AT BEDTIME
Qty: 180 TABLET | Refills: 0 | Status: SHIPPED | OUTPATIENT
Start: 2020-01-01

## 2020-01-01 RX ORDER — OXYCODONE AND ACETAMINOPHEN 5; 325 MG/1; MG/1
1 TABLET ORAL 3 TIMES DAILY PRN
Qty: 90 TABLET | Refills: 0 | Status: SHIPPED | OUTPATIENT
Start: 2020-01-01 | End: 2020-01-01

## 2020-01-01 RX ORDER — DOXEPIN 3 MG/1
TABLET, FILM COATED ORAL
Qty: 90 TABLET | Refills: 1 | Status: SHIPPED | OUTPATIENT
Start: 2020-01-01

## 2020-01-01 RX ORDER — MORPHINE SULFATE 15 MG/1
15 TABLET, FILM COATED, EXTENDED RELEASE ORAL EVERY 12 HOURS
Qty: 60 TABLET | Refills: 0 | Status: SHIPPED | OUTPATIENT
Start: 2020-01-01 | End: 2020-01-01

## 2020-01-01 RX ORDER — LIDOCAINE 40 MG/G
CREAM TOPICAL
COMMUNITY
End: 2020-01-01

## 2020-01-01 RX ORDER — LEVOCETIRIZINE DIHYDROCHLORIDE 5 MG/1
5 TABLET, FILM COATED ORAL EVERY EVENING
Qty: 30 TABLET | Refills: 3 | Status: SHIPPED | OUTPATIENT
Start: 2020-01-01 | End: 2020-01-01

## 2020-01-01 RX ORDER — LIDOCAINE 40 MG/G
CREAM TOPICAL
COMMUNITY

## 2020-01-01 RX ORDER — HYDROMORPHONE HYDROCHLORIDE 2 MG/1
1-2 TABLET ORAL EVERY 4 HOURS PRN
Qty: 20 TABLET | Refills: 0 | Status: SHIPPED | OUTPATIENT
Start: 2020-01-01 | End: 2020-01-01

## 2020-01-01 SDOH — ECONOMIC STABILITY: INCOME INSECURITY: HOW HARD IS IT FOR YOU TO PAY FOR THE VERY BASICS LIKE FOOD, HOUSING, MEDICAL CARE, AND HEATING?: NOT VERY HARD

## 2020-01-01 SDOH — ECONOMIC STABILITY: TRANSPORTATION INSECURITY
IN THE PAST 12 MONTHS, HAS LACK OF TRANSPORTATION KEPT YOU FROM MEETINGS, WORK, OR FROM GETTING THINGS NEEDED FOR DAILY LIVING?: NO

## 2020-01-01 SDOH — ECONOMIC STABILITY: FOOD INSECURITY: WITHIN THE PAST 12 MONTHS, THE FOOD YOU BOUGHT JUST DIDN'T LAST AND YOU DIDN'T HAVE MONEY TO GET MORE.: NEVER TRUE

## 2020-01-01 SDOH — HEALTH STABILITY: PHYSICAL HEALTH: ON AVERAGE, HOW MANY DAYS PER WEEK DO YOU ENGAGE IN MODERATE TO STRENUOUS EXERCISE (LIKE A BRISK WALK)?: 0 DAYS

## 2020-01-01 SDOH — ECONOMIC STABILITY: FOOD INSECURITY: WITHIN THE PAST 12 MONTHS, YOU WORRIED THAT YOUR FOOD WOULD RUN OUT BEFORE YOU GOT MONEY TO BUY MORE.: NEVER TRUE

## 2020-01-01 SDOH — ECONOMIC STABILITY: TRANSPORTATION INSECURITY
IN THE PAST 12 MONTHS, HAS THE LACK OF TRANSPORTATION KEPT YOU FROM MEDICAL APPOINTMENTS OR FROM GETTING MEDICATIONS?: NO

## 2020-01-01 SDOH — HEALTH STABILITY: PHYSICAL HEALTH: ON AVERAGE, HOW MANY MINUTES DO YOU ENGAGE IN EXERCISE AT THIS LEVEL?: 0 MIN

## 2020-01-01 ASSESSMENT — REFRACTION_MANIFEST
OS_SPHERE: -0.75
OS_ADD: +2.75
OD_SPHERE: -0.75
OD_AXIS: 015
OD_ADD: +2.75
OS_CYLINDER: +1.00
OD_CYLINDER: +0.75
OS_AXIS: 170

## 2020-01-01 ASSESSMENT — SLIT LAMP EXAM - LIDS
COMMENTS: 1+ DERMATOCHALASIS - UPPER LID
COMMENTS: 1+ DERMATOCHALASIS - UPPER LID

## 2020-01-01 ASSESSMENT — CONF VISUAL FIELD
OS_NORMAL: 1
OD_NORMAL: 1
METHOD: COUNTING FINGERS

## 2020-01-01 ASSESSMENT — REFRACTION_WEARINGRX
OD_SPHERE: -0.25
OD_CYLINDER: +0.25
OS_AXIS: 155
OS_SPHERE: -0.50
OS_ADD: +3.00
OD_ADD: +3.00
SPECS_TYPE: TRIFOCAL
OS_CYLINDER: +0.75
OD_AXIS: 136

## 2020-01-01 ASSESSMENT — VISUAL ACUITY
CORRECTION_TYPE: GLASSES
OD_CC: 20/20
OS_CC+: -2
OD_CC+: -1
OS_CC: 20/20
METHOD: SNELLEN - LINEAR

## 2020-01-01 ASSESSMENT — TONOMETRY
OD_IOP_MMHG: 12
IOP_METHOD: APPLANATION
OS_IOP_MMHG: 10

## 2020-01-01 ASSESSMENT — ACTIVITIES OF DAILY LIVING (ADL)
DEPENDENT_IADLS:: SHOPPING;LAUNDRY;COOKING;CLEANING
DEPENDENT_IADLS:: SHOPPING;LAUNDRY;COOKING;CLEANING
CURRENT_FUNCTION: HOUSEWORK REQUIRES ASSISTANCE

## 2020-01-01 ASSESSMENT — EXTERNAL EXAM - RIGHT EYE: OD_EXAM: 2+ BROW PTOSIS

## 2020-01-01 ASSESSMENT — CUP TO DISC RATIO
OS_RATIO: 0.2
OD_RATIO: 0.2

## 2020-01-01 ASSESSMENT — EXTERNAL EXAM - LEFT EYE: OS_EXAM: 2+ BROW PTOSIS

## 2020-01-06 NOTE — LETTER
[facility]  Complex Care Plan  About Me:    Patient Name:  Alexandru Crews    YOB: 1932  Age:         87 year old   [place] MRN:    [id number] Telephone Information:  Home Phone [phone]   Mobile Not on file.       Address:  [address] [place] [address] [address] [place] [address] Email address:  No e-mail address on record      Emergency Contact(s)    Name Relationship Lgl Grd Work Phone Home Phone Mobile Phone   1. ALEXANDRU CREWS* Spouse   [phone]    2. ZO MAE Daughter   [phone]            Primary language:  English     needed? No   [facility] [employer] [facility]:  [phone] op. 1  Other communication barriers: Glasses  Preferred Method of Communication:  Mail  Current living arrangement:    Mobility Status/ Medical Equipment:      Health Maintenance  Health Maintenance Reviewed: Due/Overdue   Health Maintenance Due   Topic Date Due     URINE DRUG SCREEN  09/22/1932     OP ANNUAL INR REFERRAL  04/25/2017     PHQ-9  11/09/2019     EYE EXAM  12/19/2019     ZOSTER IMMUNIZATION (2 of 2) 11/15/2019     BMP  02/05/2020       My Access Plan  Medical Emergency 911   Primary Clinic Line [facility] - [phone]   24 Hour Appointment Line [phone] or  [phone]) (toll-free)   24 Hour Nurse Line [phone] (toll-free)   Preferred Urgent Care     Preferred Hospital     Preferred Pharmacy [facility]     Behavioral Health Crisis Line The National Suicide Prevention Lifeline at 1-637.661.2318 or 911             My Care Team Members  Patient Care Team       Relationship Specialty Notifications Start End    Paul Knowles MD PCP - General Internal Medicine  8/9/13     Phone: [phone] Fax: [phone] [address] [facility] [address]    Paul Knowles MD Assigned PCP   7/21/13     Phone: [phone] Fax: [phone] [address] [facility] [address]    Shoshana Colmenares, [facility] Pharmacist    5/29/19     Phone: [phone] [address] [facility] [address]    Venus Henriquez, RN Lead Care Coordinator Primary Care - CC Admissions 6/21/19     Phone: [phone]                 My Care Plans  Self Management and Treatment Plan  Goals and (Comments)  Goals        General    Functional (pt-stated)     Notes - Note edited  1/6/2020 11:44 AM by Venus Henriquez, SHELLEY    Goal Statement: I want to feel stronger and not so tired.     Measure of Success: When I am not needing frequent naps    Supportive Steps to Achieve: Discuss with cardiology   Complete chemotherapy   Monitor blood counts   Follow with oncology  Be more active    Barriers: Fatigue    Strengths: Supportive spouse    Date to Achieve By: April 2020    Patient expressed understanding of goal: Yes               Action Plans on File:            Depression  Heart Failure       Advance Care Plans/Directives Type:        My Medical and Care Information  Problem List   Patient Active Problem List   Diagnosis     Family history of colon cancer     Osteopenia     HYPERLIPIDEMIA LDL GOAL <160     Vitreous condensations, od > os     Pseudophakia, Yag Caps, ou     Chronic pain syndrome     Advanced directives, counseling/discussion     BPH (benign prostatic hyperplasia)     Paroxysmal atrial fibrillation (H)     History of shingles     Chronic fatigue     Lumbar spinal stenosis     DJD (degenerative joint disease), lumbar and thoracic     Diverticulosis of large intestine     H/O cardiac pacemaker     Hypertension goal BP (blood pressure) < 140/90     Long term current use of anticoagulant therapy     Chronic nonintractable headache, unspecified headache type     Prediabetes     Chronic pain     Elevated glucose     De Quervain's disease (tenosynovitis)     Primary osteoarthritis of first carpometacarpal joint of left hand     Cubital tunnel syndrome, right     Primary osteoarthritis of right knee     History of total knee arthroplasty, left     Chronic  atrial fibrillation     Pacemaker     (HFpEF) heart failure with preserved ejection fraction (H)      Current Medications and Allergies:  See printed Medication Report.    Care Coordination Start Date: 6/21/2019   Frequency of Care Coordination: monthly   Form Last Updated: 01/06/2020

## 2020-01-06 NOTE — PROGRESS NOTES
Clinic Care Coordination Contact    Follow Up Progress Note      Assessment: Spoke with patient's spouse, Nkechi.     She states patient fell asleep this am at the table while having breakfast. He spilled his cup of coffee.  She states he sleeps a lot.    He does not do much around the house. He drives to the grocery store every Friday to get groceries. He does not fall asleep while driving.     Patient is now on maintenance Rituximab. He gets infusion every other month for four doses. Then just follow Oncologist told them that his lymphoma is not detectable currently. He informed patient that he could have knee replacement if he wishes.    Spouse states that patient's knee pain is BAD. She states he can hardly walk. He does not feel he can do the exercises needed for rehab.  We discussed having some therapy to strengthen legs before surgery so he can paritcipate after.  Spouse does not feel he will be interested.     Goals addressed this encounter:   Goals Addressed                 This Visit's Progress      Functional (pt-stated)   On track     Goal Statement: I want to feel stronger and not so tired.     Measure of Success: When I am not needing frequent naps    Supportive Steps to Achieve: Discuss with cardiology   Complete chemotherapy   Monitor blood counts   Follow with oncology  Be more active    Barriers: Fatigue    Strengths: Supportive spouse    Date to Achieve By: April 2020    Patient expressed understanding of goal: Yes          Intervention/Education provided during outreach: As above. Encouraged spouse to call care coordinator with concerns.      Outreach Frequency: monthly    Plan:   Patient will return call to care coordinator.     Care Coordinator will follow up in one month.     Venus eHnriquez RN, [facility] - Primary Care Clinic RN Coordinator  [facility]   1/6/2020    11:53 AM  [phone]

## 2020-01-10 NOTE — TELEPHONE ENCOUNTER
ANTICOAGULATION MANAGEMENT    Alexandru Crews due for review and annual renewal of referral to anticoagulation monitoring.      Warfarin indication(s) Atrial Fibrillation   INR goal 2.0-3.0   Current duration of therapy Indefinite/long term therapy   Date of last office visit 1/10/2020       Please advise if Alexandru DEBI Crews's anticoagulation management referral can be renewed for next year.     Please confirm renewal approval in new note within this telephone encounter and route back. No further action is necessary at this time (referral orders,etc).     Miladis Gonzalez, RN

## 2020-01-10 NOTE — PROGRESS NOTES
ANTICOAGULATION FOLLOW-UP CLINIC VISIT    Patient Name:  Alexandru Crews  Date:  1/10/2020  Contact Type:  Face to Face  Thinks he may have dropped a dose and eating lots of spinach will back off greens and return in 4 weeks. Coming here is difficult in winter.  SUBJECTIVE:  Patient Findings     Positives:   Missed doses (possible missed dose)    Comments:     Assessed for S/S bleeding, clotting, medication, diet, health, activity and alcohol changes          Clinical Outcomes     Negatives:   Major bleeding event, Thromboembolic event, Anticoagulation-related hospital admission, Anticoagulation-related ED visit, Anticoagulation-related fatality    Comments:     Assessed for S/S bleeding, clotting, medication, diet, health, activity and alcohol changes             OBJECTIVE    INR Protime   Date Value Ref Range Status   01/10/2020 1.9 (A) 0.86 - 1.14 Final       ASSESSMENT / PLAN  INR assessment THER    Recheck INR In: 4 WEEKS    INR Location Clinic      Anticoagulation Summary  As of 1/10/2020    INR goal:   2.0-3.0   TTR:   68.2 % (1 y)   INR used for dosin.9! (1/10/2020)   Warfarin maintenance plan:   2.5 mg (2.5 mg x 1) every Mon, Wed, Fri; 1.25 mg (2.5 mg x 0.5) all other days   Full warfarin instructions:   2.5 mg every Mon, Wed, Fri; 1.25 mg all other days   Weekly warfarin total:   12.5 mg   No change documented:   Miladis Gonzalez RN   Plan last modified:   Rose Navarrete RN (2019)   Next INR check:   2020   Priority:   INR   Target end date:   Indefinite    Indications    Long term current use of anticoagulant therapy [Z79.01]  Paroxysmal atrial fibrillation (H) [I48.0]             Anticoagulation Episode Summary     INR check location:       Preferred lab:       Send INR reminders to:   JAY ISRAEL    Comments:   INR Referral Renewal request sent in 19      Anticoagulation Care Providers     Provider Role Specialty Phone number    Paul Knowles MD Responsible Internal  Medicine [phone]            See the Encounter Report to view Anticoagulation Flowsheet and Dosing Calendar (Go to Encounters tab in chart review, and find the Anticoagulation Therapy Visit)        Miladis Gonzalez RN

## 2020-02-04 NOTE — PROGRESS NOTES
Clinic Care Coordination Contact    Follow Up Progress Note      Assessment: Outreach to patient and/or spouse.    Left message on patient's voicemail and requested return call.     Goals addressed this encounter:   Goals Addressed    None          Intervention/Education provided during outreach: na     Outreach Frequency: monthly    Plan:   Patient and/or spouse will return call.    Care Coordinator will follow up in 2-4 weeks.    Venus Henriquez RN, [facility] - Primary Care Clinic RN Coordinator  [facility]   2/4/2020    2:32 PM  [phone]

## 2020-02-07 NOTE — PROGRESS NOTES
ANTICOAGULATION FOLLOW-UP CLINIC VISIT    Patient Name:  Alexandru Crews  Date:  2020  Contact Type:  Face to Face  Has complaints of pain all over today. Not taking anything. Eats spinach daily except for skipped 2 times this week. Will make small increase and recheck when he is in clinic next to see eye doctor. Results will go to [place].  SUBJECTIVE:  Patient Findings     Comments:     Assessed for S/S bleeding, clotting, medication, diet, health, activity and alcohol changes          Clinical Outcomes     Negatives:   Major bleeding event, Thromboembolic event, Anticoagulation-related hospital admission, Anticoagulation-related ED visit, Anticoagulation-related fatality    Comments:     Assessed for S/S bleeding, clotting, medication, diet, health, activity and alcohol changes             OBJECTIVE    INR Protime   Date Value Ref Range Status   2020 1.8 (A) 0.86 - 1.14 Final       ASSESSMENT / PLAN  INR assessment THER    Recheck INR In: 3 WEEKS    INR Location Clinic      Anticoagulation Summary  As of 2020    INR goal:   2.0-3.0   TTR:   60.6 % (1 y)   INR used for dosin.8! (2020)   Warfarin maintenance plan:   1.25 mg (2.5 mg x 0.5) every Tue, Thu, Sat; 2.5 mg (2.5 mg x 1) all other days   Full warfarin instructions:   1.25 mg every Tue, Thu, Sat; 2.5 mg all other days   Weekly warfarin total:   13.75 mg   Plan last modified:   Miladis Gonzalez RN (2020)   Next INR check:   2020   Priority:   INR   Target end date:   Indefinite    Indications    Long term current use of anticoagulant therapy [Z79.01]  Paroxysmal atrial fibrillation (H) [I48.0]             Anticoagulation Episode Summary     INR check location:       Preferred lab:       Send INR reminders to:   JAY ISRAEL    Comments:   INR Referral Renewal request sent in 19      Anticoagulation Care Providers     Provider Role Specialty Phone number    Paul Knowles MD Referring Internal Medicine [phone]             See the Encounter Report to view Anticoagulation Flowsheet and Dosing Calendar (Go to Encounters tab in chart review, and find the Anticoagulation Therapy Visit)        Miladis Gonzalez RN

## 2020-02-26 NOTE — TELEPHONE ENCOUNTER
Pt called RN hotline. States he contacted his cardiologist stating his pain medications are making him itch. No rash. Whenever he takes morphine sulfate, he itches at night. Tried taking his oxycodone and is itching from this after 30-45 min. Wants something to help with the itch so he can take his pain medications. Cardiologist prescribed hydroxyzine, but insurance won't allow this prescription due to pt's age. They recommended Levocetirizine. Could he get a prescription for this? If so, would only like a 30 day supply of this incase it doesn't help. Please advise.    Evelyne Bethea RN  [facility]

## 2020-02-26 NOTE — TELEPHONE ENCOUNTER
Yes of course, I see this as a safe medication   Message reviewed , orders signed     Paul Knowles MD

## 2020-02-26 NOTE — TELEPHONE ENCOUNTER
Pt is requesting a 90 day supply. Please advise on refill. Please contact pt once sent, if no answer, ok to leave a detailed message.    Evelyne Bethea RN  [facility]

## 2020-02-27 NOTE — TELEPHONE ENCOUNTER
Patient notified of Provider's message as written.  Patient verbalized understanding.    Arelis Ferrara RN

## 2020-02-28 NOTE — PROGRESS NOTES
Current Eye Medications:  none     Subjective:  Complete eye exam. Vision is OK distance and near both eyes. Did get headache and pain in right eye about 6 weeks ago, only lasted one night. Gets pain is left eye, when he gets headache from occipital nerve for years.  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     Objective:  See Ophthalmology Exam.       Assessment:  Stable eye exam.      ICD-10-CM    1. Pseudophakia, Yag Caps, ou Z96.1 EYE EXAM (SIMPLE-NONBILLABLE)   2. 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 Examination of eyes and vision Z01.00 REFRACTION     EYE EXAM (SIMPLE-NONBILLABLE)   4. Presbyopia H52.4 REFRACTION        Plan:  Glasses Rx given - optional  Use artificial tears up to 4 times daily both eyes as needed.  (Refresh Tears, Systane Ultra/Balance, or Theratears)  Call in October 2020 for an appointment in February 2021 for Complete Exam    Dr. Raines [phone]

## 2020-02-28 NOTE — PATIENT INSTRUCTIONS
Glasses Rx given - optional  Use artificial tears up to 4 times daily both eyes as needed.  (Refresh Tears, Systane Ultra/Balance, or Theratears)  Call in October 2020 for an appointment in February 2021 for Complete Exam    Dr. Raines [phone]

## 2020-02-28 NOTE — TELEPHONE ENCOUNTER
ANTICOAGULATION MANAGEMENT     Patient Name:  Alexandru Crews  Date:  2020    ASSESSMENT /SUBJECTIVE:      Today's INR result of 2.20 is therapeutic. Goal INR of 2.0-3.0        PLAN:    Left detailed message for Alexandru regarding INR result and instructed:     Warfarin Dosing Instructions: Continue your current warfarin dose    Instructed patient to follow up no later than: 3 weeks  Left detailed message with recommended recheck date    Education provided: No          Instructed to call the Anticoagulation Clinic for any changes, questions or concerns. (#[phone])        OBJECTIVE:  INR   Date Value Ref Range Status   2020 2.20 (H) 0.86 - 1.14 Final             Anticoagulation Summary  As of 2020    INR goal:   2.0-3.0   TTR:   57.7 % (1 y)   INR used for dosin.20 (2020)   Warfarin maintenance plan:   1.25 mg (2.5 mg x 0.5) every Tue, Thu, Sat; 2.5 mg (2.5 mg x 1) all other days   Full warfarin instructions:   1.25 mg every Tue, Thu, Sat; 2.5 mg all other days   Weekly warfarin total:   13.75 mg   Plan last modified:   Miladis Gonzalez RN (2020)   Next INR check:   3/20/2020   Priority:   INR   Target end date:   Indefinite    Indications    Long term current use of anticoagulant therapy [Z79.01]  Paroxysmal atrial fibrillation (H) [I48.0]             Anticoagulation Episode Summary     INR check location:       Preferred lab:       Send INR reminders to:   JAY ISRAEL    Comments:   INR Referral Renewal request sent in 19      Anticoagulation Care Providers     Provider Role Specialty Phone number    Paul Knowles MD Referring Internal Medicine [phone]

## 2020-02-28 NOTE — LETTER
2/28/2020         RE: Alexandru Hassannssera  [address] [place] [address]        Dear Colleague,    Thank you for referring your patient, Alexandru Crews, to the [facility]. Please see a copy of my visit note below.     Current Eye Medications:  none     Subjective:  Complete eye exam. Vision is OK distance and near both eyes. Did get headache and pain in right eye about 6 weeks ago, only lasted one night. Gets pain is left eye, when he gets headache from occipital nerve for years.  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     Objective:  See Ophthalmology Exam.       Assessment:  Stable eye exam.      ICD-10-CM    1. Pseudophakia, Yag Caps, ou Z96.1 EYE EXAM (SIMPLE-NONBILLABLE)   2. 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 Examination of eyes and vision Z01.00 REFRACTION     EYE EXAM (SIMPLE-NONBILLABLE)   4. Presbyopia H52.4 REFRACTION        Plan:  Glasses Rx given - optional  Use artificial tears up to 4 times daily both eyes as needed.  (Refresh Tears, Systane Ultra/Balance, or Theratears)  Call in October 2020 for an appointment in February 2021 for Complete Exam    Dr. Raines [phone]             Again, thank you for allowing me to participate in the care of your patient.        Sincerely,        Obie Raiens MD

## 2020-03-02 NOTE — TELEPHONE ENCOUNTER
Spoke with patient and dosing discussed with patient.  He will continue taking 2.5 Mg Sundays.  Traci Schuler RN  [facility]ticoagulation [facility]

## 2020-03-02 NOTE — PROGRESS NOTES
Clinic Care Coordination Contact    Follow Up Progress Note      Assessment: left message on patient's voicemail and requested return call.    Patient did not respond to last outreach.     Goals addressed this encounter:   Goals Addressed    None     Outreach Frequency: monthly    Plan:   If no response in 10 days., will plan to close to care coordination.    Care Coordinator will follow up in 10 business days.    Venus Henriquez RN, [facility] - Primary Care Clinic RN Coordinator  [facility]   3/2/2020    1:29 PM  [phone]

## 2020-03-13 NOTE — TELEPHONE ENCOUNTER
Received call from patient while covering Monique's vacation. Pt responding to Monique's message about scheduling an appointment. Called patient back and left message with MTM scheduling line information.     Sharmin Gerardo PharmD, [place], BCACP  MTM Pharmacist, [facility]

## 2020-03-13 NOTE — TELEPHONE ENCOUNTER
Controlled Substance Refill Request for Oxycodone    Last refill: 12/18/19    Last clinic visit: 9/16/19   Problem List Complete:  Yes  Patient is followed by Paul Knowles MD for ongoing prescription of pain medication.  All refills should be approved by this provider, or covering partner.    Medication(s): MS-Contin (Morphine Sulfate Controlled-Release) 15 milligrams 3 times a day , percocet 5/325 tablets 2 a day  Maximum quantity per month: 90/60  Clinic visit frequency required: Q 3 months     Controlled substance agreement:  Encounter-Level CSA - 4/25/16:               Controlled Substance Agreement - Scan on 5/6/2016  1:14 PM : CONTROLLED SUBSTANCE AGREEMENT (below)            Pain Clinic evaluation in the past: Yes       Date/Location:      DIRE Total Score(s):  No flowsheet data found.    Last [facility] website verification:  12/18/19, 3/13/2020   [email or web address]    Controlled substance agreement: 8/24/18   checked in past 3 months?  Yes       reviewed. No concerns  Last dispensed 1/10/2020 #90 for a 30 day supply    Evelyne Bethea RN  [facility]

## 2020-03-16 NOTE — PROGRESS NOTES
Clinic Care Coordination Contact    Follow Up Progress Note      Assessment: No response from patient after multiple attempts to reach    Goals addressed this encounter:   Goals Addressed                 This Visit's Progress      Functional (pt-stated)   Not on track     Goal Statement: I want to feel stronger and not so tired.     Measure of Success: When I am not needing frequent naps    Supportive Steps to Achieve: Discuss with cardiology   Complete chemotherapy   Monitor blood counts   Follow with oncology  Be more active    Barriers: Fatigue    Strengths: Supportive spouse    Date to Achieve By: April 2020    Patient expressed understanding of goal: Yes          Intervention/Education provided during outreach: na     Care Coordinator will close to clinic care coordination at this time. Re-open as needed.    Venus Henriquez, RN, [facility] - Primary Care Clinic RN Coordinator  [facility]   3/16/2020    2:01 PM  [phone]

## 2020-03-20 NOTE — PROGRESS NOTES
ANTICOAGULATION FOLLOW-UP CLINIC VISIT    Patient Name:  Alexandru Crews  Date:  3/20/2020  Contact Type:  Face to Face    SUBJECTIVE:  Patient Findings     Comments:   The patient was assessed for diet, medication, and activity level changes, missed or extra doses, bruising or bleeding, with no problem findings.          Clinical Outcomes     Negatives:   Major bleeding event, Thromboembolic event, Anticoagulation-related hospital admission, Anticoagulation-related ED visit, Anticoagulation-related fatality    Comments:   The patient was assessed for diet, medication, and activity level changes, missed or extra doses, bruising or bleeding, with no problem findings.             OBJECTIVE    INR Protime   Date Value Ref Range Status   2020 2.0 (A) 0.86 - 1.14 Final       ASSESSMENT / PLAN  INR assessment THER    Recheck INR In: 5 WEEKS    INR Location Clinic      Anticoagulation Summary  As of 3/20/2020    INR goal:   2.0-3.0   TTR:   57.6 % (1 y)   INR used for dosin.0 (3/20/2020)   Warfarin maintenance plan:   1.25 mg (2.5 mg x 0.5) every Tue, Thu, Sat; 2.5 mg (2.5 mg x 1) all other days   Full warfarin instructions:   1.25 mg every Tue, Thu, Sat; 2.5 mg all other days   Weekly warfarin total:   13.75 mg   No change documented:   Rose Navarrete RN   Plan last modified:   Miladis Gonzalez RN (2020)   Next INR check:   2020   Priority:   INR   Target end date:   Indefinite    Indications    Long term current use of anticoagulant therapy [Z79.01]  Paroxysmal atrial fibrillation (H) [I48.0]             Anticoagulation Episode Summary     INR check location:       Preferred lab:       Send INR reminders to:   JAY ISRAEL    Comments:   INR Referral Renewal request sent in 19      Anticoagulation Care Providers     Provider Role Specialty Phone number    Paul Knowles MD Referring Internal Medicine [phone]            See the Encounter Report to view Anticoagulation Flowsheet and  Dosing Calendar (Go to Encounters tab in chart review, and find the Anticoagulation Therapy Visit)        Rose Navarrete RN

## 2020-03-23 NOTE — PATIENT INSTRUCTIONS
Recommendations from today's MTM visit:                                                      1. I would avoid starting the Tumeric, based on the description, the dose seems very high and this could increase risk of bleeding, and you are already on warfarin.     2. Re-try levocetirizine, this needs to be taken daily x 3 days to be fully effective, and then continue taking it once daily.      It was great to speak with you today.  I value your experience and would be very thankful for your time with providing feedback on our clinic survey. You may receive a survey via email or text message in the next few days.     Next MTM visit: 6 months    To schedule another MTM appointment, please call the clinic directly or you may call the MTM scheduling line at [phone] or toll-free at [phone].     My Clinical Pharmacist's contact information:                                                      It was a pleasure talking with you today!  Please feel free to contact me with any questions or concerns you have.      Monique Colmenares, PharmD  Medication Therapy Management Pharmacist  [phone]

## 2020-03-23 NOTE — Clinical Note
HERACLIO SPEARS note, thanks!    Monique Colmenares, PharmD  Medication Therapy Management Pharmacist  [phone]

## 2020-03-23 NOTE — PROGRESS NOTES
"MTM ENCOUNTER  SUBJECTIVE/OBJECTIVE:                           Alexandru Crews is a 87 year old male called for a follow-up visit. He was referred to me from Paul Knowles.  Today's visit is a follow-up MTM visit from 11/27/18. This will serve as an initial visit for 2020.     Chief Complaint: med review.    Osteopenia:    Prolia 60mg every 6 months (last injection 11/23/19)  Vitamin D 2000 IU daily  calcium 600/vitamin D 800 international unit(s) every other day   MVI (contains ~200mg of calcium).     Pt is not experiencing side effects.   Vitamin D Deficiency Screening Results:  Lab Results   Component Value Date    VITDT 45 09/27/2018    VITDT 46 09/17/2015   DEXA History: 9/17/18 - Osteopenia.  This patient's risks based on available information, with the use of FRAX, are 7.8 % for major osteoporotic fracture and 3.3 % for hip fracture.   High risk: No     Chronic Pain:   Percocet 5/325mg TID PRN (he generally takes 1/2 tablet 4 times daily PRN, usually less than QID)  Menthol ointment as needed (minimally effective)  Lidocaine 4% cream as needed neck/nerve pain/headaches.    Itching has been a big problem with MS Contin 15mg BID, and he is not taking this right now, pain is worse without. He's still got itching with Percocet, but not as bad as when also taking MS Contin.  He tried hydroxyzine but this was ineffective for itching, he tried two doses of levocetirizine and states that didn't work either, but he was planning to retry.  He again notes that he \"should\" have several different surgeries to treat the pain, but he doesn't feel he'd do well with these (and for some has been told he's not a candidate).  He denies side effects of therapy.  He feels he's doing ok with this regimen.  He notes pain isn't optimally controlled but he isn't interested in increasing the doses or changing medications at all.  He does sometimes use topical therapy to help with headaches, which he finds effective.  Has not tried " "elsewhere because \"I'd be rubbing it all over my body.\"    Hypertension/A. Fib/HFpEF:   metoprolol ER 12.5 mg BID  sotalol 120mg BID  warfarin as directed    No longer on furosemide. Fatigue hasn't improved since reducing metoprolol dose.   Patient reports no current medication side effects including s/s bruising/bleeding.    INR   Date Value Ref Range Status   02/28/2020 2.20 (H) 0.86 - 1.14 Final     INR Protime   Date Value Ref Range Status   03/20/2020 2.0 (A) 0.86 - 1.14 Final      Constipation:   Miralax 17 g daily    Feels bowels are overall stable with this regimen, started Miralax after his blockage, no longer on senna/docusate.  Denies side effects.     Rash/itch:    levocetirizine 5 mg not very effective x 2 doses for opioid-induced itching  triamcinolone cream as needed for ears (effective)      He denies side effects of therapy. Hydroxyzine was ineffective for opioid-induced itching.    Cancer/lymphoma:  Rituximab IV maintenance every 2 months.      States he has had some infusion related reactions, but sometimes doesn't react at all.     Supplements:   Tumeric (he hasn't started)  Essential oil roll on, peppermint, clove as needed    Wonders if these are ok to start.     Today's Vitals: There were no vitals taken for this visit.-phone visit    ASSESSMENT:                            Medication Adherence: good, no issues identified    Osteopenia: Stable.  Will be due for repeat Prolia in May.     Chronic Pain: Unimproved.  He plans to retrial MS Contin (which he has available), and take levocetirizine, see below. Educated on risks of restarting MS Contin after time off this medication, pt understands.     Hypertension/A. Fib/HFpEF: Stable.    Constipation: Stable.     Rash/itch: Needs improvement. Pt doesn't understand levocetirizine instructions, education provided.    Cancer/lymphoma: Plan in place.     Supplements: See plan. Essential oil likely safe, ok to use.    PLAN:                        "     Patient:    1. I would avoid starting the Tumeric, based on the description, the dose seems very high and this could increase risk of bleeding, and you are already on warfarin.     2. Re-try levocetirizine, this needs to be taken daily x 3 days to be fully effective, and then continue taking it once daily.      I spent 45 minutes with this patient today. All changes were made via collaborative practice agreement with Paul Knowles. A copy of the visit note was provided to the patient's primary care provider.    Will follow up in 6 months.    The patient was mailed a summary of these recommendations.     Monique Colmenares, PharmD  Medication Therapy Management Pharmacist  [phone]

## 2020-04-01 NOTE — PROGRESS NOTES
Clinic Care Coordination Contact    Follow Up Progress Note      Assessment: Received call from Patient's daughter, Dionne ([phone])    Dionne states her Dad had an oncology appointment yesterday and was told the Rituxin is no longer working. They want patient to have a bone marrow biopsy within the next week and then start chemotherapy.  Dionne states she was no with her mom and dad at the appointment but her mom took notes.    Dionne's concerns is can patient tolerate chemo. She states he does not understand that the Rituxin is not a true chemo and what he will get has more side effects than just the fatigue that he now has and complains about.      Dionne is looking down the road and wants to know what options might be available to them.    We had a long discussion about home care, private pay versus skilled services reimbursed by Medicare.  We discussed hospice care.  Dionne was an oncology nurse at [employer] years ago so has an understanding.    She states she does not think her parents or her three other sisters have even considered what chemo may do to patient or that they have the option to say no.     We discussed at length the need for them as a family to have a serious discussion about patient's desires.     Advised Dionne to discuss her concerns and to ask questions to the oncologist. He needs to know and needs to discuss with patient and family.      Goals addressed this encounter:   Goals Addressed                 This Visit's Progress      Functional (pt-stated)   On track     Goal Statement: I want to feel stronger and not so tired.     Measure of Success: When I am not needing frequent naps    Supportive Steps to Achieve: Discuss with cardiology   Complete chemotherapy   Monitor blood counts   Follow with oncology  Be more active    Barriers: Fatigue    Strengths: Supportive spouse    Date to Achieve By: April 2020    Patient expressed understanding of goal: Yes          Intervention/Education  provided during outreach: As above     Outreach Frequency: monthly    Plan:   Dionne will talk to patient and his oncologist.   Family will have a discussion about future needs.  Care Coordinator will follow up in one month.    Venus Henriquez RN, [facility] RN Coordinator  [facility]   4/1/2020    12:14 PM  [phone]

## 2020-04-06 NOTE — TELEPHONE ENCOUNTER
"He tried levocetirizine > 3 days, sometimes help, sometimes doesn't. Still has itching, \"Verdict is still out.\"  He'd prefer to remain off levocetirizine at this time.     Monique Colmenares, PharmD  Medication Therapy Management Pharmacist  [phone]      "

## 2020-04-14 NOTE — TELEPHONE ENCOUNTER
Reason for Call:  Other prescription    Detailed comments: Patient calling, he would like to get a Prolia Injection some time in May. Please call back to schedule.    Phone Number Patient can be reached at: Home number on file [phone] (home)    Best Time: any    Can we leave a detailed message on this number? YES    Call taken on 4/14/2020 at 12:45 PM by Marshall Castro

## 2020-04-24 NOTE — PROGRESS NOTES
ANTICOAGULATION FOLLOW-UP TELEPHONE VISIT    Patient Name:  Alexandru Crews  Date:  2020  Contact Type:  Telephone/ I called patient in follow up to INR done at lab today.  Wife took the detailed message.  Patient to call with changes or concerns.    SUBJECTIVE: same plan of care  Patient Findings     Comments:   No concerns identified today        Clinical Outcomes     Negatives:   Major bleeding event, Thromboembolic event, Anticoagulation-related hospital admission, Anticoagulation-related ED visit, Anticoagulation-related fatality    Comments:   No concerns identified today           OBJECTIVE    INR   Date Value Ref Range Status   2020 2.20 (H) 0.86 - 1.14 Final     Comment:     INRFD       ASSESSMENT / PLAN  INR assessment THER    Recheck INR In: 4 WEEKS    INR Location Outside lab      Anticoagulation Summary  As of 2020    INR goal:   2.0-3.0   TTR:   57.6 % (1 y)   INR used for dosin.20 (2020)   Warfarin maintenance plan:   1.25 mg (2.5 mg x 0.5) every Tue, Thu, Sat; 2.5 mg (2.5 mg x 1) all other days   Full warfarin instructions:   1.25 mg every Tue, Thu, Sat; 2.5 mg all other days   Weekly warfarin total:   13.75 mg   No change documented:   Jessica Platt RN   Plan last modified:   Miladis Gonzalez RN (2020)   Next INR check:   2020   Priority:   Maintenance   Target end date:   Indefinite    Indications    Long term current use of anticoagulant therapy [Z79.01]  Paroxysmal atrial fibrillation (H) [I48.0]             Anticoagulation Episode Summary     INR check location:       Preferred lab:       Send INR reminders to:   JAY ISRAEL    Comments:   INR Referral Renewal request sent in 19      Anticoagulation Care Providers     Provider Role Specialty Phone number    Paul Knowles MD Referring Internal Medicine [phone]            See the Encounter Report to view Anticoagulation Flowsheet and Dosing Calendar (Go to Encounters tab in chart review,  and find the Anticoagulation Therapy Visit)    Dosage adjustment made based on physician directed care plan.    Jessica Platt RN

## 2020-05-06 NOTE — TELEPHONE ENCOUNTER
Due to the COVID-19 pandemic we are postponing prolia injections thru July (roughly 8-10 weeks).    LM for patient informing him of this.  Will postpone encounter til the end of July.  Emma Leach,

## 2020-05-11 NOTE — TELEPHONE ENCOUNTER
Patient called and left  on RN Hotline. States he is having a bone marrow biospy tomorrow (Tuesday, 5/12/2020) and has stopped his Warfarin 5 days prior as directed.   Patient wondering after operation, when he should resume medication and what dose?  OK to leave a detailed message.     Beatriz Espinal RN

## 2020-05-11 NOTE — TELEPHONE ENCOUNTER
Advised to resume normal maintenance dose of 1.25 mg Tue/Thu/Sat and 2.5 mg ROW tomorrow night unless if the provider doing procedure advises otherwise and keep scheduled INR appointment on 5/22/2020. Patient verbalized understanding and has no further questions or concerns.      Rose Navarrete, RN - [facility]

## 2020-05-15 NOTE — TELEPHONE ENCOUNTER
Patient called requesting a visit for Prolia injection.  He was called and told he could have this done in July but does not want to wait that long.  He had 2 falls recently, was seen in ED on 5/12/20 for fall.  He would like to continue the Prolia injections so he can prevent any broken bones.  He has lab appointment on 5/22/20 and would like to have prolia injection same day if possible.   Okay to leave a detailed message   Josselyn Corona RN

## 2020-05-15 NOTE — TELEPHONE ENCOUNTER
According to my understanding , we have approved Denosumab injection (Prolia) injections via curbside service. Please call patient and help arrange this with patient     Paul Knowles MD

## 2020-05-18 NOTE — TELEPHONE ENCOUNTER
"Prolia is not offered curbside.   Would need a full appointment with RN  Prolia injection is not due until 5/26/2020 (6 months from last injection) but this had been postponed due to COVID19 pandemic   Spoke with patient and he stated that insurance may not pay for until it has been 6 months from last injection so 5/22/2020 would be too soon  Patient is just concerned about risk of fractures if he has another fall but is ok if provider feels ok to postpone    If ok to schedule at this time, will need provider to place CAM and/or lab orders as appropriate (please place a CAM order for \"Pre-auth\" Prolia injection and any labs needed)  (Workflow is to schedule patient 2 weeks after the order is placed to allow time for labs and PA/insurance verification process)  If ok to postpone, how far out?    Ok to leave detailed message on patient's VM    Arelis Ferrara RN    "

## 2020-05-18 NOTE — TELEPHONE ENCOUNTER
Please let patient know that we can postpone this til mid June  Schedule lab appointment and RN injection appointment a few days later  Ensure sure patient has not had any dental work including the jaw bone (i.e teeth extraction) 1 month prior to injection    Ask MA to re-order med 1 week in advance    Arelis Ferrara RN

## 2020-05-18 NOTE — TELEPHONE ENCOUNTER
1. Laboratory orders are placed and signed   2. CAM orders for Denosumab injection (Prolia) injection placed  3. Patient needs to understand that this biannual treatment works in quite a slow motion fashion and I don't think the urgency is there. Just not critically necessary here. Lets compromise and plan for his injection the second half of June.    See orders placed and Reroute if additional input requested from me     Paul Knowles MD

## 2020-05-19 NOTE — TELEPHONE ENCOUNTER
At this time the Central Prior Authorization Team does not manage prior authorizations for clinic administered medications. Those will need to be completed at the clinic level.

## 2020-05-19 NOTE — TELEPHONE ENCOUNTER
We need to do some chart review or ask patient question as to why we aren't using Fosamax (alendronate) or other oral bisphosphonates such as Actonel (Risedronate Sodium) or Boniva (Ibandronate Sodium)     I can't find the exact reasons why Denosumab injection (Prolia) was thought to be a superior treatment for this gentleman as opposed to the oral Fosamax (alendronate) , these are questions to get health insurance to cover the cost of the Denosumab injection (Prolia)     We can try asking patient but I doubt even he knows.     I am not sure I understand the diagnoses of     1. personal history of other drug history  - does this specifically refer to other oral bisphosphonates?   2. adverse effect of other drugs, again is this a specific referral to other oral bisphosphonates ?   3.same question   4. same question   5-7 patient doesn't  have renal disease     Paul Knowles MD      Previous Messages     ----- Message -----   From: Atif Mora   Sent: 5/19/2020   9:29 AM CDT   To: Paul Knowles MD   Subject: Prolia                                           Good morning Dr. Knowles,       This patient is scheduled for prolia.  With their insurance coverage, it follows Medicare's NGS policy.  Currently we have the DX M81.0 but this request will need an additional diagnosis to support why this patient is not appropriate for other therapies.  Below is a list of secondary diagnosis that would follow Medicare's policy.     Z92.29 - Personal history of other drug therapy   T50.995S - Adverse effect of other drugs, medicaments and biological substances, sequela   T88.7XXS - Unspecified adverse effect of drug or medicament, sequela   Z88.8 - Allergy status to other drugs, medicaments and biological substances status   N18.3 - Chronic kidney disease, stage 3 (moderate)   N18.4 - Chronic kidney disease, stage 4 (severe)   N18.5 - Chronic kidney disease, stage 5     Would a second diagnosis code apply to this patient? If so, can you  please have this added to the CAM order as the secondary diagnosis?  Please let me know when this is completed and I can proceed with verifying patient's benefit for this medication.     Thank you,   Atif Mora

## 2020-05-20 NOTE — TELEPHONE ENCOUNTER
Spoke to patient.  Patient has not had any dental work, extractions or jaw bone surgery within the past month.    Lab appointment scheduled for Thursday, June 11th at 10:45 a.m.    Prolia injection scheduled for Wednesday, June 17th at 10:30 a.m.    Message sent to Siria YOUNG) to order medication.  Emma Leach,

## 2020-05-21 NOTE — TELEPHONE ENCOUNTER
I have added it and please Reroute if additional input requested from me     Otherwise complete issues and close this encounter !    Paul Knowles MD

## 2020-05-21 NOTE — TELEPHONE ENCOUNTER
I have added one of the recommended diagnoses     Please Reroute if additional input requested from me , otherwise close this encounter !    Paul Knowles MD

## 2020-05-21 NOTE — TELEPHONE ENCOUNTER
I added the additional diagnosis of  gastroesophageal reflux disease. Lets see if this does the job !    Paul Knowles MD

## 2020-05-22 NOTE — PROGRESS NOTES
1. Not all back yet  2. No additional laboratory studies   3. Laboratory studies are so far fine.    No sure what he is looking for. Can you check in with him ? If necessary I can call him.    Paul Knowles MD

## 2020-05-22 NOTE — TELEPHONE ENCOUNTER
Reason for Call:  Other call back    Detailed comments: Patient had his INR drawn today, and also had labs done that Dr. Knowles ordered for him. He was told to have labs drawn a week before his prolia injection and is wondering if the labs they jessica today were supposed to be done today or not. Please call to inform patient.    Phone Number Patient can be reached at: Home number on file [phone] (home)    Best Time: Anytime, may be out but it's ok to leave a detailed message    Can we leave a detailed message on this number? YES    Call taken on 5/22/2020 at 10:42 AM by Lupe Garza

## 2020-05-22 NOTE — TELEPHONE ENCOUNTER
Labs for prolia injection were also drawn when he come in for his INR lab today  Prolia injection appointment is not until 6/17/2020- was it too soon to draw or should this be ok? If labs drawn today are ok, will need to cancel the lab appointment on 6/11/2020    Arelis Ferrara RN

## 2020-05-22 NOTE — PROGRESS NOTES
Anticoagulation Management    Unable to reach Alexandru  today.    Today's INR result of 1.5 is subtherapeutic (goal INR of 2.0-3.0).  Result received from: Clinic Lab    Follow up required to assess for changes     left message to call in for dosing. [phone]      Anticoagulation clinic to follow up    Miladis Gonzalez RN    ANTICOAGULATION FOLLOW-UP CLINIC VISIT    Patient Name:  Alexandru Crews  Date:  2020  Contact Type:  Telephone    SUBJECTIVE:  Patient Findings     Positives:   Change in diet/appetite (eating spinach daily )        Clinical Outcomes     Negatives:   Major bleeding event, Thromboembolic event, Anticoagulation-related hospital admission, Anticoagulation-related ED visit, Anticoagulation-related fatality           OBJECTIVE    Recent labs: (last 7 days)     20  1024   INR 1.50*       ASSESSMENT / PLAN  INR assessment SUB    Recheck INR In: 2 WEEKS    INR Location Clinic      Anticoagulation Summary  As of 2020    INR goal:   2.0-3.0   TTR:   57.6 % (1 y)   INR used for dosin.50! (2020)   Warfarin maintenance plan:   1.25 mg (2.5 mg x 0.5) every Tue, Thu, Sat; 2.5 mg (2.5 mg x 1) all other days   Full warfarin instructions:   : 5 mg; : 2.5 mg; Otherwise 1.25 mg every Tue, Thu, Sat; 2.5 mg all other days   Weekly warfarin total:   13.75 mg   Plan last modified:   Miladis Gonzalez RN (2020)   Next INR check:   2020   Priority:   Maintenance   Target end date:   Indefinite    Indications    Long term current use of anticoagulant therapy [Z79.01]  Paroxysmal atrial fibrillation (H) [I48.0]             Anticoagulation Episode Summary     INR check location:       Preferred lab:       Send INR reminders to:   JAY ISRAEL    Comments:   INR Referral Renewal request sent in 19      Anticoagulation Care Providers     Provider Role Specialty Phone number    Paul Knowles MD Referring Internal Medicine [phone]            See the Encounter  Report to view Anticoagulation Flowsheet and Dosing Calendar (Go to Encounters tab in chart review, and find the Anticoagulation Therapy Visit)        Miladis Gonzalez RN

## 2020-05-22 NOTE — PROGRESS NOTES
The additional laboratory studies aren't back yet and won't be until roughly next Monday     See what his questions are. I would rather have the numbers for the call but if there's any specific new issues we could call without the numbers. These are nothing special and ordered exclusively so we feel fine about the administration  Of Denosumab injection (Prolia)     Paul Knowles MD

## 2020-05-22 NOTE — Clinical Note
Alexandru would like a call back to discuss labs today and is wondering if he needs to come in on the 11th for any other labs of if todays are it?  Miladis Pozo

## 2020-05-22 NOTE — TELEPHONE ENCOUNTER
I have added the diagnosis of     Z92.29 - Personal history of other drug therapy.    I have linked this to the osteoporosis diagnosis and Denosumab injection (Prolia)     Hopefully this is adequate. If not please reroute     Paul Knowles MD

## 2020-05-22 NOTE — TELEPHONE ENCOUNTER
Dr. Knowles please use this diagnosis and add to the cam order  Z92.29 - Personal history of other drug therapy.  Emma Leach,

## 2020-05-22 NOTE — TELEPHONE ENCOUNTER
Patient unavailable.   LM with female voice and she will have patient call RN hotline back  Will also need to cancel the lab appointment on 6/11/2020    Arelis Ferrara RN

## 2020-06-08 NOTE — PROGRESS NOTES
Patient called back with additional questions about diet and if he needs to still eat the greens three times weekly. Discussed consistency in vitamin K and encouraged to continue.    Zoey Trejo RN, [facility] Triage

## 2020-06-08 NOTE — PROGRESS NOTES
ANTICOAGULATION FOLLOW-UP CLINIC VISIT    Patient Name:  Alexandru Crews  Date:  6/8/2020  Contact Type:  Telephone    SUBJECTIVE:         OBJECTIVE    Recent labs: (last 7 days)     06/08/20  1213   INR 2.70*       ASSESSMENT / PLAN  INR assessment THER    Recheck INR In: 3 WEEKS    INR Location Clinic      Anticoagulation Summary  As of 6/8/2020    INR goal:   2.0-3.0   TTR:   58.3 % (1 y)   INR used for dosing:      Warfarin maintenance plan:   1.25 mg (2.5 mg x 0.5) every Tue, Thu, Sat; 2.5 mg (2.5 mg x 1) all other days   Full warfarin instructions:   1.25 mg every Tue, Thu, Sat; 2.5 mg all other days   Weekly warfarin total:   13.75 mg   Plan last modified:   Miladis Gonzalez RN (2/7/2020)   Next INR check:   6/29/2020   Priority:   Maintenance   Target end date:   Indefinite    Indications    Long term current use of anticoagulant therapy [Z79.01]  Paroxysmal atrial fibrillation (H) [I48.0]             Anticoagulation Episode Summary     INR check location:       Preferred lab:       Send INR reminders to:   JAY ISRAEL    Comments:   INR Referral Renewal request sent in 12/4/19      Anticoagulation Care Providers     Provider Role Specialty Phone number    Paul Knowles MD Referring Internal Medicine [phone]            See the Encounter Report to view Anticoagulation Flowsheet and Dosing Calendar (Go to Encounters tab in chart review, and find the Anticoagulation Therapy Visit)    Dosage adjustment made based on physician directed care plan.    Miladis Gonzalez RN

## 2020-06-12 NOTE — TELEPHONE ENCOUNTER
Patient requesting refills on his Percocet and Morphine    States that the meds make his skin itch (no rash/hives) but he does not want to stop taking the meds because then he would have too much pain  If he takes the pain meds in the daytime, the itching is not as bothersome  But if he takes it before bedtime, the itchiness would then keep him up so he tries to avoid taking the pain meds right before bedtime  Was previously prescribed hydroxyzine for itching but was not covered and was prescribed levocetirizine (XYZAL) 5 MG tab instead  He is not sure if the levocetirizine helps and is not sure if he wants to ask for something else or try a higher dose of pain meds during the daytime and not take at night  He eventually decided that he will just stick with his current meds and try to tolerate the itchiness. Does not want to change his pain meds    Controlled Substance Refill Request for Percocet, Morphine  Problem List Complete:  Yes    Overview  Edited:  Arelis Ferrara RN  Today  Patient is followed by Paul Knowles MD for ongoing prescription of pain medication. All refills should be approved by this provider, or covering partner.     Medication(s): MS-Contin (Morphine Sulfate Controlled-Release) 15 milligrams 3 times a day , percocet 5/325 tablets 2 a day  Maximum quantity per month: 90/60  Clinic visit frequency required: Q 3 months      Controlled substance agreement:      Encounter-Level CSA - 4/25/16:                   Controlled Substance Agreement - Scan on 5/6/2016 1:14 PM : CONTROLLED SUBSTANCE AGREEMENT (below)                Pain Clinic evaluation in the past: Yes  Date/Location:      DIRE Total Score(s):  No flowsheet data found.     Last [facility] website verification: 6/12/2020  [email or web address]     Controlled substance agreement: 8/24/18                    checked in past 3 months?  Yes 6/12/2020, morphine last filled on 9/3/2019, percocet last filled on 4/17/2020    Arelis Ferrara  RN

## 2020-06-17 PROBLEM — M81.0 OSTEOPOROSIS: Status: ACTIVE | Noted: 2020-01-01

## 2020-06-17 NOTE — PROGRESS NOTES
Clinic Administered Medication Documentation      Prolia Documentation    Prior to injection, verified patient identity using patient's name and date of birth. Medication was administered. Please see MAR and medication order for additional information. Patient instructed to remain in clinic for 15 minutes and report any adverse reaction to staff immediately .    Indication: Prolia  (denosumab) is a prescription medicine used to treat osteoporosis in patients who:     Are at high risk for fracture, meaning patients who have had a fracture related to osteoporosis, or who have multiple risk factors for fracture.    Cannot use another osteoporosis medicine or other osteoporosis medicines did not work well.    The timeline for early/late injections would be 4 weeks early and any time after the 6 month ruperto. If a patient receives their injection late, then the subsequent injection would be 6 months from the date that they actually received the injection.    1.  When was the last injection?  11/26/2019  2.  Did they check with their insurance for this calendar year?  Yes  3.  Is there an order in the chart?  Yes  4.  Has the patient had dental work involving the bone in the past month or will have work in the next 6 months?  No     The following steps were completed to comply with the REMS program for Prolia:    Reviewed information in the Medication Guide and Patient Counseling Chart, including the serious risks of Prolia  and the symptoms of each risk.    Advised patient to seek prompt medical attention if they have signs or symptoms of any of the serious risks.    Provided each patient a copy of the Medication Guide and Patient Brochure.    Was entire vial of medication used? Yes  Vial/Syringe: Syringe  Expiration Date:  6/22  Was this medication supplied by the patient? No    Arelis Ferrara RN

## 2020-06-17 NOTE — PATIENT INSTRUCTIONS
You Received your Prolia injection today  Your next Injection is due in 6 months (around 12/17/2020)  If you plan on having any dental work done within the next 6 months, please let your dentist know that you are on this medication.  We will call in advance to have your next injection scheduled.   Make sure you do not have any dental work completed involving the jaw bone within 1 month prior to your scheduled injection

## 2020-06-17 NOTE — TELEPHONE ENCOUNTER
Postponing this encounter-    Patient due for Prolia injection around 12/17/2020    1. Please ask provider to put any lab orders if needed (it is up to the provider's discretion on how often labs are checked once Prolia injections have been started)    2. Please contact patient to schedule the injection with RN 2 weeks out    3. Ask MA to re-order med 1 week in advance    Make sure patient has not had any dental work including the jaw bone (i.e teeth extraction) 1 month prior to injection    For questions about the cost, patient may contact our CONSUMER PRICE LINE at [phone] for an estimate.     NO ABN IS NEEDED UNLESS COVERAGE CANNOT BE GUARENTEED (OFF LABEL USE, PA STILL IN PROCESS, INSURANCE PENDING, ETC)    CHECK STATUS PRIOR TO ADMINISTRATION- Chart Review > Referrals tab - Select the Referral to view the report    Arelis Ferrara RN

## 2020-06-26 NOTE — PROGRESS NOTES
ANTICOAGULATION FOLLOW-UP CLINIC VISIT    Patient Name:  Alexandru Crews  Date:  2020  Contact Type:  Telephone    SUBJECTIVE:  Patient Findings         Clinical Outcomes     Negatives:   Major bleeding event, Thromboembolic event, Anticoagulation-related hospital admission, Anticoagulation-related ED visit, Anticoagulation-related fatality           OBJECTIVE    Recent labs: (last 7 days)     20  1105   INR 2.10*       ASSESSMENT / PLAN  INR assessment THER    Recheck INR In: 3 WEEKS    INR Location Clinic      Anticoagulation Summary  As of 2020    INR goal:   2.0-3.0   TTR:   62.1 % (1 y)   INR used for dosin.10 (2020)   Warfarin maintenance plan:   1.25 mg (2.5 mg x 0.5) every Tue, Thu, Sat; 2.5 mg (2.5 mg x 1) all other days   Full warfarin instructions:   1.25 mg every Tue, Thu, Sat; 2.5 mg all other days   Weekly warfarin total:   13.75 mg   No change documented:   Zoey Trejo RN   Plan last modified:   Miladis Gonzalez RN (2020)   Next INR check:   2020   Priority:   Maintenance   Target end date:   Indefinite    Indications    Long term current use of anticoagulant therapy [Z79.01]  Paroxysmal atrial fibrillation (H) [I48.0]             Anticoagulation Episode Summary     INR check location:       Preferred lab:       Send INR reminders to:   JAY ISRAEL    Comments:   INR Referral Renewal request sent in 19      Anticoagulation Care Providers     Provider Role Specialty Phone number    Paul Knowles MD Referring Internal Medicine [phone]            See the Encounter Report to view Anticoagulation Flowsheet and Dosing Calendar (Go to Encounters tab in chart review, and find the Anticoagulation Therapy Visit)    Therapeutic INR 2.1. Will continue maintenance dose and recheck in 3 week(s).    Patient states negative for signs and symptoms of bleeding or blood clots, changes in medication, changes in diet, any signs of infection or antibiotic  use, anything new OTC or herbal medications, any missed or extra doses of the warfarin.    Patient informed of the symptoms to be seen for either by INR nurse or ER.    Patient ask if refill of warfarin is needed. Rx sent no    Zoey Trejo RN

## 2020-07-17 NOTE — TELEPHONE ENCOUNTER
Reason for Call:  Other prescription    Detailed comments: needing to refill RX for Oxycodone    Phone Number Patient can be reached at: Home number on file [phone] (home)    Best Time: any      Can we leave a detailed message on this number? YES    Call taken on 7/17/2020 at 11:07 AM by Debra Avila

## 2020-07-17 NOTE — TELEPHONE ENCOUNTER
A prescription is sent however I can't do this again without a telephone MD visit     Please help see to it that appointment is generated     Paul Knowles MD

## 2020-07-17 NOTE — PROGRESS NOTES
ANTICOAGULATION FOLLOW-UP CLINIC VISIT    Patient Name:  Alexandru Crews  Date:  2020  Contact Type:  Telephone    SUBJECTIVE:  Patient Findings     Comments:     Assessed for S/S bleeding, clotting, medication, diet, health, activity and alcohol changes          Clinical Outcomes     Negatives:   Major bleeding event, Thromboembolic event, Anticoagulation-related hospital admission, Anticoagulation-related ED visit, Anticoagulation-related fatality    Comments:     Assessed for S/S bleeding, clotting, medication, diet, health, activity and alcohol changes             OBJECTIVE    Recent labs: (last 7 days)     20  1051   INR 2.30*       ASSESSMENT / PLAN  INR assessment THER    Recheck INR In: 4 WEEKS    INR Location Clinic      Anticoagulation Summary  As of 2020    INR goal:   2.0-3.0   TTR:   63.8 % (1 y)   INR used for dosin.30 (2020)   Warfarin maintenance plan:   1.25 mg (2.5 mg x 0.5) every Tue, Thu, Sat; 2.5 mg (2.5 mg x 1) all other days   Full warfarin instructions:   1.25 mg every Tue, Thu, Sat; 2.5 mg all other days   Weekly warfarin total:   13.75 mg   No change documented:   Miladis Gonzalez RN   Plan last modified:   Miladis Gonzalez RN (2020)   Next INR check:   2020   Priority:   Maintenance   Target end date:   Indefinite    Indications    Long term current use of anticoagulant therapy [Z79.01]  Paroxysmal atrial fibrillation (H) [I48.0]             Anticoagulation Episode Summary     INR check location:       Preferred lab:       Send INR reminders to:   JAY ISRAEL    Comments:   INR Referral Renewal request sent in 19      Anticoagulation Care Providers     Provider Role Specialty Phone number    Paul Knowles MD Referring Internal Medicine [phone]            See the Encounter Report to view Anticoagulation Flowsheet and Dosing Calendar (Go to Encounters tab in chart review, and find the Anticoagulation Therapy Visit)    Dosage  adjustment made based on physician directed care plan.    Miladis Gonzalez, RN

## 2020-07-17 NOTE — LETTER
July 17, 2020      Alexandru Crews  [address] [place] [address]            Your provider has sent a 30 day jeff refill of oxyCODONE-acetaminophen (PERCOCET) 5-325 MG tablet. You are due for an appointment for further refills. Appointment options could include: an in person office visit, telephone visit or Evisit through etaskrt. Please contact the clinic to schedule an appointment for further refills.      Sincerely,       [facility] Yang / [facility]

## 2020-07-17 NOTE — TELEPHONE ENCOUNTER
Controlled Substance Refill Request for oxyCODONE-acetaminophen (PERCOCET) 5-325 MG tablet   Problem List Complete:  Yes   checked in past 3 months?  Yes 6/12/2020    Chronic pain syndrome   Problem Detail     Priority:  Medium    Overview Signed 7/11/2014 12:00 AM by Paul Knowles MD    Complicated. Multiple tests. Mainly this is nocturnal leg pain that is unbearable. He has tried the following different medications      Gabapentin [Neurontin] , pramipexole (MIRAPEX) , LYRICA (pregabalin) , nortriptyline (PAMELOR) , Clinoril (Sulindac)  , Bextra (Valdecoxib), ultracet, percocet 5/325 tablets [ what works best ], HYDROcodone (VICODIN) , tramadol (Ultram) , rofecoxib (Vioxx) , lactose , darvocet n-100 tablets , ropinirole [ Requip ]  , pramipexole (MIRAPEX)      All the others are either ineffective or had side effects mostly of generalized pruritis    Relevant Medications     morphine (MS CONTIN) 15 MG CR tablet    oxyCODONE-acetaminophen (PERCOCET) 5-325 MG tablet

## 2020-07-17 NOTE — TELEPHONE ENCOUNTER
Routing refill request to provider for review/approval because:  Drug not on the [facility] ref[place]    last checked 6/17/2020    Requested Prescriptions   Pending Prescriptions Disp Refills     oxyCODONE-acetaminophen (PERCOCET) 5-325 MG tablet 90 tablet 0     Sig: Take 1 tablet by mouth 3 times daily as needed for moderate to severe pain 28 days or more between refills for controlled medications       There is no refill protocol information for this order        Beatriz Espinal RN

## 2020-07-21 NOTE — TELEPHONE ENCOUNTER
Routing message to covering providers for Dr. Knowles (back in office 7-) to place new CAM order for prolia and advise if any lab work is needed prior to injection. Emma Leach,

## 2020-07-23 NOTE — TELEPHONE ENCOUNTER
Pt called and left a message on RN hotline. He is wondering if he is due for a wellness exam? Last exam was 5/23/19.  He received a letter stating he needs an appt for further refills of his pain medication. If he needs a wellness visit, can his pain medication be discussed at that visit?    Per chart review, last wellness was done 5/23/19.   Please contact pt to assist with scheduling wellness/pain medication follow up.    Ok to leave a detailed message.    Evelyne Bethea RN  [employer]

## 2020-07-24 NOTE — TELEPHONE ENCOUNTER
Attempted to call patient but line is busy- if he calls back, please let him know that Dr. Knowles is not back until next week but we will call him once Dr. Knowles advises us further    Dr. Knowles- Patient is scheduled for annual wellness with Dr. Knowles on 8/6/2020  Does he need to be fasting?    Arelis Farmer RN

## 2020-07-24 NOTE — TELEPHONE ENCOUNTER
Reason for call:  Other   Patient called regarding (reason for call): call back  Additional comments: Patient is calling wondering if he should fast for his upcoming appointment with Dr. Knowles, and what type of fast? Please call back to discuss.    Phone number to reach patient:  Home number on file [phone] (home)    Best Time:  any    Can we leave a detailed message on this number?  YES    Travel screening: Negative

## 2020-07-27 NOTE — TELEPHONE ENCOUNTER
Please add this diagnosis     T88.7XXS - Unspecified adverse effect of drug or medicament, sequela     Paul Knowles MD

## 2020-07-27 NOTE — TELEPHONE ENCOUNTER
Atif Mora Team Pink               Hello,       This patient is scheduled for prolia.  With their insurance coverage, it follows Medicare's NGS policy.  Currently we have the DX M81.0 but this request will need an additional diagnosis to support why this patient is not appropriate for other therapies.  Below is a list of secondary diagnosis that would follow Medicare's policy.     Z92.29 - Personal history of other drug therapy       T50.995S - Adverse effect of other drugs, medicaments and biological substances, sequela   T88.7XXS - Unspecified adverse effect of drug or medicament, sequela   Z88.8 - Allergy status to other drugs, medicaments and biological substances status     N18.3 - Chronic kidney disease, stage 3 (moderate)   N18.4 - Chronic kidney disease, stage 4 (severe)   N18.5 - Chronic kidney disease, stage 5     Would a second diagnosis code apply to this patient? If so, can you please have this added to the CAM order as the secondary diagnosis?  Please let me know when this is completed and I can proceed with verifying patient's benefit for this medication.     Thank you,     Atif Mora

## 2020-07-30 NOTE — PROGRESS NOTES
Clinic Care Coordination Contact  Care Team Conversations    Clinic care coordinator received a call from patient's daughter, Dionne ([phone])    Dionne states patient is currently in [facility].  She states his cancer is getting worse and they think it is time for hospice.     Dionne wonders if they can have [facility] if he is in an [facility].  Explained that any hospice agency the patient and/or family want can be ordered by hospital MD.      Patient has not  Agreed to hospice at this time.     Dionne does not think that the patient's spouse and family can adequately care for patient at home. She feels patient will need an inpatient hospice setting.  We discussed at length reimbursement from Medicare (they do not pay for room & board for inpatient). Patient does have a long term care policy. Advised Dionne to check the benefits of the plan as they may pay for the room and board if patient goes inpatient hospice.     Advised Dionne that whoever is with patient at hospital (today was her sister Samanta) they need to speak with oncologist and  to express their wishes.     In chart review, it appears that patient may be discharged on 8/1/20.    Also advised Dionne that there is not a copy of patient's Health Care Directives in his chart. She states he does have this. Advised they provide a copy to the hospital and clinic.    RN Clinic care coordinator will re-open and follow up with patient at time of discharge.     Venus Henriquez RN, [facility] - Primary Care Clinic RN Coordinator  [facility]   7/30/2020    3:49 PM  [phone]

## 2020-08-03 NOTE — TELEPHONE ENCOUNTER
Patient was at [facility] 7/29/2020-8/1/2020 for pneumonia and abd pain  Was started on gabapentin 100 mg capsule  For diagnoses: Abdominal pain, unspecified abdominal location  Commonly known as: NEURONTIN  Take 1 capsule by mouth 2 times daily.    Patient requesting alternative med    Has annual exam scheduled with Dr. Knowles on 8/6/2020    Arelis Farmer RN

## 2020-08-03 NOTE — LETTER
[facility]  Complex Care Plan  About Me:    Patient Name:  Alexandru Crews    YOB: 1932  Age:         87 year old   [place] MRN:    [id number] Telephone Information:  Home Phone [phone]   Mobile Not on file.       Address:  [address] [place] [address] [address] [place] [address] Email address:  No e-mail address on record      Emergency Contact(s)    Name Relationship Lgl Grd Work Phone Home Phone Mobile Phone   1. ALEXANDRU CREWS* Spouse   [phone]    2. ZO MAE Daughter   [phone]            Primary language:  English     needed? No   [place] [employer] [facility]:  [phone] op. 1  Other communication barriers: Glasses  Preferred Method of Communication:  Mail  Current living arrangement: I live in a private home with spouse  Mobility Status/ Medical Equipment: Independent w/Device    Health Maintenance  Health Maintenance Reviewed:    Health Maintenance Due   Topic Date Due     URINE DRUG SCREEN  09/22/1932     PHQ-9  11/09/2019     MEDICARE ANNUAL WELLNESS VISIT  05/23/2020     FALL RISK ASSESSMENT  07/03/2020       My Access Plan  Medical Emergency 911   Primary Clinic Line [facility] - [phone]   24 Hour Appointment Line [phone] or  [phone]) (toll-free)   24 Hour Nurse Line [phone] (toll-free)   Preferred Urgent Care     [facility]  [phone]   Preferred Pharmacy [facility]     Behavioral Health Crisis Line The National Suicide Prevention Lifeline at 1-340.323.2218 or 911             My Care Team Members  Patient Care Team       Relationship Specialty Notifications Start End    Paul Knowles MD PCP - General Internal Medicine  8/9/13     Phone: [phone] Fax: [phone] [address] [address]    Paul Knowles MD Assigned PCP   7/21/13     Phone: [phone] Fax: [phone] [address] [place]  [address]    Shoshana Colmenares [facility] Pharmacist   5/29/19     Phone: [phone] [address] [address]    Venus Henriquez, SHELLEY Lead Care Coordinator Primary Care - [facility] Admissions 7/30/20     Phone: [phone]                 My Care Plans  Self Management and Treatment Plan  Goals and (Comments)  Goals        General    Functional (pt-stated)     Notes - Note created  8/3/2020 11:38 AM by Venus Henriquez RN    Goal Statement: I would like to improve my stamina    Date Goal set: 8/3/2020    Barriers: Back pain    Strengths: Motivated    Date to Achieve By: December 2020    Patient expressed understanding of goal: Yes    Action steps to achieve this goal:  1. I will use pain medication as directed  2. I will work with home therapies         Healthy Coping     Notes - Note created  8/3/2020 11:42 AM by Venus Henriquez RN    Goal Statement: I want good quality of life    Date Goal set: 8/3/2020    Barriers: Lymphoma    Strengths: Can make independent decisions     Date to Achieve By: January 2021    Patient expressed understanding of goal: Yes    Action steps to achieve this goal:  1. I will discuss palliative care/hospice care with home care and family  2. I will make Health Care Directives available to care teams  3. I will discuss options of treatment with oncologist              Action Plans on File:            Depression  Heart Failure       Advance Care Plans/Directives Type:        My Medical and Care Information  Problem List   Patient Active Problem List   Diagnosis     Family history of colon cancer     Osteopenia     HYPERLIPIDEMIA LDL GOAL <160     Vitreous condensations, od > os     Pseudophakia, Yag Caps, ou     Chronic pain syndrome     Advanced directives, counseling/discussion     BPH (benign prostatic hyperplasia)     Paroxysmal atrial fibrillation (H)     History of shingles     Chronic fatigue     Lumbar spinal stenosis     DJD (degenerative joint disease), lumbar and thoracic      Diverticulosis of large intestine     H/O cardiac pacemaker     Hypertension goal BP (blood pressure) < 140/90     Long term current use of anticoagulant therapy     Chronic nonintractable headache, unspecified headache type     Prediabetes     Chronic pain     Elevated glucose     De Quervain's disease (tenosynovitis)     Primary osteoarthritis of first carpometacarpal joint of left hand     Cubital tunnel syndrome, right     Primary osteoarthritis of right knee     History of total knee arthroplasty, left     Chronic atrial fibrillation (H)     Pacemaker     (HFpEF) heart failure with preserved ejection fraction (H)     Gastroesophageal reflux disease, esophagitis presence not specified     Age-related osteoporosis without current pathological fracture      Current Medications and Allergies:  See printed Medication Report.    Care Coordination Start Date: 6/21/2019   Frequency of Care Coordination: monthly   Form Last Updated: 08/03/2020

## 2020-08-03 NOTE — TELEPHONE ENCOUNTER
"Per patient, he did not tolerate the lyrica in the past either  He does not recall what type of reaction he had    He stated that oxycodone worked for his pain but he was taken off this (note that the hospital stopped percocet and morphine)  He also got a prescription for Dilaudid 2mg, take 0.5-1 tab Q4H PRN from the hospital  Per patient, the dilaudid was helpful when he was in the hospital but since he has been home, he has been taking full tablets but it has not been helpful  He stated that if Dr. Knowles can't think of anything else to prescribe over the phone, he will just stick with the Dilaudid until his OV appointment on 8/6/2020 (Dr. Knowles has no sooner openings for a phone visit)    Per patient, he still feels bloating with distended abd.  When he was in the hospital, \"they sent me down to get the fluid out of my abd but the lady said I didn't have fluid\"  Has taken Miralax and senna as advised by the hospital for constipation   Last BM was in the hospital (about 2 days ago)  Did have \"mushy stuff\" today but stated that he was told that \"mushy stuff is stuff trying to get out from around it (stools)\"  Please advise regarding constipation       Arelis Farmer RN          "

## 2020-08-03 NOTE — TELEPHONE ENCOUNTER
Stay with dilaudid [ hydromorphone ] until appointment     Mushy stool with constipation is suggestive of overflow- incontinence diarrhea     Only effective treatment is enema therapy . Fleets enema times 1 every 2 hours times 2 or 3 times     If this doesn't produce a large result and depending on how things go, he may need repeat emergency room evaluation       Paul Knowles MD

## 2020-08-03 NOTE — TELEPHONE ENCOUNTER
Patient notified of Provider's message as written.  Patient verbalized understanding however, he stated that he tried enema last time but it did not work and ended up going to the ED for bowel obstruction  He does not think enema would work this time  Advised him that this would be Dr. Knowles's home treatment of choice for him based on the symptoms he has reported and may be helpful this time  Advised that if not effective, he can go the ED to check for obstruction and get further treatment  He stated that the ED told him he did not have an obstruction and only treated him with oral meds   Strongly urged patient to try the enemas this time as this would be the appropriate home treatment for him at this point  Patient verbalized understanding although he stated that he may not do the enemas as recommended but he understands what has been recommended to him    Arelis Farmer RN

## 2020-08-03 NOTE — LETTER
[place] CARE COORDINATION  [address]  [place] [address]    August 3, 2020    Alexandru Crews  [address] [place] [address]      Dear Alexandru,    I am a clinic care coordinator who works with Paul Knowles MD at [facility]. I wanted to thank you for spending the time to talk with me.  Below is a description of clinic care coordination and how I can further assist you.      The clinic care coordination team is made up of a registered nurse,  and community health worker who understand the health care system. The goal of clinic care coordination is to help you manage your health and improve access to the health care system in the most efficient manner. The team can assist you in meeting your health care goals by providing education, coordinating services, strengthening the communication among your providers and supporting you with any resource needs.    Please feel free to contact me at [phone] with any questions or concerns. We are focused on providing you with the highest-quality healthcare experience possible and that all starts with you.     Sincerely,     Venus Henriquez RN, [facility] - Primary Care Clinic RN Coordinator  [facility]   8/3/2020    12:09 PM  [phone]

## 2020-08-03 NOTE — TELEPHONE ENCOUNTER
Offer a change to LYRICA (pregabalin) and if this isn't satisfactory we need a telephone MD visit to go over a lot of questions and issues . If he is ok with LYRICA (pregabalin) , reroute for dosing determining and Orders signing    Paul Knowles MD

## 2020-08-03 NOTE — TELEPHONE ENCOUNTER
Patient called RN Hotline on 8/2/2020 and left . He states he was released from the hospital and was started on gabapentin. Patient is requesting an alternative to this medication as he has previously taken this before and it made him feel like he had Alzheimer's.     Beatriz Espinal RN

## 2020-08-03 NOTE — PROGRESS NOTES
"Clinic Care Coordination Contact    Clinic Care Coordination Contact  OUTREACH    Referral Information:  Referral Source: Self-patient/Caregiver    Primary Diagnosis: Oncology  Chief Complaint   Patient presents with     Clinic Care Coordination - Post Hospital     RN      Universal Utilization: Oncology, cardiology  Clinic Utilization  Difficulty keeping appointments:: No  Compliance Concerns: No  No-Show Concerns: No  No PCP office visit in Past Year: No  Utilization    Last refreshed: 8/3/2020 11:40 AM:  Hospital Admissions 0           Last refreshed: 8/3/2020 11:40 AM:  ED Visits 0           Last refreshed: 8/3/2020 11:40 AM:  No Show Count (past year) 1              Current as of: 8/3/2020 11:40 AM          Clinical Concerns:    Current Medical Concerns:  Spoke with patient. Patient is frustrated. He states he knows no more than he did when he went to the hospital. Feels like he has more problems now that he did before.      Patient states he still has pain in his left abdomen when he takes a deep breath or moves. It is better but not gone. He states \"I feel like I am pregnanct, but they tell me I had no fluid in my belly.\"   His abdomen is not hard to the touch. He state he had a loose stool this am, but then worries that there is an obstruction and the loose stool is jut \"going around the obstruction.\"  Reassured patient that the CT scan of his abdomen did not find an obstruction, just constipation.  He is taking senna and miralax as instructed.      Patient states he does not have an appetite but is eating  (weight at discharge 114 pounds)     Patient states he does not want to take the Gabapentin he was given. He states he thinks he took this before and it made him fee \"like I had Alzheimer's\"   Patient states he did take it last night and this morning, but does have a call into PCP.    Patient states he was not able to sleep last night because he was itching all over.  This has subsided this morning. " "Denies rash or skin eruptions.     Patient complains of ongoing back pain. This is chronic. He states he uses two canes for ambulation. He refuses to use a walker stating he can't lift his foot the way he needs to with a walker.  He state he thinks shifting his weight with the canes may be making his back pain worse. He states he has a wheelchair at home but he can't maneuver it himself.     Patient states he gets short of breath with minimal exertion. He is fine at rest.      Patient states oncology is to contact him with an appointment. He does have one for September but they want to see him sooner.    Patient does not know if her would want more chemo. He states \"they stopped it before because of one blood test being too low\"  He thinks they maybe should have kept going. We discussed why the blood counts are needed and why they determine ongoing treatment.     Patient states he does not want to feel worse than he already does.  He wishes they would give him a straight answer instead  Of saying \" everyone is different.\"     Current Behavioral Concerns: Frustration - wants black and white answers.       Education Provided to patient: As above      Pain  Pain (GOAL):: Yes  Type: Chronic (>3mo)  Location of chronic pain:: BAck  Radiating: No  Progression: Unchanged  Description of pain: Nagging, Aching, Stabbing  Chronic pain severity:: 7  Limitation of routine activities due to chronic pain:: Yes  Description: Unable to perform most daily activities (chores, hobbies, social activities, driving)  Alleviating Factors: Pain Medication  Aggravating Factors: Activity    Health Maintenance Reviewed:    Health Maintenance Due   Topic Date Due     URINE DRUG SCREEN  09/22/1932     PHQ-9  11/09/2019     MEDICARE ANNUAL WELLNESS VISIT  05/23/2020     FALL RISK ASSESSMENT  07/03/2020     Clinical Pathway: None    Medication Management:  Medication reconciliation status: Medications reviewed and reconciled.  Continue " "medications without change.     Daughter set up his medications from the discharge list for him.  Patient states \"I don't know why the insurance company needs to call me and get involved with my medications.\"     Functional Status:  Dependent ADLs:: Ambulation-cane  Dependent IADLs:: Shopping, Laundry, Cooking, Cleaning  Bed or wheelchair confined:: No  Mobility Status: Independent w/Device  Fallen 2 or more times in the past year?: No  Any fall with injury in the past year?: No    Living Situation:  Current living arrangement:: I live in a private home with spouse  Type of residence:: Private home - stairs    Lifestyle & Psychosocial Needs:  Lifestyle     Physical activity     Days per week: 0 days     Minutes per session: 0 min     Stress: Not on file     Social Needs     Financial resource strain: Not very hard     Food insecurity     Worry: Never true     Inability: Never true     Transportation needs     Medical: No     Non-medical: No     Diet:: Regular  Inadequate nutrition (GOAL):: No  Tube Feeding: No  Inadequate activity/exercise (GOAL):: No  Significant changes in sleep pattern (GOAL): No  Transportation means:: Accessible car, Family  Financial/Insurance concerns (GOAL):: No  Anabaptism or spiritual beliefs that impact treatment:: No  Mental health DX:: No  Mental health management concern (GOAL):: No  Informal Support system:: Spouse, Children   Socioeconomic History     Marital status:      Spouse name: Not on file     Number of children: Not on file     Years of education: Not on file     Highest education level: Not on file     Tobacco Use     Smoking status: Former Smoker     Years: 15.00     Types: Cigarettes     Last attempt to quit: 1975     Years since quittin.1     Smokeless tobacco: Never Used   Substance and Sexual Activity     Alcohol use: Yes     Comment: 1 per week     Drug use: No     Sexual activity: Not Currently     Partners: Female      Patient and family are thinking " about palliative or hospice care.  They did speak to  at [facility] before discharge. They had a call from [facility] on 8/2/20 per chart review.       Resources and Interventions:  Current Resources: [facility]  List of home care services:: Skilled Nursing, Physicial Therapy, Occupational Therapy  Community Resources: Palliative Care, Home Care  Supplies used at home:: None  Equipment Currently Used at Home: cane, straight, wheelchair, manual    Advance Care Plan/Directive  Advanced Care Plans/Directives on file:: No    Referrals Placed: None     Goals:   Goals        General    Functional (pt-stated)     Notes - Note created  8/3/2020 11:38 AM by Venus Henriquez RN    Goal Statement: I would like to improve my stamina    Date Goal set: 8/3/2020    Barriers: Back pain    Strengths: Motivated    Date to Achieve By: December 2020    Patient expressed understanding of goal: Yes    Action steps to achieve this goal:  1. I will use pain medication as directed  2. I will work with home therapies         Healthy Coping     Notes - Note created  8/3/2020 11:42 AM by Venus Henriquez RN    Goal Statement: I want good quality of life    Date Goal set: 8/3/2020    Barriers: Lymphoma    Strengths: Can make independent decisions     Date to Achieve By: January 2021    Patient expressed understanding of goal: Yes    Action steps to achieve this goal:  1. I will discuss palliative care/hospice care with home care and family  2. I will make Health Care Directives available to care teams  3. I will discuss options of treatment with oncologist           Patient/Caregiver understanding: States understanding. He has many questions and wants concrete answers that are not always available    Outreach Frequency: monthly  Future Appointments              In 3 days Paul Knowles MD [facility] [place]    In 1 week [facility] [place]          Plan: Patient will follow up with  PCP.  Patient will participate in home care services.     Clinic care coordinator will follow up in one month.     Venus Henriquez RN, [facility] - Primary Care Clinic RN Coordinator  [facility]   8/3/2020    12:07 PM  [phone]

## 2020-08-03 NOTE — PROGRESS NOTES
ANTICOAGULATION MANAGEMENT     Patient Name:  Alexandru Crews  Date:  8/3/2020    ASSESSMENT /SUBJECTIVE:    Today's INR result of 1.8 is subtherapeutic. Goal INR of 2.0-3.0      Warfarin dose taken: Warfarin recently held and taken as instructed which may be affecting INR    Diet: No new diet changes affecting INR    Medication changes/ interactions: No new medications/supplements affecting INR    Previous INR: Subtherapeutic at hospital discharge    S/S of bleeding or thromboembolism: No    New injury or illness: Yes: hospitalized last week at [facility] for abdominal pain and fluid overload    Upcoming surgery, procedure or cardioversion: No    Additional findings: new enrolled in home care today      PLAN:    Spoke with Dasha ROSA regarding INR result and instructed:     Warfarin Dosing Instructions: Continue your current warfarin dose      1.25 mg every Tue, Thu, Sat; 2.5 mg all other days       Instructed patient to follow up no later than: Thur this week. Patient has an OV this week and would like to do the INR in clinic instead of with home care.   Lab visit scheduled, did advise that PCP may order additional labs so appointment time/type may need to be adjusted but patient is firm that he wants the INR scheduled separately by [facility] today.    Education provided: Target INR goal and significance of current INR result      Dasha verbalizes understanding and agrees to warfarin dosing plan.    Instructed to call the Anticoagulation Clinic for any changes, questions or concerns. (#[phone])        Ammy Rao RN      OBJECTIVE:  Recent labs: (last 7 days)     20   INR 2.4* 3.0* 1.8* 1.5* 1.8*         No question data found.  Anticoagulation Summary  As of 8/3/2020    INR goal:   2.0-3.0   TTR:   65.9 % (1 y)   INR used for dosin.8! (8/3/2020)   Warfarin maintenance plan:   1.25 mg (2.5 mg x 0.5) every Tue, Thu, Sat; 2.5 mg (2.5 mg x 1) all other days   Full  warfarin instructions:   1.25 mg every Tue, Thu, Sat; 2.5 mg all other days   Weekly warfarin total:   13.75 mg   Plan last modified:   Miladis Gonzalez RN (2/7/2020)   Next INR check:   8/6/2020   Priority:   Maintenance   Target end date:   Indefinite    Indications    Long term current use of anticoagulant therapy [Z79.01]  Paroxysmal atrial fibrillation (H) [I48.0]             Anticoagulation Episode Summary     INR check location:       Preferred lab:       Send INR reminders to:   JAY ISRAEL    Comments:   INR Referral Renewal request sent in 12/4/19      Anticoagulation Care Providers     Provider Role Specialty Phone number    Paul Knowles MD Referring Internal Medicine [phone]

## 2020-08-06 PROBLEM — C85.80 MARGINAL ZONE B-CELL LYMPHOMA (H): Status: ACTIVE | Noted: 2020-01-01

## 2020-08-06 NOTE — PROGRESS NOTES
Patient returned your call.  Unable to reach you.  Please give him a call when able.  Thank you.  Janice Price, RN  Anticoagulation Nurse - [facility]

## 2020-08-06 NOTE — PROGRESS NOTES
ANTICOAGULATION MANAGEMENT     Patient Name:  Alexandru Crews  Date:  8/6/2020    ASSESSMENT /SUBJECTIVE:    Today's INR result of 3.3 is supratherapeutic. Goal INR of 2.0-3.0      Warfarin dose taken: Warfarin taken as previously instructed    Diet: No new diet changes affecting INR    Medication changes/ interactions: Patient is no longer taking MS Contin, started Melatonin, benadryl, and percocet    Previous INR: Subtherapeutic     S/S of bleeding or thromboembolism: No    New injury or illness: No    Upcoming surgery, procedure or cardioversion: No    Additional findings: None      PLAN:    Spoke with Alexandru regarding INR result and instructed:     Warfarin Dosing Instructions: Hold tonight then continue your current warfarin dose of 1.25 mg Tue/Thu/Sat and 2.5 mg ROW    Instructed patient to follow up no later than: 1 week  Lab visit scheduled    Education provided: Target INR goal and significance of current INR result      Alexandru verbalizes understanding and agrees to warfarin dosing plan.    Instructed to call the Anticoagulation Clinic for any changes, questions or concerns. (#[phone])        Rose Navarrete RN      OBJECTIVE:  Recent labs: (last 7 days)     07/31/20 08/01/20 08/03/20 08/06/20  1119   INR 1.8* 1.5* 1.8* 3.30*         No question data found.  Anticoagulation Summary  As of 8/6/2020    INR goal:   2.0-3.0   TTR:   66.5 % (1 y)   INR used for dosing:   3.30! (8/6/2020)   Warfarin maintenance plan:   1.25 mg (2.5 mg x 0.5) every Tue, Thu, Sat; 2.5 mg (2.5 mg x 1) all other days   Full warfarin instructions:   8/6: Hold; Otherwise 1.25 mg every Tue, Thu, Sat; 2.5 mg all other days   Weekly warfarin total:   13.75 mg   Plan last modified:   Miladis Gonzalez RN (2/7/2020)   Next INR check:   8/14/2020   Priority:   Maintenance   Target end date:   Indefinite    Indications    Long term current use of anticoagulant therapy [Z79.01]  Paroxysmal atrial fibrillation (H) [I48.0]              Anticoagulation Episode Summary     INR check location:       Preferred lab:       Send INR reminders to:   JAY ISRAEL    Comments:   INR Referral Renewal request sent in 12/4/19      Anticoagulation Care Providers     Provider Role Specialty Phone number    Paul Knowles MD Referring Internal Medicine [phone]

## 2020-08-06 NOTE — PATIENT INSTRUCTIONS
For your sleeping I am recommending    Melatonin between 3 to 5 milligrams one hour before sleep AND  Benadryl 25 to 50 milligrams one hour before sleep     Paul Knowles MD

## 2020-08-06 NOTE — PROGRESS NOTES
SUBJECTIVE:   Alexandru Crews is a 87 year old male who presents for Preventive Visit.       Encounter for Medicare annual wellness exam  Marginal zone B-cell lymphoma (H)  Chronic heart failure with preserved ejection fraction (H)  Abdominal pain, left upper quadrant  Insomnia due to medical condition  SOB (shortness of breath)  Chronic constipation   Today's appointment is scheduled as a Wellness physical exam but this is probably not appropriate given the fact that this patient is severely ill with multiple issues and concerns including an expanding Marginal zone B-cell lymphoma and has just got out of the hospital and he's here with spouse and there's a markedly difficult and long problem list .  Sleep problems, not for days   Dyspneic - has a pending pulmonary consultation for next week with [facility]   Chronic pain medication problems , changes during his recent hospitalization, see assessment and plan section    Had severe constipation , he just went to the bathroom and had a big mess. He says his medications were stopped at the hospital. He says he doesn't know why. He's been using MiraLax / GlycoLax (polyethylene glycol) but admits that enema therapy is something outside of his capabilities to deliver at home. He's not interested right now in home health care according to my understanding     Medications changed to dilaudid [ hydromorphone ] , he thought this was working but not now. He has been on oxyCODONE (ROXICODONE) and MS-Contin (Morphine Sulfate Controlled-Release) prior    He's terribly out of breath, just from being the exam room today at the hospital not hypoxic, the pulmonary consultation is pending - next week [ Wednesday] with [facility] , with Beatriz Martin nurse practitioner     [facility] treatment [ Amelia ? ] - is ongoing. She recommended he add prune juice to MiraLax / GlycoLax (polyethylene glycol) and this  "is helping. He has a Marginal zone B-cell lymphoma . Patient not concerned about his cancer,, more concerned about his left sided abdominal pain for which he was hospitalized and no cause apparently found. It was theoretical that his worsened abdominal lymphadenopathy and splenomegaly are to blame    Are you in the first 12 months of your Medicare coverage?  No    Healthy Habits:    In general, how would you rate your overall health?  Poor    Frequency of exercise:  None    Do you usually eat at least 4 servings of fruit and vegetables a day, include whole grains    & fiber and avoid regularly eating high fat or \"junk\" foods?  Yes    Taking medications regularly:  Yes    Barriers to taking medications:  Not applicable    Medication side effects:  Not applicable    Ability to successfully perform activities of daily living:  Housework requires assistance    Home Safety:  No safety concerns identified    Hearing impairment symptoms: Hearing aids, needs to follow up.    In the past 6 months, have you been bothered by leaking of urine?  No    In general, how would you rate your overall mental or emotional health?  Excellent      PHQ-2 Total Score:    Additional concerns today:  No    Do you feel safe in your environment? Yes    Have you ever done Advance Care Planning? (For example, a Health Directive, POLST, or a discussion with a medical provider or your loved ones about your wishes): Yes, advance care planning is on file.  Fall risk  Fallen 2 or more times in the past year?: Yes  Any fall with injury in the past year?: No    Cognitive Screening   1) Repeat 3 items (Leader, Season, Table)    2) Clock draw: NORMAL  3) 3 item recall: Recalls 3 objects  Results: 3 items recalled: COGNITIVE IMPAIRMENT LESS LIKELY    Mini-CogTM Copyright ALFREDO Nolen. Licensed by the author for use in [facility]; reprinted with permission (cory@.[facility]). All rights reserved.      Do you have sleep apnea, excessive snoring or " daytime drowsiness?: no    Reviewed and updated as needed this visit by clinical staff  Tobacco  Allergies  Med Hx  Surg Hx  Fam Hx  Soc Hx        Reviewed and updated as needed this visit by Provider        Social History     Tobacco Use     Smoking status: Former Smoker     Years: 15.00     Types: Cigarettes     Last attempt to quit: 1975     Years since quittin.1     Smokeless tobacco: Never Used   Substance Use Topics     Alcohol use: Yes     Comment: 1 per week     If you drink alcohol do you typically have >3 drinks per day or >7 drinks per week? No    Alcohol Use 2017   Prescreen: >3 drinks/day or >7 drinks/week? The patient does not drink >3 drinks per day nor >7 drinks per week.   No flowsheet data found.      Current providers sharing in care for this patient include:   Patient Care Team:  Paul Knowles MD as PCP - General (Internal Medicine)  Paul Knowles MD as Assigned PCP  Shoshana Colmenares RPH as Pharmacist  Venus Henriquez RN as Lead Care Coordinator (Primary Care - CC)    The following health maintenance items are reviewed in Epic and correct as of today:  Health Maintenance   Topic Date Due     URINE DRUG SCREEN  1932     PHQ-9  2019     MEDICARE ANNUAL WELLNESS VISIT  2020     INFLUENZA VACCINE (1) 2020     EYE EXAM  2021     FALL RISK ASSESSMENT  2021     DEXA  2021     DTAP/TDAP/TD IMMUNIZATION (4 - Td) 2023     ADVANCE CARE PLANNING  2024     PNEUMOCOCCAL IMMUNIZATION 65+ HIGH/HIGHEST RISK  Completed     ZOSTER IMMUNIZATION  Completed     IPV IMMUNIZATION  Aged Out     MENINGITIS IMMUNIZATION  Aged Out     Lab work is in process  Labs reviewed in EPIC  BP Readings from Last 3 Encounters:   20 102/64   19 (!) 163/76   19 114/60    Wt Readings from Last 3 Encounters:   19 51.3 kg (113 lb)   19 51.5 kg (113 lb 9.6 oz)   19 52.6 kg (116 lb)                  Patient Active Problem List    Diagnosis     Family history of colon cancer     Osteopenia     HYPERLIPIDEMIA LDL GOAL <160     Vitreous condensations, od > os     Pseudophakia, Yag Caps, ou     Chronic pain syndrome     Advanced directives, counseling/discussion     BPH (benign prostatic hyperplasia)     Paroxysmal atrial fibrillation (H)     History of shingles     Chronic fatigue     Lumbar spinal stenosis     DJD (degenerative joint disease), lumbar and thoracic     Diverticulosis of large intestine     H/O cardiac pacemaker     Hypertension goal BP (blood pressure) < 140/90     Long term current use of anticoagulant therapy     Chronic nonintractable headache, unspecified headache type     Prediabetes     Chronic pain     Elevated glucose     De Quervain's disease (tenosynovitis)     Primary osteoarthritis of first carpometacarpal joint of left hand     Cubital tunnel syndrome, right     Primary osteoarthritis of right knee     History of total knee arthroplasty, left     Chronic atrial fibrillation (H)     Pacemaker     (HFpEF) heart failure with preserved ejection fraction (H)     Gastroesophageal reflux disease, esophagitis presence not specified     Age-related osteoporosis without current pathological fracture     Past Surgical History:   Procedure Laterality Date     APPENDECTOMY  12/2004     ARTHROSCOPY KNEE RT/LT  1/2006    Rt & Lt, Dr Arriaga     BACK SURGERY  1978 / 2003    x 3 in 1978, fusions and one surgery in 2003     C APPENDECTOMY  12/31/2004     CARPAL TUNNEL RELEASE RT/LT  three    2 on left, one on right     CATARACT IOL, RT/LT       COLONOSCOPY  1995,2000,2005     INJECT EPIDURAL CERVICAL  5/24/2011    [facility]     INJECT EPIDURAL LUMBAR  11/9/2010    [facility]     INJECT EPIDURAL LUMBAR  1/4/2011    [facility]     INJECT EPIDURAL LUMBAR  4/27/2011    [facility]     LASER YAG CAPSULOTOMY  11/2016; 12/2016    left eye; right eye     PHACOEMULSIFICATION WITH STANDARD INTRAOCULAR LENS IMPLANT   2008; 2008    right eye; left eye     RECTAL SURGERY      fissurectomy and proctoplasty     RELEASE DEQUERVAINS WRIST Right 2017    DML     RELEASE TRIGGER FINGER      right hand     SHOULDER SURGERY      X four [ right x 2 and left x 2 ][[[[[     TUNA         Social History     Tobacco Use     Smoking status: Former Smoker     Years: 15.00     Types: Cigarettes     Last attempt to quit: 1975     Years since quittin.1     Smokeless tobacco: Never Used   Substance Use Topics     Alcohol use: Yes     Comment: 1 per week     Family History   Problem Relation Age of Onset     Cancer Mother         ovarian     Ovarian Cancer Mother      Cancer - colorectal Brother      Glaucoma No family hx of      Macular Degeneration No family hx of          Current Outpatient Medications   Medication Sig Dispense Refill     Calcium Carb-Cholecalciferol (CALCIUM 600+D) 600-800 MG-UNIT TABS Take one tablet by mouth every other day       DAILY VITAMINS PO 1 tablet daily       denosumab (PROLIA) 60 MG/ML SOSY injection Inject 1 mL (60 mg) Subcutaneous once for 1 dose 1 mL 0     levocetirizine (XYZAL) 5 MG tablet Take 1 tablet (5 mg) by mouth every evening 30 tablet 3     lidocaine (LMX4) 4 % external cream Apply topically once as needed for mild pain       methyl salicylate-menthol (MEI-BUNCH) OINT ointment Apply topically as needed       metoprolol (TOPROL-XL) 25 MG 24 hr tablet Take 12.5 mg by mouth 2 times daily Take half of tablet 2 times a day.       morphine (MS CONTIN) 15 MG CR tablet Take 1 tablet (15 mg) by mouth every 12 hours 28 days or more between refills for controlled medications 60 tablet 0     oxyCODONE-acetaminophen (PERCOCET) 5-325 MG tablet Take 1 tablet by mouth 3 times daily as needed for moderate to severe pain 28 days or more between refills for controlled medications 90 tablet 0     polyethylene glycol (MIRALAX/GLYCOLAX) powder Take 17 g by mouth       triamcinolone (KENALOG) 0.1 % cream Apply  "topically 2 times daily as needed to affected area as directed. 60 g 2     VITAMIN D, CHOLECALCIFEROL, PO Take 1,000 Units by mouth 2 times daily        warfarin ANTICOAGULANT (COUMADIN) 2.5 MG tablet Take 1.25 mg (1/2 tablet) BY MOUTH ON Tue/Thu/Sat and 2.5 mg (1 tablet) all other days 66 tablet 0     Allergies   Allergen Reactions     Lactose Nausea and Vomiting     Sulindac      Upset stomach     Tramadol      Itching     Valdecoxib Itching     Vicodin [Hydrocodone-Acetaminophen]      Itching     Vioxx Itching     Rofecoxib Itching and Rash     Sulfa Drugs Rash     Muscle aches     Recent Labs   Lab Test 05/22/20  1024 08/05/19  1304 06/21/19  1321 05/23/19  1217 09/27/18  1002 06/26/18  1029 05/22/18  1052 02/20/18  1146 07/27/17  1145   A1C  --   --   --  5.4 5.5  --   --  5.3  --    LDL  --   --   --  81 92  --   --   --  116*   HDL  --   --   --  64 52  --   --   --  66   TRIG  --   --   --  64 53  --   --   --  62   ALT  --  27 29 32  --   --   --  61  --    CR 0.87 0.84 1.06 0.74 0.79  --   --  0.82 0.80   GFRESTIMATED 77 79 63 83 >90  --   --  89 >90  Non  GFR Calc     GFRESTBLACK 89 >90 73 >90 >90  --   --  >90 >90  African American GFR Calc     POTASSIUM 4.4 5.4* 4.8 4.7 4.8  --   --  4.9 4.7   TSH  --   --   --   --   --  1.56 1.37  --   --         Review of Systems  Constitutional, HEENT, cardiovascular, pulmonary, gi and gu systems are negative, except as otherwise noted.    OBJECTIVE:   /64   Pulse 105   Temp 97.6  F (36.4  C) (Oral)   Resp 18   SpO2 97%  Estimated body mass index is 21.35 kg/m  as calculated from the following:    Height as of 12/2/19: 1.549 m (5' 1\").    Weight as of 12/2/19: 51.3 kg (113 lb).  Physical Exam  GENERAL: healthy, alert and in moderate distress, frail elderly gentleman , Needs no assistance when patient gets up onto the exam table but he's very slow and unsteadiness is apparent  EYES: Eyes grossly normal to inspection, PERRL and conjunctivae " and sclerae normal  HENT: ear canals and TM's normal, nose and mouth without ulcers or lesions  NECK: no adenopathy, no asymmetry, masses, or scars and thyroid normal to palpation  RESP: lungs clear to auscultation - no rales, rhonchi or wheezes  CV: regular rate and rhythm, normal S1 S2, no S3 or S4, no murmur, click or rub, no peripheral edema and peripheral pulses strong  ABDOMEN: soft, nontender, no hepatosplenomegaly, no masses and bowel sounds normal, he has a great deal of discomfort with abdominal exam however there's no peritoneal signs or masses.  MS: very arthritic gentleman with ravages of age apparent now  SKIN: no suspicious lesions or rashes  NEURO: Normal strength and tone, mentation intact and speech normal  PSYCH: mentation appears normal, affect normal/bright    Diagnostic Test Results:  Labs reviewed in Epic    ASSESSMENT / PLAN:   (Z00.00) Encounter for Medicare annual wellness exam  (primary encounter diagnosis)  Comment: routine screening issues   Plan: see orders section of this encounter     (C85.80) Marginal zone B-cell lymphoma (H)  Comment: I emphasized his need to have follow up with [facility]   Plan: he was seen by among others, Dr. Isatu Villegas, with [facility] at [facility] and follow up is emphasized . I don't know how much treatment is an option at this point     (I50.32) Chronic heart failure with preserved ejection fraction (H)  Comment: noted as a point of historical importance   Plan: plausibly implicated with his dyspnea although there's no clinical evidence of overt congestive heart failure present today      (R10.12) Abdominal pain, left upper quadrant  Comment: complicated chronic pain condition   Plan: see discharge summary. Possibly constipation . Recommended enema therapy . Recommended follow up with oncology consultation     (G47.01) Insomnia due to medical condition  Comment: see patient after-visit summary   Plan: sedative hypnotic drug like  "zolpidem [ Ambien ] is out of the question    (R06.02) SOB (shortness of breath)  Comment: multifactorial   Plan: see pulmonary consultation next week    (K59.09) Chronic constipation  Comment: measures are in place  Plan: ongoing monitoring     With his chronic pain it wasn't clear why oxyCODONE (ROXICODONE) was stopped. MS-Contin (Morphine Sulfate Controlled-Release) was causing nausea so we     1. Removed MS-Contin (Morphine Sulfate Controlled-Release) from medication list   2. I would refill dilaudid [ hydromorphone ] and oxyCODONE (ROXICODONE) but we might wish to involve medication therapy management consult      COUNSELING:  Reviewed preventive health counseling, as reflected in patient instructions    Estimated body mass index is 21.35 kg/m  as calculated from the following:    Height as of 12/2/19: 1.549 m (5' 1\").    Weight as of 12/2/19: 51.3 kg (113 lb).         reports that he quit smoking about 45 years ago. His smoking use included cigarettes. He quit after 15.00 years of use. He has never used smokeless tobacco.      Appropriate preventive services were discussed with this patient, including applicable screening as appropriate for cardiovascular disease, diabetes, osteopenia/osteoporosis, and glaucoma.  As appropriate for age/gender, discussed screening for colorectal cancer, prostate cancer, breast cancer, and cervical cancer. Checklist reviewing preventive services available has been given to the patient.    Reviewed patients plan of care and provided an AVS. The Complex Care Plan (for patients with higher acuity and needing more deliberate coordination of services) for Alexandru meets the Care Plan requirement. This Care Plan has been established and reviewed with the Patient and spouse.    Counseling Resources:  ATP IV Guidelines  Pooled Cohorts Equation Calculator  Breast Cancer Risk Calculator  FRAX Risk Assessment  ICSI Preventive Guidelines  Dietary Guidelines for Americans, 2010  USDA's " MyPlate  ASA Prophylaxis  Lung CA Screening    Paul Knowles MD  [facility]    Identified Health Risks:

## 2020-08-06 NOTE — TELEPHONE ENCOUNTER
Patient was in today and the melatonin in the hospital did not help patient, he would like to try Ambien since his brother has tried this and works really well.       Leonila ESPOSITO CMA ([facility])

## 2020-08-06 NOTE — PROGRESS NOTES
Anticoagulation Management    Unable to reach Alexandru today.    Today's INR result of 3.3 is supratherapeutic (goal INR of 2.0-3.0).  Result received from: Clinic Lab    Follow up required to confirm warfarin dose taken and assess for changes    Left message to call [phone]      Anticoagulation clinic to follow up    Rose Navarrete RN

## 2020-08-07 NOTE — TELEPHONE ENCOUNTER
Can you please spend a minute and review this with me ?    It is my initial sense that this is simply too high of a risk    Paul Knowles MD

## 2020-08-07 NOTE — TELEPHONE ENCOUNTER
Hi Dr. Knowles,    It appears he only got a few doses of melatonin while he was inpatient, I would recommend a trial of melatonin 3-6 mg daily, 2-3 hours before bedtime x 2 weeks for true trial. Yes, I agree that Ambien is not appropriate in this patient d/t age, fall risk. Another option would be low dose doxepin 3-6 mg at bedtime (if capsule not covered, could prescribe liquid, or 10 mg at bedtime capsule).  I see diphenhydramine (Benadryl) l is also on his medication list, I would prefer melatonin or doxepin to diphenhydramine.     Thank you,  Monique Colmenares, PharmD  Medication Therapy Management Pharmacist  [phone]

## 2020-08-07 NOTE — TELEPHONE ENCOUNTER
Patient called RN Hotline and left VM. States he was in yesterday and received a medication list and unsure how long he is supposed to take all these medications for. Did not specify what medications.    OK to leave a detailed message.     Beatriz Espinal RN

## 2020-08-07 NOTE — TELEPHONE ENCOUNTER
Talked with patient about medication from yesterdays appointment, huddled with Dr. Knowles to confirm. Patient was informed of what medication to take and what not to take.     Leonila ESPOSITO CMA ([facility])

## 2020-08-10 NOTE — TELEPHONE ENCOUNTER
Dr. Knowles- Spoke with patient and spouse. They would like to know why ambien is not safe. Ward is wondering if it is due to his age or another reason? He states that he has an 88 year old relative that takes it and why is it okay for him?    Called patient. Notified him of provider's reply and medications recommended by MTM. He states that he would like writer to notify his spouse of the message. Notified his spouse, Nkechi. They want to ask why the ambien is not safe, and until that can be answered they don't know if they would like to try the melatonin or doxepin.

## 2020-08-10 NOTE — TELEPHONE ENCOUNTER
please notify patient of this information. That I sent in an prescription for DoxePIN (SINEQUAN) .    Personally I think he should STAY with the Gabapentin [Neurontin] as it is truly a safe medication and he's on a very low dosage and he SHOULD get used to this. If he insisted on a change the next one to try is LYRICA (pregabalin) but in my personal opinion the side effects he's talking about are likely worse with LYRICA (pregabalin)     Paul Knowles MD

## 2020-08-10 NOTE — TELEPHONE ENCOUNTER
Called patient and notified him of reply from provider. He verbalized understanding. He would like to try doxepin. Please advise on what dose should be sent and diagnosis. Pharmacy cued.  Pt states that he does not know if he will continue on the gabapentin. States that he is having shaking in his hands, and has symptoms that he feels a little bit screwed up, having forgetfullness. He is still taking the medication. Is there anything just as good as gabapentin that does not give me those symptoms?   States that it was prescribed when he was in the hosptial.  He takes gabapentin 100 mg capsule BID for pain in his side. Today he does not have any pain.

## 2020-08-10 NOTE — TELEPHONE ENCOUNTER
Zolpidem [ Ambien ] is a habit forming medications with addictive potential. It's a sedative hypnotic drug which I do not like to prescribe for anybody. The older you are the more dangerous this is.     I won't prescribe it as general rule of thumb for anybody past about 80.    I am so sorry. I have limits with my professional judgment, it's possible there are MDs without this level of caution.     Paul Knowles MD

## 2020-08-10 NOTE — TELEPHONE ENCOUNTER
Patient called clinic back. Notified him of provider's message. He verbalized understanding. He got a call from the pharmacy regarding the doxepin. He will stick with gabapentin for now. Advised to call us back if symptoms do not improve and continue to be bothersome.

## 2020-08-14 NOTE — PROGRESS NOTES
ANTICOAGULATION FOLLOW-UP CLINIC VISIT    Patient Name:  Alexandru Crews  Date:  8/14/2020  Contact Type:  Telephone  Reviewed dosing with Alvaro wife. Dose lowered 9 % due to coming in high consistently. Will recheck in a week and adjust if need be.  SUBJECTIVE:  Patient Findings         Clinical Outcomes     Negatives:   Major bleeding event, Thromboembolic event, Anticoagulation-related hospital admission, Anticoagulation-related ED visit, Anticoagulation-related fatality           OBJECTIVE    Recent labs: (last 7 days)     08/14/20  1108   INR 3.30*       ASSESSMENT / PLAN  INR assessment SUPRA    Recheck INR In: 1 WEEK    INR Location Clinic      Anticoagulation Summary  As of 8/14/2020    INR goal:   2.0-3.0   TTR:   66.5 % (1 y)   INR used for dosing:   3.30! (8/14/2020)   Warfarin maintenance plan:   2.5 mg (2.5 mg x 1) every Mon, Wed, Fri; 1.25 mg (2.5 mg x 0.5) all other days   Full warfarin instructions:   8/14: Hold; Otherwise 2.5 mg every Mon, Wed, Fri; 1.25 mg all other days   Weekly warfarin total:   12.5 mg   Plan last modified:   Miladis Gonzalez RN (8/14/2020)   Next INR check:   8/21/2020   Priority:   Maintenance   Target end date:   Indefinite    Indications    Long term current use of anticoagulant therapy [Z79.01]  Paroxysmal atrial fibrillation (H) [I48.0]             Anticoagulation Episode Summary     INR check location:       Preferred lab:       Send INR reminders to:   JAY ISRAEL    Comments:   INR Referral Renewal request sent in 12/4/19      Anticoagulation Care Providers     Provider Role Specialty Phone number    Paul Knowles MD Referring Internal Medicine [phone]            See the Encounter Report to view Anticoagulation Flowsheet and Dosing Calendar (Go to Encounters tab in chart review, and find the Anticoagulation Therapy Visit)      Some signs and symptoms of bleeding include: Nose bleed or cut that does not stop bleeding in 10 minutes, bleeding of the gums,  vomiting (will look like coffee grounds) or coughing up blood, unusual, easy or large areas of bruising, increased or unexpected  Vaginal bleeding or increased menstrual flow, red or black stools, red or orange urine, prolonged or severe headache, pale skin, unusual or constant tiredness.  If you have these please call 911 or seek medical care immediately.     Miladis Gonzalez RN

## 2020-08-17 NOTE — TELEPHONE ENCOUNTER
Per patient, he has swelling in BLE and abd.  Swelling goes up from feet to a little above the ankles.  Worse on the right LE than the left  Abd is distended and he feels uncomfortable.   Is on Furosemide 20 mg daily in the AM  Was seen for this issues and ascites at [facility] and had f/u with Dr. Harley on 8/6/2020  Last good BM was on 8/6/2020  Has only been having liquid and small clumps of stools since then and stated that they told him if this occurs, could be an obstruction.  He has been taking Miralax BID, Senna PRN, and prune juice  A fleece enema was recommended during phone encounter 8/3/2020 but patient refused to try stating that this did not work in the past.  He has continued to avoid fleece enema and would like to avoid ED visit.      Please advise  Patient understands that PCP is out of the office this week    Arelis Farmer RN

## 2020-08-17 NOTE — TELEPHONE ENCOUNTER
Patient notified of Provider's message as written.  Patient verbalized understanding.  He stated that he took his furosemide and elevated his LE  The edema in the leg is better now.  He does have compression stockings but stated that he does not use due to it causing an indentation mid calf and that is how far up the stockings go  Explained to him that stockings should be long enough to stop right below the knee, not mid calf.  His abd is bothering him more than the LE edema  He agreed with Ciara Landaverde NP's recommendation for the constipation but declined to schedule a visit for his edema at this point  He wants to monitor the edema for now and will schedule if symptoms worsen/persist  Advised him to go to ED if symptoms are severe of if he develops any breathing issues.  Patient verbalized understanding.    Arelis Farmer RN

## 2020-08-17 NOTE — PROGRESS NOTES
Clinic Care Coordination Contact    Follow Up Progress Note      Assessment: RN CC received a voicemail message from daughter, Dionne.     Dionne states they had an all family meeting on Sunday 8/16/2020. She states there were a lot of different understandings and ideas.     She states they were finally able to get patient to talk about what he may want. He does not want to go to a nursing home for care of any sort (including hospice)  He thinks he would be willing to have care in the home.  He has long term care insurance but does not want to use this as what ever is not used will be returned.    Dionne states her family or her mother are not able to care for patient at home if his conditions worsens and he can't care for himself to some degree.     Patient thinks he might agree to a POLST or no CPR.  Dionne stated that he might request this from care coordinator.     Dionne states patient is not happy with PCP or with his oncologist. She states he is not happy and anxious about everything. He is also having difficulties with his memory and pain.    Patient saw Dr. Villegas from oncology while inpatient and he liked her so he going to follow up with her for care.         Goals addressed this encounter:   Goals Addressed                 This Visit's Progress      Functional (pt-stated)   20%     Goal Statement: I would like to improve my stamina    Date Goal set: 8/3/2020    Barriers: Back pain    Strengths: Motivated    Date to Achieve By: December 2020    Patient expressed understanding of goal: Yes    Action steps to achieve this goal:  1. I will use pain medication as directed  2. I will work with home therapies           Healthy Coping   30%     Goal Statement: I want good quality of life    Date Goal set: 8/3/2020    Barriers: Lymphoma    Strengths: Can make independent decisions     Date to Achieve By: January 2021    Patient expressed understanding of goal: Yes    Action steps to achieve this goal:  1. I will  discuss palliative care/hospice care with home care and family  2. I will make Health Care Directives available to care teams  3. I will discuss options of treatment with oncologist         intervention/Education provided during outreach:    Outreach Frequency: monthly    Plan:   Dionne states patient wants to call RN CC himself. He does not know that she called.     Care Coordinator will follow up in one month if patient does not call.    Venus Henriquez RN, [facility] RN Coordinator  [facility]   8/17/2020    2:18 PM  [phone]

## 2020-08-17 NOTE — TELEPHONE ENCOUNTER
Please have him take senna twice daily.  If no improvement, use fleets enema.  Please have him schedule to be seen for edema.    Ciara Landaverde, CNP

## 2020-08-17 NOTE — TELEPHONE ENCOUNTER
INR at last check 08/14/2020 was 3.30  Dose 08/14: 8/14: Hold; Otherwise 2.5 mg every Mon, Wed, Fri; 1.25 mg all other days.

## 2020-08-19 NOTE — TELEPHONE ENCOUNTER
Patient calling to get refills on meds started in hospital (7/29/2020 admission and had f/u with Dr. Knowles on 8/6/2020    1. Furosemide 20mg once daily in AM- patient asking if he can increase this to BID due to continued swelling in legs  2. Senna 8.6 mg, take 2 tabs at HS (continues to have issues with constipation since discharge)  3. Gabapentin 100 mg, take 1 cap BID for abd pain (continues to have abd pain)  4. Dilaudid 2 mg, take 0.5-1 tab (1-2 mg) Q4H PRN for pain (continues to have abd pain)    Patient would also like to know if these are ongoing meds or if there is an end date to any of these meds    Arelis Farmer RN

## 2020-08-19 NOTE — TELEPHONE ENCOUNTER
Patient notified.  Patient went to get his calender to make an appointment and came back with SOB  Resting helps  OV scheduled for 8/31/2020 with Dr. Knowles  Also advised patient to let Dr. Knowles know at the visit that he is having SOB with exertion  Patient agreed    Arelis Farmer RN

## 2020-08-19 NOTE — TELEPHONE ENCOUNTER
Ok to increase furosemide to BID. Schedule follow-up appointment within 1 week - in person preferred.     Pilar Saavedra MD    Signed Prescriptions:                        Disp   Refills    gabapentin (NEURONTIN) 100 MG capsule      180 ca*0        Sig: Take 1 capsule (100 mg) by mouth 2 times daily  Authorizing Provider: PILAR SAAVEDRA    senna (SM SENNA LAXATIVE) 8.6 MG tablet    180 ta*0        Sig: Take 2 tablets by mouth At Bedtime  Authorizing Provider: PILAR SAAVEDRA    HYDROmorphone (DILAUDID) 2 MG tablet       20 tab*0        Sig: Take 0.5-1 tablets (1-2 mg) by mouth every 4 hours as           needed for pain . No further refills until           follow-up appointment  Authorizing Provider: PILAR SAAVEDRA    furosemide (LASIX) 20 MG tablet            60 tab*0        Sig: Take 1 tablet (20 mg) by mouth 2 times daily  Authorizing Provider: PILAR SAAVEDRA

## 2020-08-21 NOTE — PROGRESS NOTES
Anticoagulation Management    Unable to reach Alexandru today.    Today's INR result of 1.8 is subtherapeutic (goal INR of 2.0-3.0).  Result received from: Clinic Lab    Follow up required to add 2.5 mg back to sunday unless a dose was skipped. and follow up next week.    Some signs and symptoms of clots include: pain or tenderness in arm or leg, swelling in arm or leg, changes in skin color, or area is warm to touch, shortness or breath, trouble breathing.  Numbness or weakness especially on 1 side of the body, sudden trouble speaking or swallowing, sudden trouble seeing, sudden confusion, dizzy spells or headache.  If you have these please call 911 or seek medical care immediately.      Anticoagulation clinic to follow up    Miladis Gonzalez RN

## 2020-08-28 NOTE — PROGRESS NOTES
ANTICOAGULATION MANAGEMENT     Patient Name:  Alexandru Crews  Date:  2020    ASSESSMENT /SUBJECTIVE:    Today's INR result of 2.3 is therapeutic. Goal INR of 2.0-3.0      Warfarin dose taken: Warfarin taken as previously instructed    Diet: No new diet changes affecting INR    Medication changes/ interactions: No new medications/supplements affecting INR    Previous INR: Therapeutic     S/S of bleeding or thromboembolism: No    New injury or illness: Yes: Patient reports feeling very crummy.  Increased SOB and swelling in the feet.  Also reports that his cancer Dr suggested he get an EKG, but he isnt sure how to get that.  He is seeing PCP on Monday.    Upcoming surgery, procedure or cardioversion: No    Additional findings: Alexandru was strongly encouraged to go to the ED to be evaluated for increased SOB and swelling in the feet but declined.  Instructed Alexandru that if SOB or swelling get worse he does need to go in rather than wait until next week.        PLAN:    Spoke with Alexandru regarding INR result and instructed:     Warfarin Dosing Instructions: Continue your current warfarin dose 1.25 mg on Tues, Thurs and Sat and 2.5 mg all other days of the week.    Instructed patient to follow up no later than: 2 weeks  Lab visit scheduled    Education provided: Monitoring for bleeding signs and symptoms, Monitoring for clotting signs and symptoms and When to seek medical attention/emergency care      Alexandru verbalizes understanding and agrees to warfarin dosing plan.    Instructed to call the Anticoagulation Clinic for any changes, questions or concerns. (#[phone])        Oralia Levine [facility]      OBJECTIVE:  Recent labs: (last 7 days)     20  1418   INR 2.30*         No question data found.  Anticoagulation Summary  As of 2020    INR goal:   2.0-3.0   TTR:   68.9 % (1 y)   INR used for dosin.30 (2020)   Warfarin maintenance plan:   1.25 mg (2.5 mg x 0.5) every Tue, Thu, Sat; 2.5  mg (2.5 mg x 1) all other days   Full warfarin instructions:   1.25 mg every Tue, Thu, Sat; 2.5 mg all other days   Weekly warfarin total:   13.75 mg   No change documented:   Oralia Levine, [facility]   Plan last modified:   Miladis Gonzalez, RN (8/21/2020)   Next INR check:      Priority:   Maintenance   Target end date:   Indefinite    Indications    Long term current use of anticoagulant therapy [Z79.01]  Paroxysmal atrial fibrillation (H) [I48.0]             Anticoagulation Episode Summary     INR check location:       Preferred lab:       Send INR reminders to:   JAY ISRAEL    Comments:   INR Referral Renewal request sent in 12/4/19      Anticoagulation Care Providers     Provider Role Specialty Phone number    Paul Knowles MD Referring Internal Medicine [phone]

## 2020-08-31 NOTE — PROGRESS NOTES
Subjective     Alexandru Crews is a 87 year old male who presents to clinic today for the following health issues:    HPI     Chief Complaint   Patient presents with     Recheck Medication     dry mouth      RECHECK     follow up swelling in feet, SOB, Fatigue, Constipation        Marginal zone B-cell lymphoma (H)  Chronic atrial fibrillation (H)  Advanced directives, counseling/discussion  Abdominal pain, unspecified abdominal location  Dyspnea, unspecified type    This was a somewhat difficult appointment. I think it is possible patient is expecting or hoping more from me then is possible. He is seriously ill with cancer and has been told during his last office visit with Dr. Isatu Villegas, with [facility] from 4 days ago that further aggressive measures aren't appropriate or indicated.pt is felt to be symptomatic from a progression of his non-hodgkins lymphoma . I did obtain and review the office visit notes from [facility] group from his appointment on 8/26/2020. It is felt that his progressive cancer is the primary cause of his progressive problems of anemia, fatigue and worsening quality of life. Further chemotherapy was in fact not entirely out of the question however concerns were raised taking into account his advanced age and relative frailty. He was also felt to be too fragile for blood transfusions.     Swollen feet and legs - this is noted to be, per the oncologist, likely secondary to high salt intake in the context of underlying congestive heart failure. I think there are other considerations as well, we reviewed the recent heart evaluations with [facility] and find a good heart with no major wall motion abnormalities  Or decreased % ejection fraction or valvular heart disease. Patient is not having angina pectoralis and with my review of his most recent previous hospital discharge  There was abdominal CT scan and patient has extensive abdominal lymphadenopathy that is felt to  be a major symptomatic problem, and even to the point that this may be a cause of some or most of his dyspnea as well as other contributing factors including aging, deconditioning , and anemia. Patient has no relevant smoking history and no primary lung disease history, his wife who is along today comments that they've scheduled a further follow up apartment with a pulmonologist and they asked me what I thought of this idea, see assessment and plan section     Can't breathe, easily out of breath  Even with the least amount of activity.  mostly since last hospital discharge      I note that Dr. Isatu Villegas, with [facility] did send a new prescription for a stimulant medication to try and help patient with his fatigue although when I reviewed the current medication administration record possibly I missed this or more likely it looks like this prescription wasn't pursued ?    Finally patient brought with him a POLST (Provider Orders for Life Sustaining Treatment) form that he says he did read and this was signed initially by Dr. Villegas, I did not know that and put some of my own writing on the form so I feel his POLST (Provider Orders for Life Sustaining Treatment) form needed to be redone today , again, see assessment and plan section      Review of Systems   Constitutional, HEENT, cardiovascular, pulmonary, gi and gu systems are negative, except as otherwise noted.      Objective    Pulse 89   Temp 98.8  F (37.1  C) (Tympanic)   Resp 20   SpO2 94%   There is no height or weight on file to calculate BMI.     Wt Readings from Last 5 Encounters:   12/02/19 51.3 kg (113 lb)   09/16/19 51.5 kg (113 lb 9.6 oz)   08/05/19 52.6 kg (116 lb)   07/09/19 52.2 kg (115 lb)   07/09/19 55.3 kg (122 lb)     Patient says he is about a 112 pounds today without clothes  Physical Exam   GENERAL: this is a frail elderly gentleman who answers all questions appropriately and is alert and talkative   RESP: lungs clear to auscultation -  no rales, rhonchi or wheezes  CV: regular rate and rhythm, normal S1 S2, no S3 or S4, no murmur, click or rub, no peripheral edema and peripheral pulses strong  MS: 1+  edema to distal ankles          Assessment & Plan     Marginal zone B-cell lymphoma (H)  I felt I had no appropriate recourse but to be as honest as I can be. I reviewed the meaning of a POLST (Provider Orders for Life Sustaining Treatment) form and explained to patient and his wife that in my opinion the treatment plan that makes the most sense to me is hospice care. I am looking at an 87 years old gentleman who has extinguished aggressive measures and I am not seeing how comfort cares will overall improve his survival. I think hospice care is most approved. I did explain what hospice care is I could see patient was interested to hear this but ultimately seems noncommittal right now    Chronic atrial fibrillation (H)  Noted as a point of historical importance     Advanced directives, counseling/discussion  See new POLST (Provider Orders for Life Sustaining Treatment) form    Abdominal pain, unspecified abdominal location  For reasons not entirely clear to me his MS-Contin (Morphine Sulfate Controlled-Release) and percocet 5/325 tablets which he took for more then 12-13 years was stopped and dilaudid [ hydromorphone ] put in it's place. I explained to patient that I am uncertain why this change was made but patient says the medication is helping and so this is his new opioid pain medication plan  - HYDROmorphone (DILAUDID) 2 MG tablet; Take 1 tablet (2 mg) by mouth 4 times daily as needed for pain . No further refills until follow-up appointment    Dyspnea, unspecified type  Regarding need to see a pulmonologist I expressed that I just have no idea if there is a significant pulmonary disease but while we wait for him to be seen by the pulmonologist we can try albuterol   - albuterol (PROAIR HFA/PROVENTIL HFA/VENTOLIN HFA) 108 (90 Base) MCG/ACT  inhaler; Inhale 2 puffs into the lungs every 6 hours as needed for shortness of breath / dyspnea or wheezing       1-3 months     Return in about 3 months (around 11/30/2020).    Paul Knowles MD  [facility]

## 2020-09-02 NOTE — PROGRESS NOTES
Clinic Care Coordination Contact  [facility]/Voicemail       Clinical Data: Care Coordinator Outreach    Patient's wife, Nkechi, left voicemail for Lead Care Coordinator, Venus, SHELLEY CC requesting call back to obtain more information on hospice. She reports patient completed PCP visit earlier this week and hospice was discussed.  Requested call back as she has more questions.    Outreach attempted x 1.  Left message on patient's home voicemail with call back information and requested return call.    Plan: Covering Care Coordinator will remain available if patient/wife return call this week; otherwise will defer to Lead RN CC to follow up as appropriate.    JYOTSNA AlbertsN, RN   [facility]  - Clinic Care Coordinator  Phone: [phone]

## 2020-09-03 NOTE — TELEPHONE ENCOUNTER
RN CC left voicemail for patient/Nkechi that referral has been signed and to anticipate call from [facility].  RN CC reviewed HomeCare Advisor and can see pending referral which confirms [facility] is processing the referral.    MARIO Alberts, RN   [facility]  - Clinic Care Coordinator  Phone: [phone]

## 2020-09-03 NOTE — PROGRESS NOTES
Clinic Care Coordination Contact    Follow Up Progress Note      Assessment: RN CC received call back from patient and his wife Nkechi. Both were on the phone and desired basic information about Palliative care vs hospice.  RN CC answered within scope of proactive and answered their questions to satisfaction. We discussed option to pursue home referral- either for palliative care and hospice.  Choice of agency was also discussed.      Of note, patient had skilled home care referral in early August which was sent to [facility] but then was deferred to [facility]. Patient tells writer that he was not happy with initial visit and does not have any current home care services.    Goals        General    Functional (pt-stated)     Notes - Note created  8/3/2020 11:38 AM by Venus Henriquez RN    Goal Statement: I would like to improve my stamina    Date Goal set: 8/3/2020    Barriers: Back pain    Strengths: Motivated    Date to Achieve By: December 2020    Patient expressed understanding of goal: Yes    Action steps to achieve this goal:  1. I will use pain medication as directed  2. I will work with home therapies         Healthy Coping     Notes - Note created  8/3/2020 11:42 AM by Venus Henriquez RN    Goal Statement: I want good quality of life    Date Goal set: 8/3/2020    Barriers: Lymphoma    Strengths: Can make independent decisions     Date to Achieve By: January 2021    Patient expressed understanding of goal: Yes    Action steps to achieve this goal:  1. I will discuss palliative care/hospice care with home care and family  2. I will make Health Care Directives available to care teams  3. I will discuss options of treatment with oncologist               Intervention/Education provided during outreach: telephone encounter routed to PCP to request home care referral     Plan:   Writer will follow home care referral request to ensure initial outreach by home care is completed to patient/family.   Patient affirms he appreciates CCC involvement and that Venus Henriquez, Lead RN CC will remain available and continue to follow up for outreaches.  Patient has writer's contact information for any questions that arise this week for clinic care coordinator. He has Venus' contact information as primary.    JYOTSNA AlbertsN, RN   [facility]  - Clinic Care Coordinator  Phone: [phone]

## 2020-09-03 NOTE — TELEPHONE ENCOUNTER
Background: Patient had recent OV with PCP on 8/31/20. Goals of care were discussed including hospice, but patient was not ready for hospice.    Situation: Patient and his wife spoke to writer who is covering for Venus Henriquez RN Care Coordinator this week.  They had questions regarding palliative vs hospice care and writer helped to provide brief overview of both.  We discussed home care vs hospice referral and Alexandru would like to begin with a palliative home care referral through [facility].      Intervention: Referral is queued for provider to review.    Plan: Routing to provider. Please reply to sender with any questions or once referral is signed. I will then provide warm handoff to [facility].    Thank you kindly!  MARIO Alberts, RN   [facility]  - Clinic Care Coordinator  Phone: [phone]

## 2020-09-04 NOTE — PROGRESS NOTES
Clinic Care Coordination Contact  Care Team Conversations    Received voicemail from Dionne, pt's daughter. She spoke to pt and Nkechi and they are overwhelmed. Dionne is a nurse who has worked in oncology for several years. Both patient and Nkechi gave verbal permission to speak with Dionne.      RN CC spoke to Dionne.  She had several questions mostly trying to clarifying why kind of home care referral was initiated yesterday.  She understands the coordination of an actual home care evaluation by RN is within the agency.  RN CC provided her with [facility] main number as Dionne wants to help her parents by facilitating that home visit.  Dionne added that all four daughters feel patient is appropriate for hospice.  Patient has made strides towards agreeing to that mindset but is not quite there per Dionne.  She had questions about what to do or who to call over the weekend if his health declines. We discussed FNA number as well as ED evaluation if it's serious changes or concerns.  Home care vs hospice team would become involved if and when patient was evaluated and agreed to enrollment in either palliative home care (which is pending SOC) or hospice (if patient verbalizes agreement to in the future).    Plan:  Patient and family will coordinate initiation of home care evaluation with [facility].    Lead RN CC will continue to follow.      MARIO Alberts, RN   [facility]  - Clinic Care Coordinator  Phone: [phone]

## 2020-09-08 NOTE — TELEPHONE ENCOUNTER
Patient calling stating he spoke with the pharmacist this weekend and decided to stop taking the Gabapentin that was started on 8/19/20. Since starting he has had side effects such has SOB, dark urine, shakiness, memory issues, fatigue, and dry mouth.  He has been SOB since his cancer diagnosis and this has not worsened.  He stopped taking Gabapentin on 9/5/20 and most of the symptoms has resolved, SOB is baseline now.  Removed medication from med list.   Josselyn Corona RN

## 2020-09-11 NOTE — PROGRESS NOTES
"ANTICOAGULATION MANAGEMENT     Patient Name:  Alexandru Crews  Date:  2020    ASSESSMENT /SUBJECTIVE:    Today's INR result of 2.5 is therapeutic. Goal INR of 2.0-3.0      Warfarin dose taken: Warfarin taken as previously instructed    Diet: No new diet changes affecting INR    Medication changes/ interactions: No new medications/supplements affecting INR    Previous INR: Therapeutic     S/S of bleeding or thromboembolism: No    New injury or illness: No    Upcoming surgery, procedure or cardioversion: No  Additional findings: Swelling in feet is \"about the same\" and patient has been \"coughing up bloody tinged sputum, he's very dry\" and patient is still denying to be seen by provider as advised by [facility] clinic. Patient hospice nurse reports that family states this \"is normal for him, it comes and then clears up\" Educated to monitor and if it gets worse, starts coughing dark red or coffee colored blood or clots to be seen right away. If it doesn't clear up by Monday, advised patient follow up with pulmonology on Monday.  Will send to PCP to followup if needed as well.    PLAN:    Spoke with siddhartha Ramirez, regarding INR result and instructed:     Warfarin Dosing Instructions: Continue your current warfarin dose 1.25 mg on tues/thu/sat and 2.5 mg all other days    Instructed patient to follow up no later than: 1 week  Orders given to  Homecare nurse/facility to recheck    Education provided: Monitoring for bleeding signs and symptoms and When to seek medical attention/emergency care      siddhartha Ramirez nurse, verbalizes understanding and agrees to warfarin dosing plan.    Instructed to call the Anticoagulation Clinic for any changes, questions or concerns. (#[phone])        Azeb Clark RN      OBJECTIVE:  Recent labs: (last 7 days)     20   INR 2.5*         No question data found.  Anticoagulation Summary  As of 2020    INR goal:   2.0-3.0   TTR:   72.1 % (1 y)   INR used for dosin.5 " (9/11/2020)   Warfarin maintenance plan:   1.25 mg (2.5 mg x 0.5) every Tue, Thu, Sat; 2.5 mg (2.5 mg x 1) all other days   Full warfarin instructions:   1.25 mg every Tue, Thu, Sat; 2.5 mg all other days   Weekly warfarin total:   13.75 mg   Plan last modified:   Miladis Gonzalez RN (8/21/2020)   Next INR check:   9/25/2020   Priority:   Maintenance   Target end date:   Indefinite    Indications    Long term current use of anticoagulant therapy [Z79.01]  Paroxysmal atrial fibrillation (H) [I48.0]             Anticoagulation Episode Summary     INR check location:       Preferred lab:       Send INR reminders to:   JAY ISRAEL    Comments:   INR Referral Renewal request sent in 12/4/19      Anticoagulation Care Providers     Provider Role Specialty Phone number    Paul Knowles MD Referring Internal Medicine [phone]

## 2020-09-14 NOTE — PROGRESS NOTES
Clinic Care Coordination Contact    Follow Up Progress Note      Assessment: Outreach to patient and spouse.     Spouse states that patient is very tired and weak. He has no stamina. She states he spends most of his time in bed.     He continues to be short of breath. She states they got a call from the oxygen company stating they were bringing him oxygen. They did not know about this so patient refused the oxygen. She states he now things maybe he could use it.  Encouraged spouse to call [facility] hospice nurse and tell them he changed his mind and thinks he would now benefit from the oxygen.  She agrees to do this.     Patient and spouse have agreed to hospice at this time. Discussed with Nkechi(spouse) to use the services offered. She states between patient and covid-19 she has not been out of the house since March. She states her daughter would like to take her out. Discussed that hospice has volunteers that could come and sit with patient so she could get out for a bit.      Goals addressed this encounter:  Failing from terminal conition  Goals Addressed                 This Visit's Progress      Functional (pt-stated)   10%     Goal Statement: I would like to improve my stamina    Date Goal set: 8/3/2020    Barriers: Back pain    Strengths: Motivated    Date to Achieve By: December 2020    Patient expressed understanding of goal: Yes    Action steps to achieve this goal:  1. I will use pain medication as directed  2. I will work with home therapies           Healthy Coping   40%     Goal Statement: I want good quality of life    Date Goal set: 8/3/2020    Barriers: Lymphoma    Strengths: Can make independent decisions     Date to Achieve By: January 2021    Patient expressed understanding of goal: Yes    Action steps to achieve this goal:  1. I will discuss palliative care/hospice care with home care and family  2. I will make Health Care Directives available to care teams  3. I will discuss options of treatment  with oncologist         Intervention/Education provided during outreach: As above     Plan:   Spouse will utilize hospice services.      Care Coordinator will follow up in one month. Due to long standing family interactions and request  RN care coordinator will stay in touch even though patient is in hospice.     Venus Henriquez RN, [facility] - Primary Care Clinic RN Coordinator  [facility]   9/14/2020    11:57 AM  [phone]

## 2020-09-14 NOTE — TELEPHONE ENCOUNTER
Prescription approved per [facility] Refill Protocol.  Rabia Ott RN

## 2020-09-18 NOTE — PROGRESS NOTES
ANTICOAGULATION MANAGEMENT     Patient Name:  Alexandru Crews  Date:  2020    ASSESSMENT /SUBJECTIVE:    Today's INR result of 2.5 is therapeutic. Goal INR of 2.0-3.0      Warfarin dose taken: Warfarin taken as previously instructed    Diet: No new diet changes affecting INR    Medication changes/ interactions: No new medications/supplements affecting INR    Previous INR: Therapeutic     S/S of bleeding or thromboembolism: No    New injury or illness: Yes: hospice reports > in dyspnea, will be starting O2    Upcoming surgery, procedure or cardioversion: No    Additional findings: None      PLAN:    Spoke with Anne-Marie CARIAS RN regarding INR result and instructed:     Warfarin Dosing Instructions: Continue your current warfarin dose 1.25 mg TTHSat; 2.5 mg ROW    Instructed patient to follow up no later than: 1 week  Orders given to  Homecare nurse/facility to recheck    Education provided: None required      Anne-Marie verbalizes understanding and agrees to warfarin dosing plan.    Instructed to call the Anticoagulation Clinic for any changes, questions or concerns. (#[phone])        Alexandra Pope, SHELLEY      OBJECTIVE:  Recent labs: (last 7 days)     20   INR 2.5*         INR assessment THER    Recheck INR In: 1 WEEK    INR Location Homecare INR      Anticoagulation Summary  As of 2020    INR goal:   2.0-3.0   TTR:   72.1 % (1 y)   INR used for dosin.5 (2020)   Warfarin maintenance plan:   1.25 mg (2.5 mg x 0.5) every Tue, Thu, Sat; 2.5 mg (2.5 mg x 1) all other days   Full warfarin instructions:   1.25 mg every Tue, Thu, Sat; 2.5 mg all other days   Weekly warfarin total:   13.75 mg   No change documented:   Kelsea Garcia RN   Plan last modified:   Miladis Gonzalez RN (2020)   Next INR check:   2020   Priority:   Maintenance   Target end date:   Indefinite    Indications    Long term current use of anticoagulant therapy [Z79.01]  Paroxysmal atrial fibrillation (H)  [I48.0]             Anticoagulation Episode Summary     INR check location:       Preferred lab:       Send INR reminders to:   JAY ISRAEL    Comments:   INR Referral Renewal request sent in 12/4/19      Anticoagulation Care Providers     Provider Role Specialty Phone number    Paul Knowles MD Referring Internal Medicine [phone]

## 2020-09-28 NOTE — PROGRESS NOTES
ANTICOAGULATION  MANAGEMENT    Alexandru Crews is being discharged from the [facility] ([facility]).    Reason for discharge: warfarin therapy completed. Per Ashley with [facility] patient is no longer taking warfarin per hospice provider recommendation.    Anticoagulation episode resolved, [facility] referral closed and INR Standing order discontinued    If patient needs warfarin management in the future, please send a new referral      Donald HELLER RN, CACP

## 2020-09-29 NOTE — PROGRESS NOTES
Hospice physician visit  Location: This visit is made via video chat using "[employer] technology.  The patient is in his home, I am in my office in [place].  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  He was admitted on 9/10/2020.  He has multiple complaints.  He feels itchy all over, using Benadryl 25 mg at bedtime in addition to topical steroids with minimal effect.  He has several different types of rashes on his body.  His daughter, who is a former oncology nurse, notes a petechial rash on his legs.  He also has a blanching erythematous and purpuric rash on his inner right knee with a small area on the inner left knee and left ankle.  He has a raised erythematous rash over his back and flank.  He does not think these lesions itch specifically he just feels itchy all over.  In addition he has a number of concerns about the medications he is taking.  In general he feels like he takes too many pills but also is tightly adherent to a regimen of vitamins that he finds difficult to discontinue.  He has 3+ lower extremity edema extending into the scrotum and penis, increase in Lasix from 20 twice daily to 40 twice daily did not improve swelling at all.  He has had a tremor at rest intermittently in both of his hands.  He was prescribed gabapentin which affected his memory and made him feel like he had worsening tremor.  This was stopped several weeks ago and memory improved but tremor did not.  His family notes irritability and anxiety, lifelong difficulty sleeping.  He does take doxepin 6 mg at bedtime along with the Benadryl but often sleeps only several hours before getting up.  He is easily fatigued, wants to lay back in bed after being up for 15 minutes, sleeping 12 to 18  hours/day.  He has been on warfarin for atrial fibrillation but had increasing petechia and bloody sputum for 1 day, warfarin has been DC'd  He denies pain, nausea.  He has regular bowel movements without diarrhea or constipation.  PMH: Distant history of peptic ulcer disease, osteoporosis and osteopenia, degenerative joint disease, BPH.  PSH: Appendectomy, bilateral knee arthroscopies, back surgery x2, carpal tunnel surgery x2 in the left 1 on the right, bilateral cataracts, multiple cervical and epidural steroid injections, 4 shoulder surgeries on the right 2 on the left, trigger finger release and de Quervain's release on the right.  Social history: Lives with his wife, adult daughter is helping to care for him.  Former smoker, 15-pack-year history.  12 point review systems: Negative except as per HPI.    Physical exam: Done with assistance from hospice nurse  Awake, alert, fatigued, anxious appearing man who appears thin in no distress.  /60, pulse 22, sats 94% on room air, temp 97.8  Skin: Just above the right medial knee there is a 4 cm irregular area of raised confluent erythema with purpuric center, partially blanching.  There are rounded serpiginous appearing raised red lesions over the left flank and right hip.  These also appear over the bilateral ankles and left medial knee.  No dermatographia is him extremities: 2-3+ pitting edema bilateral legs    Most recent labs show white count 2.3, hemoglobin 10.5, platelets 69,000.  Creatinine 0.92 with GFR greater than 60    Assessment: Patient is an 88-year-old man with advanced B-cell lymphoma with multiple symptoms including rash, edema, fatigue, insomnia, irritability.  His prognosis is poor, estimated less than 6 months given his advanced cancer with functional decline and progressive symptoms.    Plan  1.  Rash: Suspect this is a direct relationship to cancer.  Continue to use Benadryl 25 mg every 6 hours as needed for itching.  Cautioned this may  make him fatigued.  Trial of dexamethasone 4 mg once daily to see if this improves symptoms.  Reviewed with daughter that I do not see petechiae on today's exam.  If dexamethasone effective, add omeprazole and continue current dose  2.  Edema: Suspect multifactorial due to nutritional decline, congestive heart failure, position changes.  Patient does complain of dyspnea and may be fluid overloaded.  Because increase in diuretic (Lasix) was not effective, add metolazone 2.5 mg daily and potassium 20 mEq daily x3 days.  Monitor for dizziness and hypotension.  3.  Lifelong anxiety, irritability, insomnia, now worse due to chronic illness.  Patient cannot tolerate multiple medication changes in 1 day.  Reviewed epic chart.  Would recommend trial of sertraline 25 mg increasing to 50 after 1 week to help with symptoms.  Plan to start later this week or early next week.    Evelyne Astudillo MD  Medical Director  [facility]

## 2020-10-02 NOTE — PROGRESS NOTES
"Clinic Care Coordination Contact    Follow Up Progress Note      Assessment: Outreach to patient and family. Spoke to two of his daughters, Symone and Dionne.    \"things are rough right now\"  Patient has fallen three times in last 36 hours. He is needing to urinate at least every two hours. He is now sitting on the edge of the bed using a urinal but that does not always work. His legs are too weak for him to stand any more.     Dionne states that patient is very lucid and stubborn. She states his mind is fine, but his body is failing him.     He is not short of breath. Is is having some trouble speaking or communicating. Intake is sporadic. Last evening he ate a lot.     Family does now have hospice care with [facility]. Dionne states they have been told that they can not supply an eight hour shift or even four hours.  Family is trying to provide care. Dionne has been with patient 24/7 for several days and plans to continue. There are four girls, so they will take turns as needed.       Goals addressed this encounter: There will be no improvement.   Goals Addressed                 This Visit's Progress      Functional (pt-stated)   0%     Goal Statement: I would like to improve my stamina    Date Goal set: 8/3/2020    Barriers: Back pain    Strengths: Motivated    Date to Achieve By: December 2020    Patient expressed understanding of goal: Yes    Action steps to achieve this goal:  1. I will use pain medication as directed  2. I will work with home therapies           Intervention/Education provided during outreach: Encouragement     Outreach Frequency: monthly    Plan:   Family will utilize available hospice services    Care Coordinator will follow up in 2-3 weeks.    Venus Henriquez RN, [facility] - Primary Care Clinic RN Coordinator  [facility]   10/2/2020    11:48 AM  [phone]  "

## 2020-10-19 NOTE — PROGRESS NOTES
Clinic Care Coordination Contact    Follow Up Progress Note      Assessment: Patient is currently enrolled in [facility].     Family is rotating to provide care at home.     Goals addressed this encounter:   Goals Addressed                 This Visit's Progress      Functional (pt-stated)   On Hold     Goal Statement: I would like to improve my stamina    Date Goal set: 8/3/2020    Barriers: Back pain    Strengths: Motivated    Date to Achieve By: December 2020    Patient expressed understanding of goal: Yes    Action steps to achieve this goal:  1. I will use pain medication as directed  2. I will work with home therapies           Outreach Frequency: monthly    Plan:   Remain available to family for questions or concerns.   Care Coordinator will follow up in one month to monitor status.    Venus Henriquez RN, [facility] - Primary Care Clinic RN Coordinator  [facility]   10/19/2020    1:17 PM  [phone]

## 2020-10-22 NOTE — PROGRESS NOTES
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  He was admitted on 9/10/2020, and last seen by video visit with me at home on 9-28-20.  At that time, he had multiple complaints, including pruritis with rash, severe leg edema extending into the scrotum, rest tremor, irritability, bloody sputum and petechia, and insomnia.   Since our last visit he has significantly improved in terms of symptoms. Itching and rash are gone with low dose dexamethasone, 4 mg daily. His appetite has improved. Leg edema was very responsive to addition of metolazone low dose for 3 days and is now gone; he is back on his daily dose of lasix. His tremor has resolved. We stopped his coumadin and he has had no further bloody sputum or petechia. Insomnia has improved with adding lorazepam at hs (0.5-0.75 mg); he has continued his doxepin. Family describes him as having life-long negativity, but he denies depression or anxiety today and says his mood is just fine.  He did have a fall after my last visit with a head strike, and has a large bruise on his forehead, slowly resolving. He is very fatigued, gets tired easily and does not feel like sleep is restorative.  His daughter is still staying with them, assissting with SBA for ambulation and toileting.    PMH: Distant history of peptic ulcer disease, osteoporosis and osteopenia, degenerative joint disease, BPH.  PSH: Appendectomy, bilateral knee arthroscopies, back surgery x2, carpal tunnel surgery x2 in the left 1 on the right, bilateral cataracts, multiple cervical and epidural steroid injections, 4 shoulder surgeries on the right 2 on the left, trigger finger release and de Quervain's release on the right.  Social  history: Lives with his wife, adult daughter is helping to care for him.  Former smoker, 15-pack-year history.  12 point review systems: Negative except as per HPI. Patient denies sob, pain, nausea, vomiting, cough.  Medications reviewed in detail with the nurse    Physical exam:    Awake and alert, conversational. Tearful when we talk about his decline. Visibly short of breath when changing position. Thin and frail.  Chest: decreased BS in bases, otw clear. Heart: irregularly irregular with distant tones  Ab: soft, mildly distended, + BS. Ext: no edema. Skin: large, healing bruise in center of forehead. No rash or petechia. Neuro: equal , ambulates with holding on to furniture to commode.    Most recent labs show white count 2.3, hemoglobin 10.5, platelets 69,000.  Creatinine 0.92 with GFR greater than 60    Assessment: Patient is an 88-year-old man with advanced B-cell lymphoma with multiple symptoms, now improved. Ongoing fatigue, flat affect. His prognosis is poor, estimated less than 6 months given his advanced cancer with functional decline and progressive symptoms.    Plan  Spent a total of 60 minutes with the patient, more then 1/2 spent discussing signs of decline, management of symptoms (how to manage if edema returns, how to best monitor for decline, discussion of fatigue and mood. Patient does not want to take antidepressants. Discussed that MVI, calcium and vitamin D are of limited benefit. No recommended medication changes. Watch his blood pressure at visits and cut back on antihypertensive medications if running low or has further dizziness.       Evelyne Astudillo MD  Medical Director  [facility]

## 2020-11-02 NOTE — PROGRESS NOTES
"To PCP:    TORY: from hospice nurse, patient is reporting \"Swelling in feet is \"about the same\" and patient has been \"coughing up bloody tinged sputum, he's very dry\" and patient is still denying to be seen by provider as advised by [facility] clinic. Patient hospice nurse reports that family states this \"is normal for him, it comes and then clears up\" Educated to monitor and if it gets worse, starts coughing dark red or coffee colored blood or clots to be seen right away. If it doesn't clear up by Monday, advised patient follow up with pulmonology on Monday.\"     Please followup with patient if needed.    Thanks,  Azeb Clark RN    " Appointment canceled for Zoila Alcaraz ([id number])  Visit Type: FOLLOW-UP VISIT  Date        Time      Length    Provider                  Department  11/5/2020    2:20 PM  20 mins.  Shanda Zheng MD      [facility]     Reason for Cancellation: Too sick to Come in     Patient Comments: Just had surgery

## 2020-11-17 NOTE — PROGRESS NOTES
Clinic Care Coordination Contact    Follow Up Progress Note      Assessment: Patient is enrolled in [facility] program.     Goals addressed this encounter:   Goals Addressed                 This Visit's Progress      COMPLETED: Functional (pt-stated)        Goal Statement: I would like to improve my stamina    Date Goal set: 8/3/2020    Barriers: Back pain    Strengths: Motivated    Date to Achieve By: December 2020    Patient expressed understanding of goal: Yes    Action steps to achieve this goal:  1. I will use pain medication as directed  2. I will work with home therapies           Care Coordinator will close to active care coordination at this time     Venus Henriquez RN, [facility] - Primary Care Clinic RN Coordinator  [facility]   11/17/2020    1:47 PM  [phone]

## 2020-11-23 NOTE — TELEPHONE ENCOUNTER
Called to schedule MTM appt, no answer, LVM.    Monique Colmenares, PharmD  Medication Therapy Management Pharmacist  [phone]

## 2020-11-25 NOTE — TELEPHONE ENCOUNTER
Alexandru returned phone call, is now on hospice, is not interested in MTM, will stop reaching out.    Monique Colmenares, PharmD  Medication Therapy Management Pharmacist  [phone]

## 2020-12-01 NOTE — TELEPHONE ENCOUNTER
Called patient and notified him of message from provider. He was in agreement with stopping the Prolia. Discontinued Prolia from MAR/CAM orders.

## 2020-12-01 NOTE — TELEPHONE ENCOUNTER
Routing to Dr. Knowles to place new CAM order for prolia and also advise if any labs are needed prior to injection and to place orders if needed. Emma Leach,

## 2020-12-01 NOTE — TELEPHONE ENCOUNTER
In my opinion Denosumab injection (Prolia) should be stopped. The benefits of this kind of therapy take 3-5 years to see and pt simply doesn't have that kind of time with his current hospice care situation      Paul Knowles MD

## 2020-12-30 NOTE — PROGRESS NOTES
Dear Dr. Knowles,    We are notifying you of a  Hospice Death.  Alexandru Crews; MRN [id number]   peacefully On date 20  Time 4:10 AM  Sincerely [facility]  Pippa Montez  [phone]

## 2021-04-24 NOTE — TELEPHONE ENCOUNTER
Called to follow-up on retrial of levocetirizine. No answer, LVM.    Monique Colmenares, PharmD  Medication Therapy Management Pharmacist  [phone]     while walking up stairs.

## 2021-06-21 NOTE — TELEPHONE ENCOUNTER
Spoke to patient and scheduled an INR appt for 9/27. Patient to continue current coumadin dosing.    Janice Price, RN  Anticoagulation Nurse - [facility], [place]     50

## 2022-02-17 PROBLEM — K21.9 GASTROESOPHAGEAL REFLUX DISEASE: Status: ACTIVE | Noted: 2020-01-01

## 2023-04-21 NOTE — TELEPHONE ENCOUNTER
No answer, LVM to schedule MTM appt (in person or phone visit covered).    Monique Colmenares, PharmD  Medication Therapy Management Pharmacist  [phone]     Yes

## 2024-07-30 NOTE — TELEPHONE ENCOUNTER
Pt called primary care RN hotline stating that he got a call from an INR nurse with instructions to continue on his current dose. He states that he used to take 1.25 mg on Sunday's. His dose was increased to 2.5 mg on Sunday on 02/09/2020, and he is wondering if he should continue at 2.5 mg on Sunday or go back to 1.25 mg on Sunday? He can be reached at [phone] and a detailed VM can be left.    [Current] : current [>= 20 pack years] : >= 20 pack years [Stable] : are stable [None] : ~He/She~ has no significant interval events [Difficulty Breathing During Exertion] : stable dyspnea on exertion [Feelings Of Weakness On Exertion] : stable exercise intolerance [Cough] : denies coughing [Wheezing] : denies wheezing [Regional Soft Tissue Swelling Both Lower Extremities] : denies lower extremity edema [Chest Pain Or Discomfort] : denies chest pain [Fever] : denies fever [Date: ___] : was performed [unfilled] [Wt Gain ___ Lbs] : no recent weight gain [Wt Loss ___ Lbs] : no recent weight loss [Oxygen] : the patient uses no supplemental oxygen [de-identified] : subcm pulmonary nodules/ apical scar [FreeTextEntry1] :   self d/;d BREZTRI Dry Mouth pos taobacco but dec  pos  chest  congestion better post tx  difficult to  clear secretions  yellow sputum  no  fever chills  sweats has  used HFN with DUONEB  QID   side  effects with Chantix  Did  get Nicotine  patch which has  helped from 1 ppd to  1/2  ppd over past  3  weeks in past did use Wellbutrin with some mild success but now actively smoking 1/2 ppd No Active respiratory symptoms but notes some inc dyspnea primarily stairs Continues to smoke pos 6-7 cigs per day Addressed all questions regarding immunization profile No fevers chills or sweats No lower extremity edema Denies chest pain chest tightness wheezing sputum production or hemoptysis

## 2024-08-07 NOTE — PATIENT INSTRUCTIONS
DExcom and Insulins approved, pt notified   [facility]:  Please call [phone] to cancel and/or reschedule your appointment   Please call [phone] with any problems or questions regarding your Warfarin therapy.    Call with date of your gallbladder surgery.

## 2024-10-31 NOTE — TELEPHONE ENCOUNTER
1. Yes the medications can cause constipation   2. Nevertheless he can take the medication   3. If a new prescription is needed please reroute     Paul Knowles MD    
Left message on answering machine for patient to call back to the RN hotline at [phone].    Evelyne Bethea RN  [facility]    
Patient called back  He is on morphine and percocet.  He is wondering if he could still take percocet  He is concerned it may cause constipation and blockage but needs the pain med    Ok to leave detailed message on his VM    Arelis Ferrara RN    
Patient had a colon blockage recently and feels like he is having similar symptoms again.  He has been taking Miralax daily with 8oz of water- he noted prescription said take for 14 days and he has been taking it longer than that.  He feels like is abdomen is distending and has cramping today.  When he does have bowel movement he has liquid stools- he is wondering if he has a blockage and is bypassing the stool which is why he has liquids stools now?  He has another cancer treatment tomorrow and wondering what to do?   Josselyn Corona RN     
Patient notified of Provider's message as written.  Patient verbalized understanding.    Arelis Ferrara RN  
Patient returned call to RN Hotline.  Spoke with patient & notified of provider's results as written.   Verbalizes understanding & no further questions.     Beatriz Espinal RN  
Pt called RN hotline. He is concerned about using the enema. States he is taking Miralax and enema says not to use with other laxatives. Advised that Dr. Knowles was made aware he has been taking Miralax, so this is ok. He said he is restricting his sodium intake and it says to not take if on sodium restrictions. Advised that his sodium level was normal so this should be ok. It also says to only use 1 in 24 hours and provider said he could use up to 3. Advised that this was provider's recommendation. Pt states he will try the enema.    Evelyne Beteha RN  [facility]    
This kind of history isn't absolutely specific for fecal impaction but it is possible, of course.    The treatment for fecal impaction is enema therapy. Typically we try a fleets enema times 1 and this can be repeated at 1-2 hour time intervals out to a total of 3 times. If this still has not produced the needed goal of adequate elimination and patient remains with symptoms, the next step is emergency room     Paul Knowles MD    
- - -

## 2024-11-12 NOTE — TELEPHONE ENCOUNTER
Patient called and informed. Emma Leach,      Pt provided with discharge teaching and materials, including follow up care and when to return to ED. PT verbalized understanding and had no questions at this time. Pt appears stable, alert and oriented. Pt ambulatory out of ED with steady gait.

## 2025-05-08 NOTE — TELEPHONE ENCOUNTER
Left message on voicemail for patient to return call to RN hotline at # [phone].  Did he have questions or was this an FYI?  Noted in Pre-op that Dr. Knowles documented patient will be under general anesthesia     Arelis Ferrara RN    
Patient called and left a voicemail on the RN hotline requesting a call back from someone in regards to his surgery that he is going to have on Friday. Patient states when he came to see Dr. Knowles for his pre-op, Dr. Knowles asked him about anesthesia and he forgot to mention to him one thing and would like for someone to call him so that he can pass forward that message to Dr. Gomez about the anesthesia.  Please call back at [phone]    Cj APODACA RN, BSN        
Patient left message on RN Hotline at 1:59PM  He stated on VM that Dr. Knowles had asked him at the pre-op whether or not he has had any issues with anesthesia.    He did not recall at the time but remembered when he got home that in 1986, he had a hand surgery for trigger finger.  They did not put him under but instead, gave him something that allowed him to still be awake (sedative? Local anesthesia? Patient not sure)  Patient experienced pain during the operation so they ended up putting him under.  He was then discharged home by mistake as the nurses were not aware he was under general anesthesia and he got sick when he got home as the med had not wear off    He wanted Dr. Knowles to be aware of this.  Please call him back if there are any concerns    Arelis Ferrara RN    
Thank you for the update      Paul Knowles MD    
07-May-2025 06:15